# Patient Record
Sex: FEMALE | Race: BLACK OR AFRICAN AMERICAN | NOT HISPANIC OR LATINO | Employment: FULL TIME | URBAN - METROPOLITAN AREA
[De-identification: names, ages, dates, MRNs, and addresses within clinical notes are randomized per-mention and may not be internally consistent; named-entity substitution may affect disease eponyms.]

---

## 2017-01-20 ENCOUNTER — OFFICE VISIT (OUTPATIENT)
Dept: OBSTETRICS AND GYNECOLOGY | Facility: CLINIC | Age: 22
End: 2017-01-20
Payer: MEDICAID

## 2017-01-20 VITALS
BODY MASS INDEX: 37.77 KG/M2 | WEIGHT: 187.38 LBS | HEIGHT: 59 IN | SYSTOLIC BLOOD PRESSURE: 90 MMHG | DIASTOLIC BLOOD PRESSURE: 60 MMHG

## 2017-01-20 DIAGNOSIS — N91.2 AMENORRHEA: Primary | ICD-10-CM

## 2017-01-20 DIAGNOSIS — O09.891 HISTORY OF PRETERM DELIVERY, CURRENTLY PREGNANT, FIRST TRIMESTER: ICD-10-CM

## 2017-01-20 DIAGNOSIS — Z34.91 NORMAL PREGNANCY IN FIRST TRIMESTER: ICD-10-CM

## 2017-01-20 DIAGNOSIS — Z86.32 HISTORY OF GESTATIONAL DIABETES: ICD-10-CM

## 2017-01-20 DIAGNOSIS — R82.71 GBS BACTERIURIA: ICD-10-CM

## 2017-01-20 PROBLEM — O09.899 HISTORY OF PRETERM DELIVERY, CURRENTLY PREGNANT: Status: ACTIVE | Noted: 2017-01-20

## 2017-01-20 LAB
B-HCG UR QL: POSITIVE
CTP QC/QA: YES

## 2017-01-20 PROCEDURE — 99213 OFFICE O/P EST LOW 20 MIN: CPT | Mod: PBBFAC | Performed by: OBSTETRICS & GYNECOLOGY

## 2017-01-20 PROCEDURE — 99202 OFFICE O/P NEW SF 15 MIN: CPT | Mod: TH,S$PBB,, | Performed by: OBSTETRICS & GYNECOLOGY

## 2017-01-20 PROCEDURE — 99999 PR PBB SHADOW E&M-EST. PATIENT-LVL III: CPT | Mod: PBBFAC,,, | Performed by: OBSTETRICS & GYNECOLOGY

## 2017-01-20 PROCEDURE — 87086 URINE CULTURE/COLONY COUNT: CPT

## 2017-01-20 PROCEDURE — 81025 URINE PREGNANCY TEST: CPT | Mod: PBBFAC | Performed by: OBSTETRICS & GYNECOLOGY

## 2017-01-20 PROCEDURE — 87591 N.GONORRHOEAE DNA AMP PROB: CPT

## 2017-01-20 NOTE — LETTER
January 20, 2017      Anh Green MD  [address] [facility] [address]  [facility] [address]           [facility] [address]  [address] [facility] [address]  [facility] [address]  Phone: [phone]  Fax: [phone]          Patient: Jacobo Alvarado   MR Number: [id number]   YOB: 1995   Date of Visit: 1/20/2017       Dear Dr. Anh Green:    Thank you for referring Jacobo Alvarado to me for evaluation. Attached you will find relevant portions of my assessment and plan of care.    If you have questions, please do not hesitate to call me. I look forward to following Jacobo Alvarado along with you.    Sincerely,    Tiffanie Salinas MD    Enclosure  CC:  No Recipients    If you would like to receive this communication electronically, please contact [email or web address] or [phone] to request more information on [facility] access.    For providers and/or their staff who would like to refer a patient to [facility], please contact us through our one-stop-shop provider referral line, [facility], at [phone].    If you feel you have received this communication in error or would no longer like to receive these types of communications, please e-mail [email or web address]

## 2017-01-20 NOTE — MR AVS SNAPSHOT
"    [facility] [address]  [address]  [address]  [facility] [address]  Phone: [phone]  Fax: [phone]                  Jacobo Alvarado   2017 1:15 PM   Office Visit    Description:  Female : 1995   Provider:  Tiffanie Salinas MD   Department:  [facility] [address]           Reason for Visit     Amenorrhea           Diagnoses this Visit        Comments    Amenorrhea    -  Primary            To Do List           Goals (5 Years of Data)     None      [facility]     [facility] Nurse Care Line -  Assistance  Registered nurses in the [facility] On Call Center provide clinical advisement, health education, appointment booking, and other advisory services.  Call for this free service at [phone].             Medications           Message regarding Medications     Verify the changes and/or additions to your medication regime listed below are the same as discussed with your clinician today.  If any of these changes or additions are incorrect, please notify your healthcare provider.             Verify that the below list of medications is an accurate representation of the medications you are currently taking.  If none reported, the list may be blank. If incorrect, please contact your healthcare provider. Carry this list with you in case of emergency.           Current Medications     norgestimate-ethinyl estradiol (ORTHO-CYCLEN) 0.25-35 mg-mcg per tablet Take 1 tablet by mouth once daily.           Clinical Reference Information           Vital Signs - Last Recorded  Most recent update: 2017  1:51 PM by Saadia Treadwell    BP Ht Wt LMP BMI    90/60 4' 11" (1.499 m) 85 kg (187 lb 6.3 oz) 2016 37.85 kg/m2      Blood Pressure          Most Recent Value    BP  90/60      Allergies as of 2017     No Known Allergies      Immunizations Administered on Date of Encounter - 2017     None      Orders Placed During Today's Visit      Normal Orders This Visit    " POCT Urine Pregnancy          1/20/2017  1:59 PM - Saadia Treadwell      Component Results     Component Value Flag Ref Range Units Status    POC Preg Test, Ur Positive (A) Negative  Final     Acceptable Yes    Final

## 2017-01-20 NOTE — PROGRESS NOTES
New OB    SUBJECTIVE:     Chief Complaint: Amenorrhea (lmp 2016)       History of Present Illness:  Pt is a 22 y.o. female who presents complaining of amenorrhea.  She is 6 weeks by her LMP.  She denies vaginal bleeding but reports some abdominal cramping. She does have nausea and does not have vomiting.     Significant History:   History of PPROM at 36 weeks followed by induction  GDM diet controlled in previous pregnancy  Alcohol use prior to knowledge of pregnancy    P[facility] at [facility]    Review of patient's allergies indicates:  No Known Allergies    Past Medical History   Diagnosis Date    Chlamydia      History reviewed. No pertinent past surgical history.  OB History      Para Term  AB TAB SAB Ectopic Multiple Living    2 1  1      1        Family History   Problem Relation Age of Onset    Breast cancer Other     Diabetes Mother     Kidney disease Mother      Social History   Substance Use Topics    Smoking status: Never Smoker    Smokeless tobacco: None      Comment: quit one month ago    Alcohol use No       No current outpatient prescriptions on file.     No current facility-administered medications for this visit.        Review of Systems:  Review of Systems   Constitutional: Negative for fever and unexpected weight change.   Respiratory: Negative for shortness of breath.    Cardiovascular: Negative for chest pain.   Gastrointestinal: Positive for nausea. Negative for constipation, diarrhea and vomiting.   Genitourinary: Positive for pelvic pain (cramping). Negative for dysuria, frequency, menstrual problem, urgency, vaginal bleeding, vaginal discharge and vaginal pain.   Psychiatric/Behavioral: The patient is not nervous/anxious.         OBJECTIVE:     Physical Exam:  Physical Exam   Constitutional: She is oriented to person, place, and time. She appears well-developed and well-nourished.   Neck: Normal range of motion. Neck supple. No tracheal deviation present. No thyromegaly  present.   Cardiovascular: Normal rate, regular rhythm and normal heart sounds.    Pulmonary/Chest: Effort normal and breath sounds normal. Right breast exhibits no inverted nipple, no mass, no nipple discharge, no skin change and no tenderness. Left breast exhibits no inverted nipple, no mass, no nipple discharge, no skin change and no tenderness. Breasts are symmetrical.   Abdominal: Soft.   Genitourinary: Vagina normal and uterus normal. No labial fusion. There is no rash, tenderness, lesion or injury on the right labia. There is no rash, tenderness, lesion or injury on the left labia. Uterus is not deviated, not enlarged, not fixed and not tender. Cervix exhibits no motion tenderness, no discharge and no friability. Right adnexum displays no mass, no tenderness and no fullness. Left adnexum displays no mass, no tenderness and no fullness. No erythema, tenderness or bleeding in the vagina. No foreign body in the vagina. No signs of injury around the vagina. No vaginal discharge found.   Genitourinary Comments: Mild tenderness on exam   Neurological: She is alert and oriented to person, place, and time.   Psychiatric: She has a normal mood and affect. Her behavior is normal. Judgment and thought content normal.   Nursing note and vitals reviewed.        ASSESSMENT:       ICD-10-CM ICD-9-CM    1. Amenorrhea N91.2 626.0 POCT Urine Pregnancy      US OB/GYN Procedure ([facility])      Urine culture      C. trachomatis/N. gonorrhoeae by AMP DNA Urine   2. Normal pregnancy in first trimester Z34.91 V22.2    3. History of gestational diabetes Z86.32 V12.21    4. History of  delivery, currently pregnant, first trimester O09.211 V23.41           Plan:      Jacobo was seen today for amenorrhea.    Diagnoses and all orders for this visit:    Amenorrhea  -     POCT Urine Pregnancy  -     US OB/GYN Procedure ([facility]); Future  -     Urine culture  -     C. trachomatis/N. gonorrhoeae by AMP DNA Urine    Normal  pregnancy in first trimester    History of gestational diabetes    History of  delivery, currently pregnant, first trimester        Orders Placed This Encounter   Procedures    Urine culture    C. trachomatis/N. gonorrhoeae by AMP DNA Urine    POCT Urine Pregnancy    US OB/GYN Procedure ([facility])       Positive UPT:   - UPT: positive in the clinic  - Dating U/S: ordered today  - Will discuss down syndrome screening next visit  - Recommended Prenatal vitamins with with at least 400 mcg of folic acid  - First trimester labs: release signed from [facility]; if no records next visit will order    History of PPROM:   - needs jignesh at 16 weeks    History of GDM:   - will screen with labs for DM at next visit if not in records      Counseling: Patient was counseled today on routine 1st trimester precautions, including vaginal bleeding and abdominal pain. We also discussed proper weight gain based on the [place] [employer]'s recommendations based on her pre-pregnancy weight, foods to avoid in pregnancy (i.e. sushi, fish that are high in mercury, cold deli meat, and unpasteurized cheeses), environmental precautions such as cat litter, and prenatal vitamin options (i.e. stool softener, DHA).  Patient given A to Z handbook of pregnancy.    Return in about 1 month (around 2017) for ob check.    Tiffanie Salinas

## 2017-01-22 LAB
BACTERIA UR CULT: NORMAL
BACTERIA UR CULT: NORMAL

## 2017-01-23 LAB
C TRACH DNA SPEC QL NAA+PROBE: NEGATIVE
N GONORRHOEA DNA SPEC QL NAA+PROBE: NEGATIVE

## 2017-01-27 ENCOUNTER — PROCEDURE VISIT (OUTPATIENT)
Dept: OBSTETRICS AND GYNECOLOGY | Facility: CLINIC | Age: 22
End: 2017-01-27
Payer: MEDICAID

## 2017-01-27 DIAGNOSIS — N91.2 AMENORRHEA: ICD-10-CM

## 2017-01-27 PROBLEM — R82.71 GBS BACTERIURIA: Status: ACTIVE | Noted: 2017-01-27

## 2017-01-27 PROCEDURE — 76801 OB US < 14 WKS SINGLE FETUS: CPT | Mod: PBBFAC | Performed by: OBSTETRICS & GYNECOLOGY

## 2017-01-27 PROCEDURE — 76801 OB US < 14 WKS SINGLE FETUS: CPT | Mod: 26,S$PBB,, | Performed by: OBSTETRICS & GYNECOLOGY

## 2017-02-17 ENCOUNTER — ROUTINE PRENATAL (OUTPATIENT)
Dept: OBSTETRICS AND GYNECOLOGY | Facility: CLINIC | Age: 22
End: 2017-02-17
Payer: MEDICAID

## 2017-02-17 ENCOUNTER — TELEPHONE (OUTPATIENT)
Dept: OBSTETRICS AND GYNECOLOGY | Facility: CLINIC | Age: 22
End: 2017-02-17

## 2017-02-17 VITALS
BODY MASS INDEX: 37.76 KG/M2 | DIASTOLIC BLOOD PRESSURE: 70 MMHG | SYSTOLIC BLOOD PRESSURE: 100 MMHG | WEIGHT: 186.94 LBS

## 2017-02-17 DIAGNOSIS — O03.9 COMPLETE ABORTION: Primary | ICD-10-CM

## 2017-02-17 PROBLEM — Z34.91 NORMAL PREGNANCY IN FIRST TRIMESTER: Status: RESOLVED | Noted: 2017-01-20 | Resolved: 2017-02-17

## 2017-02-17 PROBLEM — O09.899 HISTORY OF PRETERM DELIVERY, CURRENTLY PREGNANT: Status: RESOLVED | Noted: 2017-01-20 | Resolved: 2017-02-17

## 2017-02-17 PROBLEM — Z86.32 HISTORY OF GESTATIONAL DIABETES: Status: RESOLVED | Noted: 2017-01-20 | Resolved: 2017-02-17

## 2017-02-17 PROCEDURE — 99212 OFFICE O/P EST SF 10 MIN: CPT | Mod: PBBFAC | Performed by: OBSTETRICS & GYNECOLOGY

## 2017-02-17 PROCEDURE — 88305 TISSUE EXAM BY PATHOLOGIST: CPT | Performed by: PATHOLOGY

## 2017-02-17 PROCEDURE — 99999 PR PBB SHADOW E&M-EST. PATIENT-LVL II: CPT | Mod: PBBFAC,,, | Performed by: OBSTETRICS & GYNECOLOGY

## 2017-02-17 PROCEDURE — 88305 TISSUE EXAM BY PATHOLOGIST: CPT | Mod: 26,,, | Performed by: PATHOLOGY

## 2017-02-17 PROCEDURE — 99214 OFFICE O/P EST MOD 30 MIN: CPT | Mod: TH,S$PBB,, | Performed by: OBSTETRICS & GYNECOLOGY

## 2017-02-17 RX ORDER — OXYCODONE AND ACETAMINOPHEN 5; 325 MG/1; MG/1
1 TABLET ORAL EVERY 4 HOURS PRN
Qty: 15 TABLET | Refills: 0 | Status: SHIPPED | OUTPATIENT
Start: 2017-02-17 | End: 2017-02-27

## 2017-02-17 RX ORDER — OXYCODONE AND ACETAMINOPHEN 5; 325 MG/1; MG/1
1 TABLET ORAL EVERY 4 HOURS PRN
Qty: 15 TABLET | Refills: 0 | Status: SHIPPED | OUTPATIENT
Start: 2017-02-17 | End: 2017-02-17 | Stop reason: SDUPTHER

## 2017-02-17 RX ORDER — MISOPROSTOL 200 UG/1
800 TABLET ORAL ONCE
Qty: 4 TABLET | Refills: 0 | Status: SHIPPED | OUTPATIENT
Start: 2017-02-17 | End: 2018-03-07

## 2017-02-17 NOTE — MR AVS SNAPSHOT
"    [facility] [address]  [address]  [address]  [facility] [address]  Phone: [phone]  Fax: [phone]                  Jacobo Alvarado   2017 10:00 AM   Routine Prenatal    Description:  Female : 1995   Provider:  Tiffanie Salinas MD   Department:  [facility] [address]           Reason for Visit     Routine Prenatal Visit                To Do List           Goals (5 Years of Data)     None      [facility]     [facility] On [facility] Nurse [facility] -  Assistance  Registered nurses in the [facility] On Call Center provide clinical advisement, health education, appointment booking, and other advisory services.  Call for this free service at [phone].             Medications           Message regarding Medications     Verify the changes and/or additions to your medication regime listed below are the same as discussed with your clinician today.  If any of these changes or additions are incorrect, please notify your healthcare provider.             Verify that the below list of medications is an accurate representation of the medications you are currently taking.  If none reported, the list may be blank. If incorrect, please contact your healthcare provider. Carry this list with you in case of emergency.                Clinical Reference Information           Prenatal Vitals     Enc. Date GA Prenatal Vitals Prenatal Pulse Pain Level Urine Albumin/Glucose Edema Presentation Dilation/Effacement/Station    17 10w0d 100/70 / 84.8 kg (186 lb 15.2 oz)   6        17 6w0d 90/60 / 85 kg (187 lb 6.3 oz)              TWG: 3.152 kg (6 lb 15.2 oz)   Pregravid weight: 81.6 kg (180 lb)   Height: 4' 11" (1.499 m)   BMI: 36.3       Your Vitals Were     BP Weight Last Period BMI       100/70 84.8 kg (186 lb 15.2 oz) 2016 37.76 kg/m2       Allergies as of 2017     No Known Allergies      Immunizations Administered on Date of Encounter - 2017     None      Language " Assistance Services     ATTENTION: Language assistance services are available, free of charge. Please call [phone].      ATENCIÓN: Si habla judson, tiene a dent disposición servicios gratuitos de asistencia lingüística. Llame al [phone].     CHÚ Ý: N?u b?n nói Ti?ng Vi?t, có các d?ch v? h? tr? ngôn ng? mi?n phí dành cho b?n. G?i s? [phone].         [facility] [address] complies with applicable Federal civil rights laws and does not discriminate on the basis of race, color, national origin, age, disability, or sex.

## 2017-02-17 NOTE — PROGRESS NOTES
Patient presents today with active bleeding and spontaneous ab in the clinic. She reports bleeding/cramping since 4 am.  She came late to her appointment due to discomfort.  She passed a 10 wk fetus in the clinic (specimen sent to pathology).  Speculum exam reveals minimal bleeding.  Patient did not tolerate exam and I was unable to visualize the cervix.  Grossly, no remaining products visible.    Cytotec 800 mcg sent to pharmacy.  Percocet rx given.  O positive blood type.  Hcg scheduled q 2 weeks starting in two weeks.   Patient desires paraguard IUD.  Will return for post partum eval and placement of IUD once approved.  Patient advised, no intercourse until IUD is placed to ensure no active pregnancy at the time of placement.

## 2017-02-19 ENCOUNTER — TELEPHONE (OUTPATIENT)
Dept: OBSTETRICS AND GYNECOLOGY | Facility: HOSPITAL | Age: 22
End: 2017-02-19

## 2017-02-19 NOTE — TELEPHONE ENCOUNTER
Patient called.  She was able to get cytotec from the pharmacy.  She does reports heavy bleeding.  Went through 5 pads over night.  Has only required 2 pads this morning.  Does not recall passing any more products. Overall, feels good.      Tiffanie Salinas

## 2017-02-22 ENCOUNTER — PATIENT MESSAGE (OUTPATIENT)
Dept: OBSTETRICS AND GYNECOLOGY | Facility: CLINIC | Age: 22
End: 2017-02-22

## 2017-02-22 DIAGNOSIS — R10.2 PELVIC PAIN: Primary | ICD-10-CM

## 2017-02-22 NOTE — TELEPHONE ENCOUNTER
"Patient states she has tenderness in vaginal and pelvic area with "shocking pain. Pt states she itches when she take the percocet. States she finished cytotec Saturday. States pain has improved slightly this morning. Please advise.  "

## 2017-02-23 ENCOUNTER — TELEPHONE (OUTPATIENT)
Dept: OBSTETRICS AND GYNECOLOGY | Facility: CLINIC | Age: 22
End: 2017-02-23

## 2017-02-23 NOTE — TELEPHONE ENCOUNTER
Patient called.  Timeline is as follows: miscarriage on Friday in the office, cytotec Saturday, bleeding/cramping resolved by Sunday, Monday started having occasional sharp shooting pains in the pelvis.  No fever or chills.  Reports pain is a 8-10/10.  Still spotting, minimal cramping.     Pelvic ultrasound and urine culture ordered.

## 2017-02-24 ENCOUNTER — HOSPITAL ENCOUNTER (OUTPATIENT)
Dept: RADIOLOGY | Facility: OTHER | Age: 22
Discharge: HOME | End: 2017-02-24
Attending: OBSTETRICS & GYNECOLOGY
Payer: MEDICAID

## 2017-02-24 DIAGNOSIS — R10.2 PELVIC PAIN: ICD-10-CM

## 2017-02-24 PROCEDURE — 76856 US EXAM PELVIC COMPLETE: CPT | Mod: 26,,, | Performed by: RADIOLOGY

## 2017-02-24 PROCEDURE — 76856 US EXAM PELVIC COMPLETE: CPT | Mod: TC

## 2017-02-24 PROCEDURE — 76830 TRANSVAGINAL US NON-OB: CPT | Mod: 26,,, | Performed by: RADIOLOGY

## 2017-02-27 ENCOUNTER — TELEPHONE (OUTPATIENT)
Dept: OBSTETRICS AND GYNECOLOGY | Facility: CLINIC | Age: 22
End: 2017-02-27

## 2017-02-27 NOTE — TELEPHONE ENCOUNTER
"Patient called.  Ultrasound reviewed with patient.  She reports that this weekend she has had "shocking" pains here and there throughout the day and some pelvic cramping.  Ultrasound is consistent with post ab appearance.  No masses in the adnexa.  Some free fluid.  Suspect pain is due to free fluid/rupture of cyst or possibly a UTI.  Pt did not drop off urine sample the other day, but will today. Pt denies fever, chills, pelvic tenderness, etc.  Bleeding is now minimal. Will continue to monitor pain.  If pain does not improve or worsens over the next few days, patient will notify me.    Tiffanie Salinas    "

## 2017-03-09 ENCOUNTER — PATIENT MESSAGE (OUTPATIENT)
Dept: OBSTETRICS AND GYNECOLOGY | Facility: CLINIC | Age: 22
End: 2017-03-09

## 2017-03-10 ENCOUNTER — TELEPHONE (OUTPATIENT)
Dept: OBSTETRICS AND GYNECOLOGY | Facility: CLINIC | Age: 22
End: 2017-03-10

## 2017-03-10 NOTE — TELEPHONE ENCOUNTER
----- Message from Ashlee White sent at 3/9/2017 10:19 AM CST -----  Contact: Self  Pt is calling in regards of some heavy bleeding that she is having. The pt states that the bleeding started today and filled up a pad within 10 mins. The pt can be reached at [phone]. Thanks KG

## 2017-03-10 NOTE — TELEPHONE ENCOUNTER
Spoke to patient and she states she passed a blood clot and she have not had any bleeding after that, patient denied pain, dizziness and weakness, patient was informed to monitor for fever, if she bleed to call the office. Patient verbalized understanding.

## 2017-03-15 ENCOUNTER — LAB VISIT (OUTPATIENT)
Dept: LAB | Facility: OTHER | Age: 22
End: 2017-03-15
Attending: OBSTETRICS & GYNECOLOGY
Payer: MEDICAID

## 2017-03-15 DIAGNOSIS — O03.9 COMPLETE ABORTION: ICD-10-CM

## 2017-03-15 LAB — HCG INTACT+B SERPL-ACNC: 23 MIU/ML

## 2017-03-15 PROCEDURE — 84702 CHORIONIC GONADOTROPIN TEST: CPT

## 2017-03-15 PROCEDURE — 36415 COLL VENOUS BLD VENIPUNCTURE: CPT

## 2017-03-24 ENCOUNTER — PATIENT MESSAGE (OUTPATIENT)
Dept: OBSTETRICS AND GYNECOLOGY | Facility: CLINIC | Age: 22
End: 2017-03-24

## 2017-03-27 ENCOUNTER — TELEPHONE (OUTPATIENT)
Dept: OBSTETRICS AND GYNECOLOGY | Facility: CLINIC | Age: 22
End: 2017-03-27

## 2017-03-27 NOTE — TELEPHONE ENCOUNTER
Spoke to patient. Pt states she has been having light bleeding for 4 days. Advised pt to monitor bleeding. Advised to go to ER if filling pad an hour, severe pelvic pain, chills and fever.

## 2017-04-04 ENCOUNTER — OFFICE VISIT (OUTPATIENT)
Dept: OBSTETRICS AND GYNECOLOGY | Facility: CLINIC | Age: 22
End: 2017-04-04
Payer: MEDICAID

## 2017-04-04 ENCOUNTER — LAB VISIT (OUTPATIENT)
Dept: LAB | Facility: OTHER | Age: 22
End: 2017-04-04
Attending: OBSTETRICS & GYNECOLOGY
Payer: MEDICAID

## 2017-04-04 VITALS
HEIGHT: 60 IN | BODY MASS INDEX: 36.53 KG/M2 | DIASTOLIC BLOOD PRESSURE: 76 MMHG | SYSTOLIC BLOOD PRESSURE: 110 MMHG | WEIGHT: 186.06 LBS

## 2017-04-04 DIAGNOSIS — R30.0 DYSURIA: ICD-10-CM

## 2017-04-04 DIAGNOSIS — O03.9 COMPLETE ABORTION: ICD-10-CM

## 2017-04-04 DIAGNOSIS — Z30.011 ENCOUNTER FOR INITIAL PRESCRIPTION OF CONTRACEPTIVE PILLS: ICD-10-CM

## 2017-04-04 DIAGNOSIS — N92.6 IRREGULAR BLEEDING: Primary | ICD-10-CM

## 2017-04-04 DIAGNOSIS — N89.8 VAGINAL DISCHARGE: ICD-10-CM

## 2017-04-04 LAB — HCG INTACT+B SERPL-ACNC: 2.3 MIU/ML

## 2017-04-04 PROCEDURE — 84702 CHORIONIC GONADOTROPIN TEST: CPT

## 2017-04-04 PROCEDURE — 36415 COLL VENOUS BLD VENIPUNCTURE: CPT

## 2017-04-04 PROCEDURE — 99214 OFFICE O/P EST MOD 30 MIN: CPT | Mod: S$PBB,,, | Performed by: OBSTETRICS & GYNECOLOGY

## 2017-04-04 PROCEDURE — 99999 PR PBB SHADOW E&M-EST. PATIENT-LVL III: CPT | Mod: PBBFAC,,, | Performed by: OBSTETRICS & GYNECOLOGY

## 2017-04-04 NOTE — PROGRESS NOTES
Gynecology    SUBJECTIVE:     Chief Complaint: Vaginal Bleeding (bleeding off and on)       History of Present Illness:  22 year old with bleeding irregularities.  Patient had a miscarriage that was 2/17/17.  She stopped bleeding one week later, started again one week after that with on/off bleeding for one week.  She had occasional spotting on and off for the month following this.  Then last week, patient had four days of heavy bleeding (pad every 2 hours) followed by on and off spotting (panti liner per day) and bleeding after intercourse.      Patient also complains of vaginal discharge- white, no odor, no itching, no burning.  Patient complains of a slight dysuria and urgency.      Review of Systems:  Review of Systems   Constitutional: Negative for appetite change, fever and unexpected weight change.   Respiratory: Negative for shortness of breath.    Cardiovascular: Negative for chest pain.   Gastrointestinal: Negative for nausea and vomiting.   Genitourinary: Positive for dysuria, frequency, menstrual problem, urgency and vaginal discharge. Negative for menorrhagia, pelvic pain, vaginal bleeding, vaginal pain and vaginal odor.        OBJECTIVE:     Physical Exam:  Physical Exam   Constitutional: She is oriented to person, place, and time. She appears well-developed and well-nourished.   Pulmonary/Chest: Effort normal.   Abdominal: Soft.   Genitourinary: Uterus normal. No labial fusion. There is no rash, tenderness, lesion or injury on the right labia. There is no rash, tenderness, lesion or injury on the left labia. Uterus is not deviated, not enlarged, not fixed and not tender. Cervix exhibits no motion tenderness, no discharge and no friability. Right adnexum displays no mass, no tenderness and no fullness. Left adnexum displays no mass, no tenderness and no fullness. There is bleeding in the vagina. No erythema or tenderness in the vagina. No foreign body in the vagina. No signs of injury around the  vagina. No vaginal discharge found.   Neurological: She is alert and oriented to person, place, and time.   Psychiatric: She has a normal mood and affect. Her behavior is normal. Judgment and thought content normal.   Nursing note and vitals reviewed.        ASSESSMENT:       ICD-10-CM ICD-9-CM    1. Irregular bleeding N92.6 626.4 norgestrel-ethinyl estradiol (LO/OVRAL) 0.3-30 mg-mcg per tablet   2. Encounter for initial prescription of contraceptive pills Z30.011 V25.01    3. Vaginal discharge N89.8 623.5 Vaginosis Screen by DNA Probe      C. trachomatis/N. gonorrhoeae by AMP DNA Cervix   4. Dysuria R30.0 788.1 Urine culture          Plan:      Jacobo was seen today for vaginal bleeding.    Diagnoses and all orders for this visit:    Irregular bleeding  -     norgestrel-ethinyl estradiol (LO/OVRAL) 0.3-30 mg-mcg per tablet; Take 1 tablet by mouth once daily.    Encounter for initial prescription of contraceptive pills    Vaginal discharge  -     Vaginosis Screen by DNA Probe  -     C. trachomatis/N. gonorrhoeae by AMP DNA Cervix    Dysuria  -     Urine culture      No obvious abnormalities on exam.   Patient needs final hCG.  Ordered today.    GC/CT and vaginosis screen ordered.   Urine culture ordered.   Consider pelvic ultrasound if all wnl and no change in bleeding.   Rx for OCP sent to pharmacy.  Plan to use for three months to regulate cycle.   Paraguard paperwork filled out.      Orders Placed This Encounter   Procedures    Vaginosis Screen by DNA Probe    C. trachomatis/N. gonorrhoeae by AMP DNA Cervix    Urine culture       Return for annual or prn.    Tiffanie Salinas

## 2017-04-04 NOTE — MR AVS SNAPSHOT
[facility] [address]  [address]  [address]  [facility] [address]  Phone: [phone]  Fax: [phone]                  Jacobo Alvarado   2017 3:45 PM   Office Visit    Description:  Female : 1995   Provider:  Tiffanie Salinas MD   Department:  [facility] [address]           Reason for Visit     Vaginal Bleeding                To Do List           Goals (5 Years of Data)     None      [facility] On Call     [facility] On Call Nurse Care Line -  Assistance  Unless otherwise directed by your provider, please contact [facility], our nurse care line that is available for  assistance.     Registered nurses in the [facility] On Call Center provide: appointment scheduling, clinical advisement, health education, and other advisory services.  Call: [phone] (toll free)               Medications           Message regarding Medications     Verify the changes and/or additions to your medication regime listed below are the same as discussed with your clinician today.  If any of these changes or additions are incorrect, please notify your healthcare provider.             Verify that the below list of medications is an accurate representation of the medications you are currently taking.  If none reported, the list may be blank. If incorrect, please contact your healthcare provider. Carry this list with you in case of emergency.           Current Medications     misoprostol (CYTOTEC) 200 MCG Tab Place 4 tablets (800 mcg total) vaginally once. Take 200 mg of cytotec every 6 hours until complete.           Clinical Reference Information           Your Vitals Were     BP Height Weight Last Period BMI    110/76 5' (1.524 m) 84.4 kg (186 lb 1.1 oz) (LMP Unknown) 36.34 kg/m2      Blood Pressure          Most Recent Value    BP  110/76      Allergies as of 2017     No Known Allergies      Immunizations Administered on Date of Encounter - 2017     None      Language  Assistance Services     ATTENTION: Language assistance services are available, free of charge. Please call [phone].      ATENCIÓN: Si habla judson, tiene a dent disposición servicios gratuitos de asistencia lingüística. Llame al [phone].     CHÚ Ý: N?u b?n nói Ti?ng Vi?t, có các d?ch v? h? tr? ngôn ng? mi?n phí dành cho b?n. G?i s? [phone].         [facility] [address] complies with applicable Federal civil rights laws and does not discriminate on the basis of race, color, national origin, age, disability, or sex.

## 2017-04-05 LAB
C TRACH DNA SPEC QL NAA+PROBE: NOT DETECTED
CANDIDA RRNA VAG QL PROBE: NEGATIVE
G VAGINALIS RRNA GENITAL QL PROBE: POSITIVE
N GONORRHOEA DNA SPEC QL NAA+PROBE: NOT DETECTED
T VAGINALIS RRNA GENITAL QL PROBE: NEGATIVE

## 2017-04-06 ENCOUNTER — PATIENT MESSAGE (OUTPATIENT)
Dept: OBSTETRICS AND GYNECOLOGY | Facility: HOSPITAL | Age: 22
End: 2017-04-06

## 2017-04-06 DIAGNOSIS — N76.0 BV (BACTERIAL VAGINOSIS): Primary | ICD-10-CM

## 2017-04-06 DIAGNOSIS — B96.89 BV (BACTERIAL VAGINOSIS): Primary | ICD-10-CM

## 2017-04-06 LAB — BACTERIA UR CULT: NO GROWTH

## 2017-04-06 RX ORDER — METRONIDAZOLE 500 MG/1
500 TABLET ORAL EVERY 12 HOURS
Qty: 14 TABLET | Refills: 0 | Status: SHIPPED | OUTPATIENT
Start: 2017-04-06 | End: 2017-04-13

## 2017-04-10 ENCOUNTER — PATIENT MESSAGE (OUTPATIENT)
Dept: OBSTETRICS AND GYNECOLOGY | Facility: CLINIC | Age: 22
End: 2017-04-10

## 2017-07-28 ENCOUNTER — OFFICE VISIT (OUTPATIENT)
Dept: OBSTETRICS AND GYNECOLOGY | Facility: CLINIC | Age: 22
End: 2017-07-28
Payer: MEDICAID

## 2017-07-28 VITALS — HEIGHT: 59 IN | WEIGHT: 187.81 LBS | BODY MASS INDEX: 37.86 KG/M2

## 2017-07-28 DIAGNOSIS — Z11.3 SCREEN FOR STD (SEXUALLY TRANSMITTED DISEASE): ICD-10-CM

## 2017-07-28 DIAGNOSIS — N64.4 BREAST PAIN: ICD-10-CM

## 2017-07-28 DIAGNOSIS — Z01.419 WELL WOMAN EXAM WITH ROUTINE GYNECOLOGICAL EXAM: Primary | ICD-10-CM

## 2017-07-28 PROCEDURE — 99212 OFFICE O/P EST SF 10 MIN: CPT | Mod: PBBFAC | Performed by: OBSTETRICS & GYNECOLOGY

## 2017-07-28 PROCEDURE — 99395 PREV VISIT EST AGE 18-39: CPT | Mod: S$PBB,,, | Performed by: OBSTETRICS & GYNECOLOGY

## 2017-07-28 PROCEDURE — 87591 N.GONORRHOEAE DNA AMP PROB: CPT

## 2017-07-28 PROCEDURE — 99999 PR PBB SHADOW E&M-EST. PATIENT-LVL II: CPT | Mod: PBBFAC,,, | Performed by: OBSTETRICS & GYNECOLOGY

## 2017-07-28 NOTE — PROGRESS NOTES
History & Physical  Gynecology      SUBJECTIVE:     Chief Complaint: Annual Exam; Breast Pain (right); and STD CHECK       History of Present Illness:  Annual Exam-Premenopausal  Patient presents for annual exam. The patient has  complaints today of breast discomfort on the underside of her breasts.  She also reports a sharp pain in her chest that is over her right breast that lasts about one minute.  Not necessary after eating. Menstrual cycles are once a month/regular.  The patient is sexually active. GYN screening history: last pap: approximate date  and was normal. The patient participates in regular exercise: yes.  The patient does not smoke.      Contraception: condoms    FH:  Breast cancer: great grandmother, aunt  Colon cancer: none  Ovarian cancer: none    Review of patient's allergies indicates:  No Known Allergies    Past Medical History:   Diagnosis Date    Chlamydia      History reviewed. No pertinent surgical history.  OB History      Para Term  AB Living    2 1   1   1    SAB TAB Ectopic Multiple Live Births            1        Family History   Problem Relation Age of Onset    Breast cancer Other     Diabetes Mother     Kidney disease Mother      Social History   Substance Use Topics    Smoking status: Never Smoker    Smokeless tobacco: Not on file      Comment: quit one month ago    Alcohol use No       Current Outpatient Prescriptions   Medication Sig    misoprostol (CYTOTEC) 200 MCG Tab Place 4 tablets (800 mcg total) vaginally once. Take 200 mg of cytotec every 6 hours until complete.    norgestrel-ethinyl estradiol (LO/OVRAL) 0.3-30 mg-mcg per tablet Take 1 tablet by mouth once daily.     No current facility-administered medications for this visit.          Review of Systems:  Review of Systems   Constitutional: Negative for activity change, appetite change and fever.   Respiratory: Negative for shortness of breath.    Cardiovascular: Negative for chest pain.    Gastrointestinal: Negative for abdominal pain, constipation, diarrhea, nausea and vomiting.   Genitourinary: Negative for menorrhagia, menstrual problem, pelvic pain, vaginal bleeding, vaginal discharge and vaginal pain.   Neurological: Negative for numbness and headaches.   Breast: Negative for breast pain and nipple discharge       OBJECTIVE:     Physical Exam:  Physical Exam   Constitutional: She is oriented to person, place, and time. She appears well-developed and well-nourished.   Neck: Normal range of motion. Neck supple. No tracheal deviation present. No thyromegaly present.   Cardiovascular: Normal rate, regular rhythm and normal heart sounds.    Pulmonary/Chest: Effort normal and breath sounds normal. Right breast exhibits no inverted nipple, no mass, no nipple discharge, no skin change and no tenderness. Left breast exhibits no inverted nipple, no mass, no nipple discharge, no skin change and no tenderness. Breasts are symmetrical.   Large, pendulous breasts. No rashes or abnormalities; no lumps   Abdominal: Soft.   Genitourinary: Vagina normal and uterus normal. No labial fusion. There is no rash, tenderness, lesion or injury on the right labia. There is no rash, tenderness, lesion or injury on the left labia. Uterus is not deviated, not enlarged, not fixed and not tender. Cervix exhibits no motion tenderness, no discharge and no friability. Right adnexum displays no mass, no tenderness and no fullness. Left adnexum displays no mass, no tenderness and no fullness. No erythema, tenderness or bleeding in the vagina. No foreign body in the vagina. No signs of injury around the vagina. No vaginal discharge found.   Neurological: She is alert and oriented to person, place, and time.   Psychiatric: She has a normal mood and affect. Her behavior is normal. Judgment and thought content normal.   Nursing note and vitals reviewed.        ASSESSMENT:       ICD-10-CM ICD-9-CM    1. Well woman exam with routine  gynecological exam Z01.419 V72.31    2. Screen for STD (sexually transmitted disease) Z11.3 V74.5 C. trachomatis/N. gonorrhoeae by AMP DNA Cervix   3. Breast pain N64.4 611.71           Plan:      Jacobo was seen today for annual exam, breast pain and std check.    Diagnoses and all orders for this visit:    Well woman exam with routine gynecological exam    Screen for STD (sexually transmitted disease)  -     C. trachomatis/N. gonorrhoeae by AMP DNA Cervix    Breast pain        Orders Placed This Encounter   Procedures    C. trachomatis/N. gonorrhoeae by AMP DNA Cervix       Well Woman:  - Pap smear due next year  - Birth control: condoms; discussed kyleena and other IUDs  - GC/CT:done today  - Mammogram: due age 39  - Smoking cessation: n/a  - Labs: none reuqired   - Vaccines: suggested that patient check with peds to see if she's completed HPV  - breast pain: suspect due to large, pendulous breasts; recommended supportive bra; nsaids;     Return in about 1 year (around 7/28/2018) for annual or prn.    Tiffanie Salinas

## 2017-07-29 LAB
C TRACH DNA SPEC QL NAA+PROBE: NOT DETECTED
N GONORRHOEA DNA SPEC QL NAA+PROBE: NOT DETECTED

## 2017-09-01 ENCOUNTER — PATIENT MESSAGE (OUTPATIENT)
Dept: OBSTETRICS AND GYNECOLOGY | Facility: CLINIC | Age: 22
End: 2017-09-01

## 2018-02-03 ENCOUNTER — PATIENT MESSAGE (OUTPATIENT)
Dept: OBSTETRICS AND GYNECOLOGY | Facility: CLINIC | Age: 23
End: 2018-02-03

## 2018-02-19 ENCOUNTER — PATIENT MESSAGE (OUTPATIENT)
Dept: OBSTETRICS AND GYNECOLOGY | Facility: CLINIC | Age: 23
End: 2018-02-19

## 2018-03-07 ENCOUNTER — LAB VISIT (OUTPATIENT)
Dept: LAB | Facility: OTHER | Age: 23
End: 2018-03-07
Attending: OBSTETRICS & GYNECOLOGY
Payer: MEDICAID

## 2018-03-07 ENCOUNTER — OFFICE VISIT (OUTPATIENT)
Dept: OBSTETRICS AND GYNECOLOGY | Facility: CLINIC | Age: 23
End: 2018-03-07
Payer: MEDICAID

## 2018-03-07 VITALS
SYSTOLIC BLOOD PRESSURE: 110 MMHG | DIASTOLIC BLOOD PRESSURE: 80 MMHG | WEIGHT: 185.19 LBS | HEIGHT: 59 IN | BODY MASS INDEX: 37.33 KG/M2

## 2018-03-07 DIAGNOSIS — N92.6 IRREGULAR PERIODS: Primary | ICD-10-CM

## 2018-03-07 DIAGNOSIS — Z11.3 SCREEN FOR STD (SEXUALLY TRANSMITTED DISEASE): ICD-10-CM

## 2018-03-07 DIAGNOSIS — R10.2 PELVIC PAIN: ICD-10-CM

## 2018-03-07 DIAGNOSIS — N92.6 IRREGULAR PERIODS: ICD-10-CM

## 2018-03-07 LAB — TSH SERPL DL<=0.005 MIU/L-ACNC: 1.39 UIU/ML

## 2018-03-07 PROCEDURE — 87491 CHLMYD TRACH DNA AMP PROBE: CPT

## 2018-03-07 PROCEDURE — 86592 SYPHILIS TEST NON-TREP QUAL: CPT

## 2018-03-07 PROCEDURE — 84146 ASSAY OF PROLACTIN: CPT

## 2018-03-07 PROCEDURE — 80074 ACUTE HEPATITIS PANEL: CPT

## 2018-03-07 PROCEDURE — 84443 ASSAY THYROID STIM HORMONE: CPT

## 2018-03-07 PROCEDURE — 99213 OFFICE O/P EST LOW 20 MIN: CPT | Mod: PBBFAC | Performed by: OBSTETRICS & GYNECOLOGY

## 2018-03-07 PROCEDURE — 99214 OFFICE O/P EST MOD 30 MIN: CPT | Mod: S$PBB,,, | Performed by: OBSTETRICS & GYNECOLOGY

## 2018-03-07 PROCEDURE — 99999 PR PBB SHADOW E&M-EST. PATIENT-LVL III: CPT | Mod: PBBFAC,,, | Performed by: OBSTETRICS & GYNECOLOGY

## 2018-03-07 PROCEDURE — 86703 HIV-1/HIV-2 1 RESULT ANTBDY: CPT

## 2018-03-07 PROCEDURE — 36415 COLL VENOUS BLD VENIPUNCTURE: CPT

## 2018-03-07 PROCEDURE — 87086 URINE CULTURE/COLONY COUNT: CPT

## 2018-03-07 NOTE — PROGRESS NOTES
Gynecology    SUBJECTIVE:     Chief Complaint: irregular periods       History of Present Illness:  23 year old who presents with irregular periods.  Previously, cycles were once a month/regular.  Patient's periods have lasted 3-5 days each month.  The first day of each period has been on 10/26, 12/1, 1/1, 2/6, and 2/17.  She is concerned that these are irregular.  She has no problems with weight gain, hair growth, acne.  She does feel fatigued due to starting a new job.   She never started the OCP that was prescribed to her because she is considering another pregnancy.      She also complains of an occasional cramp on the right side minimal in severity and only occasionally. She does report also some urinary frequency.  No dysuria, no urgency.      She also wants STD testing.    Review of Systems:  Review of Systems   Constitutional: Negative for appetite change, fever and unexpected weight change.   Respiratory: Negative for shortness of breath.    Cardiovascular: Negative for chest pain.   Gastrointestinal: Negative for nausea and vomiting.   Genitourinary: Positive for menstrual problem. Negative for menorrhagia, pelvic pain, vaginal bleeding, vaginal discharge and vaginal pain.        OBJECTIVE:     Physical Exam:  Physical Exam   Constitutional: She is oriented to person, place, and time. She appears well-developed and well-nourished.   Pulmonary/Chest: Effort normal.   Neurological: She is oriented to person, place, and time.   Skin: No pallor.   Psychiatric: She has a normal mood and affect. Her behavior is normal. Judgment and thought content normal.   Nursing note and vitals reviewed.        ASSESSMENT:       ICD-10-CM ICD-9-CM    1. Irregular periods N92.6 626.4 TSH      Prolactin   2. Pelvic pain R10.2 [id number] Urine culture   3. Screen for STD (sexually transmitted disease) Z11.3 V74.5 HIV-1 and HIV-2 antibodies      RPR      Hepatitis panel, acute      C. trachomatis/N. gonorrhoeae by AMP DNA Urine           Plan:      Jacobo was seen today for irregular periods.    Diagnoses and all orders for this visit:    Irregular periods  -     TSH; Future  -     Prolactin; Future    Pelvic pain  -     Urine culture    Screen for STD (sexually transmitted disease)  -     HIV-1 and HIV-2 antibodies; Future  -     RPR; Future  -     Hepatitis panel, acute; Future  -     C. trachomatis/N. gonorrhoeae by AMP DNA Urine    - recommended OCP until she and her partner are ready for another pregnancy  - reassured that other than this most recent cycle, that her periods are mostly monthly; will order TSH and prolactin today  - patient desires STD screen  - urine culture to rule out UTI as cause for occasional sharp pain and urinary frequency    Orders Placed This Encounter   Procedures    Urine culture    C. trachomatis/N. gonorrhoeae by AMP DNA Urine    TSH    Prolactin    HIV-1 and HIV-2 antibodies    RPR    Hepatitis panel, acute       Follow-up for annual or prn.    Tiffanie Salinas

## 2018-03-08 LAB
HAV IGM SERPL QL IA: NEGATIVE
HBV CORE IGM SERPL QL IA: NEGATIVE
HBV SURFACE AG SERPL QL IA: NEGATIVE
HCV AB SERPL QL IA: NEGATIVE
HIV 1+2 AB+HIV1 P24 AG SERPL QL IA: NEGATIVE
PROLACTIN SERPL IA-MCNC: 25.2 NG/ML
RPR SER QL: NORMAL

## 2018-03-09 LAB
BACTERIA UR CULT: NORMAL
BACTERIA UR CULT: NORMAL
C TRACH DNA SPEC QL NAA+PROBE: NOT DETECTED
N GONORRHOEA DNA SPEC QL NAA+PROBE: NOT DETECTED

## 2018-03-29 ENCOUNTER — OFFICE VISIT (OUTPATIENT)
Dept: OBSTETRICS AND GYNECOLOGY | Facility: CLINIC | Age: 23
End: 2018-03-29
Payer: MEDICAID

## 2018-03-29 VITALS
DIASTOLIC BLOOD PRESSURE: 52 MMHG | WEIGHT: 191.56 LBS | HEIGHT: 59 IN | SYSTOLIC BLOOD PRESSURE: 137 MMHG | BODY MASS INDEX: 38.62 KG/M2

## 2018-03-29 DIAGNOSIS — N91.2 AMENORRHEA: Primary | ICD-10-CM

## 2018-03-29 DIAGNOSIS — Z32.01 POSITIVE URINE PREGNANCY TEST: ICD-10-CM

## 2018-03-29 PROBLEM — R82.71 GBS BACTERIURIA: Status: RESOLVED | Noted: 2017-01-27 | Resolved: 2018-03-29

## 2018-03-29 PROCEDURE — 88175 CYTOPATH C/V AUTO FLUID REDO: CPT

## 2018-03-29 PROCEDURE — 99214 OFFICE O/P EST MOD 30 MIN: CPT | Mod: TH,S$GLB,, | Performed by: OBSTETRICS & GYNECOLOGY

## 2018-03-29 PROCEDURE — 87086 URINE CULTURE/COLONY COUNT: CPT

## 2018-03-29 PROCEDURE — 87480 CANDIDA DNA DIR PROBE: CPT

## 2018-03-29 RX ORDER — PYRIDOXINE HCL (VITAMIN B6) 25 MG
TABLET ORAL
Qty: 40 TABLET | Refills: 3 | Status: SHIPPED | OUTPATIENT
Start: 2018-03-29 | End: 2018-04-24

## 2018-03-29 NOTE — PROGRESS NOTES
New OB    SUBJECTIVE:     Chief Complaint: Initial Prenatal Visit       History of Present Illness:  Pt is a 23 y.o. female who presents complaining of amenorrhea.  She is 7w2d based on her LMP.    She does not vaginal bleeding/spotting, does not abdominal pain,  does have nausea, does have vomiting.     Significant History:   History of  delivery (36 weeks)    Last pap: , normal    Review of patient's allergies indicates:  No Known Allergies    Past Medical History:   Diagnosis Date    Chlamydia      History reviewed. No pertinent surgical history.  OB History      Para Term  AB Living    4 1   1 1 1    SAB TAB Ectopic Multiple Live Births    1       1        Family History   Problem Relation Age of Onset    Breast cancer Other     Diabetes Mother     Kidney disease Mother     Ovarian cancer Neg Hx     Colon cancer Neg Hx      Social History   Substance Use Topics    Smoking status: Never Smoker    Smokeless tobacco: Not on file      Comment: quit one month ago    Alcohol use No       Current Outpatient Prescriptions   Medication Sig    doxylamine succinate (ULTRA SLEEP, DOXYLAMINE SUCC,) 25 mg tablet Take 1 tablet nightly. If still nauseated, add 1/2 a tablet in the morning and 1/2 a tablet in the afternoon.    PNV,calcium 72-iron-folic acid 27 mg iron- 1 mg Tab Take 1 tablet (1 each total) by mouth once daily.    pyridoxine, vitamin B6, (B-6) 25 MG Tab Take 1 tablet nightly. If still nauseated, add 1/2 a tablet in the morning and 1/2 a tablet in the afternoon.     No current facility-administered medications for this visit.        Review of Systems:  Review of Systems   Constitutional: Negative for fever and unexpected weight change.   Respiratory: Negative for shortness of breath.    Cardiovascular: Negative for chest pain.   Gastrointestinal: Positive for nausea and vomiting. Negative for constipation and diarrhea.   Genitourinary: Negative for dysuria, frequency, menstrual  problem, pelvic pain, urgency, vaginal bleeding, vaginal discharge and vaginal pain.   Psychiatric/Behavioral: The patient is not nervous/anxious.         OBJECTIVE:     Physical Exam:  Physical Exam   Constitutional: She is oriented to person, place, and time. She appears well-developed and well-nourished.   Neck: Normal range of motion. Neck supple. No tracheal deviation present. No thyromegaly present.   Cardiovascular: Normal rate, regular rhythm and normal heart sounds.    Pulmonary/Chest: Effort normal and breath sounds normal. Right breast exhibits no inverted nipple, no mass, no nipple discharge, no skin change and no tenderness. Left breast exhibits no inverted nipple, no mass, no nipple discharge, no skin change and no tenderness. Breasts are symmetrical.   Abdominal: Soft.   Genitourinary: Vagina normal and uterus normal. No labial fusion. There is no rash, tenderness, lesion or injury on the right labia. There is no rash, tenderness, lesion or injury on the left labia. Uterus is not deviated, not enlarged, not fixed and not tender. Cervix exhibits no motion tenderness, no discharge and no friability. Right adnexum displays no mass, no tenderness and no fullness. Left adnexum displays no mass, no tenderness and no fullness. No erythema, tenderness or bleeding in the vagina. No foreign body in the vagina. No signs of injury around the vagina. No vaginal discharge found.   Neurological: She is alert and oriented to person, place, and time.   Psychiatric: She has a normal mood and affect. Her behavior is normal. Judgment and thought content normal.   Nursing note and vitals reviewed.      Chaperoned by: Laura    ASSESSMENT:       ICD-10-CM ICD-9-CM    1. Amenorrhea N91.2 626.0  OB/GYN Procedure ([facility])      CANCELED: POCT urine pregnancy   2. Positive urine pregnancy test Z32.01 V72.42 CBC auto differential      Hepatitis B surface antigen      Rubella antibody, IgG      Urine culture       Liquid-based pap smear, screening      Type & Screen      PNV,calcium 72-iron-folic acid 27 mg iron- 1 mg Tab      doxylamine succinate (ULTRA SLEEP, DOXYLAMINE SUCC,) 25 mg tablet      pyridoxine, vitamin B6, (B-6) 25 MG Tab      US MFM Procedure ([facility])      Cystic Fibrosis Mutation Panel      Hemoglobin A1c      Vaginosis Screen by DNA Probe          Plan:      Jacobo was seen today for initial prenatal visit.    Diagnoses and all orders for this visit:    Amenorrhea  -     Cancel: POCT urine pregnancy  -     US OB/GYN Procedure ([facility]); Future    Positive urine pregnancy test  -     CBC auto differential; Future  -     Hepatitis B surface antigen; Future  -     Rubella antibody, IgG; Future  -     Urine culture  -     Liquid-based pap smear, screening  -     Type & Screen; Future  -     PNV,calcium 72-iron-folic acid 27 mg iron- 1 mg Tab; Take 1 tablet (1 each total) by mouth once daily.  -     doxylamine succinate (ULTRA SLEEP, DOXYLAMINE SUCC,) 25 mg tablet; Take 1 tablet nightly. If still nauseated, add 1/2 a tablet in the morning and 1/2 a tablet in the afternoon.  -     pyridoxine, vitamin B6, (B-6) 25 MG Tab; Take 1 tablet nightly. If still nauseated, add 1/2 a tablet in the morning and 1/2 a tablet in the afternoon.  -     US MFM Procedure ([facility]); Future  -     Cystic Fibrosis Mutation Panel; Future  -     Hemoglobin A1c; Future  -     Vaginosis Screen by DNA Probe        Orders Placed This Encounter   Procedures    Urine culture    Vaginosis Screen by DNA Probe    CBC auto differential    Hepatitis B surface antigen    Rubella antibody, IgG    Cystic Fibrosis Mutation Panel    Hemoglobin A1c    Type & Screen    US OB/GYN Procedure ([facility])    US MFM Procedure ([facility])       Positive UPT:   - UPT: positive in the clinic  - Dating U/S: ordered today  - She does want 1st trimester screening with MFM.   - Recommended Prenatal vitamins with with at least 400 mcg of folic acid  -  First trimester labs: ordered +CF;     History of  delivery:  - recommended progesterone for 16-36 weeks; discussed increased risk of  delivery this pregnancy    Obesity:  - Obesity/Overweight:   - counseled on the risks with obesity: miscarriage, structural anomalies, gestational diabetes, gestational hypertension, pre-eclampsia, abnormal growth patterns  - discussed recommended weight gain based on BMI:    BMI 25-29.9: 15-25 lb; >30: 11-20 lb      Counseling: Patient was counseled today on routine 1st trimester precautions, including vaginal bleeding and abdominal pain. We also discussed proper weight gain based on the [place] [employer]'s recommendations based on her pre-pregnancy weight, foods to avoid in pregnancy (i.e. sushi, fish that are high in mercury, cold deli meat, and unpasteurized cheeses), environmental precautions such as cat litter, and prenatal vitamin options (i.e. stool softener, DHA).  Patient given A to Z handbook of pregnancy.    No Follow-up on file.    Tiffanie Salinas

## 2018-03-30 ENCOUNTER — PATIENT MESSAGE (OUTPATIENT)
Dept: OBSTETRICS AND GYNECOLOGY | Facility: CLINIC | Age: 23
End: 2018-03-30

## 2018-03-30 DIAGNOSIS — B37.9 YEAST INFECTION: Primary | ICD-10-CM

## 2018-03-30 LAB
BACTERIA UR CULT: NORMAL
BACTERIA UR CULT: NORMAL
CANDIDA RRNA VAG QL PROBE: POSITIVE
G VAGINALIS RRNA GENITAL QL PROBE: NEGATIVE
T VAGINALIS RRNA GENITAL QL PROBE: NEGATIVE

## 2018-03-30 RX ORDER — MICONAZOLE NITRATE 100 MG/1
100 SUPPOSITORY VAGINAL NIGHTLY
Qty: 7 SUPPOSITORY | Refills: 0 | Status: SHIPPED | OUTPATIENT
Start: 2018-03-30 | End: 2018-04-24

## 2018-04-09 ENCOUNTER — PATIENT MESSAGE (OUTPATIENT)
Dept: OBSTETRICS AND GYNECOLOGY | Facility: CLINIC | Age: 23
End: 2018-04-09

## 2018-04-09 ENCOUNTER — LAB VISIT (OUTPATIENT)
Dept: LAB | Facility: OTHER | Age: 23
End: 2018-04-09
Attending: OBSTETRICS & GYNECOLOGY
Payer: MEDICAID

## 2018-04-09 ENCOUNTER — PROCEDURE VISIT (OUTPATIENT)
Dept: OBSTETRICS AND GYNECOLOGY | Facility: CLINIC | Age: 23
End: 2018-04-09
Payer: MEDICAID

## 2018-04-09 DIAGNOSIS — N91.2 AMENORRHEA: ICD-10-CM

## 2018-04-09 DIAGNOSIS — Z32.01 POSITIVE URINE PREGNANCY TEST: ICD-10-CM

## 2018-04-09 LAB
ABO + RH BLD: NORMAL
BASOPHILS # BLD AUTO: 0.01 K/UL
BASOPHILS NFR BLD: 0.1 %
BLD GP AB SCN CELLS X3 SERPL QL: NORMAL
DIFFERENTIAL METHOD: ABNORMAL
EOSINOPHIL # BLD AUTO: 0.1 K/UL
EOSINOPHIL NFR BLD: 1.5 %
ERYTHROCYTE [DISTWIDTH] IN BLOOD BY AUTOMATED COUNT: 14.5 %
ESTIMATED AVG GLUCOSE: 103 MG/DL
HBA1C MFR BLD HPLC: 5.2 %
HCT VFR BLD AUTO: 37.6 %
HGB BLD-MCNC: 11.8 G/DL
LYMPHOCYTES # BLD AUTO: 2.2 K/UL
LYMPHOCYTES NFR BLD: 25.6 %
MCH RBC QN AUTO: 25.8 PG
MCHC RBC AUTO-ENTMCNC: 31.4 G/DL
MCV RBC AUTO: 82 FL
MONOCYTES # BLD AUTO: 0.8 K/UL
MONOCYTES NFR BLD: 9.1 %
NEUTROPHILS # BLD AUTO: 5.4 K/UL
NEUTROPHILS NFR BLD: 63.6 %
PLATELET # BLD AUTO: 346 K/UL
PMV BLD AUTO: 9.5 FL
RBC # BLD AUTO: 4.58 M/UL
WBC # BLD AUTO: 8.49 K/UL

## 2018-04-09 PROCEDURE — 86901 BLOOD TYPING SEROLOGIC RH(D): CPT

## 2018-04-09 PROCEDURE — 76801 OB US < 14 WKS SINGLE FETUS: CPT | Mod: 26,S$PBB,, | Performed by: PEDIATRICS

## 2018-04-09 PROCEDURE — 76817 TRANSVAGINAL US OBSTETRIC: CPT | Mod: PBBFAC | Performed by: PEDIATRICS

## 2018-04-09 PROCEDURE — 36415 COLL VENOUS BLD VENIPUNCTURE: CPT

## 2018-04-09 PROCEDURE — 86762 RUBELLA ANTIBODY: CPT

## 2018-04-09 PROCEDURE — 83036 HEMOGLOBIN GLYCOSYLATED A1C: CPT

## 2018-04-09 PROCEDURE — 87340 HEPATITIS B SURFACE AG IA: CPT

## 2018-04-09 PROCEDURE — 76801 OB US < 14 WKS SINGLE FETUS: CPT | Mod: PBBFAC | Performed by: PEDIATRICS

## 2018-04-09 PROCEDURE — 76817 TRANSVAGINAL US OBSTETRIC: CPT | Mod: 26,S$PBB,, | Performed by: PEDIATRICS

## 2018-04-09 PROCEDURE — 81220 CFTR GENE COM VARIANTS: CPT

## 2018-04-09 PROCEDURE — 85025 COMPLETE CBC W/AUTO DIFF WBC: CPT

## 2018-04-09 NOTE — PROCEDURES
Procedures     Indication  ========    Estimation of gestational age.    Method  ======    Transvaginal ultrasound examination, [place] S8. View: Good view.    Pregnancy  =========    Jackman pregnancy. Number of fetuses: 1.    Dating  ======    LMP on: 2/6/2018  GA by LMP 8 w + 6 d  ILDA by LMP: 11/13/2018    Assessment  ==========    Gestational sac: visualized  Location: intrauterine  GS mean 13.4 mm 6w 1d Rempen  Yolk sac: visualized  YS mean 37.0 mm  Embryo: uncertain  Cardiac activity: uncertain    Maternal Structures  ===============    Ovaries / Tubes / Adnexa  Rt ovary: Visualized  Rt ovary D1 3.1 cm  Rt ovary D2 3.0 cm  Rt ovary D3 1.6 cm  Rt ovary mean 2.6 cm  Rt ovary vol 8.0 cm³  Lt ovary: Visualized  Lt ovary D1 3.7 cm  Lt ovary D2 2.7 cm  Lt ovary D3 2.2 cm  Lt ovary mean 2.9 cm  Lt ovary vol 11.3 cm³  Lt ovarian corpus luteum D1 21.0 mm  Lt ovarian corpus luteum D2 18.0 mm  Lt ovarian corpus luteum D3 17.0 mm  Pouch of Andrew / Other Structures  Cul de Sac details: Anechoic  Free fluid: Free fluid visualized  Pouch of Andrew largest pool D1 35.0 mm  Pouch of Andrew largest pool D2 22.0 mm  Pouch of Andrew largest pool D3 10.0 mm  Pouch of Andrew largest poo Vol. 4.032 ml          Impression  =========    Gestational sac noted with yolk sac. No embryonic pole identified.    Size of sac is not consistent with LMP dating.    An intrauterine gestational sac is seen. The mean sac diameter (MSD) is 13.4 mm. A yolk sac is seen within the gestational sac but no embryo noted. At a MSD of 13 mm, the absence of an embryo is not pathologic. Recommend follow-up ultrasound exam in 10 days to 2 weeks. May also consider monitoring with serial quantitative HCG levels.          Recommendation  ==============    Repeat ultrasound study as clinically indicated.

## 2018-04-10 LAB
HBV SURFACE AG SERPL QL IA: NEGATIVE
RUBV IGG SER-ACNC: 62.1 IU/ML
RUBV IGG SER-IMP: REACTIVE

## 2018-04-11 ENCOUNTER — PATIENT MESSAGE (OUTPATIENT)
Dept: OBSTETRICS AND GYNECOLOGY | Facility: CLINIC | Age: 23
End: 2018-04-11

## 2018-04-11 LAB — CFTR MUT ANL BLD/T: NORMAL

## 2018-04-13 ENCOUNTER — TELEPHONE (OUTPATIENT)
Dept: OBSTETRICS AND GYNECOLOGY | Facility: CLINIC | Age: 23
End: 2018-04-13

## 2018-04-13 DIAGNOSIS — Z34.01 ENCOUNTER FOR SUPERVISION OF NORMAL FIRST PREGNANCY IN FIRST TRIMESTER: Primary | ICD-10-CM

## 2018-04-13 NOTE — TELEPHONE ENCOUNTER
Patient called.  Notified that U/S is inconsistent with dates indicating either incorrect LMP or early miscarriage..  Recommended repeat ultrasound 2 weeks after the first.  Order placed.

## 2018-04-23 ENCOUNTER — PROCEDURE VISIT (OUTPATIENT)
Dept: OBSTETRICS AND GYNECOLOGY | Facility: CLINIC | Age: 23
End: 2018-04-23
Payer: MEDICAID

## 2018-04-23 DIAGNOSIS — O02.1 MISSED ABORTION: ICD-10-CM

## 2018-04-23 DIAGNOSIS — Z34.01 ENCOUNTER FOR SUPERVISION OF NORMAL FIRST PREGNANCY IN FIRST TRIMESTER: ICD-10-CM

## 2018-04-23 PROCEDURE — 76817 TRANSVAGINAL US OBSTETRIC: CPT | Mod: 26,S$PBB,, | Performed by: OBSTETRICS & GYNECOLOGY

## 2018-04-23 PROCEDURE — 76817 TRANSVAGINAL US OBSTETRIC: CPT | Mod: PBBFAC | Performed by: OBSTETRICS & GYNECOLOGY

## 2018-04-24 ENCOUNTER — ROUTINE PRENATAL (OUTPATIENT)
Dept: OBSTETRICS AND GYNECOLOGY | Facility: CLINIC | Age: 23
End: 2018-04-24
Payer: MEDICAID

## 2018-04-24 VITALS
BODY MASS INDEX: 37.71 KG/M2 | DIASTOLIC BLOOD PRESSURE: 80 MMHG | WEIGHT: 186.75 LBS | SYSTOLIC BLOOD PRESSURE: 110 MMHG

## 2018-04-24 DIAGNOSIS — O02.1 MISSED AB: Primary | ICD-10-CM

## 2018-04-24 PROCEDURE — 99213 OFFICE O/P EST LOW 20 MIN: CPT | Mod: S$PBB,TH,, | Performed by: OBSTETRICS & GYNECOLOGY

## 2018-04-24 PROCEDURE — 99999 PR PBB SHADOW E&M-EST. PATIENT-LVL III: CPT | Mod: PBBFAC,,, | Performed by: OBSTETRICS & GYNECOLOGY

## 2018-04-24 PROCEDURE — 99213 OFFICE O/P EST LOW 20 MIN: CPT | Mod: PBBFAC,TH | Performed by: OBSTETRICS & GYNECOLOGY

## 2018-04-24 NOTE — PROGRESS NOTES
Patient presents today s/p missed ab diagnosed with ultrasound yesterday.  No cramping or bleeding, fever or chills.  Tearful as expected.   Exam: cervix closed, no vaginal bleeding  Discussed options: expectant management vs cytotec vs surgical management and risks/benefits to each.  Patient desires surgical management.  Reviewed consents with patient.  Will sign the morning or surgery.   Plan for D&C 3/30/2018 at 12 pm.  Advised NPO after midnight the night before.    Counseled about bleeding/pain precautions in the event that the patient passes the pregnancy prior to D&C.

## 2018-04-25 ENCOUNTER — PATIENT MESSAGE (OUTPATIENT)
Dept: SURGERY | Facility: OTHER | Age: 23
End: 2018-04-25

## 2018-04-27 ENCOUNTER — PATIENT MESSAGE (OUTPATIENT)
Dept: OBSTETRICS AND GYNECOLOGY | Facility: CLINIC | Age: 23
End: 2018-04-27

## 2018-04-30 ENCOUNTER — ANESTHESIA EVENT (OUTPATIENT)
Dept: SURGERY | Facility: OTHER | Age: 23
End: 2018-04-30
Payer: MEDICAID

## 2018-04-30 ENCOUNTER — ANESTHESIA (OUTPATIENT)
Dept: SURGERY | Facility: OTHER | Age: 23
End: 2018-04-30
Payer: MEDICAID

## 2018-04-30 ENCOUNTER — HOSPITAL ENCOUNTER (OUTPATIENT)
Facility: OTHER | Age: 23
Discharge: HOME | End: 2018-04-30
Attending: OBSTETRICS & GYNECOLOGY | Admitting: OBSTETRICS & GYNECOLOGY
Payer: MEDICAID

## 2018-04-30 ENCOUNTER — SURGERY (OUTPATIENT)
Age: 23
End: 2018-04-30

## 2018-04-30 VITALS
HEIGHT: 59 IN | SYSTOLIC BLOOD PRESSURE: 103 MMHG | DIASTOLIC BLOOD PRESSURE: 62 MMHG | HEART RATE: 72 BPM | WEIGHT: 185 LBS | OXYGEN SATURATION: 99 % | BODY MASS INDEX: 37.29 KG/M2 | RESPIRATION RATE: 16 BRPM | TEMPERATURE: 98 F

## 2018-04-30 DIAGNOSIS — O02.1 MISSED AB: ICD-10-CM

## 2018-04-30 PROCEDURE — 63600175 PHARM REV CODE 636 W HCPCS: Performed by: STUDENT IN AN ORGANIZED HEALTH CARE EDUCATION/TRAINING PROGRAM

## 2018-04-30 PROCEDURE — 71000039 HC RECOVERY, EACH ADD'L HOUR: Performed by: OBSTETRICS & GYNECOLOGY

## 2018-04-30 PROCEDURE — 36000704 HC OR TIME LEV I 1ST 15 MIN: Performed by: OBSTETRICS & GYNECOLOGY

## 2018-04-30 PROCEDURE — 01965 ANES INCOMPL/MISSED AB PX: CPT | Performed by: OBSTETRICS & GYNECOLOGY

## 2018-04-30 PROCEDURE — 59820 CARE OF MISCARRIAGE: CPT | Mod: ,,, | Performed by: OBSTETRICS & GYNECOLOGY

## 2018-04-30 PROCEDURE — 71000033 HC RECOVERY, INTIAL HOUR: Performed by: OBSTETRICS & GYNECOLOGY

## 2018-04-30 PROCEDURE — 25000003 PHARM REV CODE 250: Performed by: OBSTETRICS & GYNECOLOGY

## 2018-04-30 PROCEDURE — 37000008 HC ANESTHESIA 1ST 15 MINUTES: Performed by: OBSTETRICS & GYNECOLOGY

## 2018-04-30 PROCEDURE — 88305 TISSUE EXAM BY PATHOLOGIST: CPT | Mod: 26,,, | Performed by: PATHOLOGY

## 2018-04-30 PROCEDURE — 25000003 PHARM REV CODE 250: Performed by: ANESTHESIOLOGY

## 2018-04-30 PROCEDURE — 25000003 PHARM REV CODE 250: Performed by: STUDENT IN AN ORGANIZED HEALTH CARE EDUCATION/TRAINING PROGRAM

## 2018-04-30 PROCEDURE — 63600175 PHARM REV CODE 636 W HCPCS: Performed by: NURSE ANESTHETIST, CERTIFIED REGISTERED

## 2018-04-30 PROCEDURE — 63600175 PHARM REV CODE 636 W HCPCS: Performed by: ANESTHESIOLOGY

## 2018-04-30 PROCEDURE — 88305 TISSUE EXAM BY PATHOLOGIST: CPT | Performed by: PATHOLOGY

## 2018-04-30 PROCEDURE — 71000015 HC POSTOP RECOV 1ST HR: Performed by: OBSTETRICS & GYNECOLOGY

## 2018-04-30 PROCEDURE — 71000016 HC POSTOP RECOV ADDL HR: Performed by: OBSTETRICS & GYNECOLOGY

## 2018-04-30 PROCEDURE — 36000705 HC OR TIME LEV I EA ADD 15 MIN: Performed by: OBSTETRICS & GYNECOLOGY

## 2018-04-30 PROCEDURE — 37000009 HC ANESTHESIA EA ADD 15 MINS: Performed by: OBSTETRICS & GYNECOLOGY

## 2018-04-30 PROCEDURE — 25000003 PHARM REV CODE 250: Performed by: NURSE ANESTHETIST, CERTIFIED REGISTERED

## 2018-04-30 RX ORDER — FENTANYL CITRATE 50 UG/ML
INJECTION, SOLUTION INTRAMUSCULAR; INTRAVENOUS
Status: DISCONTINUED | OUTPATIENT
Start: 2018-04-30 | End: 2018-04-30

## 2018-04-30 RX ORDER — ACETAMINOPHEN 10 MG/ML
INJECTION, SOLUTION INTRAVENOUS
Status: DISCONTINUED | OUTPATIENT
Start: 2018-04-30 | End: 2018-04-30

## 2018-04-30 RX ORDER — SODIUM CHLORIDE 0.9 % (FLUSH) 0.9 %
3 SYRINGE (ML) INJECTION
Status: DISCONTINUED | OUTPATIENT
Start: 2018-04-30 | End: 2018-04-30 | Stop reason: HOSPADM

## 2018-04-30 RX ORDER — HYDROCODONE BITARTRATE AND ACETAMINOPHEN 5; 325 MG/1; MG/1
1 TABLET ORAL EVERY 4 HOURS PRN
Qty: 10 TABLET | Refills: 0 | Status: SHIPPED | OUTPATIENT
Start: 2018-04-30 | End: 2018-05-31

## 2018-04-30 RX ORDER — DIPHENHYDRAMINE HYDROCHLORIDE 50 MG/ML
25 INJECTION INTRAMUSCULAR; INTRAVENOUS EVERY 4 HOURS PRN
Status: DISCONTINUED | OUTPATIENT
Start: 2018-04-30 | End: 2018-04-30 | Stop reason: HOSPADM

## 2018-04-30 RX ORDER — PROPOFOL 10 MG/ML
VIAL (ML) INTRAVENOUS
Status: DISCONTINUED | OUTPATIENT
Start: 2018-04-30 | End: 2018-04-30

## 2018-04-30 RX ORDER — MEPERIDINE HYDROCHLORIDE 50 MG/ML
12.5 INJECTION INTRAMUSCULAR; INTRAVENOUS; SUBCUTANEOUS ONCE AS NEEDED
Status: COMPLETED | OUTPATIENT
Start: 2018-04-30 | End: 2018-04-30

## 2018-04-30 RX ORDER — MIDAZOLAM HYDROCHLORIDE 1 MG/ML
INJECTION INTRAMUSCULAR; INTRAVENOUS
Status: DISCONTINUED | OUTPATIENT
Start: 2018-04-30 | End: 2018-04-30

## 2018-04-30 RX ORDER — IBUPROFEN 600 MG/1
600 TABLET ORAL EVERY 6 HOURS PRN
Status: DISCONTINUED | OUTPATIENT
Start: 2018-04-30 | End: 2018-04-30 | Stop reason: HOSPADM

## 2018-04-30 RX ORDER — LIDOCAINE HCL/PF 100 MG/5ML
SYRINGE (ML) INTRAVENOUS
Status: DISCONTINUED | OUTPATIENT
Start: 2018-04-30 | End: 2018-04-30

## 2018-04-30 RX ORDER — DIPHENHYDRAMINE HCL 25 MG
25 CAPSULE ORAL EVERY 4 HOURS PRN
Status: DISCONTINUED | OUTPATIENT
Start: 2018-04-30 | End: 2018-04-30 | Stop reason: HOSPADM

## 2018-04-30 RX ORDER — GLYCOPYRROLATE 0.2 MG/ML
INJECTION INTRAMUSCULAR; INTRAVENOUS
Status: DISCONTINUED | OUTPATIENT
Start: 2018-04-30 | End: 2018-04-30

## 2018-04-30 RX ORDER — SODIUM CHLORIDE, SODIUM LACTATE, POTASSIUM CHLORIDE, CALCIUM CHLORIDE 600; 310; 30; 20 MG/100ML; MG/100ML; MG/100ML; MG/100ML
INJECTION, SOLUTION INTRAVENOUS CONTINUOUS PRN
Status: DISCONTINUED | OUTPATIENT
Start: 2018-04-30 | End: 2018-04-30

## 2018-04-30 RX ORDER — ONDANSETRON 8 MG/1
8 TABLET, ORALLY DISINTEGRATING ORAL EVERY 8 HOURS PRN
Status: DISCONTINUED | OUTPATIENT
Start: 2018-04-30 | End: 2018-04-30 | Stop reason: HOSPADM

## 2018-04-30 RX ORDER — ONDANSETRON 2 MG/ML
4 INJECTION INTRAMUSCULAR; INTRAVENOUS DAILY PRN
Status: DISCONTINUED | OUTPATIENT
Start: 2018-04-30 | End: 2018-04-30 | Stop reason: HOSPADM

## 2018-04-30 RX ORDER — ONDANSETRON 2 MG/ML
INJECTION INTRAMUSCULAR; INTRAVENOUS
Status: DISCONTINUED | OUTPATIENT
Start: 2018-04-30 | End: 2018-04-30

## 2018-04-30 RX ORDER — DOXYCYCLINE HYCLATE 100 MG
200 TABLET ORAL ONCE
Status: COMPLETED | OUTPATIENT
Start: 2018-04-30 | End: 2018-04-30

## 2018-04-30 RX ORDER — HYDROCODONE BITARTRATE AND ACETAMINOPHEN 5; 325 MG/1; MG/1
1 TABLET ORAL EVERY 4 HOURS PRN
Status: DISCONTINUED | OUTPATIENT
Start: 2018-04-30 | End: 2018-04-30 | Stop reason: HOSPADM

## 2018-04-30 RX ORDER — DEXAMETHASONE SODIUM PHOSPHATE 4 MG/ML
INJECTION, SOLUTION INTRA-ARTICULAR; INTRALESIONAL; INTRAMUSCULAR; INTRAVENOUS; SOFT TISSUE
Status: DISCONTINUED | OUTPATIENT
Start: 2018-04-30 | End: 2018-04-30

## 2018-04-30 RX ORDER — OXYCODONE HYDROCHLORIDE 5 MG/1
5 TABLET ORAL
Status: DISCONTINUED | OUTPATIENT
Start: 2018-04-30 | End: 2018-04-30 | Stop reason: HOSPADM

## 2018-04-30 RX ORDER — IBUPROFEN 600 MG/1
600 TABLET ORAL EVERY 8 HOURS PRN
Qty: 30 TABLET | Refills: 1 | Status: SHIPPED | OUTPATIENT
Start: 2018-04-30 | End: 2018-05-31

## 2018-04-30 RX ORDER — FENTANYL CITRATE 50 UG/ML
25 INJECTION, SOLUTION INTRAMUSCULAR; INTRAVENOUS EVERY 5 MIN PRN
Status: DISCONTINUED | OUTPATIENT
Start: 2018-04-30 | End: 2018-04-30 | Stop reason: HOSPADM

## 2018-04-30 RX ADMIN — FENTANYL CITRATE 100 MCG: 50 INJECTION, SOLUTION INTRAMUSCULAR; INTRAVENOUS at 12:04

## 2018-04-30 RX ADMIN — MIDAZOLAM HYDROCHLORIDE 2 MG: 1 INJECTION, SOLUTION INTRAMUSCULAR; INTRAVENOUS at 12:04

## 2018-04-30 RX ADMIN — ONDANSETRON 4 MG: 2 INJECTION INTRAMUSCULAR; INTRAVENOUS at 12:04

## 2018-04-30 RX ADMIN — PROMETHAZINE HYDROCHLORIDE 12.5 MG: 25 INJECTION INTRAMUSCULAR; INTRAVENOUS at 01:04

## 2018-04-30 RX ADMIN — GLYCOPYRROLATE 0.2 MG: 0.2 INJECTION, SOLUTION INTRAMUSCULAR; INTRAVENOUS at 12:04

## 2018-04-30 RX ADMIN — PROPOFOL 300 MG: 10 INJECTION, EMULSION INTRAVENOUS at 12:04

## 2018-04-30 RX ADMIN — LIDOCAINE HYDROCHLORIDE 100 MG: 20 INJECTION, SOLUTION INTRAVENOUS at 12:04

## 2018-04-30 RX ADMIN — DOXYCYCLINE HYCLATE 200 MG: 100 TABLET, COATED ORAL at 04:04

## 2018-04-30 RX ADMIN — DOXYCYCLINE 100 MG: 100 INJECTION, POWDER, LYOPHILIZED, FOR SOLUTION INTRAVENOUS at 12:04

## 2018-04-30 RX ADMIN — MEPERIDINE HYDROCHLORIDE 12.5 MG: 50 INJECTION INTRAMUSCULAR; INTRAVENOUS; SUBCUTANEOUS at 01:04

## 2018-04-30 RX ADMIN — ACETAMINOPHEN 1000 MG: 10 INJECTION, SOLUTION INTRAVENOUS at 12:04

## 2018-04-30 RX ADMIN — SODIUM CHLORIDE, SODIUM LACTATE, POTASSIUM CHLORIDE, AND CALCIUM CHLORIDE: 600; 310; 30; 20 INJECTION, SOLUTION INTRAVENOUS at 11:04

## 2018-04-30 RX ADMIN — ONDANSETRON HYDROCHLORIDE 4 MG: 2 INJECTION INTRAMUSCULAR; INTRAVENOUS at 03:04

## 2018-04-30 RX ADMIN — DEXAMETHASONE SODIUM PHOSPHATE 8 MG: 4 INJECTION, SOLUTION INTRAMUSCULAR; INTRAVENOUS at 12:04

## 2018-04-30 NOTE — LETTER
April 30, 2018         [address]  [place] [address]  Phone: [phone]  Fax: [phone]       Patient: Jacobo Alvarado   YOB: 1995  Date of Visit: 04/30/2018    To Whom It May Concern:    Stanley Alvarado  was at [facility] on 04/30/2018. She may return to work on 5/5/18 with no restrictions.         Sincerely,      Gemma Hernandez RN

## 2018-04-30 NOTE — INTERVAL H&P NOTE
The patient has been examined and the H&P has been reviewed:    I concur with the findings and no changes have occurred since H&P was written.    Surgery risks, benefits and alternative options discussed and understood by patient/family. Dc instructions discussed with patient. Consents signed.           Active Hospital Problems    Diagnosis  POA    Missed ab [O02.1]  Yes      Resolved Hospital Problems    Diagnosis Date Resolved POA   No resolved problems to display.

## 2018-04-30 NOTE — ANESTHESIA PREPROCEDURE EVALUATION
04/30/2018  Jacobo Alvarado is a 23 y.o., female.    Anesthesia Evaluation    I have reviewed the Patient Summary Reports.    I have reviewed the Nursing Notes.   I have reviewed the Medications.     Review of Systems  Anesthesia Hx:  No problems with previous Anesthesia  Denies Family Hx of Anesthesia complications.   Denies Personal Hx of Anesthesia complications.   Social:  Non-Smoker    Hematology/Oncology:  Hematology Normal   Oncology Normal     EENT/Dental:EENT/Dental Normal   Cardiovascular:  Cardiovascular Normal Exercise tolerance: good     Pulmonary:  Pulmonary Normal    Renal/:  Renal/ Normal     Musculoskeletal:  Musculoskeletal Normal    Neurological:  Neurology Normal    Endocrine:  Endocrine Normal    Dermatological:  Skin Normal    Psych:  Psychiatric Normal           Physical Exam  General:  Well nourished    Airway/Jaw/Neck:  Airway Findings: Mouth Opening: Normal Tongue: Normal  General Airway Assessment: Adult  Mallampati: I  TM Distance: Normal, at least 6 cm  Jaw/Neck Findings:  Neck ROM: Normal ROM      Dental:  Dental Findings: In tact             Anesthesia Plan  Type of Anesthesia, risks & benefits discussed:  Anesthesia Type:  general  Patient's Preference:   Intra-op Monitoring Plan:   Intra-op Monitoring Plan Comments:   Post Op Pain Control Plan:   Post Op Pain Control Plan Comments:   Induction:   IV  Beta Blocker:         Informed Consent: Patient understands risks and agrees with Anesthesia plan.  Questions answered. Anesthesia consent signed with patient.  ASA Score: 1     Day of Surgery Review of History & Physical:    H&P update referred to the surgeon.         Ready For Surgery From Anesthesia Perspective.

## 2018-04-30 NOTE — H&P
Subjective:       Patient ID: Jacobo Alvarado is a 23 y.o. female.    Chief Complaint:  No chief complaint on file.      History of Present Illness  Jacobo Alvarado is a 23 y.o. female  who presents for surgery for a missed ab.  See last ultrasound:    Impression  =========  There is no cardiac activity identified > 11 days from the patient's previous scan demonstrating an intrauterine gestation.  These findings are consistent with early pregnancy failure.    Recommendation  ==============  Dr. Salinas contacted.    Review of Systems  Review of Systems   Constitutional: Negative for chills, fatigue and fever.   Respiratory: Negative for shortness of breath.    Cardiovascular: Negative for chest pain.   Gastrointestinal: Negative for abdominal distention, abdominal pain, constipation, diarrhea, nausea and vomiting.   Genitourinary: Negative for pelvic pain, vaginal bleeding, vaginal discharge and vaginal pain.   Neurological: Negative for dizziness and light-headedness.           Objective:    Physical Exam   Constitutional: She is oriented to person, place, and time. She appears well-developed and well-nourished.   Pulmonary/Chest: Effort normal.   Genitourinary:   Genitourinary Comments: See last clinic notes for full exam   Neurological: She is oriented to person, place, and time.   Skin: No pallor.   Psychiatric: She has a normal mood and affect. Her behavior is normal. Judgment and thought content normal.   Nursing note and vitals reviewed.        Assessment:        1. Missed ab               Plan:        Orders Placed This Encounter   Procedures    Notify physician    Notify Physician - Potential Need of Opioid Reversal    Notify physician if BS > 180 for hysterectomy patients    Notify Physician/Vital Signs Parameters Urine output less than 0.5mL/kg/hr (with indwelling catheter) or 30 mL/hr (without indwelling catheter) or blood glucose greater than 200 mg/dL    Place sequential  compression device    Place CHRIS hose    Oxygen Continuous    Pulse Oximetry Q4H    Place in Outpatient       Medications Ordered This Encounter      doxycycline (VIBRAMYCIN) 100mg in dextrose 5% 250 mL IVPB (ready to mix)      sodium chloride 0.9% flush 3 mL    - consents reviewed with patient.   - plan for suction D&C as discussed in clinic.       Tiffanie Salinas MD  [facility]  [phone]

## 2018-04-30 NOTE — SUBJECTIVE & OBJECTIVE
Obstetric History       T0      L1     SAB1   TAB0   Ectopic0   Multiple0   Live Births1       # Outcome Date GA Lbr Cody/2nd Weight Sex Delivery Anes PTL Lv   4             3  12           2  12 36w0d  2.693 kg (5 lb 15 oz) F Vag-Spont EPI Y [place]      Name: Kenya Kincaid SAB                 Past Medical History:   Diagnosis Date    Chlamydia     Miscarriage      History reviewed. No pertinent surgical history.    No prescriptions prior to admission.       Review of patient's allergies indicates:  No Known Allergies     Family History     Problem Relation (Age of Onset)    Breast cancer Other    Diabetes Mother    Kidney disease Mother        Social History Main Topics    Smoking status: Never Smoker    Smokeless tobacco: Not on file      Comment: quit one month ago    Alcohol use No    Drug use: No    Sexual activity: Yes     Partners: Male     Birth control/ protection: None     Review of Systems   Constitutional: Negative for chills and fever.   Eyes: Negative for visual disturbance.   Respiratory: Negative for shortness of breath.    Cardiovascular: Negative for chest pain.   Gastrointestinal: Positive for abdominal pain. Negative for nausea and vomiting.   Genitourinary: Positive for pelvic pain and vaginal bleeding. Negative for vaginal discharge.   Skin:  Negative for rash.   Neurological: Negative for headaches.      Objective:     Vital Signs (Most Recent):  Temp: 98.3 °F (36.8 °C) (18 1059)  Pulse: 84 (18 1059)  Resp: 18 (18 1059)  BP: 122/69 (18 1059)  SpO2: 100 % (18 1059) Vital Signs (24h Range):  Temp:  [98.3 °F (36.8 °C)] 98.3 °F (36.8 °C)  Pulse:  [84] 84  Resp:  [18] 18  SpO2:  [100 %] 100 %  BP: (122)/(69) 122/69     Weight: 83.9 kg (185 lb)  Body mass index is 37.37 kg/m².    No LMP recorded.    Physical Exam:   Constitutional: She is oriented to person, place, and time. She appears well-developed and  well-nourished. No distress.    HENT:   Head: Normocephalic and atraumatic.       Pulmonary/Chest: Effort normal. No respiratory distress.        Abdominal: Soft. She exhibits no distension. There is no tenderness. There is no rebound.             Musculoskeletal: Moves all extremeties.       Neurological: She is alert and oriented to person, place, and time.    Skin: Skin is warm and dry. She is not diaphoretic.    Psychiatric: She has a normal mood and affect. Her behavior is normal. Judgment and thought content normal.       Laboratory:  none    Diagnostic Results:  TVUS: There is no cardiac activity identified > 11 days from the patient's previous scan demonstrating an intrauterine gestation.  These findings are consistent with early pregnancy failure.

## 2018-04-30 NOTE — DISCHARGE INSTRUCTIONS
Anesthesia: After Your Surgery  Youve just had surgery. During surgery, you received medication called anesthesia to keep you comfortable and pain-free. After surgery, you may experience some pain or nausea. This is common. Here are some tips for feeling better and recovering after surgery.    Going home  Your doctor or nurse will show you how to take care of yourself when you go home. He or she will also answer your questions. Have an adult family member or friend drive you home. For the first 24 hours after your surgery:  · Do not drive or use heavy equipment.  · Do not make important decisions or sign legal documents.  · Avoid alcohol.  · Have someone stay with you, if needed. He or she can watch for problems and help keep you safe.  Be sure to keep all follow-up appointments with your doctor. And rest after your procedure for as long as your doctor tells you to.    Coping with pain  If you have pain after surgery, pain medication will help you feel better. Take it as directed, before pain becomes severe. Also, ask your doctor or pharmacist about other ways to control pain, such as with heat, ice, and relaxation. And follow any other instructions your surgeon or nurse gives you.    Tips for taking pain medication  To get the best relief possible, remember these points:  · Pain medications can upset your stomach. Taking them with a little food may help.  · Most pain relievers taken by mouth need at least 20 to 30 minutes to take effect.  · Taking medication on a schedule can help you remember to take it. Try to time your medication so that you can take it before beginning an activity, such as dressing, walking, or sitting down for dinner.  · Constipation is a common side effect of pain medications. Contact your doctor before taking any medications like laxatives or stool softeners to help relieve constipation. Also ask about any dietary restrictions, because drinking lots of fluids and eating foods like fruits  and vegetables that are high in fiber can also help. Remember, dont take laxatives unless your surgeon has prescribed them.  · Mixing alcohol and pain medication can cause dizziness and slow your breathing. It can even be fatal. Dont drink alcohol while taking pain medication.  · Pain medication can slow your reflexes. Dont drive or operate machinery while taking pain medication.  If your health care provider tells you to take acetaminophen to help relieve your pain, ask him or her how much you are supposed to take each day. (Acetaminophen is the generic name for Tylenol and other brand-name pain relievers.) Acetaminophen or other pain relievers may interact with your prescription medicines or other over-the-counter (OTC) drugs. Some prescription medications contain acetaminophen along with other active ingredients. Using both prescription and OTC acetaminophen for pain can cause you to overdose. The FDA recommends that you read the labels on your OTC medications carefully. This will help you to clearly understand the list of active ingredients, dosing instructions, and any warnings. It may also help you avoid taking too much acetaminophen. If you have questions or don't understand the information, ask your pharmacist or health care provider to explain it to you before you take the OTC medication.    Managing nausea  Some people have an upset stomach after surgery. This is often due to anesthesia, pain, pain medications, or the stress of surgery. The following tips will help you manage nausea and get good nutrition as you recover. If you were on a special diet before surgery, ask your doctor if you should follow it during recovery. These tips may help:  · Dont push yourself to eat. Your body will tell you when to eat and how much.  · Start off with clear liquids and soup. They are easier to digest.  · Progress to semi-solid foods (mashed potatoes, applesauce, and gelatin) as you feel ready.  · Slowly move to  solid foods. Dont eat fatty, rich, or spicy foods at first.  · Dont force yourself to have three large meals a day. Instead, eat smaller amounts more often.  · Take pain medications with a small amount of solid food, such as crackers or toast to avoid nausea.      Call your surgeon if  · You still have pain an hour after taking medication (it may not be strong enough).  · You feel too sleepy, dizzy, or groggy (medication may be too strong).  · You have side effects like nausea, vomiting, or skin changes (rash, itching, or hives).   © 3836-7581 [employer] [facility]. [address] [place] [address], [place] [address]. All rights reserved. This information is not intended as a substitute for professional medical care. Always follow your healthcare professional's instructions.                  Discharge Instructions for Dilation and Curettage (D and C)  Your doctor performed dilation and curettage (D&C). The reasons for having this procedure vary from person to person. The D&C may be done to control heavy uterine bleeding, to find the cause of irregular bleeding,or to remove pregnancy tissue if you have had a miscarriage.  Home care  · Take it easy. Rest for 2 days as needed.  · Return to your normal activities after 24 to 48 hours. You may also return to work at that time.  · Eat a normal diet.  · Take an over-the-counter pain reliever for pain, if needed.  · Remember, its OK to have bleeding for about a week after the procedure. The amount of bleeding should be similar to what you have during a normal period.  · Dont drive for 24 hours after the procedure unless specifically told by your provider that it is OK to do so.  · Dont have sexual intercourse or use tampons or douches until your doctor says its safe to do so.  Follow-up  · Make a follow-up appointment as directed by our staff.     When to call your doctor  Call your doctor right away if you have any of the following:  · Bleeding that soaks more than one  sanitary pad in one hour  · Severe abdominal pain  · Severe cramps  · Fever above 100.4°F (38.0°C)  · A foul smelling vaginal discharge   Date Last Reviewed: 5/19/2015  © 9949-6299 [employer]. [address] [place] [address], [place] [address]. All rights reserved. This information is not intended as a substitute for professional medical care. Always follow your healthcare professional's instructions.

## 2018-04-30 NOTE — OP NOTE
Operative Report    Procedure: Suction Dilation and Curettage    Date of Surgery 2018    Surgeon: Dr. Tiffanie Slainas MD    Assistant: Timothy Cheung MD PGY1    Pre-Op Diagnosis: missed     Post-op Diagnosis:  same    EBL: minimal     IVF: 300cc    Urine Output: 100cc     Specimen: endometrial contents    Findings:   1. Normal external genitalia  2. Cervix dilated to #8 Hegar  3. Uterine contents removed from uterine cavity using #8 suction tip  4. Gentle curettage performed until gritty texture was noted in all four quadrants  5. Tenaculum removed and good hemostasis noted at puncture sites    Procedure in Detail:  After receiving Doxycycline 100mg PO once in the pre-operative area, the patient was taken to the OR where general anesthesia was found to be adequate.  She was placed in the dorsal lithotomy position and prepped and draped in the normal sterile fashion.  The weighted speculum was inserted into the posterior vagina, and the anterior lip of the cervix was grasped with the single tooth tenaculum.  The cervix was dilated with the Cody & Hegar dilators until the 8 mm suction catheter was able to be inserted into the cervix without difficulty.  The uterine cavity was suctioned with multiple passes of the 8 mm suction catheter, and products of conception were obtained.  A curette was then used for gentle curettage of each quadrant of the uterine cavity. A gritty texture was not yet observed, so the suction catheter was reinserted and additional uterine contents were removed. A curette was then reinserted and curettage was performed until the four quadrants of the uterine cavity were left with a gritty texture.  One final pass of the suction curette was made to ensure that all products had been removed.  The single tooth tenaculum was removed from the anterior lip of the cervix, and the cervix was noted to be hemostatic.  The weighted speculum was removed from the vagina.  Sponge, lap, and  instrument count were correct times two.  The products of conception were sent to pathology for evaluation. The patient tolerated the procedure well.  She was transferred to the PACU in stable condition.  She will receive Doxycycline 200mg PO once in the PACU.     Timothy Cheung MD  PGY1, Obstetrics & Gynecology  [facility]

## 2018-04-30 NOTE — PLAN OF CARE
Jacobo Alvarado has met all discharge criteria from Phase II. Vital Signs are stable, ambulating  without difficulty. Discharge instructions given, patient verbalized understanding. Discharged from facility via wheelchair in stable condition.

## 2018-04-30 NOTE — TRANSFER OF CARE
"Anesthesia Transfer of Care Note    Patient: Jacobo Alvarado    Procedure(s) Performed: Procedure(s) (LRB):  D & C (SUCTION) (N/A)    Patient location: PACU    Anesthesia Type: general    Transport from OR: Transported from OR on 2-3 L/min O2 by NC with adequate spontaneous ventilation    Post pain: adequate analgesia    Post assessment: no apparent anesthetic complications    Post vital signs: stable    Level of consciousness: awake, alert and oriented    Nausea/Vomiting: no nausea/vomiting    Complications: none    Transfer of care protocol was followed      Last vitals:   Visit Vitals  /69 (BP Location: Right arm, Patient Position: Sitting)   Pulse 84   Temp 36.8 °C (98.3 °F) (Oral)   Resp 18   Ht 4' 11" (1.499 m)   Wt 83.9 kg (185 lb)   SpO2 100%   Breastfeeding? No   BMI 37.37 kg/m²     "

## 2018-04-30 NOTE — H&P
[facility]  Obstetrics & Gynecology  History & Physical    Patient Name: Jacobo Alvarado  MRN: [id number]  Admission Date: 2018  Primary Care Provider: Primary Doctor No    Subjective:     Chief Complaint/Reason for Admission: Missed AB    History of Present Illness:  Pt is a 24 yo  who presents for scheduled suction d&c for missed .         Obstetric History       T0      L1     SAB1   TAB0   Ectopic0   Multiple0   Live Births1       # Outcome Date GA Lbr Cody/2nd Weight Sex Delivery Anes PTL Lv   4             3  12           2  12 36w0d  2.693 kg (5 lb 15 oz) F Vag-Spont EPI Y [place]      Name: Kenya Kincaid SAB                 Past Medical History:   Diagnosis Date    Chlamydia     Miscarriage      History reviewed. No pertinent surgical history.    No prescriptions prior to admission.       Review of patient's allergies indicates:  No Known Allergies     Family History     Problem Relation (Age of Onset)    Breast cancer Other    Diabetes Mother    Kidney disease Mother        Social History Main Topics    Smoking status: Never Smoker    Smokeless tobacco: Not on file      Comment: quit one month ago    Alcohol use No    Drug use: No    Sexual activity: Yes     Partners: Male     Birth control/ protection: None     Review of Systems   Constitutional: Negative for chills and fever.   Eyes: Negative for visual disturbance.   Respiratory: Negative for shortness of breath.    Cardiovascular: Negative for chest pain.   Gastrointestinal: Positive for abdominal pain. Negative for nausea and vomiting.   Genitourinary: Positive for pelvic pain and vaginal bleeding. Negative for vaginal discharge.   Skin:  Negative for rash.   Neurological: Negative for headaches.      Objective:     Vital Signs (Most Recent):  Temp: 98.3 °F (36.8 °C) (18 1059)  Pulse: 84 (18 1059)  Resp: 18 (18 1059)  BP: 122/69 (18  1059)  SpO2: 100 % (04/30/18 1059) Vital Signs (24h Range):  Temp:  [98.3 °F (36.8 °C)] 98.3 °F (36.8 °C)  Pulse:  [84] 84  Resp:  [18] 18  SpO2:  [100 %] 100 %  BP: (122)/(69) 122/69     Weight: 83.9 kg (185 lb)  Body mass index is 37.37 kg/m².    No LMP recorded.    Physical Exam:   Constitutional: She is oriented to person, place, and time. She appears well-developed and well-nourished. No distress.    HENT:   Head: Normocephalic and atraumatic.       Pulmonary/Chest: Effort normal. No respiratory distress.        Abdominal: Soft. She exhibits no distension. There is no tenderness. There is no rebound.             Musculoskeletal: Moves all extremeties.       Neurological: She is alert and oriented to person, place, and time.    Skin: Skin is warm and dry. She is not diaphoretic.    Psychiatric: She has a normal mood and affect. Her behavior is normal. Judgment and thought content normal.       Laboratory:  none    Diagnostic Results:  TVUS: There is no cardiac activity identified > 11 days from the patient's previous scan demonstrating an intrauterine gestation.  These findings are consistent with early pregnancy failure.    Assessment/Plan:     Missed ab    --to OR for suction d&c  --doxy 100mg PO prior to surgery  --200mg doxy postop  --consents signed  --dc instructions discussed  --to follow up with Dr. Salinas in 6 weeks            Timothy Cheung MD  Obstetrics & Gynecology  [facility]

## 2018-04-30 NOTE — ANESTHESIA POSTPROCEDURE EVALUATION
"Anesthesia Post Evaluation    Patient: Jacobo Alvarado    Procedure(s) Performed: Procedure(s) (LRB):  D & C (SUCTION) (N/A)    Final Anesthesia Type: general  Patient location during evaluation: PACU  Patient participation: Yes- Able to Participate  Level of consciousness: awake and alert  Post-procedure vital signs: reviewed and stable  Pain management: adequate  Airway patency: patent  PONV status at discharge: No PONV  Anesthetic complications: no      Cardiovascular status: blood pressure returned to baseline  Respiratory status: unassisted  Hydration status: euvolemic  Follow-up not needed.        Visit Vitals  /66   Pulse 68   Temp 36.9 °C (98.4 °F) (Oral)   Resp 18   Ht 4' 11" (1.499 m)   Wt 83.9 kg (185 lb)   SpO2 99%   Breastfeeding? No   BMI 37.37 kg/m²       Pain/Yoselin Score: Presence of Pain: non-verbal indicators absent (4/30/2018  1:03 PM)  Pain Rating Prior to Med Admin: 2 (4/30/2018  1:15 PM)  Pain Rating Post Med Admin: 0 (4/30/2018  2:20 PM)  Yoselin Score: 10 (4/30/2018  2:20 PM)      "

## 2018-04-30 NOTE — DISCHARGE SUMMARY
[facility]  Brief Op Note/Discharge Note  Short Stay    Admit Date: 4/30/2018    Discharge Date: 04/30/2018    Attending Physician: Tiffanie Salinas MD     Surgery Date: 4/30/2018     Surgeon(s) and Role:     * Tiffanie Salinas MD - Primary    Assisting Surgeon: Timothy Cheung MD PGY1    Pre-op Diagnosis:  Missed ab [O02.1]    Post-op Diagnosis:  Post-Op Diagnosis Codes:     * Missed ab [O02.1]    Procedure(s) (LRB):  D & C (SUCTION) (N/A)    Anesthesia: General    Findings/Key Components:   **please see op note for full details**  1. Normal external genitalia  2. Cervix dilated to #8 Hegar  3. Uterine contents removed from uterine cavity using #8 suction tip  4. Gentle curettage performed until gritty texture was noted in all four quadrants  5. Tenaculum removed and good hemostasis noted at puncture sites    Estimated Blood Loss: minimal         Specimens:   Specimen (12h ago through future)    Start     Ordered    04/30/18 1237  Specimen to Pathology - Surgery  Once     Comments:  Products of conception      04/30/18 1241          Discharge Provider: Timothy Cheung    Diagnoses:  Active Hospital Problems    Diagnosis  POA    *Missed ab [O02.1]  Yes      Resolved Hospital Problems    Diagnosis Date Resolved POA   No resolved problems to display.       Discharged Condition: good    Hospital Course:   Patient was admitted for outpatient procedure as above, and tolerated the procedure well with no complications. Please see operative report for further details. Following the procedure, the patient was awakened from anesthesia and transferred to the recovery area in stable condition. She was discharged to home once ambulating, voiding, tolerating PO intake, and pain was well-controlled. Patient was given routine post-op instructions and prescriptions for pain medication to take as needed. Patient instructed to follow up with Dr. Salinas in 4 weeks.    Final Diagnoses: Same as principal  problem.    Disposition: Home or Self Care    Follow up/Patient Instructions:    Medications:  Reconciled Home Medications:      Medication List      START taking these medications    hydrocodone-acetaminophen 5-325mg 5-325 mg per tablet  Commonly known as:  NORCO  Take 1 tablet by mouth every 4 (four) hours as needed for Pain.     ibuprofen 600 MG tablet  Commonly known as:  ADVIL,MOTRIN  Take 1 tablet (600 mg total) by mouth every 8 (eight) hours as needed for Pain.            Discharge Procedure Orders  Diet general     Activity as tolerated     Other restrictions (specify):   Order Comments: Nothing in the vagina until your post op appointment     Call MD for:  temperature >100.4     Call MD for:  persistent nausea and vomiting     Call MD for:  severe uncontrolled pain     Call MD for:  difficulty breathing, headache or visual disturbances     Call MD for:  hives     Call MD for:  persistent dizziness or light-headedness     Call MD for:  extreme fatigue     Call MD for:   Order Comments: Vaginal bleeding more than 1 pad/hr for more than 2 hours in a row       Follow-up Information     Tiffanie Salinas MD. Schedule an appointment as soon as possible for a visit in 4 weeks.    Specialty:  Obstetrics and Gynecology  Why:  Post Op Check  Contact information:  [address] [facility] [address]  [phone]                 Timothy Cheung MD  PGY1, OBN  [facility]

## 2018-04-30 NOTE — ASSESSMENT & PLAN NOTE
--to OR for suction d&c  --doxy 100mg PO prior to surgery  --200mg doxy postop  --consents signed  --dc instructions discussed  --to follow up with Dr. Salinas in 6 weeks

## 2018-04-30 NOTE — OR NURSING
"Spoke with Dr Chenug, states it is okay to give patient a "return to work" note for Saturday, 5-5-18.  "

## 2018-05-06 ENCOUNTER — PATIENT MESSAGE (OUTPATIENT)
Dept: OBSTETRICS AND GYNECOLOGY | Facility: CLINIC | Age: 23
End: 2018-05-06

## 2018-05-06 DIAGNOSIS — R30.0 DYSURIA: Primary | ICD-10-CM

## 2018-05-07 ENCOUNTER — PATIENT MESSAGE (OUTPATIENT)
Dept: OBSTETRICS AND GYNECOLOGY | Facility: CLINIC | Age: 23
End: 2018-05-07

## 2018-05-31 ENCOUNTER — OFFICE VISIT (OUTPATIENT)
Dept: OBSTETRICS AND GYNECOLOGY | Facility: CLINIC | Age: 23
End: 2018-05-31
Payer: MEDICAID

## 2018-05-31 VITALS
WEIGHT: 191.81 LBS | DIASTOLIC BLOOD PRESSURE: 60 MMHG | BODY MASS INDEX: 38.67 KG/M2 | SYSTOLIC BLOOD PRESSURE: 90 MMHG | HEIGHT: 59 IN

## 2018-05-31 DIAGNOSIS — N96 RECURRENT PREGNANCY LOSS: Primary | ICD-10-CM

## 2018-05-31 PROCEDURE — 99024 POSTOP FOLLOW-UP VISIT: CPT | Mod: S$GLB,,, | Performed by: OBSTETRICS & GYNECOLOGY

## 2018-05-31 NOTE — PROGRESS NOTES
Gynecology    SUBJECTIVE:     Chief Complaint: Post-op Evaluation       History of Present Illness:  Postoperative Follow-up  Patient presents to the clinic 4 weeks status post suction D&C for missed ab. Eating a regular diet without difficulty. Bowel movements are normal. The patient is not having any pain.    Review of Systems:  Review of Systems   Constitutional: Negative for appetite change, fever and unexpected weight change.   Respiratory: Negative for shortness of breath.    Cardiovascular: Negative for chest pain.   Gastrointestinal: Negative for nausea and vomiting.   Genitourinary: Negative for menorrhagia, menstrual problem, pelvic pain, vaginal bleeding, vaginal discharge and vaginal pain.        OBJECTIVE:     Physical Exam:  Physical Exam   Constitutional: She is oriented to person, place, and time. She appears well-developed and well-nourished.   Pulmonary/Chest: Effort normal.   Abdominal: Soft.   Genitourinary: Vagina normal and uterus normal. No labial fusion. There is no rash, tenderness, lesion or injury on the right labia. There is no rash, tenderness, lesion or injury on the left labia. Uterus is not deviated, not enlarged, not fixed and not tender. Cervix exhibits no motion tenderness, no discharge and no friability. Right adnexum displays no mass, no tenderness and no fullness. Left adnexum displays no mass, no tenderness and no fullness. No erythema, tenderness or bleeding in the vagina. No foreign body in the vagina. No signs of injury around the vagina. No vaginal discharge found.   Neurological: She is alert and oriented to person, place, and time.   Psychiatric: She has a normal mood and affect. Her behavior is normal. Judgment and thought content normal.   Nursing note and vitals reviewed.        ASSESSMENT:       ICD-10-CM ICD-9-CM    1. Recurrent pregnancy loss N96 [id number] ANTIPHOSPHOLIPID AB (ANTICARDIOLIPIN)      DRVVT      BETA-2 GLYCOPROTEIN ANTIBODIES          Plan:      Infinity  was seen today for post-op evaluation.    Diagnoses and all orders for this visit:    Recurrent pregnancy loss  -     ANTIPHOSPHOLIPID AB (ANTICARDIOLIPIN); Future  -     DRVVT; Future  -     BETA-2 GLYCOPROTEIN ANTIBODIES; Future    - discussed recurrent pregnancy loss after two vs three miscarriages; patient does want to have testing done  - doing well post operatively, no complaints    Orders Placed This Encounter   Procedures    ANTIPHOSPHOLIPID AB (ANTICARDIOLIPIN)    DRVVT    BETA-2 GLYCOPROTEIN ANTIBODIES       Follow-up for annual or prn.    Tiffanie Salinas

## 2018-06-20 ENCOUNTER — PATIENT MESSAGE (OUTPATIENT)
Dept: OBSTETRICS AND GYNECOLOGY | Facility: CLINIC | Age: 23
End: 2018-06-20

## 2018-06-22 ENCOUNTER — TELEPHONE (OUTPATIENT)
Dept: OBSTETRICS AND GYNECOLOGY | Facility: CLINIC | Age: 23
End: 2018-06-22

## 2018-06-22 DIAGNOSIS — Z34.90 PREGNANCY, UNSPECIFIED GESTATIONAL AGE: Primary | ICD-10-CM

## 2018-06-25 ENCOUNTER — LAB VISIT (OUTPATIENT)
Dept: LAB | Facility: HOSPITAL | Age: 23
End: 2018-06-25
Attending: OBSTETRICS & GYNECOLOGY
Payer: MEDICAID

## 2018-06-25 ENCOUNTER — TELEPHONE (OUTPATIENT)
Dept: OBSTETRICS AND GYNECOLOGY | Facility: CLINIC | Age: 23
End: 2018-06-25

## 2018-06-25 DIAGNOSIS — O20.0 THREATENED MISCARRIAGE: Primary | ICD-10-CM

## 2018-06-25 DIAGNOSIS — N96 RECURRENT PREGNANCY LOSS: ICD-10-CM

## 2018-06-25 DIAGNOSIS — O20.0 THREATENED MISCARRIAGE: ICD-10-CM

## 2018-06-25 LAB — HCG INTACT+B SERPL-ACNC: 24 MIU/ML

## 2018-06-25 PROCEDURE — 84702 CHORIONIC GONADOTROPIN TEST: CPT

## 2018-06-25 PROCEDURE — 85613 RUSSELL VIPER VENOM DILUTED: CPT

## 2018-06-25 PROCEDURE — 86146 BETA-2 GLYCOPROTEIN ANTIBODY: CPT | Mod: 59

## 2018-06-25 PROCEDURE — 86147 CARDIOLIPIN ANTIBODY EA IG: CPT

## 2018-06-25 NOTE — TELEPHONE ENCOUNTER
Patient called.  Reports having blood clots this weekend after a positive pregnancy test.  Does not think she saw any tissue.  Now not having any bleeding.  Coming for labwork today for recurrent pregnancy loss. Will add hCG today and Wednesday.  Patient questions answered.

## 2018-06-26 LAB
CARDIOLIPIN IGG SER IA-ACNC: <9.4 GPL
CARDIOLIPIN IGM SER IA-ACNC: <9.4 MPL
LA PPP-IMP: NEGATIVE

## 2018-06-27 ENCOUNTER — TELEPHONE (OUTPATIENT)
Dept: OBSTETRICS AND GYNECOLOGY | Facility: CLINIC | Age: 23
End: 2018-06-27

## 2018-06-27 ENCOUNTER — LAB VISIT (OUTPATIENT)
Dept: LAB | Facility: HOSPITAL | Age: 23
End: 2018-06-27
Attending: OBSTETRICS & GYNECOLOGY
Payer: MEDICAID

## 2018-06-27 DIAGNOSIS — O20.0 THREATENED MISCARRIAGE: ICD-10-CM

## 2018-06-27 DIAGNOSIS — N96 RECURRENT PREGNANCY LOSS: ICD-10-CM

## 2018-06-27 PROBLEM — Z34.91 NORMAL PREGNANCY IN FIRST TRIMESTER: Status: RESOLVED | Noted: 2017-01-20 | Resolved: 2018-06-27

## 2018-06-27 PROBLEM — O02.1 MISSED AB: Status: RESOLVED | Noted: 2018-04-30 | Resolved: 2018-06-27

## 2018-06-27 LAB — HCG INTACT+B SERPL-ACNC: 11 MIU/ML

## 2018-06-27 PROCEDURE — 36415 COLL VENOUS BLD VENIPUNCTURE: CPT

## 2018-06-27 PROCEDURE — 84702 CHORIONIC GONADOTROPIN TEST: CPT

## 2018-06-27 NOTE — TELEPHONE ENCOUNTER
Patient called.  Notified of falling hCG of 24 -> 11.  Discussed briefly recurrent pregnancy loss as this would be her third miscarriage.  Patient has had [facility] labs drawn, waiting on final results of beta 2 glycoprotein, but other tests are negative.  Discussed HSG and karyotype.  Patient will come in for a visit with partner to further review options.     Tiffanie Salinas

## 2018-06-28 LAB
B2 GLYCOPROT1 IGA SER QL: <9 SAU
B2 GLYCOPROT1 IGG SER QL: <9 SGU
B2 GLYCOPROT1 IGM SER QL: <9 SMU

## 2018-09-06 ENCOUNTER — OFFICE VISIT (OUTPATIENT)
Dept: OBSTETRICS AND GYNECOLOGY | Facility: CLINIC | Age: 23
End: 2018-09-06
Payer: MEDICAID

## 2018-09-06 VITALS
DIASTOLIC BLOOD PRESSURE: 77 MMHG | SYSTOLIC BLOOD PRESSURE: 125 MMHG | HEIGHT: 59 IN | BODY MASS INDEX: 39.96 KG/M2 | WEIGHT: 198.19 LBS

## 2018-09-06 DIAGNOSIS — Z30.011 ORAL CONTRACEPTION INITIATION: Primary | ICD-10-CM

## 2018-09-06 LAB
B-HCG UR QL: NEGATIVE
CTP QC/QA: YES

## 2018-09-06 PROCEDURE — 81025 URINE PREGNANCY TEST: CPT | Mod: S$GLB,,, | Performed by: OBSTETRICS & GYNECOLOGY

## 2018-09-06 PROCEDURE — 99213 OFFICE O/P EST LOW 20 MIN: CPT | Mod: S$GLB,,, | Performed by: OBSTETRICS & GYNECOLOGY

## 2018-09-06 RX ORDER — NORETHINDRONE ACETATE AND ETHINYL ESTRADIOL .02; 1 MG/1; MG/1
1 TABLET ORAL DAILY
Qty: 28 TABLET | Refills: 12 | Status: SHIPPED | OUTPATIENT
Start: 2018-09-06 | End: 2018-10-09

## 2018-09-06 NOTE — PROGRESS NOTES
Gynecology    SUBJECTIVE:     Chief Complaint: Contraception       History of Present Illness:  Patient presents today s/p miscarriage in 6/2018.  Had a normal period one month later, July 26-28.  Patient then had four days of spotting in August 21-24, came about 4-5 days early.  Patient is sexually active, not using any contraception.  UPT negative today. Patient is interested in contraception.  Desires OCP contraception.     Review of Systems:  Review of Systems   Genitourinary: Negative for menorrhagia, menstrual problem, pelvic pain, vaginal bleeding, vaginal discharge and vaginal pain.        OBJECTIVE:     Physical Exam:  Physical Exam   Constitutional: She is oriented to person, place, and time. She appears well-developed and well-nourished.   Pulmonary/Chest: Effort normal.   Neurological: She is oriented to person, place, and time.   Skin: No pallor.   Psychiatric: She has a normal mood and affect. Her behavior is normal. Judgment and thought content normal.   Nursing note and vitals reviewed.      ASSESSMENT:       ICD-10-CM ICD-9-CM    1. Oral contraception initiation Z30.011 V25.01 POCT urine pregnancy      norethindrone-ethinyl estradiol (LOESTRIN 1/20, 21,) 1-20 mg-mcg per tablet          Plan:      Jacobo was seen today for contraception.    Diagnoses and all orders for this visit:    Oral contraception initiation  -     POCT urine pregnancy  -     norethindrone-ethinyl estradiol (LOESTRIN 1/20, 21,) 1-20 mg-mcg per tablet; Take 1 tablet by mouth once daily.    - negative UPT today; discussed taking another UPT prior to starting OCP or waiting until after menstrual cycle  - OCP sent to pharmacy  - briefly discussed work up for recurrent pregnancy loss; has already had APA testing; would consider HSG and chromosome analysis; discussed that in more than 50% of cases, no cause for RPL is found    - The use of the estrogen containing contraception has been fully discussed with the patient. This includes  the proper method to initiate and continue the drug, the need for regular compliance to ensure adequate contraceptive effect, the physiology which make the pill/patch/ring effective, the instructions for what to do in event of a missed or delayed dosage, and warnings about anticipated minor side effects such as breakthrough spotting, nausea, breast tenderness, weight changes, acne, headaches, etc.    - We also discussed the more serious potential side effects such as MI, stroke, and deep vein thrombosis, all of which are very unlikely.  She has been asked to report any signs of such serious problems immediately.  She was advised to use back up during the first cycle. The need for additional protection, such as a condom, to prevent exposure to sexually transmitted diseases has also been discussed- the patient has been clearly reminded that the pill/patch/ring cannot protect her against diseases such as HIV and others. She understands and wishes to take the medication as prescribed.      Orders Placed This Encounter   Procedures    POCT urine pregnancy       Follow-up for annual or prn.    Tiffanie Salinas

## 2018-10-09 ENCOUNTER — HOSPITAL ENCOUNTER (EMERGENCY)
Facility: OTHER | Age: 23
Discharge: HOME | End: 2018-10-09
Attending: EMERGENCY MEDICINE
Payer: MEDICAID

## 2018-10-09 VITALS
HEART RATE: 86 BPM | DIASTOLIC BLOOD PRESSURE: 72 MMHG | TEMPERATURE: 98 F | BODY MASS INDEX: 40.32 KG/M2 | HEIGHT: 59 IN | OXYGEN SATURATION: 99 % | WEIGHT: 200 LBS | SYSTOLIC BLOOD PRESSURE: 122 MMHG | RESPIRATION RATE: 16 BRPM

## 2018-10-09 DIAGNOSIS — R10.11 RUQ PAIN: Primary | ICD-10-CM

## 2018-10-09 LAB
ALBUMIN SERPL BCP-MCNC: 4 G/DL
ALP SERPL-CCNC: 87 U/L
ALT SERPL W/O P-5'-P-CCNC: 14 U/L
ANION GAP SERPL CALC-SCNC: 8 MMOL/L
AST SERPL-CCNC: 18 U/L
B-HCG UR QL: NEGATIVE
BASOPHILS # BLD AUTO: 0.01 K/UL
BASOPHILS NFR BLD: 0.1 %
BILIRUB SERPL-MCNC: 0.2 MG/DL
BUN SERPL-MCNC: 13 MG/DL
CALCIUM SERPL-MCNC: 10 MG/DL
CHLORIDE SERPL-SCNC: 104 MMOL/L
CO2 SERPL-SCNC: 26 MMOL/L
CREAT SERPL-MCNC: 0.7 MG/DL
CTP QC/QA: YES
DIFFERENTIAL METHOD: ABNORMAL
EOSINOPHIL # BLD AUTO: 0.2 K/UL
EOSINOPHIL NFR BLD: 2.8 %
ERYTHROCYTE [DISTWIDTH] IN BLOOD BY AUTOMATED COUNT: 14.6 %
EST. GFR  (AFRICAN AMERICAN): >60 ML/MIN/1.73 M^2
EST. GFR  (NON AFRICAN AMERICAN): >60 ML/MIN/1.73 M^2
GLUCOSE SERPL-MCNC: 91 MG/DL
HCT VFR BLD AUTO: 38.6 %
HGB BLD-MCNC: 12 G/DL
LIPASE SERPL-CCNC: 41 U/L
LYMPHOCYTES # BLD AUTO: 2.7 K/UL
LYMPHOCYTES NFR BLD: 37.8 %
MCH RBC QN AUTO: 25.6 PG
MCHC RBC AUTO-ENTMCNC: 31.1 G/DL
MCV RBC AUTO: 82 FL
MONOCYTES # BLD AUTO: 0.7 K/UL
MONOCYTES NFR BLD: 9.2 %
NEUTROPHILS # BLD AUTO: 3.6 K/UL
NEUTROPHILS NFR BLD: 50 %
PLATELET # BLD AUTO: 328 K/UL
PMV BLD AUTO: 9.6 FL
POTASSIUM SERPL-SCNC: 3.8 MMOL/L
PROT SERPL-MCNC: 8.6 G/DL
RBC # BLD AUTO: 4.69 M/UL
SODIUM SERPL-SCNC: 138 MMOL/L
WBC # BLD AUTO: 7.2 K/UL

## 2018-10-09 PROCEDURE — 80053 COMPREHEN METABOLIC PANEL: CPT

## 2018-10-09 PROCEDURE — 81025 URINE PREGNANCY TEST: CPT | Performed by: EMERGENCY MEDICINE

## 2018-10-09 PROCEDURE — 63600175 PHARM REV CODE 636 W HCPCS: Performed by: EMERGENCY MEDICINE

## 2018-10-09 PROCEDURE — 85025 COMPLETE CBC W/AUTO DIFF WBC: CPT

## 2018-10-09 PROCEDURE — 83690 ASSAY OF LIPASE: CPT

## 2018-10-09 PROCEDURE — 96372 THER/PROPH/DIAG INJ SC/IM: CPT

## 2018-10-09 PROCEDURE — 99284 EMERGENCY DEPT VISIT MOD MDM: CPT | Mod: 25

## 2018-10-09 RX ORDER — KETOROLAC TROMETHAMINE 30 MG/ML
10 INJECTION, SOLUTION INTRAMUSCULAR; INTRAVENOUS
Status: COMPLETED | OUTPATIENT
Start: 2018-10-09 | End: 2018-10-09

## 2018-10-09 RX ADMIN — KETOROLAC TROMETHAMINE 10 MG: 30 INJECTION, SOLUTION INTRAMUSCULAR at 01:10

## 2018-10-09 NOTE — ED PROVIDER NOTES
"Encounter Date: 10/9/2018    SCRIBE #1 NOTE: I, Violet Ascencio, am scribing for, and in the presence of, Dr. Cisneros.       History     Chief Complaint   Patient presents with    Abdominal Pain     atraumatic left upper rib pain onset today. denies SOB     Time seen by provider: 12:39 AM    This is a 23 y.o. female who presents to the ED with complaint of abdominal pain that onset today. Pt reports abdominal tenderness and soreness that onset a month ago. Pt reports when she went to give blood about two weeks ago, they did a physical exam and found "tenderness over the liver." Pt reports a sharp, shooting pain when standing up at work today. Pt reports nausea. Pt denies vomiting or diarrhea. Pt denies EtOH use.         The history is provided by the patient.     Review of patient's allergies indicates:  No Known Allergies  Past Medical History:   Diagnosis Date    Chlamydia     Miscarriage      Past Surgical History:   Procedure Laterality Date    D & C (SUCTION) N/A 4/30/2018    [facility] at [facility]    DILATION AND CURETTAGE OF UTERUS  04/30/2018     Family History   Problem Relation Age of Onset    Breast cancer Other     Diabetes Mother     Kidney disease Mother     Ovarian cancer Neg Hx     Colon cancer Neg Hx      Social History     Tobacco Use    Smoking status: Never Smoker    Smokeless tobacco: Never Used    Tobacco comment: quit one month ago   Substance Use Topics    Alcohol use: No    Drug use: No     Review of Systems   Constitutional: Negative for fever.   HENT: Negative for congestion and sore throat.    Respiratory: Negative for cough.    Cardiovascular: Negative for chest pain.   Gastrointestinal: Positive for abdominal pain and nausea. Negative for diarrhea and vomiting.   Genitourinary: Negative for dysuria.   Musculoskeletal: Negative for back pain.   Skin: Negative for rash.   Neurological: Negative for weakness.   Hematological: Does not bruise/bleed " easily.       Physical Exam     Initial Vitals [10/09/18 0030]   BP Pulse Resp Temp SpO2   108/64 86 16 98.7 °F (37.1 °C) 100 %      MAP       --         Physical Exam    Nursing note and vitals reviewed.  Constitutional: She appears well-developed and well-nourished. She is not diaphoretic. No distress.   HENT:   Head: Normocephalic and atraumatic.   Cardiovascular: Normal rate, regular rhythm and normal heart sounds. Exam reveals no gallop and no friction rub.    No murmur heard.  Pulmonary/Chest: Breath sounds normal. No respiratory distress. She has no wheezes. She has no rhonchi. She has no rales.   Abdominal: Soft. Bowel sounds are normal. She exhibits no distension. There is no rebound and no guarding.   Abdominal tenderness in the RUQ. Negative Fofana's sign.   Musculoskeletal: Normal range of motion. She exhibits no edema or tenderness.   Neurological: She is alert and oriented to person, place, and time.   Skin: Skin is warm and dry. No erythema. No pallor.   Psychiatric: She has a normal mood and affect. Her behavior is normal. Judgment and thought content normal.         ED Course   Procedures  Labs Reviewed   CBC W/ AUTO DIFFERENTIAL   COMPREHENSIVE METABOLIC PANEL   LIPASE   POCT URINE PREGNANCY          Imaging Results    None          Medical Decision Making:   Clinical Tests:   Lab Tests: Ordered and Reviewed  ED Management:  Morbidly obese patient presents complaining of intermittent right upper quadrant pain for several weeks.  An episode of worsening colic today that has resolved with Tylenol.  A little bit of residual right upper quadrant tenderness on exam, negative Fofana sign. Afebrile.  No nausea or vomiting.  My primary suspicion is biliary colic, I also consider pancreatitis and hepatitis.  Blood work obtained without acute abnormalities.  Patient has upcoming outpatient ultrasound scheduled in 2 days.  I do not think emergent ultrasound indicated this time.  Counseled keep his  appointment.  Counseled likely pathology biliary colic.    I did have an extensive talk regarding signs to return for and need for follow up. Patient expressed understanding and will monitor symptoms closely and follow-up as needed.    TEMITOPE Cisneros M.D.  10/09/2018  2:26 AM              Scribe Attestation:   Scribe #1: I performed the above scribed service and the documentation accurately describes the services I performed. I attest to the accuracy of the note.    Attending Attestation:           Physician Attestation for Scribe:  Physician Attestation Statement for Scribe #1: I, Dr. Cisneros, reviewed documentation, as scribed by Violet Ascencio in my presence, and it is both accurate and complete.                    Clinical Impression:     1. RUQ pain                                   Sarwat Cisneros MD  10/09/18 0226

## 2018-10-09 NOTE — ED NOTES
Pt has has tenderness and soreness to upper abd on rt side for over a month. Supposed to have an ultrasound coming up Thursday. Had a sharp pain today and was concerned, it was relieved by tylenol. Admits to some nausea, denies emesis or diarrhea

## 2018-11-23 ENCOUNTER — OFFICE VISIT (OUTPATIENT)
Dept: OBSTETRICS AND GYNECOLOGY | Facility: CLINIC | Age: 23
End: 2018-11-23
Payer: MEDICAID

## 2018-11-23 ENCOUNTER — LAB VISIT (OUTPATIENT)
Dept: LAB | Facility: OTHER | Age: 23
End: 2018-11-23
Payer: MEDICAID

## 2018-11-23 VITALS
SYSTOLIC BLOOD PRESSURE: 114 MMHG | DIASTOLIC BLOOD PRESSURE: 68 MMHG | WEIGHT: 202.69 LBS | HEIGHT: 59 IN | BODY MASS INDEX: 40.86 KG/M2

## 2018-11-23 DIAGNOSIS — Z32.01 POSITIVE PREGNANCY TEST: ICD-10-CM

## 2018-11-23 DIAGNOSIS — N91.2 AMENORRHEA: ICD-10-CM

## 2018-11-23 DIAGNOSIS — O21.9 NAUSEA AND VOMITING IN PREGNANCY: ICD-10-CM

## 2018-11-23 DIAGNOSIS — N91.2 AMENORRHEA: Primary | ICD-10-CM

## 2018-11-23 LAB
ABO + RH BLD: NORMAL
ANION GAP SERPL CALC-SCNC: 8 MMOL/L
BASOPHILS # BLD AUTO: 0.02 K/UL
BASOPHILS NFR BLD: 0.3 %
BLD GP AB SCN CELLS X3 SERPL QL: NORMAL
BUN SERPL-MCNC: 10 MG/DL
C TRACH DNA SPEC QL NAA+PROBE: NOT DETECTED
CALCIUM SERPL-MCNC: 9.3 MG/DL
CHLORIDE SERPL-SCNC: 106 MMOL/L
CO2 SERPL-SCNC: 23 MMOL/L
CREAT SERPL-MCNC: 0.6 MG/DL
DIFFERENTIAL METHOD: ABNORMAL
EOSINOPHIL # BLD AUTO: 0.1 K/UL
EOSINOPHIL NFR BLD: 1.7 %
ERYTHROCYTE [DISTWIDTH] IN BLOOD BY AUTOMATED COUNT: 14.9 %
EST. GFR  (AFRICAN AMERICAN): >60 ML/MIN/1.73 M^2
EST. GFR  (NON AFRICAN AMERICAN): >60 ML/MIN/1.73 M^2
GLUCOSE SERPL-MCNC: 89 MG/DL
HBV SURFACE AG SERPL QL IA: NEGATIVE
HCT VFR BLD AUTO: 36.5 %
HGB BLD-MCNC: 11.5 G/DL
HIV 1+2 AB+HIV1 P24 AG SERPL QL IA: NEGATIVE
LYMPHOCYTES # BLD AUTO: 1.8 K/UL
LYMPHOCYTES NFR BLD: 28.2 %
MCH RBC QN AUTO: 25.7 PG
MCHC RBC AUTO-ENTMCNC: 31.5 G/DL
MCV RBC AUTO: 82 FL
MONOCYTES # BLD AUTO: 0.8 K/UL
MONOCYTES NFR BLD: 11.9 %
N GONORRHOEA DNA SPEC QL NAA+PROBE: NOT DETECTED
NEUTROPHILS # BLD AUTO: 3.7 K/UL
NEUTROPHILS NFR BLD: 57.6 %
PLATELET # BLD AUTO: 373 K/UL
PMV BLD AUTO: 9.9 FL
POTASSIUM SERPL-SCNC: 3.9 MMOL/L
RBC # BLD AUTO: 4.47 M/UL
RPR SER QL: NORMAL
SODIUM SERPL-SCNC: 137 MMOL/L
WBC # BLD AUTO: 6.49 K/UL

## 2018-11-23 PROCEDURE — 99213 OFFICE O/P EST LOW 20 MIN: CPT | Mod: PBBFAC,25,TH | Performed by: NURSE PRACTITIONER

## 2018-11-23 PROCEDURE — 85025 COMPLETE CBC W/AUTO DIFF WBC: CPT

## 2018-11-23 PROCEDURE — 80048 BASIC METABOLIC PNL TOTAL CA: CPT

## 2018-11-23 PROCEDURE — 86762 RUBELLA ANTIBODY: CPT

## 2018-11-23 PROCEDURE — 87491 CHLMYD TRACH DNA AMP PROBE: CPT

## 2018-11-23 PROCEDURE — 87086 URINE CULTURE/COLONY COUNT: CPT

## 2018-11-23 PROCEDURE — 99203 OFFICE O/P NEW LOW 30 MIN: CPT | Mod: S$PBB,TH,, | Performed by: NURSE PRACTITIONER

## 2018-11-23 PROCEDURE — 86850 RBC ANTIBODY SCREEN: CPT

## 2018-11-23 PROCEDURE — 86703 HIV-1/HIV-2 1 RESULT ANTBDY: CPT

## 2018-11-23 PROCEDURE — 87340 HEPATITIS B SURFACE AG IA: CPT

## 2018-11-23 PROCEDURE — 36415 COLL VENOUS BLD VENIPUNCTURE: CPT

## 2018-11-23 PROCEDURE — 86592 SYPHILIS TEST NON-TREP QUAL: CPT

## 2018-11-23 PROCEDURE — 99999 PR PBB SHADOW E&M-EST. PATIENT-LVL III: CPT | Mod: PBBFAC,,, | Performed by: NURSE PRACTITIONER

## 2018-11-23 RX ORDER — DOXYLAMINE SUCCINATE AND PYRIDOXINE HYDROCHLORIDE, DELAYED RELEASE TABLETS 10 MG/10 MG 10; 10 MG/1; MG/1
2 TABLET, DELAYED RELEASE ORAL NIGHTLY
Qty: 60 TABLET | Refills: 2 | Status: SHIPPED | OUTPATIENT
Start: 2018-11-23 | End: 2018-12-23

## 2018-11-25 LAB — BACTERIA UR CULT: NORMAL

## 2018-11-26 LAB
RUBV IGG SER-ACNC: 63.2 IU/ML
RUBV IGG SER-IMP: REACTIVE

## 2018-11-28 NOTE — PROGRESS NOTES
"CC: Positive Pregnancy Test    HISTORY OF PRESENT ILLNESS:    Jacobo Alvarado is a 23 y.o. female, ,  presents today for a routine exam complaining of amenorrhea and positive home urine pregnancy test.  Patient's last menstrual period was 10/21/2018 (exact date).  She is not currently on any contraception. Reports nausea and vomiting x 1 daily. Reports breast tenderness. Denies vaginal bleeding and pelvic pain. Denies urinary and bowel issues.   Reports history of SAB x 3 (10 wk, 6 wks, 5 wks). Reports 1   at 36 weeks  ILDA: 19, EGA: 4w5d based on LMP  Last pap smear 2018- wnl    ROS:  GENERAL: No weight changes. No swelling. No fatigue. No fever.  CARDIOVASCULAR: No chest pain. No shortness of breath. No leg cramps.   NEUROLOGICAL: No headaches. No vision changes.  BREASTS: No pain. No lumps. No discharge.  ABDOMEN: No pain. No diarrhea. No constipation.  REPRODUCTIVE: No abnormal bleeding.   VULVA: No pain. No lesions. No itching.  VAGINA: No relaxation. No itching. No odor. No discharge. No lesions.  URINARY: No incontinence. No nocturia. No frequency. No dysuria.    MEDICATIONS AND ALLERGIES:  Reviewed    /68 (BP Location: Left arm, Patient Position: Sitting)   Ht 4' 11" (1.499 m)   Wt 92 kg (202 lb 11.4 oz)   LMP 10/21/2018 (Exact Date)   BMI 40.94 kg/m²     PE:  AFFECT: Calm, alert and oriented X 3. Interactive during exam  GENERAL: Appears well-nourished, well-developed, in no acute distress.  HEAD: Normocephalic, atruamatic  SKIN: Normal for race, warm, & dry. No lesions or rashes.  LUNGS: Easy and unlabored  ABDOMEN: Soft and nontender without masses or organomegally.  VULVA: No lesions, masses or tenderness.  VAGINA: Moist and well rugated without lesions or discharge.  CERVIX: Moist and pink without lesions, discharge or tenderness.      UTERUS SIZE: 6 week size, nontender and without masses.  ADNEXA: No masses or tenderness.  ESTIMATE OF PELVIC CAPACITY: " Adequate  EXTREMITIES: No cyanosis, clubbing or edema. No calf tenderness.      Diagnosis:  1. Amenorrhea    2. Positive pregnancy test    3. Nausea and vomiting in pregnancy        Plan:     Orders Placed This Encounter    Urine culture    C. trachomatis/N. gonorrhoeae by AMP DNA    HIV-1 and HIV-2 antibodies    RPR    Hepatitis B surface antigen    Rubella antibody, IgG    CBC auto differential    Basic metabolic panel    POCT urine pregnancy    Type & Screen    doxylamine-pyridoxine, vit B6, (DICLEGIS) 10-10 mg TbEC    prenatal vit 10-iron fum-folic 65-1 mg Tab    US OB/GYN Procedure ([facility])     Nausea and vomiting in pregnancy    -  Education regarding lifestyle and dietary modifications    -  Advised use of B6/Unisom. Pt will notify us if no relief/worsening symptoms, will consider Zofran if needed.      1st TRIMESTER COUNSELING: booklet provided:  Discussed:   Common complaints of pregnancy  Risk factors identified by prenatal history  Toxoplasmosis precautions (Cats/Raw Meat)  Sexual activity and exercise  Travel  Tobacco (Ask, Advise, Assess, Assist, and Arrange), as well as alcohol and drug use  Use of any medications (Including supplements, Vitamins, Herbs, or OTC Drugs)  Domestic violence    Dating US 12/21/18  FOLLOW-UP in 4 weeks with Dr. Cade 12/28/18 at [facility]

## 2018-12-05 ENCOUNTER — TELEPHONE (OUTPATIENT)
Dept: OBSTETRICS AND GYNECOLOGY | Facility: CLINIC | Age: 23
End: 2018-12-05

## 2018-12-05 NOTE — TELEPHONE ENCOUNTER
----- Message from Karen Lobato sent at 12/5/2018  3:47 PM CST -----  Contact: heide   Can the clinic reply in [facility]: no    Who Called: heide     Date of Positive Preg Test: 11/21/2018    1st day of Last Menstrual Cycle: 10/21/2018    List Any Difficulties: no    What Number to Call Back: [phone]

## 2018-12-05 NOTE — TELEPHONE ENCOUNTER
I spoke to the pt and evens her an apt to be seen pt did not come to her apt today due to she had no one to get her kids. Pt states that she has a history if miscarriages and needs something soon. Pt was informed that she had an apt today and last week. Pt wanted to come in again to be seen. I scheduled the pt and apt

## 2018-12-21 ENCOUNTER — PROCEDURE VISIT (OUTPATIENT)
Dept: OBSTETRICS AND GYNECOLOGY | Facility: CLINIC | Age: 23
End: 2018-12-21
Payer: MEDICAID

## 2018-12-21 DIAGNOSIS — N91.2 AMENORRHEA: ICD-10-CM

## 2018-12-21 PROCEDURE — 76801 OB US < 14 WKS SINGLE FETUS: CPT | Mod: PBBFAC | Performed by: OBSTETRICS & GYNECOLOGY

## 2018-12-21 PROCEDURE — 76801 OB US < 14 WKS SINGLE FETUS: CPT | Mod: 26,S$PBB,, | Performed by: OBSTETRICS & GYNECOLOGY

## 2018-12-21 PROCEDURE — 76801 PR US, OB <14WKS, TRANSABD, SINGLE GESTATION: ICD-10-PCS | Mod: 26,S$PBB,, | Performed by: OBSTETRICS & GYNECOLOGY

## 2018-12-28 ENCOUNTER — INITIAL PRENATAL (OUTPATIENT)
Dept: OBSTETRICS AND GYNECOLOGY | Facility: CLINIC | Age: 23
End: 2018-12-28
Payer: MEDICAID

## 2018-12-28 VITALS
BODY MASS INDEX: 41.01 KG/M2 | SYSTOLIC BLOOD PRESSURE: 118 MMHG | DIASTOLIC BLOOD PRESSURE: 70 MMHG | WEIGHT: 203.06 LBS

## 2018-12-28 DIAGNOSIS — N89.8 VAGINAL DISCHARGE: ICD-10-CM

## 2018-12-28 DIAGNOSIS — O21.9 NAUSEA AND VOMITING DURING PREGNANCY: Primary | ICD-10-CM

## 2018-12-28 DIAGNOSIS — Z87.51 HISTORY OF PRETERM DELIVERY: ICD-10-CM

## 2018-12-28 DIAGNOSIS — Z34.81 ENCOUNTER FOR SUPERVISION OF OTHER NORMAL PREGNANCY IN FIRST TRIMESTER: ICD-10-CM

## 2018-12-28 LAB
CANDIDA RRNA VAG QL PROBE: NEGATIVE
G VAGINALIS RRNA GENITAL QL PROBE: POSITIVE
T VAGINALIS RRNA GENITAL QL PROBE: NEGATIVE

## 2018-12-28 PROCEDURE — 99213 OFFICE O/P EST LOW 20 MIN: CPT | Mod: PBBFAC,TH,PO | Performed by: OBSTETRICS & GYNECOLOGY

## 2018-12-28 PROCEDURE — 99213 OFFICE O/P EST LOW 20 MIN: CPT | Mod: TH,S$PBB,, | Performed by: OBSTETRICS & GYNECOLOGY

## 2018-12-28 PROCEDURE — 87660 TRICHOMONAS VAGIN DIR PROBE: CPT

## 2018-12-28 PROCEDURE — 99999 PR PBB SHADOW E&M-EST. PATIENT-LVL III: CPT | Mod: PBBFAC,,, | Performed by: OBSTETRICS & GYNECOLOGY

## 2018-12-28 RX ORDER — PROMETHAZINE HYDROCHLORIDE 25 MG/1
12.5 TABLET ORAL EVERY 6 HOURS PRN
Qty: 30 TABLET | Refills: 1 | Status: SHIPPED | OUTPATIENT
Start: 2018-12-28 | End: 2018-12-31 | Stop reason: SDUPTHER

## 2018-12-28 RX ORDER — HYDROXYPROGESTERONE CAPROATE 250 MG/ML
250 INJECTION INTRAMUSCULAR
Qty: 4 ML | Refills: 30 | Status: SHIPPED | OUTPATIENT
Start: 2018-12-28 | End: 2019-06-06

## 2018-12-28 RX ORDER — DOXYLAMINE SUCCINATE AND PYRIDOXINE HYDROCHLORIDE, DELAYED RELEASE TABLETS 10 MG/10 MG 10; 10 MG/1; MG/1
1 TABLET, DELAYED RELEASE ORAL DAILY
Qty: 30 TABLET | Refills: 3 | Status: SHIPPED | OUTPATIENT
Start: 2018-12-28 | End: 2018-12-31 | Stop reason: SDUPTHER

## 2018-12-28 NOTE — PROGRESS NOTES
Complaints today: 9w5d by LMP consistent w/ 8wk US. Had US last week but report is not up. Reports ongoing nausea in AMs. Has occasional vomiting. Only using diclegis intermittently. Denies vaginal bleeding or abdominal cramping. Reports white vaginal discharge.     /70   Wt 92.1 kg (203 lb 0.7 oz)   LMP 10/21/2018 (Exact Date)   BMI 41.01 kg/m²     23 y.o., at Unknown by Estimated Date of Delivery: None noted.  Patient Active Problem List   Diagnosis    Recurrent pregnancy loss     OB History    Para Term  AB Living   5 1   1 3 1   SAB TAB Ectopic Multiple Live Births   3       1      # Outcome Date GA Lbr [place]/2nd Weight Sex Delivery Anes PTL Lv   5 Current            4 SAB 2018     SAB   FD   3 SAB 18 12w0d    SAB   FD   2 SAB 2017     SAB   FD   1  12 36w0d  2.693 kg (5 lb 15 oz) F Vag-Spont EPI Y [place]          Dating reviewed    Allergies and problem list reviewed and updated    Medical and surgical history reviewed    Prenatal labs reviewed and updated    Physical Exam:  ABD: soft, nontender  : cervix closed, +white vaginal discharge     Assessment:  IUP at 9w5d    Plan:     IUP at 9w5d  -- Dating scan report pending  -- Desires genetic screening: NT ordered   -- avoid NSAIDs/alcohol/drugs/raw meat/cold deli meats/fish with high mercury content/unpasteruized cheeses and to take daily PNV.     H/o PTD @ 36  -- Rx Kendy to speciality pharmacy to start @ 16wga    Vaginal Discharge  -- Affirm       N/V  -- Diclegis daily  -- Phenergan PRN

## 2018-12-29 ENCOUNTER — PATIENT MESSAGE (OUTPATIENT)
Dept: OBSTETRICS AND GYNECOLOGY | Facility: CLINIC | Age: 23
End: 2018-12-29

## 2018-12-29 RX ORDER — CLINDAMYCIN PHOSPHATE 20 MG/G
CREAM VAGINAL NIGHTLY
Qty: 40 G | Refills: 0 | Status: SHIPPED | OUTPATIENT
Start: 2018-12-29 | End: 2019-01-05

## 2018-12-31 ENCOUNTER — TELEPHONE (OUTPATIENT)
Dept: PHARMACY | Facility: CLINIC | Age: 23
End: 2018-12-31

## 2018-12-31 DIAGNOSIS — O21.9 NAUSEA AND VOMITING DURING PREGNANCY: ICD-10-CM

## 2018-12-31 DIAGNOSIS — Z32.01 POSITIVE PREGNANCY TEST: ICD-10-CM

## 2018-12-31 RX ORDER — DOXYLAMINE SUCCINATE AND PYRIDOXINE HYDROCHLORIDE, DELAYED RELEASE TABLETS 10 MG/10 MG 10; 10 MG/1; MG/1
1 TABLET, DELAYED RELEASE ORAL DAILY
Qty: 30 TABLET | Refills: 3 | Status: SHIPPED | OUTPATIENT
Start: 2018-12-31 | End: 2019-02-21

## 2018-12-31 RX ORDER — PROMETHAZINE HYDROCHLORIDE 25 MG/1
12.5 TABLET ORAL EVERY 6 HOURS PRN
Qty: 30 TABLET | Refills: 1 | Status: SHIPPED | OUTPATIENT
Start: 2018-12-31 | End: 2019-02-21

## 2019-01-07 ENCOUNTER — PATIENT MESSAGE (OUTPATIENT)
Dept: OBSTETRICS AND GYNECOLOGY | Facility: CLINIC | Age: 24
End: 2019-01-07

## 2019-01-14 ENCOUNTER — PATIENT MESSAGE (OUTPATIENT)
Dept: OBSTETRICS AND GYNECOLOGY | Facility: CLINIC | Age: 24
End: 2019-01-14

## 2019-01-15 RX ORDER — METRONIDAZOLE 500 MG/1
500 TABLET ORAL EVERY 12 HOURS
Qty: 14 TABLET | Refills: 0 | Status: SHIPPED | OUTPATIENT
Start: 2019-01-15 | End: 2019-01-22

## 2019-01-17 ENCOUNTER — LAB VISIT (OUTPATIENT)
Dept: LAB | Facility: OTHER | Age: 24
End: 2019-01-17
Attending: OBSTETRICS & GYNECOLOGY
Payer: MEDICAID

## 2019-01-17 ENCOUNTER — PROCEDURE VISIT (OUTPATIENT)
Dept: MATERNAL FETAL MEDICINE | Facility: CLINIC | Age: 24
End: 2019-01-17
Payer: MEDICAID

## 2019-01-17 VITALS — WEIGHT: 201.06 LBS | BODY MASS INDEX: 40.61 KG/M2

## 2019-01-17 DIAGNOSIS — Z36.82 ENCOUNTER FOR ANTENATAL SCREENING FOR NUCHAL TRANSLUCENCY: ICD-10-CM

## 2019-01-17 DIAGNOSIS — Z36.89 ENCOUNTER FOR FETAL ANATOMIC SURVEY: Primary | ICD-10-CM

## 2019-01-17 DIAGNOSIS — Z34.81 ENCOUNTER FOR SUPERVISION OF OTHER NORMAL PREGNANCY IN FIRST TRIMESTER: ICD-10-CM

## 2019-01-17 PROCEDURE — 76813 OB US NUCHAL MEAS 1 GEST: CPT | Mod: PBBFAC | Performed by: OBSTETRICS & GYNECOLOGY

## 2019-01-17 PROCEDURE — 76813 PR US, OB NUCHAL, TRANSABDOM/TRANSVAG, FIRST GESTATION: ICD-10-PCS | Mod: 26,S$PBB,, | Performed by: OBSTETRICS & GYNECOLOGY

## 2019-01-17 PROCEDURE — 99499 UNLISTED E&M SERVICE: CPT | Mod: S$PBB,,, | Performed by: OBSTETRICS & GYNECOLOGY

## 2019-01-17 PROCEDURE — 36415 COLL VENOUS BLD VENIPUNCTURE: CPT

## 2019-01-17 PROCEDURE — 76813 OB US NUCHAL MEAS 1 GEST: CPT | Mod: 26,S$PBB,, | Performed by: OBSTETRICS & GYNECOLOGY

## 2019-01-17 PROCEDURE — 84163 PAPPA SERUM: CPT

## 2019-01-17 PROCEDURE — 99499 NO LOS: ICD-10-PCS | Mod: S$PBB,,, | Performed by: OBSTETRICS & GYNECOLOGY

## 2019-01-17 NOTE — PROGRESS NOTES
Obstetrical ultrasound completed today.  See report in imaging section of [facility].

## 2019-01-17 NOTE — TELEPHONE ENCOUNTER
Patient provided [place] 250mg/ml counseling on 19.  Confirmed 2 patient identifiers - name and . She is currently <13 weeks GA. She previously saw Dr. Salinas and would like to go back to seeing Dr. Salinas if possible. Consultation was completed and consent to deliver to [facility] was granted, but she would like a f/u call in 2 weeks to ensure medication is sent to the correct MD.  $0 copay (Medicaid waive) - patient informed.     Counseled patient on administration directions:  Inject [place] 250mg/0.5ml into the muscle once weekly (every 7 days). To be administered in MD office.  - Treatment may begin between 16 weeks 0 days and 20 weeks 6 days of gestation. Continue weekly administration until 37 weeks gestation or until delivery, whichever comes first.  ?  Patient was counseled on possible side effects:  ·   The most common side effects of JENNIFER include:   pain, swelling, itching or a hard bump at the injection site  hives  itching  nausea  diarrhea  Call your healthcare provider if you have the following at your injection site:  increased pain over time  oozing of blood or fluid  swelling     ·   JENNIFER may cause serious side effects, including:   Blood clots. Symptoms of a blood clot may include;  leg swelling  redness in your leg  a spot on your leg that is warm to the touch  leg pain that gets worse when you bend your foot  Call your healthcare provider right away if you get any of the symptoms above during treatment with JENNIFER.  Allergic reactions. Symptoms of an allergic reaction may include:  hives  itching  swelling of the face  Call your healthcare provider right away if you get any of the symptoms above during treatment with JENNIFER.  Decrease in glucose (blood sugar) tolerance. Your healthcare provider will need to monitor your blood sugar while taking JENNIFER if you have diabetes or pre-diabetes.  Your body may hold too much fluid (fluid retention).  Depression.  Yellowing of your skin and the whites  of your eyes (jaundice).  High blood pressure.       Patient verbalized understanding.  will f/u with her in 2 weeks as requested to see how to proceed with delivery of Kendy.  Patient advised to call [facility] should any questions arise.

## 2019-01-22 LAB — MATERNAL INTEGRATED SCREEN PART 1: NORMAL

## 2019-02-01 NOTE — TELEPHONE ENCOUNTER
Patient confirms that she would still like Kendy delivered to Dr. Salinas's office.  She is ~15 weeks GA.  Her appointment is scheduled for 2/7/19.  Will confirm delivery address with [facility].

## 2019-02-04 ENCOUNTER — PATIENT MESSAGE (OUTPATIENT)
Dept: OBSTETRICS AND GYNECOLOGY | Facility: CLINIC | Age: 24
End: 2019-02-04

## 2019-02-07 ENCOUNTER — ROUTINE PRENATAL (OUTPATIENT)
Dept: OBSTETRICS AND GYNECOLOGY | Facility: CLINIC | Age: 24
End: 2019-02-07
Payer: MEDICAID

## 2019-02-07 VITALS
DIASTOLIC BLOOD PRESSURE: 70 MMHG | SYSTOLIC BLOOD PRESSURE: 120 MMHG | BODY MASS INDEX: 40.97 KG/M2 | WEIGHT: 202.81 LBS

## 2019-02-07 DIAGNOSIS — R10.9 SIDE PAIN: ICD-10-CM

## 2019-02-07 DIAGNOSIS — O26.20 HIGH RISK PREGNANCY DUE TO RECURRENT MISCARRIAGE: ICD-10-CM

## 2019-02-07 DIAGNOSIS — N89.8 VAGINAL DISCHARGE: Primary | ICD-10-CM

## 2019-02-07 DIAGNOSIS — O09.213 HIGH RISK PREGNANCY DUE TO HISTORY OF PRETERM LABOR IN THIRD TRIMESTER: ICD-10-CM

## 2019-02-07 LAB
CANDIDA RRNA VAG QL PROBE: POSITIVE
G VAGINALIS RRNA GENITAL QL PROBE: NEGATIVE
T VAGINALIS RRNA GENITAL QL PROBE: NEGATIVE

## 2019-02-07 PROCEDURE — 87480 CANDIDA DNA DIR PROBE: CPT

## 2019-02-07 PROCEDURE — 87086 URINE CULTURE/COLONY COUNT: CPT

## 2019-02-07 PROCEDURE — 87147 CULTURE TYPE IMMUNOLOGIC: CPT

## 2019-02-07 PROCEDURE — 99213 PR OFFICE/OUTPT VISIT, EST, LEVL III, 20-29 MIN: ICD-10-PCS | Mod: TH,S$GLB,, | Performed by: OBSTETRICS & GYNECOLOGY

## 2019-02-07 PROCEDURE — 99213 OFFICE O/P EST LOW 20 MIN: CPT | Mod: TH,S$GLB,, | Performed by: OBSTETRICS & GYNECOLOGY

## 2019-02-07 PROCEDURE — 87088 URINE BACTERIA CULTURE: CPT

## 2019-02-07 NOTE — PROGRESS NOTES
No bleeding or spotting.  Occasional sharp pain, no intense cramping or discomfort.  Does have some vaginal discharge and itching.  Vaginosis screen done today.  Urine culture ordered for side pain.

## 2019-02-08 DIAGNOSIS — B37.9 YEAST INFECTION: Primary | ICD-10-CM

## 2019-02-08 RX ORDER — FLUCONAZOLE 150 MG/1
150 TABLET ORAL ONCE
Qty: 1 TABLET | Refills: 0 | Status: SHIPPED | OUTPATIENT
Start: 2019-02-08 | End: 2019-02-08

## 2019-02-09 LAB
BACTERIA UR CULT: NORMAL

## 2019-02-11 PROBLEM — B95.1 GROUP BETA STREP POSITIVE: Status: ACTIVE | Noted: 2019-02-11

## 2019-02-15 ENCOUNTER — PATIENT MESSAGE (OUTPATIENT)
Dept: OBSTETRICS AND GYNECOLOGY | Facility: CLINIC | Age: 24
End: 2019-02-15

## 2019-02-15 ENCOUNTER — HOSPITAL ENCOUNTER (EMERGENCY)
Facility: HOSPITAL | Age: 24
Discharge: HOME | End: 2019-02-15
Attending: EMERGENCY MEDICINE
Payer: MEDICAID

## 2019-02-15 VITALS
SYSTOLIC BLOOD PRESSURE: 129 MMHG | HEART RATE: 98 BPM | RESPIRATION RATE: 16 BRPM | HEIGHT: 59 IN | OXYGEN SATURATION: 100 % | TEMPERATURE: 98 F | DIASTOLIC BLOOD PRESSURE: 97 MMHG | BODY MASS INDEX: 40.92 KG/M2 | WEIGHT: 203 LBS

## 2019-02-15 DIAGNOSIS — O20.0 THREATENED MISCARRIAGE: Primary | ICD-10-CM

## 2019-02-15 LAB
BACTERIA #/AREA URNS AUTO: ABNORMAL /HPF
BACTERIA GENITAL QL WET PREP: ABNORMAL
BILIRUB UR QL STRIP: NEGATIVE
BILIRUB UR QL STRIP: NEGATIVE
CLARITY UR REFRACT.AUTO: ABNORMAL
CLARITY UR REFRACT.AUTO: CLEAR
CLUE CELLS VAG QL WET PREP: ABNORMAL
COLOR UR AUTO: YELLOW
COLOR UR AUTO: YELLOW
FILAMENT FUNGI VAG WET PREP-#/AREA: ABNORMAL
GLUCOSE UR QL STRIP: NEGATIVE
GLUCOSE UR QL STRIP: NEGATIVE
HGB UR QL STRIP: NEGATIVE
HGB UR QL STRIP: NEGATIVE
KETONES UR QL STRIP: ABNORMAL
KETONES UR QL STRIP: NEGATIVE
LEUKOCYTE ESTERASE UR QL STRIP: ABNORMAL
LEUKOCYTE ESTERASE UR QL STRIP: NEGATIVE
MICROSCOPIC COMMENT: ABNORMAL
NITRITE UR QL STRIP: NEGATIVE
NITRITE UR QL STRIP: NEGATIVE
PH UR STRIP: 6 [PH] (ref 5–8)
PH UR STRIP: 7 [PH] (ref 5–8)
PROT UR QL STRIP: NEGATIVE
PROT UR QL STRIP: NEGATIVE
RBC #/AREA URNS AUTO: 1 /HPF (ref 0–4)
SP GR UR STRIP: 1.01 (ref 1–1.03)
SP GR UR STRIP: 1.01 (ref 1–1.03)
SPECIMEN SOURCE: ABNORMAL
SQUAMOUS #/AREA URNS AUTO: 30 /HPF
T VAGINALIS GENITAL QL WET PREP: ABNORMAL
URN SPEC COLLECT METH UR: ABNORMAL
URN SPEC COLLECT METH UR: ABNORMAL
WBC #/AREA URNS AUTO: 2 /HPF (ref 0–5)
WBC #/AREA VAG WET PREP: ABNORMAL
YEAST GENITAL QL WET PREP: ABNORMAL

## 2019-02-15 PROCEDURE — 99284 PR EMERGENCY DEPT VISIT,LEVEL IV: ICD-10-PCS | Mod: ,,, | Performed by: EMERGENCY MEDICINE

## 2019-02-15 PROCEDURE — 99284 EMERGENCY DEPT VISIT MOD MDM: CPT | Mod: ,,, | Performed by: EMERGENCY MEDICINE

## 2019-02-15 PROCEDURE — 87210 SMEAR WET MOUNT SALINE/INK: CPT

## 2019-02-15 PROCEDURE — 81003 URINALYSIS AUTO W/O SCOPE: CPT

## 2019-02-15 PROCEDURE — 87491 CHLMYD TRACH DNA AMP PROBE: CPT

## 2019-02-15 PROCEDURE — 99283 EMERGENCY DEPT VISIT LOW MDM: CPT

## 2019-02-15 PROCEDURE — 25000003 PHARM REV CODE 250: Performed by: EMERGENCY MEDICINE

## 2019-02-15 PROCEDURE — 81001 URINALYSIS AUTO W/SCOPE: CPT

## 2019-02-15 RX ORDER — ACETAMINOPHEN 325 MG/1
650 TABLET ORAL
Status: COMPLETED | OUTPATIENT
Start: 2019-02-15 | End: 2019-02-15

## 2019-02-15 RX ORDER — ACETAMINOPHEN 325 MG/1
325 TABLET ORAL EVERY 6 HOURS PRN
Qty: 20 TABLET | Refills: 0 | Status: SHIPPED | OUTPATIENT
Start: 2019-02-15 | End: 2019-09-12

## 2019-02-15 RX ADMIN — ACETAMINOPHEN 650 MG: 325 TABLET ORAL at 12:02

## 2019-02-15 NOTE — ED PROVIDER NOTES
Encounter Date: 2/15/2019       History     Chief Complaint   Patient presents with    Abdominal Pain     abd pain and cramping.  Pt 17 weeks pregnant     24 y.o.  SAb3 at 16-5 wga presenting with chief complaint of cramping. Pt endorses acute onset of cramping pain in lower abdomen that began 1 hour ago. No radiation. Denies vaginal bleeding or previous cramping with pregnancy. No loss of fluid from vagina. Pt has not yet felt baby move this pregnancy. No dysuria, frequency, no unusual vaginal discharge. She has not taken any medication for the pain.        The history is provided by the patient.     Review of patient's allergies indicates:  No Known Allergies  Past Medical History:   Diagnosis Date    Chlamydia     Miscarriage      Past Surgical History:   Procedure Laterality Date    D & C (SUCTION) N/A 2018    [facility] at [facility]    DILATION AND CURETTAGE OF UTERUS  2018     Family History   Problem Relation Age of Onset    Breast cancer Other     Diabetes Mother     Kidney disease Mother     Ovarian cancer Neg Hx     Colon cancer Neg Hx      Social History     Tobacco Use    Smoking status: Never Smoker    Smokeless tobacco: Never Used    Tobacco comment: quit one month ago   Substance Use Topics    Alcohol use: No    Drug use: No     Review of Systems   Constitutional: Negative for fever.   HENT: Negative for sore throat.    Respiratory: Negative for shortness of breath.    Cardiovascular: Negative for chest pain.   Gastrointestinal: Positive for abdominal pain. Negative for constipation, diarrhea, nausea and vomiting.   Genitourinary: Negative for dysuria and vaginal bleeding.   Musculoskeletal: Negative for back pain.   Skin: Negative for rash.   Neurological: Negative for weakness.   Hematological: Does not bruise/bleed easily.       Physical Exam     Initial Vitals [02/15/19 1134]   BP Pulse Resp Temp SpO2   (!) 129/97 98 16 98.2 °F (36.8 °C) 100 %       MAP       --         Physical Exam    Nursing note and vitals reviewed.  Constitutional: She appears well-developed and well-nourished. She is not diaphoretic. No distress.   HENT:   Head: Normocephalic and atraumatic.   Eyes: EOM are normal. Pupils are equal, round, and reactive to light. Right eye exhibits no discharge. Left eye exhibits no discharge. No scleral icterus.   Neck: Normal range of motion. Neck supple. No JVD present.   Cardiovascular: Normal rate, regular rhythm, normal heart sounds and intact distal pulses. Exam reveals no gallop and no friction rub.    No murmur heard.  Pulmonary/Chest: Breath sounds normal. No respiratory distress. She has no wheezes. She has no rhonchi. She has no rales. She exhibits no tenderness.   Abdominal: Soft. Bowel sounds are normal. She exhibits no distension and no mass. There is no rebound and no guarding.   Gravid abdomen with uterus palpated to level of the umbilicus.   Mild suprapubic tenderness to palpation.     Genitourinary:   Genitourinary Comments: Pelvic exam: No adnexal tenderness or fullness, no CMT, cervix is closed. white vaginal discharge.    Musculoskeletal: Normal range of motion. She exhibits no edema or tenderness.   Lymphadenopathy:     She has no cervical adenopathy.   Neurological: She is alert and oriented to person, place, and time. She has normal strength. No sensory deficit.   Skin: Skin is warm and dry. Capillary refill takes less than 2 seconds.   Psychiatric: She has a normal mood and affect.         ED Course   Procedures  Labs Reviewed   URINALYSIS, REFLEX TO URINE CULTURE - Abnormal; Notable for the following components:       Result Value    Appearance, UA Cloudy (*)     Leukocytes, UA 2+ (*)     All other components within normal limits    Narrative:     Preferred Collection Type->Urine, Clean Catch   VAGINAL SCREEN - Abnormal; Notable for the following components:    WBC - Vaginal Screen Rare (*)     Bacteria - Vaginal Screen Rare  (*)     All other components within normal limits   URINALYSIS MICROSCOPIC - Abnormal; Notable for the following components:    Bacteria, UA Moderate (*)     All other components within normal limits    Narrative:     Preferred Collection Type->Urine, Clean Catch   URINALYSIS, REFLEX TO URINE CULTURE - Abnormal; Notable for the following components:    Ketones, UA Trace (*)     All other components within normal limits    Narrative:     Preferred Collection Type->Urine, Clean Catch   C. TRACHOMATIS/N. GONORRHOEAE BY AMP DNA          Imaging Results    None          Medical Decision Making:   History:   Old Medical Records: I decided to obtain old medical records.  Initial Assessment:   24 y.o.  SAb3 at 16-5 wga presenting with chief complaint of cramping.  Differential Diagnosis:   Threatened AB, cramping, BV, infectious issues.   Clinical Tests:   Lab Tests: Ordered and Reviewed  ED Management:  Bedside US reveals IUP with fetal heart rate in 140s. Will administer tylenol and obtain labs.    Reassessment:  Initial UA contaminated by squamous cells. Repeat UA without squamous cells and was clear of any evidence of infection. Vaginal screen is clear. Pt reported improvement in pain with tylenol but still has some contractions that are lighter in intensity. Symptoms overall concerning for a threatened miscarriage. She remains without any vaginal bleeding. Pt provided with instructions for symptomatic treatment. Scripts provided for tylenol. Follow up with her OBGYN, return precautions.               Scribe Attestation:   Scribe #1: I performed the above scribed service and the documentation accurately describes the services I performed. I attest to the accuracy of the note.    Attending Attestation:           Physician Attestation for Scribe:      Comments: I, Dr. Larry Rojas, personally performed the services described in this documentation. All medical record entries made by the scribe were at my direction and  in my presence.  I have reviewed the chart and agree that the record reflects my personal performance and is accurate and complete. Larry Rojas MD.  3:26 PM 02/15/2019                 Clinical Impression:   The encounter diagnosis was Threatened miscarriage.                             Larry Rojas MD  02/15/19 1526

## 2019-02-17 LAB
C TRACH DNA SPEC QL NAA+PROBE: NOT DETECTED
N GONORRHOEA DNA SPEC QL NAA+PROBE: NOT DETECTED

## 2019-02-20 ENCOUNTER — PATIENT MESSAGE (OUTPATIENT)
Dept: OBSTETRICS AND GYNECOLOGY | Facility: CLINIC | Age: 24
End: 2019-02-20

## 2019-02-21 ENCOUNTER — ROUTINE PRENATAL (OUTPATIENT)
Dept: OBSTETRICS AND GYNECOLOGY | Facility: CLINIC | Age: 24
End: 2019-02-21
Payer: MEDICAID

## 2019-02-21 VITALS — DIASTOLIC BLOOD PRESSURE: 61 MMHG | BODY MASS INDEX: 41.41 KG/M2 | SYSTOLIC BLOOD PRESSURE: 97 MMHG | WEIGHT: 205 LBS

## 2019-02-21 DIAGNOSIS — R10.2 PELVIC PAIN: Primary | ICD-10-CM

## 2019-02-21 DIAGNOSIS — O09.213 HIGH RISK PREGNANCY DUE TO HISTORY OF PRETERM LABOR IN THIRD TRIMESTER: ICD-10-CM

## 2019-02-21 DIAGNOSIS — O26.20 HIGH RISK PREGNANCY DUE TO RECURRENT MISCARRIAGE: ICD-10-CM

## 2019-02-21 DIAGNOSIS — B95.1 GROUP BETA STREP POSITIVE: ICD-10-CM

## 2019-02-21 PROCEDURE — 99213 PR OFFICE/OUTPT VISIT, EST, LEVL III, 20-29 MIN: ICD-10-PCS | Mod: TH,S$GLB,, | Performed by: OBSTETRICS & GYNECOLOGY

## 2019-02-21 PROCEDURE — 87147 CULTURE TYPE IMMUNOLOGIC: CPT

## 2019-02-21 PROCEDURE — 87086 URINE CULTURE/COLONY COUNT: CPT

## 2019-02-21 PROCEDURE — 99213 OFFICE O/P EST LOW 20 MIN: CPT | Mod: TH,S$GLB,, | Performed by: OBSTETRICS & GYNECOLOGY

## 2019-02-21 PROCEDURE — 87088 URINE BACTERIA CULTURE: CPT

## 2019-02-21 NOTE — PROGRESS NOTES
Patient had gone to the ED with cramping.  Pain has resolved but still reports that it is a low pelvic pain/pressure.  Has some discomfort with urination and bowel movements.  Is having problems with constipation.  Discussed fiber.  Does also have round ligament pain.  No bleeding.    Urine culture ordered.   Kendy injection scheduled today.  Seq 2 rescheduled.

## 2019-02-21 NOTE — TELEPHONE ENCOUNTER
Kendy F/U attempted.  Patient is in [facility] and would like a call back.  No notes if patient had started Kendy yet.

## 2019-02-23 LAB
BACTERIA UR CULT: NORMAL

## 2019-03-01 NOTE — TELEPHONE ENCOUNTER
3rd attempt for [place] F/U.  No answer.  LVM for call back.  $0 copay in 004 ($3 copay waived, Medicaid).

## 2019-03-07 NOTE — TELEPHONE ENCOUNTER
Patient reached in regards to Kendy. Confirmed two patient identifiers - name + . Patient states that she was sick and [place] was never initiated because she was never scheduled by Dr. Salinas's office for administration. Initial Kendy delivery was sent on 19 to Dr. Salinas's office. [facility] will reach out to [facility] to have them schedule patient accordingly. She will be at [facility] tomorrow at 1pm, so hopefully she can get her first injection then. She will be 20 weeks GA tomorrow. She should have 4 doses remaining at [facility]. TTN

## 2019-03-08 ENCOUNTER — PROCEDURE VISIT (OUTPATIENT)
Dept: MATERNAL FETAL MEDICINE | Facility: CLINIC | Age: 24
End: 2019-03-08
Payer: MEDICAID

## 2019-03-08 DIAGNOSIS — Z36.89 ENCOUNTER FOR FETAL ANATOMIC SURVEY: ICD-10-CM

## 2019-03-08 DIAGNOSIS — Z36.9 ENCOUNTER FOR ANTENATAL SCREENING: Primary | ICD-10-CM

## 2019-03-08 PROCEDURE — 76805 OB US >/= 14 WKS SNGL FETUS: CPT | Mod: PBBFAC | Performed by: OBSTETRICS & GYNECOLOGY

## 2019-03-08 PROCEDURE — 99499 UNLISTED E&M SERVICE: CPT | Mod: S$PBB,,, | Performed by: OBSTETRICS & GYNECOLOGY

## 2019-03-08 PROCEDURE — 76805 OB US >/= 14 WKS SNGL FETUS: CPT | Mod: 26,S$PBB,, | Performed by: OBSTETRICS & GYNECOLOGY

## 2019-03-08 PROCEDURE — 76805 PR US, OB 14+WKS, TRANSABD, SINGLE GESTATION: ICD-10-PCS | Mod: 26,S$PBB,, | Performed by: OBSTETRICS & GYNECOLOGY

## 2019-03-08 PROCEDURE — 99499 NO LOS: ICD-10-PCS | Mod: S$PBB,,, | Performed by: OBSTETRICS & GYNECOLOGY

## 2019-03-18 NOTE — TELEPHONE ENCOUNTER
[place] follow up. Confirmed two patient identifiers - name + . She states that her 1st injection for [place] is scheduled for 3/21/19 at [facility]. She questions why she cannot go to the [facility] instead. Advised her to call [facility] because we do not schedule those injections and [facility] confirmed the delivery address with [facility]. I'm also uncertain if the [facility] has the nursing staff to inject Kendy there. She almost mentions that her insurance has informed her that Home health nursing visits for the weekly injections would be covered. She is also advised to inform her MD if she would like to go that route because they likely would have to submit supporting documents to arrange that. Pt verbalized understanding and had no further questions at this time. In the meantime, she is scheduled her first dose on 3/21/19; [facility] should have 4 doses on hand for the patient. [facility] to f/u in 1 week and then monthly for refills. TTN

## 2019-03-21 ENCOUNTER — TELEPHONE (OUTPATIENT)
Dept: OBSTETRICS AND GYNECOLOGY | Facility: CLINIC | Age: 24
End: 2019-03-21

## 2019-03-21 NOTE — TELEPHONE ENCOUNTER
Called Gayathri back and she is faxing over an [employer] rx paper that I have to fill out with pts info. Also I will fax over a letter with pt current GA and ILDA.

## 2019-03-21 NOTE — LETTER
March 21, 2019    Jacobo Alvarado  [address] [facility] [address] [place] [id number]         [facility] [address] [place] [address]  Phone: [phone]  Fax: [phone] March 21, 2019     Patient: Jacobo Alvarado   YOB: 1995   Date of Visit: 3/21/2019       To Whom It May Concern:    It is my medical opinion that Jacobo Alvarado is currently 21 weeks 4 days. Her estimated due date is 7/28/19    If you have any questions or concerns, please don't hesitate to call.    Sincerely,        Dr. Tiffanie Salinas M.D./Laura Issa MA

## 2019-03-21 NOTE — TELEPHONE ENCOUNTER
----- Message from Yulisa Mina sent at 3/19/2019  1:00 PM CDT -----  Contact: Kiddify   Name of Who is Calling: Sophy       What is the request in detail: Sopyh laguerre nurse from [facility] called she would like to request that the patient be set up for her Kendy injections be done in Home Care. Please call her.       Can the clinic reply by [facility]: No       What Number to Call Back if not in [facility]: [phone]

## 2019-03-27 ENCOUNTER — PATIENT MESSAGE (OUTPATIENT)
Dept: OBSTETRICS AND GYNECOLOGY | Facility: CLINIC | Age: 24
End: 2019-03-27

## 2019-03-27 ENCOUNTER — HOSPITAL ENCOUNTER (EMERGENCY)
Facility: OTHER | Age: 24
Discharge: HOME | End: 2019-03-28
Attending: OBSTETRICS & GYNECOLOGY
Payer: MEDICAID

## 2019-03-27 VITALS
RESPIRATION RATE: 18 BRPM | HEIGHT: 59 IN | OXYGEN SATURATION: 100 % | TEMPERATURE: 96 F | HEART RATE: 104 BPM | WEIGHT: 203 LBS | SYSTOLIC BLOOD PRESSURE: 105 MMHG | BODY MASS INDEX: 40.92 KG/M2 | DIASTOLIC BLOOD PRESSURE: 55 MMHG

## 2019-03-27 DIAGNOSIS — O12.02 SWELLING OF LOWER EXTREMITY DURING PREGNANCY IN SECOND TRIMESTER: Primary | ICD-10-CM

## 2019-03-27 DIAGNOSIS — Z3A.22 22 WEEKS GESTATION OF PREGNANCY: ICD-10-CM

## 2019-03-27 DIAGNOSIS — O26.899 PREGNANCY RELATED NAUSEA, ANTEPARTUM: ICD-10-CM

## 2019-03-27 DIAGNOSIS — R11.0 PREGNANCY RELATED NAUSEA, ANTEPARTUM: ICD-10-CM

## 2019-03-27 PROCEDURE — 99283 EMERGENCY DEPT VISIT LOW MDM: CPT

## 2019-03-28 LAB
BILIRUB SERPL-MCNC: NORMAL MG/DL
BLOOD URINE, POC: NORMAL
COLOR, POC UA: YELLOW
GLUCOSE UR QL STRIP: NORMAL
KETONES UR QL STRIP: NORMAL
LEUKOCYTE ESTERASE URINE, POC: NORMAL
NITRITE, POC UA: NORMAL
PH, POC UA: 6
PROTEIN, POC: NORMAL
SPECIFIC GRAVITY, POC UA: 1.01
UROBILINOGEN, POC UA: NORMAL

## 2019-03-28 PROCEDURE — 99284 PR EMERGENCY DEPT VISIT,LEVEL IV: ICD-10-PCS | Mod: ,,, | Performed by: OBSTETRICS & GYNECOLOGY

## 2019-03-28 PROCEDURE — 99284 EMERGENCY DEPT VISIT MOD MDM: CPT | Mod: ,,, | Performed by: OBSTETRICS & GYNECOLOGY

## 2019-03-28 PROCEDURE — 25000003 PHARM REV CODE 250: Performed by: STUDENT IN AN ORGANIZED HEALTH CARE EDUCATION/TRAINING PROGRAM

## 2019-03-28 RX ORDER — SIMETHICONE 80 MG
1 TABLET,CHEWABLE ORAL 3 TIMES DAILY PRN
Status: DISCONTINUED | OUTPATIENT
Start: 2019-03-28 | End: 2019-03-28 | Stop reason: HOSPADM

## 2019-03-28 RX ORDER — ONDANSETRON 4 MG/1
4 TABLET, ORALLY DISINTEGRATING ORAL ONCE
Status: COMPLETED | OUTPATIENT
Start: 2019-03-28 | End: 2019-03-28

## 2019-03-28 RX ORDER — ONDANSETRON 4 MG/1
4 TABLET, ORALLY DISINTEGRATING ORAL EVERY 6 HOURS PRN
Qty: 15 TABLET | Refills: 0 | Status: SHIPPED | OUTPATIENT
Start: 2019-03-28 | End: 2019-09-12

## 2019-03-28 RX ADMIN — SIMETHICONE CHEW TAB 80 MG 80 MG: 80 TABLET ORAL at 12:03

## 2019-03-28 RX ADMIN — ONDANSETRON 4 MG: 4 TABLET, ORALLY DISINTEGRATING ORAL at 12:03

## 2019-03-28 NOTE — ED PROVIDER NOTES
Encounter Date: 3/27/2019       History     Chief Complaint   Patient presents with    Shortness of Breath    Back Pain    Leg Swelling     Infinity Nalini Alvarado is a 24 y.o. [id number] at 22w3d presents complaining of leg swelling and nausea. Patient states that she noticed an increase in lower leg swelling yesterday. Her feet are now tight and tender. She also reports nausea x 24 hrs. She tolerated PO and had a normal dinner today. She last had an episode of emesis during her first trimester. She reports occasional SOB when laying flat. Patient presented secondary to her mother's wishes for her to be checked out. 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 She has not been getting [place] injections due to new stressors.     This IUP is complicated by history of PTD at 36 weeks and GBS UTI.  Patient denies contractions, denies vaginal bleeding, denies LOF.   Fetal Movement: normal.          Review of patient's allergies indicates:  No Known Allergies  Past Medical History:   Diagnosis Date    Chlamydia     Miscarriage      Past Surgical History:   Procedure Laterality Date    D & C (SUCTION) N/A 2018    Performed [facility] at [facility] OR    DILATION AND CURETTAGE OF UTERUS  2018     Family History   Problem Relation Age of Onset    Breast cancer Other     Diabetes Mother     Kidney disease Mother     Ovarian cancer Neg Hx     Colon cancer Neg Hx      Social History     Tobacco Use    Smoking status: Never Smoker    Smokeless tobacco: Never Used    Tobacco comment: quit one month ago   Substance Use Topics    Alcohol use: No    Drug use: No     Review of Systems   Constitutional: Negative for activity change, appetite change, chills, fatigue and fever.   HENT: Negative for congestion and rhinorrhea.    Eyes: Positive for visual disturbance (Blurry vision, patient has not been wearing her  prescription glasses).   Respiratory: Positive for shortness of breath. Negative for cough, chest tightness and wheezing.    Cardiovascular: Negative for chest pain and palpitations.   Gastrointestinal: Positive for abdominal pain (occasional gas pains) and constipation. Negative for diarrhea, nausea and vomiting.   Genitourinary: Negative for dysuria, hematuria, pelvic pain, vaginal bleeding and vaginal discharge.   Musculoskeletal: Positive for back pain (bilateral upper back soreness).   Skin: Negative for rash.   Neurological: Negative for dizziness, light-headedness and headaches.   Psychiatric/Behavioral: Negative for agitation.       Physical Exam     Initial Vitals   BP Pulse Resp Temp SpO2   03/27/19 2340 03/27/19 2340 03/27/19 2340 03/27/19 2340 03/27/19 2341   (!) 105/55 97 18 96.2 °F (35.7 °C) 100 %      MAP       --                Physical Exam    Vitals reviewed.  Constitutional: She appears well-developed and well-nourished. She is not diaphoretic. No distress.   Frequent belching during exam.   Cardiovascular: Normal rate, regular rhythm and normal heart sounds.   Posterior tibialis and dorsalis pedis pulses intact.   Pulmonary/Chest: No respiratory distress.   Abdominal: Soft. There is no tenderness. There is no rebound and no guarding.   Musculoskeletal: She exhibits edema (nonpitting edema of lower extremities bilaterally). She exhibits no tenderness (to lower extremities, agnieszka around ankles).   Neurological: She is alert and oriented to person, place, and time.   Skin: Skin is warm.   Psychiatric: She has a normal mood and affect. Her behavior is normal. Judgment and thought content normal.         ED Course   Procedures  Labs Reviewed   POCT URINALYSIS, DIPSTICK OR TABLET REAGENT, AUTOMATED, WITH MICROSCOP          Imaging Results    None          Medical Decision Making:   ED Management:  - VSS  - FHTs verified  - Will administer zofran and simethicone in [facility]  - Prescription for zofran and  medical grade compression stockings provided  - Counseled patient about lower extremity swelling in pregnancy. Will try less sodium, propping feet up when resting, and compression stockings  - Back back most likely MSK in nature, continue tylenol and heating pads which have somewhat helped with pain.   - Encouraged to keep prenatal appointment with Dr. Salinas next week.   - Discharged in stable condition with labor precautions.              Attending Attestation:   Physician Attestation Statement for Resident:  As the supervising MD   Physician Attestation Statement: I have personally seen and examined this patient.   I agree with the above history. -:   As the supervising MD I agree with the above PE.    As the supervising MD I agree with the above treatment, course, plan, and disposition.   -: Patient evaluated and found to be stable, agree with resident's assessment and plan.  I was personally present during the critical portions of the procedure(s) performed by the resident and was immediately available in the ED to provide services and assistance as needed during the entire procedure.  I have reviewed the following: old records at this facility.                       Clinical Impression:       ICD-10-CM ICD-9-CM   1. Swelling of lower extremity during pregnancy in second trimester O12.02 646.13   2. 22 weeks gestation of pregnancy Z3A.22 V22.2   3. Pregnancy related nausea, antepartum O26.899 643.03    R11.0          Disposition:   Disposition: Discharged  Condition: Stable                        Christiana Franz MD  Resident  03/28/19 2358

## 2019-04-04 ENCOUNTER — TELEPHONE (OUTPATIENT)
Dept: OBSTETRICS AND GYNECOLOGY | Facility: CLINIC | Age: 24
End: 2019-04-04

## 2019-04-04 NOTE — TELEPHONE ENCOUNTER
Called pt and informed her that she will need to get an injection next week. Informed pt that I will try to get her an appointment next Wednesday before her m appt. Pt verbalized understanding.

## 2019-04-04 NOTE — TELEPHONE ENCOUNTER
----- Message from Cici Ambrose sent at 4/4/2019  2:59 PM CDT -----  Contact: pt  Name of Who is Calling: LILLY BARRETT [[id number]]      What is the request in detail: pt returning call..please advise      Can the clinic reply by [facility]:no      [facility] Back [facility]: [phone]

## 2019-04-05 ENCOUNTER — HOSPITAL ENCOUNTER (EMERGENCY)
Facility: OTHER | Age: 24
Discharge: HOME | End: 2019-04-05
Attending: OBSTETRICS & GYNECOLOGY
Payer: MEDICAID

## 2019-04-05 VITALS
TEMPERATURE: 97 F | DIASTOLIC BLOOD PRESSURE: 53 MMHG | SYSTOLIC BLOOD PRESSURE: 111 MMHG | OXYGEN SATURATION: 97 % | HEART RATE: 97 BPM | RESPIRATION RATE: 18 BRPM

## 2019-04-05 DIAGNOSIS — Z3A.23 23 WEEKS GESTATION OF PREGNANCY: ICD-10-CM

## 2019-04-05 DIAGNOSIS — R10.9 ABDOMINAL PAIN DURING PREGNANCY IN SECOND TRIMESTER: ICD-10-CM

## 2019-04-05 DIAGNOSIS — N94.9 ROUND LIGAMENT PAIN: Primary | ICD-10-CM

## 2019-04-05 DIAGNOSIS — O26.892 ABDOMINAL PAIN DURING PREGNANCY IN SECOND TRIMESTER: ICD-10-CM

## 2019-04-05 LAB
ALBUMIN SERPL BCP-MCNC: 2.8 G/DL (ref 3.5–5.2)
ALP SERPL-CCNC: 95 U/L (ref 55–135)
ALT SERPL W/O P-5'-P-CCNC: 17 U/L (ref 10–44)
ANION GAP SERPL CALC-SCNC: 6 MMOL/L (ref 8–16)
AST SERPL-CCNC: 17 U/L (ref 10–40)
BASOPHILS # BLD AUTO: 0.01 K/UL (ref 0–0.2)
BASOPHILS NFR BLD: 0.1 % (ref 0–1.9)
BILIRUB SERPL-MCNC: 0.3 MG/DL (ref 0.1–1)
BILIRUB SERPL-MCNC: NORMAL MG/DL
BILIRUB UR QL STRIP: NEGATIVE
BLOOD URINE, POC: NORMAL
BUN SERPL-MCNC: 5 MG/DL (ref 6–20)
CALCIUM SERPL-MCNC: 9.1 MG/DL (ref 8.7–10.5)
CHLORIDE SERPL-SCNC: 106 MMOL/L (ref 95–110)
CLARITY UR: CLEAR
CO2 SERPL-SCNC: 24 MMOL/L (ref 23–29)
COLOR UR: YELLOW
COLOR, POC UA: YELLOW
CREAT SERPL-MCNC: 0.6 MG/DL (ref 0.5–1.4)
DIFFERENTIAL METHOD: ABNORMAL
EOSINOPHIL # BLD AUTO: 0.1 K/UL (ref 0–0.5)
EOSINOPHIL NFR BLD: 1.3 % (ref 0–8)
ERYTHROCYTE [DISTWIDTH] IN BLOOD BY AUTOMATED COUNT: 14.7 % (ref 11.5–14.5)
EST. GFR  (AFRICAN AMERICAN): >60 ML/MIN/1.73 M^2
EST. GFR  (NON AFRICAN AMERICAN): >60 ML/MIN/1.73 M^2
GLUCOSE SERPL-MCNC: 81 MG/DL (ref 70–110)
GLUCOSE UR QL STRIP: NEGATIVE
GLUCOSE UR QL STRIP: NORMAL
HCT VFR BLD AUTO: 31.3 % (ref 37–48.5)
HGB BLD-MCNC: 10.1 G/DL (ref 12–16)
HGB UR QL STRIP: NEGATIVE
KETONES UR QL STRIP: NEGATIVE
KETONES UR QL STRIP: NORMAL
LEUKOCYTE ESTERASE UR QL STRIP: NEGATIVE
LEUKOCYTE ESTERASE URINE, POC: NORMAL
LYMPHOCYTES # BLD AUTO: 1.8 K/UL (ref 1–4.8)
LYMPHOCYTES NFR BLD: 24.5 % (ref 18–48)
MCH RBC QN AUTO: 26.2 PG (ref 27–31)
MCHC RBC AUTO-ENTMCNC: 32.3 G/DL (ref 32–36)
MCV RBC AUTO: 81 FL (ref 82–98)
MONOCYTES # BLD AUTO: 0.8 K/UL (ref 0.3–1)
MONOCYTES NFR BLD: 11.1 % (ref 4–15)
NEUTROPHILS # BLD AUTO: 4.6 K/UL (ref 1.8–7.7)
NEUTROPHILS NFR BLD: 62.6 % (ref 38–73)
NITRITE UR QL STRIP: NEGATIVE
NITRITE, POC UA: NORMAL
PH UR STRIP: 6 [PH] (ref 5–8)
PH, POC UA: 6
PLATELET # BLD AUTO: 308 K/UL (ref 150–350)
PMV BLD AUTO: 9.6 FL (ref 9.2–12.9)
POTASSIUM SERPL-SCNC: 3.7 MMOL/L (ref 3.5–5.1)
PROT SERPL-MCNC: 6.8 G/DL (ref 6–8.4)
PROT UR QL STRIP: NEGATIVE
PROTEIN, POC: NORMAL
RBC # BLD AUTO: 3.85 M/UL (ref 4–5.4)
SODIUM SERPL-SCNC: 136 MMOL/L (ref 136–145)
SP GR UR STRIP: 1.02 (ref 1–1.03)
SPECIFIC GRAVITY, POC UA: 1
URN SPEC COLLECT METH UR: NORMAL
UROBILINOGEN UR STRIP-ACNC: 1 EU/DL
UROBILINOGEN, POC UA: NORMAL
WBC # BLD AUTO: 7.42 K/UL (ref 3.9–12.7)

## 2019-04-05 PROCEDURE — 25000003 PHARM REV CODE 250: Performed by: STUDENT IN AN ORGANIZED HEALTH CARE EDUCATION/TRAINING PROGRAM

## 2019-04-05 PROCEDURE — 59025 PR FETAL 2N-STRESS TEST: ICD-10-PCS | Mod: 26,,, | Performed by: OBSTETRICS & GYNECOLOGY

## 2019-04-05 PROCEDURE — 81003 URINALYSIS AUTO W/O SCOPE: CPT

## 2019-04-05 PROCEDURE — 99284 EMERGENCY DEPT VISIT MOD MDM: CPT | Mod: 25,,, | Performed by: OBSTETRICS & GYNECOLOGY

## 2019-04-05 PROCEDURE — 80053 COMPREHEN METABOLIC PANEL: CPT

## 2019-04-05 PROCEDURE — 99284 PR EMERGENCY DEPT VISIT,LEVEL IV: ICD-10-PCS | Mod: 25,,, | Performed by: OBSTETRICS & GYNECOLOGY

## 2019-04-05 PROCEDURE — 59025 FETAL NON-STRESS TEST: CPT

## 2019-04-05 PROCEDURE — 99284 EMERGENCY DEPT VISIT MOD MDM: CPT | Mod: 25

## 2019-04-05 PROCEDURE — 85025 COMPLETE CBC W/AUTO DIFF WBC: CPT

## 2019-04-05 PROCEDURE — 59025 FETAL NON-STRESS TEST: CPT | Mod: 26,,, | Performed by: OBSTETRICS & GYNECOLOGY

## 2019-04-05 PROCEDURE — 81002 URINALYSIS NONAUTO W/O SCOPE: CPT

## 2019-04-05 RX ORDER — ACETAMINOPHEN 325 MG/1
650 TABLET ORAL ONCE
Status: COMPLETED | OUTPATIENT
Start: 2019-04-05 | End: 2019-04-05

## 2019-04-05 RX ADMIN — ACETAMINOPHEN 650 MG: 325 TABLET ORAL at 06:04

## 2019-04-05 NOTE — ED PROVIDER NOTES
Encounter Date: 2019       History     Chief Complaint   Patient presents with    Abdominal Pain     Infinity Nalini Alvarado is a 24 y.o. [id number] at 23w5d presents complaining of left-sided abdominal pain. Patient states that an hour and a half ago she developed sharp pain in her LUQ that comes about every 5 mins and lasts a few seconds. It is not tender. The pain is not relieved by tylenol. She last had a BM this am. She denies fever, chills, nausea/vomiting, diarrhea, dysuria, hematuria, vaginal discharge, vaginal bleeding, and contractions. This IUP is complicated by history of PTD (on Kendy) and GBS pos status.           Review of patient's allergies indicates:  No Known Allergies  Past Medical History:   Diagnosis Date    Chlamydia     Miscarriage      Past Surgical History:   Procedure Laterality Date    D & C (SUCTION) N/A 2018    [facility]formed [facility] at [facility] OR    DILATION AND CURETTAGE OF UTERUS  2018     Family History   Problem Relation Age of Onset    Breast cancer Other     Diabetes Mother     Kidney disease Mother     Ovarian cancer Neg Hx     Colon cancer Neg Hx      Social History     Tobacco Use    Smoking status: Never Smoker    Smokeless tobacco: Never Used    Tobacco comment: quit one month ago   Substance Use Topics    Alcohol use: No    Drug use: No     Review of Systems   Constitutional: Negative for activity change, appetite change, chills, fatigue and fever.   HENT: Negative for congestion and rhinorrhea.    Eyes: Negative for visual disturbance.   Respiratory: Negative for shortness of breath.    Cardiovascular: Negative for chest pain and palpitations.   Gastrointestinal: Positive for abdominal pain. Negative for constipation, diarrhea, nausea and vomiting.   Genitourinary: Negative for dysuria, hematuria, pelvic pain, vaginal bleeding and vaginal discharge.   Musculoskeletal: Negative for back pain.   Skin: Negative for rash.   Neurological:  Negative for dizziness, light-headedness and headaches.   Psychiatric/Behavioral: Negative for agitation.       Physical Exam     Initial Vitals [04/05/19 0547]   BP Pulse Resp Temp SpO2   (!) 111/53 95 18 97 °F (36.1 °C) 96 %      MAP       --         Physical Exam    Vitals reviewed.  Constitutional: She appears well-developed and well-nourished. No distress.   Cardiovascular: Normal rate, regular rhythm and normal heart sounds.   Pulmonary/Chest: No respiratory distress.   Abdominal: Soft. There is no tenderness. There is no rebound and no guarding.   Unable to reproduce pain on exam   Musculoskeletal: She exhibits no edema or tenderness.   Neurological: She is alert and oriented to person, place, and time.   Skin: Skin is warm and dry.   Psychiatric: She has a normal mood and affect. Her behavior is normal. Judgment and thought content normal.     OB LABOR EXAM:             Dilatation: 0.   Station: -3.   Effacement: 60%.             ED Course   Procedures  Labs Reviewed   POCT URINALYSIS, DIPSTICK OR TABLET REAGENT, AUTOMATED, WITH MICROSCOP          Imaging Results    None          Medical Decision Making:   ED Management:  - FHTs +++       APC / Resident Notes:   C/o left sided abdominal pain  Benign exam  FHT present  Cervix closed  Will administer tylenol and heating pack and observe on toco, no ctx seen on toco  Suspect round ligament pain, recommend maternity belt  CBC, CMP, UA WNL         Attending Attestation:   Physician Attestation Statement for Resident:  As the supervising MD   Physician Attestation Statement: I have personally seen and examined this patient.   I agree with the above history. -:   As the supervising MD I agree with the above PE.    As the supervising MD I agree with the above treatment, course, plan, and disposition.   -:   NST  I independently reviewed the fetal non-stress test with the following interpretation:  135 BPM  baseline  Variability: moderate  Accelerations: present  Decelerations: absent  Contractions: none  Category 1    Clinical Interpretation:age appropriate    Patient evaluated and found to be stable, agree with resident's assessment of bilateral lower pelvic pain which is motion sensitive and c/w round ligament pain and plan to discharge to home with precautions re s/s placental abruption or  labor.  I was personally present during the critical portions of the procedure(s) performed by the resident and was immediately available in the ED to provide services and assistance as needed during the entire procedure.  I have reviewed and agree with the residents interpretation of the following: lab data.  I have reviewed the following: old records at this facility.                       Clinical Impression:       ICD-10-CM ICD-9-CM   1. Round ligament pain N94.9 625.9   2. 23 weeks gestation of pregnancy Z3A.23 V22.2   3. Abdominal pain during pregnancy in second trimester O26.892 646.83    R10.9 789.00                                Ingrid Lujan MD  Resident  19 0839       Ingrid Lujan MD  Resident  19 0839       Kiara Ballesteros MD  19 1144

## 2019-04-05 NOTE — DISCHARGE INSTRUCTIONS
Call clinic [phone] or L & D after hours at [phone] for vaginal bleeding, leakage of fluids, contractions 4-5 in one hour, decreased fetal movements ( 10 kicks in 2 hours), headache not relieved by Tylenol, blurry vision, or temp of 100.4 or greater.  Begin doing fetal kick counts, at least 10 movements in 2 hours starting at 28 weeks gestation.  Keep next clinic appointment

## 2019-04-10 ENCOUNTER — TELEPHONE (OUTPATIENT)
Dept: OBSTETRICS AND GYNECOLOGY | Facility: CLINIC | Age: 24
End: 2019-04-10

## 2019-04-10 ENCOUNTER — PROCEDURE VISIT (OUTPATIENT)
Dept: MATERNAL FETAL MEDICINE | Facility: CLINIC | Age: 24
End: 2019-04-10
Payer: MEDICAID

## 2019-04-10 DIAGNOSIS — Z36.89 ENCOUNTER FOR ULTRASOUND TO CHECK FETAL GROWTH: Primary | ICD-10-CM

## 2019-04-10 DIAGNOSIS — Z3A.24 24 WEEKS GESTATION OF PREGNANCY: ICD-10-CM

## 2019-04-10 PROCEDURE — 99499 UNLISTED E&M SERVICE: CPT | Mod: S$PBB,,, | Performed by: OBSTETRICS & GYNECOLOGY

## 2019-04-10 PROCEDURE — 76816 PR  US,PREGNANT UTERUS,F/U,TRANSABD APP: ICD-10-PCS | Mod: 26,S$PBB,, | Performed by: OBSTETRICS & GYNECOLOGY

## 2019-04-10 PROCEDURE — 76816 OB US FOLLOW-UP PER FETUS: CPT | Mod: 26,S$PBB,, | Performed by: OBSTETRICS & GYNECOLOGY

## 2019-04-10 PROCEDURE — 99499 NO LOS: ICD-10-PCS | Mod: S$PBB,,, | Performed by: OBSTETRICS & GYNECOLOGY

## 2019-04-10 PROCEDURE — 76816 OB US FOLLOW-UP PER FETUS: CPT | Mod: PBBFAC | Performed by: OBSTETRICS & GYNECOLOGY

## 2019-04-11 ENCOUNTER — ROUTINE PRENATAL (OUTPATIENT)
Dept: OBSTETRICS AND GYNECOLOGY | Facility: CLINIC | Age: 24
End: 2019-04-11
Payer: MEDICAID

## 2019-04-11 VITALS — BODY MASS INDEX: 41.41 KG/M2 | DIASTOLIC BLOOD PRESSURE: 80 MMHG | SYSTOLIC BLOOD PRESSURE: 120 MMHG | WEIGHT: 205 LBS

## 2019-04-11 DIAGNOSIS — O26.20 HIGH RISK PREGNANCY DUE TO RECURRENT MISCARRIAGE: ICD-10-CM

## 2019-04-11 DIAGNOSIS — B95.1 GROUP BETA STREP POSITIVE: ICD-10-CM

## 2019-04-11 DIAGNOSIS — O09.213 HIGH RISK PREGNANCY DUE TO HISTORY OF PRETERM LABOR IN THIRD TRIMESTER: ICD-10-CM

## 2019-04-11 PROCEDURE — 99213 PR OFFICE/OUTPT VISIT, EST, LEVL III, 20-29 MIN: ICD-10-PCS | Mod: TH,S$GLB,, | Performed by: OBSTETRICS & GYNECOLOGY

## 2019-04-11 PROCEDURE — 99213 OFFICE O/P EST LOW 20 MIN: CPT | Mod: TH,S$GLB,, | Performed by: OBSTETRICS & GYNECOLOGY

## 2019-04-11 NOTE — PROGRESS NOTES
Good fetal movement.  No contractions, no vaginal bleeding, and no loss of fluid.  Ob glucose and cbc ordered/scheduled.

## 2019-04-15 ENCOUNTER — PATIENT MESSAGE (OUTPATIENT)
Dept: OBSTETRICS AND GYNECOLOGY | Facility: CLINIC | Age: 24
End: 2019-04-15

## 2019-04-17 ENCOUNTER — PATIENT MESSAGE (OUTPATIENT)
Dept: OBSTETRICS AND GYNECOLOGY | Facility: CLINIC | Age: 24
End: 2019-04-17

## 2019-04-23 ENCOUNTER — CLINICAL SUPPORT (OUTPATIENT)
Dept: OBSTETRICS AND GYNECOLOGY | Facility: CLINIC | Age: 24
End: 2019-04-23
Payer: MEDICAID

## 2019-04-23 DIAGNOSIS — O09.213 HIGH RISK PREGNANCY DUE TO HISTORY OF PRETERM LABOR IN THIRD TRIMESTER: Primary | ICD-10-CM

## 2019-04-23 PROCEDURE — 96372 THER/PROPH/DIAG INJ SC/IM: CPT | Mod: PBBFAC

## 2019-04-23 PROCEDURE — 99999 PR PBB SHADOW E&M-EST. PATIENT-LVL II: ICD-10-PCS | Mod: PBBFAC,,,

## 2019-04-23 PROCEDURE — 99212 OFFICE O/P EST SF 10 MIN: CPT | Mod: PBBFAC,TH,25

## 2019-04-23 PROCEDURE — 99999 PR PBB SHADOW E&M-EST. PATIENT-LVL II: CPT | Mod: PBBFAC,,,

## 2019-04-23 RX ORDER — HYDROXYPROGESTERONE CAPROATE 250 MG/ML
250 INJECTION INTRAMUSCULAR
Status: COMPLETED | OUTPATIENT
Start: 2019-04-23 | End: 2019-04-23

## 2019-04-23 RX ADMIN — HYDROXYPROGESTERONE CAPROATE 250 MG: 250 INJECTION INTRAMUSCULAR at 03:04

## 2019-04-23 NOTE — PROGRESS NOTES
Here for hydroxyprogesterone caproate injection (Kendy ) 250 mg/ml. 1 ml./IM . ( RB ) Patient without complaint of pain before or after injection. Advised to wait in lobby 15 minutes after injection. Return in 1 week.     Patient SUPPLIED MEDICATION    Patient had remainder of 2 vials, medication was given to patient for home health to complete per pts request

## 2019-05-06 ENCOUNTER — TELEPHONE (OUTPATIENT)
Dept: PHARMACY | Facility: CLINIC | Age: 24
End: 2019-05-06

## 2019-05-06 ENCOUNTER — TELEPHONE (OUTPATIENT)
Dept: OBSTETRICS AND GYNECOLOGY | Facility: CLINIC | Age: 24
End: 2019-05-06

## 2019-05-06 NOTE — TELEPHONE ENCOUNTER
"Laura Issa MA confirms that patient is set up with HH and MD has given ok for [facility] to ship Kendy directly to patient's home for administration by a HCP/HH nurse. Call attempted to patient but NA, MARIANAM. Msg sent to [employer] to look at PA - claim rejecting - "filled after coverage terminated." TTN  "

## 2019-05-06 NOTE — TELEPHONE ENCOUNTER
----- Message from Tiffanie Salinas MD sent at 5/3/2019  5:32 PM CDT -----  Regarding: FW: Cooper Injections  Please call the patient to find this out.  Thank you!    ----- Message -----  From: Vandana Ulrich PharmD  Sent: 5/3/2019   4:31 PM  To: Tiffanie Salinas MD, Santa Silva PharmD, #  Subject: Kendy Injections                                Hi Dr. Salinas + Staff,    I see the injection nurse notes on 4/23/19 that patient had 2 vials of Cooper remaining which was given for her to take home for home health to complete per patient request. Do you know if she has been set up with home health for injections in the home? Does she still need [facility] to provide the medication? If so, the   mandates that the medication be provided to the doctor's office for administration by a healthcare professional. Will she  the refills at your office?? Please advise.    Thanks!  Vandana Ulrich, Demetrius, [facility]  Clinical Pharmacist  [facility]  P: [phone]   Toll Free: [phone]  Monday thru Friday 7am-7pm  F: [phone]

## 2019-05-07 ENCOUNTER — LAB VISIT (OUTPATIENT)
Dept: LAB | Facility: OTHER | Age: 24
End: 2019-05-07
Attending: OBSTETRICS & GYNECOLOGY
Payer: MEDICAID

## 2019-05-07 DIAGNOSIS — O26.20 HIGH RISK PREGNANCY DUE TO RECURRENT MISCARRIAGE: ICD-10-CM

## 2019-05-07 LAB
BASOPHILS # BLD AUTO: 0.01 K/UL (ref 0–0.2)
BASOPHILS NFR BLD: 0.1 % (ref 0–1.9)
DIFFERENTIAL METHOD: ABNORMAL
EOSINOPHIL # BLD AUTO: 0.1 K/UL (ref 0–0.5)
EOSINOPHIL NFR BLD: 1 % (ref 0–8)
ERYTHROCYTE [DISTWIDTH] IN BLOOD BY AUTOMATED COUNT: 14.9 % (ref 11.5–14.5)
GLUCOSE SERPL-MCNC: 104 MG/DL (ref 70–140)
HCT VFR BLD AUTO: 31.2 % (ref 37–48.5)
HGB BLD-MCNC: 10 G/DL (ref 12–16)
LYMPHOCYTES # BLD AUTO: 1.8 K/UL (ref 1–4.8)
LYMPHOCYTES NFR BLD: 18.2 % (ref 18–48)
MCH RBC QN AUTO: 26 PG (ref 27–31)
MCHC RBC AUTO-ENTMCNC: 32.1 G/DL (ref 32–36)
MCV RBC AUTO: 81 FL (ref 82–98)
MONOCYTES # BLD AUTO: 0.9 K/UL (ref 0.3–1)
MONOCYTES NFR BLD: 9.3 % (ref 4–15)
NEUTROPHILS # BLD AUTO: 6.9 K/UL (ref 1.8–7.7)
NEUTROPHILS NFR BLD: 70.9 % (ref 38–73)
PLATELET # BLD AUTO: 350 K/UL (ref 150–350)
PMV BLD AUTO: 9.6 FL (ref 9.2–12.9)
RBC # BLD AUTO: 3.84 M/UL (ref 4–5.4)
WBC # BLD AUTO: 9.67 K/UL (ref 3.9–12.7)

## 2019-05-07 PROCEDURE — 36415 COLL VENOUS BLD VENIPUNCTURE: CPT

## 2019-05-07 PROCEDURE — 82950 GLUCOSE TEST: CPT

## 2019-05-07 PROCEDURE — 85025 COMPLETE CBC W/AUTO DIFF WBC: CPT

## 2019-05-09 ENCOUNTER — ROUTINE PRENATAL (OUTPATIENT)
Dept: OBSTETRICS AND GYNECOLOGY | Facility: CLINIC | Age: 24
End: 2019-05-09
Payer: MEDICAID

## 2019-05-09 VITALS — DIASTOLIC BLOOD PRESSURE: 70 MMHG | BODY MASS INDEX: 42.3 KG/M2 | WEIGHT: 209.44 LBS | SYSTOLIC BLOOD PRESSURE: 110 MMHG

## 2019-05-09 DIAGNOSIS — N90.89 VULVAR IRRITATION: ICD-10-CM

## 2019-05-09 DIAGNOSIS — N89.8 VAGINAL DISCHARGE: Primary | ICD-10-CM

## 2019-05-09 DIAGNOSIS — B95.1 GROUP BETA STREP POSITIVE: ICD-10-CM

## 2019-05-09 DIAGNOSIS — O09.213 HIGH RISK PREGNANCY DUE TO HISTORY OF PRETERM LABOR IN THIRD TRIMESTER: ICD-10-CM

## 2019-05-09 DIAGNOSIS — O26.20 HIGH RISK PREGNANCY DUE TO RECURRENT MISCARRIAGE: ICD-10-CM

## 2019-05-09 PROCEDURE — 87510 GARDNER VAG DNA DIR PROBE: CPT

## 2019-05-09 PROCEDURE — 87480 CANDIDA DNA DIR PROBE: CPT

## 2019-05-09 PROCEDURE — 99213 PR OFFICE/OUTPT VISIT, EST, LEVL III, 20-29 MIN: ICD-10-PCS | Mod: TH,S$GLB,, | Performed by: OBSTETRICS & GYNECOLOGY

## 2019-05-09 PROCEDURE — 99213 OFFICE O/P EST LOW 20 MIN: CPT | Mod: TH,S$GLB,, | Performed by: OBSTETRICS & GYNECOLOGY

## 2019-05-09 RX ORDER — CLOTRIMAZOLE AND BETAMETHASONE DIPROPIONATE 10; .64 MG/G; MG/G
CREAM TOPICAL
Qty: 15 G | Refills: 0 | Status: SHIPPED | OUTPATIENT
Start: 2019-05-09 | End: 2019-09-12

## 2019-05-09 NOTE — PROGRESS NOTES
Good fetal movement.  No contractions, no vaginal bleeding, and no loss of fluid.  Occasional tightening, nonpainful.  Patient is receiving jignesh with home health injections.  Patient's medicaid is pending currently, she is unsure why.  Working on getting it reinstated.    Patient also has some vulvar irritation/itching.  Has some white discharge as well.  Has not tried anything over the counter.  She has used some new soap recently.  Vaginosis screen done today.  Some skin irritation and thickening near the clitoral lockhart. Lotrisone prescribed.   Patient has had doppler with home health nurse yesterday- rate was 156.  Doppler and ultrasound currently missing from clinic.

## 2019-05-11 LAB
BACTERIAL VAGINOSIS DNA: NEGATIVE
CANDIDA GLABRATA DNA: NEGATIVE
CANDIDA KRUSEI DNA: NEGATIVE
CANDIDA RRNA VAG QL PROBE: NEGATIVE
T VAGINALIS RRNA GENITAL QL PROBE: NEGATIVE

## 2019-05-23 ENCOUNTER — ROUTINE PRENATAL (OUTPATIENT)
Dept: OBSTETRICS AND GYNECOLOGY | Facility: CLINIC | Age: 24
End: 2019-05-23
Payer: MEDICAID

## 2019-05-23 VITALS — SYSTOLIC BLOOD PRESSURE: 110 MMHG | BODY MASS INDEX: 42.3 KG/M2 | DIASTOLIC BLOOD PRESSURE: 70 MMHG | WEIGHT: 209.44 LBS

## 2019-05-23 DIAGNOSIS — O26.20 HIGH RISK PREGNANCY DUE TO RECURRENT MISCARRIAGE: ICD-10-CM

## 2019-05-23 DIAGNOSIS — B95.1 GROUP BETA STREP POSITIVE: ICD-10-CM

## 2019-05-23 DIAGNOSIS — O09.213 HIGH RISK PREGNANCY DUE TO HISTORY OF PRETERM LABOR IN THIRD TRIMESTER: ICD-10-CM

## 2019-05-23 PROCEDURE — 99213 OFFICE O/P EST LOW 20 MIN: CPT | Mod: TH,S$GLB,, | Performed by: OBSTETRICS & GYNECOLOGY

## 2019-05-23 PROCEDURE — 99213 PR OFFICE/OUTPT VISIT, EST, LEVL III, 20-29 MIN: ICD-10-PCS | Mod: TH,S$GLB,, | Performed by: OBSTETRICS & GYNECOLOGY

## 2019-05-23 NOTE — PROGRESS NOTES
Good fetal movement.  No contractions, no vaginal bleeding, and no loss of fluid.  Is having some low pelvic pain and back pain here and there.  Discussed using a heating pad and tylenol.    Not getting jignesh in the last two weeks, insurance is pending.  Tdap ordered- will schedule.

## 2019-06-02 ENCOUNTER — PATIENT MESSAGE (OUTPATIENT)
Dept: OBSTETRICS AND GYNECOLOGY | Facility: CLINIC | Age: 24
End: 2019-06-02

## 2019-06-06 ENCOUNTER — CLINICAL SUPPORT (OUTPATIENT)
Dept: OBSTETRICS AND GYNECOLOGY | Facility: CLINIC | Age: 24
End: 2019-06-06
Payer: MEDICAID

## 2019-06-06 ENCOUNTER — ROUTINE PRENATAL (OUTPATIENT)
Dept: OBSTETRICS AND GYNECOLOGY | Facility: CLINIC | Age: 24
End: 2019-06-06
Payer: MEDICAID

## 2019-06-06 VITALS — BODY MASS INDEX: 42.7 KG/M2 | WEIGHT: 211.44 LBS | DIASTOLIC BLOOD PRESSURE: 75 MMHG | SYSTOLIC BLOOD PRESSURE: 108 MMHG

## 2019-06-06 DIAGNOSIS — O26.20 HIGH RISK PREGNANCY DUE TO RECURRENT MISCARRIAGE: Primary | ICD-10-CM

## 2019-06-06 DIAGNOSIS — O34.33 PREMATURE CERVICAL DILATION IN THIRD TRIMESTER: ICD-10-CM

## 2019-06-06 DIAGNOSIS — O09.213 HIGH RISK PREGNANCY DUE TO HISTORY OF PRETERM LABOR IN THIRD TRIMESTER: ICD-10-CM

## 2019-06-06 PROCEDURE — 99999 PR PBB SHADOW E&M-EST. PATIENT-LVL I: CPT | Mod: PBBFAC,,,

## 2019-06-06 PROCEDURE — 99211 OFF/OP EST MAY X REQ PHY/QHP: CPT | Mod: PBBFAC,25

## 2019-06-06 PROCEDURE — 99999 PR PBB SHADOW E&M-EST. PATIENT-LVL III: CPT | Mod: PBBFAC,,, | Performed by: OBSTETRICS & GYNECOLOGY

## 2019-06-06 PROCEDURE — 99213 OFFICE O/P EST LOW 20 MIN: CPT | Mod: TH,S$PBB,, | Performed by: OBSTETRICS & GYNECOLOGY

## 2019-06-06 PROCEDURE — 99999 PR PBB SHADOW E&M-EST. PATIENT-LVL I: ICD-10-PCS | Mod: PBBFAC,,,

## 2019-06-06 PROCEDURE — 99999 PR PBB SHADOW E&M-EST. PATIENT-LVL III: ICD-10-PCS | Mod: PBBFAC,,, | Performed by: OBSTETRICS & GYNECOLOGY

## 2019-06-06 PROCEDURE — 99213 OFFICE O/P EST LOW 20 MIN: CPT | Mod: PBBFAC,27 | Performed by: OBSTETRICS & GYNECOLOGY

## 2019-06-06 PROCEDURE — 96372 THER/PROPH/DIAG INJ SC/IM: CPT | Mod: PBBFAC

## 2019-06-06 PROCEDURE — 99213 PR OFFICE/OUTPT VISIT, EST, LEVL III, 20-29 MIN: ICD-10-PCS | Mod: TH,S$PBB,, | Performed by: OBSTETRICS & GYNECOLOGY

## 2019-06-06 RX ORDER — BETAMETHASONE SODIUM PHOSPHATE AND BETAMETHASONE ACETATE 3; 3 MG/ML; MG/ML
12 INJECTION, SUSPENSION INTRA-ARTICULAR; INTRALESIONAL; INTRAMUSCULAR; SOFT TISSUE DAILY
Status: COMPLETED | OUTPATIENT
Start: 2019-06-06 | End: 2019-06-07

## 2019-06-06 RX ADMIN — BETAMETHASONE SODIUM PHOSPHATE AND BETAMETHASONE ACETATE 12 MG: 3; 3 INJECTION, SUSPENSION INTRA-ARTICULAR; INTRALESIONAL; INTRAMUSCULAR at 02:06

## 2019-06-06 NOTE — PROGRESS NOTES
Patient here for a BETAMETHASONE injection, no pain noted. Injection given, tolerated well. Advised to wait in lobby 5 minutes. Report any adverse reactions.    Site: LB

## 2019-06-07 ENCOUNTER — CLINICAL SUPPORT (OUTPATIENT)
Dept: OBSTETRICS AND GYNECOLOGY | Facility: CLINIC | Age: 24
End: 2019-06-07
Payer: MEDICAID

## 2019-06-07 ENCOUNTER — HOSPITAL ENCOUNTER (EMERGENCY)
Facility: OTHER | Age: 24
Discharge: HOME | End: 2019-06-07
Attending: OBSTETRICS & GYNECOLOGY
Payer: MEDICAID

## 2019-06-07 ENCOUNTER — PATIENT MESSAGE (OUTPATIENT)
Dept: OBSTETRICS AND GYNECOLOGY | Facility: CLINIC | Age: 24
End: 2019-06-07

## 2019-06-07 VITALS
OXYGEN SATURATION: 98 % | HEART RATE: 109 BPM | RESPIRATION RATE: 18 BRPM | SYSTOLIC BLOOD PRESSURE: 106 MMHG | TEMPERATURE: 97 F | DIASTOLIC BLOOD PRESSURE: 58 MMHG

## 2019-06-07 DIAGNOSIS — Z3A.32 32 WEEKS GESTATION OF PREGNANCY: ICD-10-CM

## 2019-06-07 DIAGNOSIS — O26.899 CRAMPING AFFECTING PREGNANCY, ANTEPARTUM: ICD-10-CM

## 2019-06-07 DIAGNOSIS — Z36.89 NST (NON-STRESS TEST) REACTIVE: Primary | ICD-10-CM

## 2019-06-07 DIAGNOSIS — R10.9 CRAMPING AFFECTING PREGNANCY, ANTEPARTUM: ICD-10-CM

## 2019-06-07 PROCEDURE — 59025 FETAL NON-STRESS TEST: CPT

## 2019-06-07 PROCEDURE — 99211 OFF/OP EST MAY X REQ PHY/QHP: CPT | Mod: PBBFAC

## 2019-06-07 PROCEDURE — 99284 EMERGENCY DEPT VISIT MOD MDM: CPT | Mod: 25,27

## 2019-06-07 PROCEDURE — 99283 EMERGENCY DEPT VISIT LOW MDM: CPT | Mod: 25,,, | Performed by: OBSTETRICS & GYNECOLOGY

## 2019-06-07 PROCEDURE — 59025 PR FETAL 2N-STRESS TEST: ICD-10-PCS | Mod: 26,,, | Performed by: OBSTETRICS & GYNECOLOGY

## 2019-06-07 PROCEDURE — 96372 THER/PROPH/DIAG INJ SC/IM: CPT | Mod: PBBFAC

## 2019-06-07 PROCEDURE — 99283 PR EMERGENCY DEPT VISIT,LEVEL III: ICD-10-PCS | Mod: 25,,, | Performed by: OBSTETRICS & GYNECOLOGY

## 2019-06-07 PROCEDURE — 59025 FETAL NON-STRESS TEST: CPT | Mod: 26,,, | Performed by: OBSTETRICS & GYNECOLOGY

## 2019-06-07 PROCEDURE — 99999 PR PBB SHADOW E&M-EST. PATIENT-LVL I: ICD-10-PCS | Mod: PBBFAC,,,

## 2019-06-07 PROCEDURE — 99999 PR PBB SHADOW E&M-EST. PATIENT-LVL I: CPT | Mod: PBBFAC,,,

## 2019-06-07 RX ADMIN — BETAMETHASONE SODIUM PHOSPHATE AND BETAMETHASONE ACETATE 12 MG: 3; 3 INJECTION, SUSPENSION INTRA-ARTICULAR; INTRALESIONAL; INTRAMUSCULAR at 02:06

## 2019-06-07 NOTE — ED PROVIDER NOTES
Encounter Date: 2019       History     Chief Complaint   Patient presents with    Decreased Fetal Movement     24 y.o.  @ 32w5d p/w c/o mild abdominal cramping and decreased fetal movement since this AM. She is now feeling fetal movement.  This pregnancy is c/b a history of PTB and she is on 17-OHP this pregnancy        Review of patient's allergies indicates:  No Known Allergies  Past Medical History:   Diagnosis Date    Chlamydia     Miscarriage      Past Surgical History:   Procedure Laterality Date    D & C (SUCTION) N/A 2018    [facility]    DILATION AND CURETTAGE OF UTERUS  2018     Family History   Problem Relation Age of Onset    Breast cancer Other     Diabetes Mother     Kidney disease Mother     Ovarian cancer Neg Hx     Colon cancer Neg Hx      Social History     Tobacco Use    Smoking status: Never Smoker    Smokeless tobacco: Never Used   Substance Use Topics    Alcohol use: No    Drug use: No     Review of Systems   Constitutional: Negative for fever.   Eyes: Negative for visual disturbance.   Respiratory: Negative for shortness of breath.    Cardiovascular: Negative for chest pain.   Gastrointestinal: Negative for abdominal pain.   Genitourinary: Negative for vaginal bleeding.   Musculoskeletal: Negative for back pain.   Skin: Negative for rash.   Neurological: Negative for light-headedness.   Hematological: Does not bruise/bleed easily.   Psychiatric/Behavioral: The patient is not nervous/anxious.        Physical Exam     Initial Vitals [19 1450]   BP Pulse Resp Temp SpO2   112/67 (!) 115 18 96.6 °F (35.9 °C) 99 %      MAP       --         Physical Exam    Vitals reviewed.  Constitutional: She appears well-developed and well-nourished. She is not diaphoretic. No distress.   Cardiovascular: Normal rate, regular rhythm, normal heart sounds and intact distal pulses.   Pulmonary/Chest: Breath sounds normal.   Abdominal:  Soft. There is no tenderness. There is no guarding.   Neurological: She is alert and oriented to person, place, and time. She has normal strength. No sensory deficit.   Psychiatric: She has a normal mood and affect. Her behavior is normal. Judgment and thought content normal.         ED Course   Fetal non-stress test  Date/Time: 2019 2:44 PM  Performed by: Yessenia Martinez MD  Authorized by: Yessenia Martinez MD     Nonstress Test:     Variability:  6-25 BPM    Decelerations:  None    Accelerations:  15 bpm    Baseline:  130    Contractions:  Not present  Biophysical Profile:     Nonstress Test Interpretation: reactive      Overall Impression:  Reassuring      Labs Reviewed - No data to display       Imaging Results    None          Medical Decision Making:   ED Management:  Vital signs normal  SVE closed  Reactive NST  DC home with  labor and kick count precautions  Follow up with regularly scheduled OB                        Clinical Impression:       ICD-10-CM ICD-9-CM   1. NST (non-stress test) reactive Z36.89 V28.89   2. Cramping affecting pregnancy, antepartum O26.899 646.83    R10.9 789.00   3. 32 weeks gestation of pregnancy Z3A.32 V22.2                                Yessenia Martinez MD  19 1513

## 2019-06-07 NOTE — DISCHARGE INSTRUCTIONS
Contact your primary OB or after hours at [phone] if you experience any of the following:    Contractions every 7-10 minutes for 1 or more hours.   A sudden gush or constant leaking of fluid.  Heavy vaginal bleeding.   If you experience a constant headache, blurry vision, pain underneath your right rib, or sudden swelling of your hands, feet, and face.   If you are 28 weeks pregnant or greater, you can measure kick counts with a goal of 10 or more movements within 2 hours.     Remember to stay hydrated; drink 8-10 bottles of water a day.     Maintain all follow-up appointments.

## 2019-06-07 NOTE — PROGRESS NOTES
Patient here for BETAMETHASONE injection, no pain noted. Injection given, tolerated well. Advised to wait in lobby 5 minutes. Report any adverse reactions.      Site: RB

## 2019-06-07 NOTE — ED TRIAGE NOTES
Pt presents to [facility] c/o decreased fetal movement and intermittent contractions.  Pt states she has not felt baby move since this morning.  Denies LOF and VB.

## 2019-06-14 ENCOUNTER — PATIENT MESSAGE (OUTPATIENT)
Dept: OBSTETRICS AND GYNECOLOGY | Facility: CLINIC | Age: 24
End: 2019-06-14

## 2019-06-19 ENCOUNTER — ROUTINE PRENATAL (OUTPATIENT)
Dept: OBSTETRICS AND GYNECOLOGY | Facility: CLINIC | Age: 24
End: 2019-06-19
Payer: MEDICAID

## 2019-06-19 VITALS
BODY MASS INDEX: 42.03 KG/M2 | SYSTOLIC BLOOD PRESSURE: 124 MMHG | DIASTOLIC BLOOD PRESSURE: 64 MMHG | WEIGHT: 208.13 LBS

## 2019-06-19 DIAGNOSIS — O26.20 HIGH RISK PREGNANCY DUE TO RECURRENT MISCARRIAGE: Primary | ICD-10-CM

## 2019-06-19 DIAGNOSIS — M54.9 CVA TENDERNESS: ICD-10-CM

## 2019-06-19 DIAGNOSIS — O09.213 HIGH RISK PREGNANCY DUE TO HISTORY OF PRETERM LABOR IN THIRD TRIMESTER: ICD-10-CM

## 2019-06-19 LAB
BACTERIA #/AREA URNS AUTO: NORMAL /HPF
BILIRUB UR QL STRIP: NEGATIVE
CLARITY UR REFRACT.AUTO: ABNORMAL
COLOR UR AUTO: YELLOW
GLUCOSE UR QL STRIP: NEGATIVE
HGB UR QL STRIP: NEGATIVE
KETONES UR QL STRIP: ABNORMAL
LEUKOCYTE ESTERASE UR QL STRIP: ABNORMAL
MICROSCOPIC COMMENT: NORMAL
NITRITE UR QL STRIP: NEGATIVE
PH UR STRIP: 6 [PH] (ref 5–8)
PROT UR QL STRIP: NEGATIVE
RBC #/AREA URNS AUTO: 2 /HPF (ref 0–4)
SP GR UR STRIP: 1.01 (ref 1–1.03)
SQUAMOUS #/AREA URNS AUTO: 19 /HPF
URN SPEC COLLECT METH UR: ABNORMAL
WBC #/AREA URNS AUTO: 2 /HPF (ref 0–5)

## 2019-06-19 PROCEDURE — 99213 OFFICE O/P EST LOW 20 MIN: CPT | Mod: TH,S$PBB,, | Performed by: OBSTETRICS & GYNECOLOGY

## 2019-06-19 PROCEDURE — 81001 URINALYSIS AUTO W/SCOPE: CPT

## 2019-06-19 PROCEDURE — 99999 PR PBB SHADOW E&M-EST. PATIENT-LVL III: ICD-10-PCS | Mod: PBBFAC,,, | Performed by: OBSTETRICS & GYNECOLOGY

## 2019-06-19 PROCEDURE — 99213 PR OFFICE/OUTPT VISIT, EST, LEVL III, 20-29 MIN: ICD-10-PCS | Mod: TH,S$PBB,, | Performed by: OBSTETRICS & GYNECOLOGY

## 2019-06-19 PROCEDURE — 99213 OFFICE O/P EST LOW 20 MIN: CPT | Mod: PBBFAC,TH | Performed by: OBSTETRICS & GYNECOLOGY

## 2019-06-19 PROCEDURE — 87086 URINE CULTURE/COLONY COUNT: CPT

## 2019-06-19 PROCEDURE — 99999 PR PBB SHADOW E&M-EST. PATIENT-LVL III: CPT | Mod: PBBFAC,,, | Performed by: OBSTETRICS & GYNECOLOGY

## 2019-06-19 NOTE — PROGRESS NOTES
Good fetal movement.  No vaginal bleeding, and no loss of fluid.  Patient is still having tightening pain that is sometimes painful.  Pain comes and goes every so often.  Has pain in the right side in her mid back.  No fever or chills.  Pain tends to last for 5 minutes and then resolves.  Denies burning with urination.  Denies frequency or urgency.  Does have pelvic pain and pressure when she has to urinate. Does have some problem with constipation.   Cervix:  1.5/50/-2  Plan:   Urine culture and urinalysis today  Fiber  Patient to call [employer] to complete jignesh shots (last had shots 5/8/2019 due to losing medicaid for a short period of time)

## 2019-06-20 LAB
BACTERIA UR CULT: NORMAL
BACTERIA UR CULT: NORMAL

## 2019-07-02 ENCOUNTER — ROUTINE PRENATAL (OUTPATIENT)
Dept: OBSTETRICS AND GYNECOLOGY | Facility: CLINIC | Age: 24
End: 2019-07-02
Payer: MEDICAID

## 2019-07-02 VITALS
DIASTOLIC BLOOD PRESSURE: 62 MMHG | SYSTOLIC BLOOD PRESSURE: 122 MMHG | WEIGHT: 210.56 LBS | BODY MASS INDEX: 42.52 KG/M2

## 2019-07-02 DIAGNOSIS — O26.20 HIGH RISK PREGNANCY DUE TO RECURRENT MISCARRIAGE: Primary | ICD-10-CM

## 2019-07-02 DIAGNOSIS — O09.213 HIGH RISK PREGNANCY DUE TO HISTORY OF PRETERM LABOR IN THIRD TRIMESTER: ICD-10-CM

## 2019-07-02 PROCEDURE — 99999 PR PBB SHADOW E&M-EST. PATIENT-LVL III: CPT | Mod: PBBFAC,,, | Performed by: OBSTETRICS & GYNECOLOGY

## 2019-07-02 PROCEDURE — 99213 OFFICE O/P EST LOW 20 MIN: CPT | Mod: TH,S$PBB,, | Performed by: OBSTETRICS & GYNECOLOGY

## 2019-07-02 PROCEDURE — 99213 PR OFFICE/OUTPT VISIT, EST, LEVL III, 20-29 MIN: ICD-10-PCS | Mod: TH,S$PBB,, | Performed by: OBSTETRICS & GYNECOLOGY

## 2019-07-02 PROCEDURE — 99213 OFFICE O/P EST LOW 20 MIN: CPT | Mod: PBBFAC,TH | Performed by: OBSTETRICS & GYNECOLOGY

## 2019-07-02 PROCEDURE — 99999 PR PBB SHADOW E&M-EST. PATIENT-LVL III: ICD-10-PCS | Mod: PBBFAC,,, | Performed by: OBSTETRICS & GYNECOLOGY

## 2019-07-02 NOTE — PROGRESS NOTES
Good fetal movement.  No contractions, no vaginal bleeding, and no loss of fluid.  Tdap due.  BTL paper signed today.  Patient is aware that this is a permanent procedure that is not reversible.  She also is aware that if she delivers before one month, that it will not be done during her hospital stay.

## 2019-07-14 ENCOUNTER — PATIENT MESSAGE (OUTPATIENT)
Dept: OBSTETRICS AND GYNECOLOGY | Facility: CLINIC | Age: 24
End: 2019-07-14

## 2019-07-16 ENCOUNTER — LAB VISIT (OUTPATIENT)
Dept: LAB | Facility: OTHER | Age: 24
End: 2019-07-16
Attending: OBSTETRICS & GYNECOLOGY
Payer: MEDICAID

## 2019-07-16 ENCOUNTER — ROUTINE PRENATAL (OUTPATIENT)
Dept: OBSTETRICS AND GYNECOLOGY | Facility: CLINIC | Age: 24
End: 2019-07-16
Payer: MEDICAID

## 2019-07-16 VITALS
SYSTOLIC BLOOD PRESSURE: 120 MMHG | BODY MASS INDEX: 43.06 KG/M2 | WEIGHT: 213.19 LBS | DIASTOLIC BLOOD PRESSURE: 72 MMHG

## 2019-07-16 DIAGNOSIS — Z3A.38 38 WEEKS GESTATION OF PREGNANCY: Primary | ICD-10-CM

## 2019-07-16 DIAGNOSIS — O26.20 HIGH RISK PREGNANCY DUE TO RECURRENT MISCARRIAGE: ICD-10-CM

## 2019-07-16 DIAGNOSIS — O09.213 HIGH RISK PREGNANCY DUE TO HISTORY OF PRETERM LABOR IN THIRD TRIMESTER: ICD-10-CM

## 2019-07-16 DIAGNOSIS — N89.8 VAGINAL DISCHARGE DURING PREGNANCY IN THIRD TRIMESTER: ICD-10-CM

## 2019-07-16 DIAGNOSIS — O26.893 VAGINAL DISCHARGE DURING PREGNANCY IN THIRD TRIMESTER: ICD-10-CM

## 2019-07-16 LAB
BASOPHILS # BLD AUTO: 0.02 K/UL (ref 0–0.2)
BASOPHILS NFR BLD: 0.2 % (ref 0–1.9)
DIFFERENTIAL METHOD: ABNORMAL
EOSINOPHIL # BLD AUTO: 0.1 K/UL (ref 0–0.5)
EOSINOPHIL NFR BLD: 1.3 % (ref 0–8)
ERYTHROCYTE [DISTWIDTH] IN BLOOD BY AUTOMATED COUNT: 16 % (ref 11.5–14.5)
HCT VFR BLD AUTO: 31.6 % (ref 37–48.5)
HGB BLD-MCNC: 10 G/DL (ref 12–16)
IMM GRANULOCYTES # BLD AUTO: 0.13 K/UL (ref 0–0.04)
IMM GRANULOCYTES NFR BLD AUTO: 1.3 % (ref 0–0.5)
LYMPHOCYTES # BLD AUTO: 2 K/UL (ref 1–4.8)
LYMPHOCYTES NFR BLD: 20.6 % (ref 18–48)
MCH RBC QN AUTO: 24.4 PG (ref 27–31)
MCHC RBC AUTO-ENTMCNC: 31.6 G/DL (ref 32–36)
MCV RBC AUTO: 77 FL (ref 82–98)
MONOCYTES # BLD AUTO: 1.1 K/UL (ref 0.3–1)
MONOCYTES NFR BLD: 10.9 % (ref 4–15)
NEUTROPHILS # BLD AUTO: 6.3 K/UL (ref 1.8–7.7)
NEUTROPHILS NFR BLD: 65.7 % (ref 38–73)
NRBC BLD-RTO: 0 /100 WBC
PLATELET # BLD AUTO: 337 K/UL (ref 150–350)
PMV BLD AUTO: 10.4 FL (ref 9.2–12.9)
RBC # BLD AUTO: 4.09 M/UL (ref 4–5.4)
WBC # BLD AUTO: 9.63 K/UL (ref 3.9–12.7)

## 2019-07-16 PROCEDURE — 36415 COLL VENOUS BLD VENIPUNCTURE: CPT

## 2019-07-16 PROCEDURE — 85025 COMPLETE CBC W/AUTO DIFF WBC: CPT

## 2019-07-16 PROCEDURE — 99212 OFFICE O/P EST SF 10 MIN: CPT | Mod: TH,S$PBB,, | Performed by: NURSE PRACTITIONER

## 2019-07-16 PROCEDURE — 86703 HIV-1/HIV-2 1 RESULT ANTBDY: CPT

## 2019-07-16 PROCEDURE — 86592 SYPHILIS TEST NON-TREP QUAL: CPT

## 2019-07-16 PROCEDURE — 99212 PR OFFICE/OUTPT VISIT, EST, LEVL II, 10-19 MIN: ICD-10-PCS | Mod: TH,S$PBB,, | Performed by: NURSE PRACTITIONER

## 2019-07-16 PROCEDURE — 99213 OFFICE O/P EST LOW 20 MIN: CPT | Mod: PBBFAC,TH | Performed by: NURSE PRACTITIONER

## 2019-07-16 PROCEDURE — 87480 CANDIDA DNA DIR PROBE: CPT

## 2019-07-16 PROCEDURE — 99999 PR PBB SHADOW E&M-EST. PATIENT-LVL III: CPT | Mod: PBBFAC,,, | Performed by: NURSE PRACTITIONER

## 2019-07-16 PROCEDURE — 99999 PR PBB SHADOW E&M-EST. PATIENT-LVL III: ICD-10-PCS | Mod: PBBFAC,,, | Performed by: NURSE PRACTITIONER

## 2019-07-16 PROCEDURE — 87510 GARDNER VAG DNA DIR PROBE: CPT

## 2019-07-16 NOTE — PROGRESS NOTES
Here for routine OB appt at 38w2d, with no complaints.  Reports good FM.  Denies LOF, denies VB, denies contractions. Ctx yesterday but they were irregular and then completely stopped. Generally uncomfortable, discussed indx after 39 weeks with Rita at last visit. C/o increased discharge, no odor or itching. Nitrazine negative, affirm pending. GIRL, naming her Nely. Discussed GBS positive and need for PCN in labor.   Reviewed warning signs of Labor and Preeclampsia.  Daily FM counts reinforced.  F/U scheduled 1 week  3T labs today  Tdap next visit  Affirm pending

## 2019-07-17 LAB
HIV 1+2 AB+HIV1 P24 AG SERPL QL IA: NEGATIVE
RPR SER QL: NORMAL

## 2019-07-19 ENCOUNTER — PATIENT MESSAGE (OUTPATIENT)
Dept: OBSTETRICS AND GYNECOLOGY | Facility: CLINIC | Age: 24
End: 2019-07-19

## 2019-07-20 ENCOUNTER — HOSPITAL ENCOUNTER (EMERGENCY)
Facility: OTHER | Age: 24
Discharge: HOME | End: 2019-07-20
Attending: OBSTETRICS & GYNECOLOGY
Payer: MEDICAID

## 2019-07-20 VITALS
DIASTOLIC BLOOD PRESSURE: 64 MMHG | OXYGEN SATURATION: 99 % | RESPIRATION RATE: 18 BRPM | SYSTOLIC BLOOD PRESSURE: 117 MMHG | HEART RATE: 99 BPM

## 2019-07-20 DIAGNOSIS — R10.2 PELVIC PRESSURE IN PREGNANCY, ANTEPARTUM, THIRD TRIMESTER: Primary | ICD-10-CM

## 2019-07-20 DIAGNOSIS — Z3A.38 38 WEEKS GESTATION OF PREGNANCY: ICD-10-CM

## 2019-07-20 DIAGNOSIS — O26.893 PELVIC PRESSURE IN PREGNANCY, ANTEPARTUM, THIRD TRIMESTER: Primary | ICD-10-CM

## 2019-07-20 PROCEDURE — 99283 PR EMERGENCY DEPT VISIT,LEVEL III: ICD-10-PCS | Mod: 25,,, | Performed by: OBSTETRICS & GYNECOLOGY

## 2019-07-20 PROCEDURE — 99283 EMERGENCY DEPT VISIT LOW MDM: CPT | Mod: 25,,, | Performed by: OBSTETRICS & GYNECOLOGY

## 2019-07-20 PROCEDURE — 99284 EMERGENCY DEPT VISIT MOD MDM: CPT | Mod: 25

## 2019-07-20 PROCEDURE — 59025 FETAL NON-STRESS TEST: CPT

## 2019-07-20 PROCEDURE — 59025 PR FETAL 2N-STRESS TEST: ICD-10-PCS | Mod: 26,,, | Performed by: OBSTETRICS & GYNECOLOGY

## 2019-07-20 PROCEDURE — 59025 FETAL NON-STRESS TEST: CPT | Mod: 26,,, | Performed by: OBSTETRICS & GYNECOLOGY

## 2019-07-21 NOTE — ED PROVIDER NOTES
Encounter Date: 2019       History     Chief Complaint   Patient presents with    Rupture of Membranes     Infinity Nalini Alvarado is a 24 y.o. [id number] at 38w6d presents complaining of possible loss of fluid. The episode occurred after intercourse when the pt felt like she urinated on herself. No complaints of regular contractions or pain, however she does characterize the sensation as pelvic pressure.     This IUP is complicated by hx of PTD (jignesh),  GBS UTI in pregnancy (needs PCN), SAB x 3 (2 D&Cs). Patient denies contractions, denies vaginal bleeding, reports LOF. Fetal Movement: normal.        Review of patient's allergies indicates:  No Known Allergies  Past Medical History:   Diagnosis Date    Chlamydia     Miscarriage      Past Surgical History:   Procedure Laterality Date    D & C (SUCTION) N/A 2018    Performed by Tiffanie Salinas MD at [facility]    DILATION AND CURETTAGE OF UTERUS  2018     Family History   Problem Relation Age of Onset    Breast cancer Other     Diabetes Mother     Kidney disease Mother     Ovarian cancer Neg Hx     Colon cancer Neg Hx      Social History     Tobacco Use    Smoking status: Never Smoker    Smokeless tobacco: Never Used   Substance Use Topics    Alcohol use: No    Drug use: No     Review of Systems   Constitutional: Negative for fever.   HENT: Negative for sore throat.    Respiratory: Negative for shortness of breath.    Cardiovascular: Negative for chest pain.   Gastrointestinal: Negative for diarrhea.   Genitourinary: Negative for difficulty urinating.   Skin: Negative for color change.   Neurological: Negative for light-headedness and headaches.   Psychiatric/Behavioral: Negative for confusion.   All other systems reviewed and are negative.      Physical Exam     Initial Vitals   BP Pulse Resp Temp SpO2   19 2151 19 2138 19 2151 -- 19   117/64 101 18  99 %      MAP       --              Pulse:  []  99  Resp:  [18] 18  SpO2:  [99 %] 99 %  BP: (117)/(64) 117/64  Physical Exam    Constitutional: She appears well-developed and well-nourished. No distress.   HENT:   Head: Normocephalic and atraumatic.   Eyes: EOM are normal.   Cardiovascular: Normal rate.   Pulmonary/Chest: She has no wheezes.   Abdominal: Soft. There is no tenderness.   Musculoskeletal: Normal range of motion.   Neurological: She is alert and oriented to person, place, and time.   Skin: Skin is warm, dry and intact. No rash noted.   Psychiatric: She has a normal mood and affect. Her speech is normal and behavior is normal.     OB LABOR EXAM:   Pre-Term Labor: No.   Membranes ruptured: No.   Method: Sterile vaginal exam per MD and Sterile speculum exam per MD.   Vaginal Bleeding: none present.     Dilatation: 2.   Station: -3.   Effacement: 40%.   Amniotic Fluid Color: no fluid.           ED Course   Obtain Fetal nonstress test (NST)  Date/Time: 7/20/2019 9:15 PM  Performed by: Dwight Cano MD  Authorized by: Sobia Stubbs MD     Nonstress Test:     Variability:  6-25 BPM    Decelerations:  None    Accelerations:  15 bpm    Acoustic Stimulator: No      Uterine Irritability: Yes      Contractions:  Not present  Biophysical Profile:     Nonstress Test Interpretation: reactive      Overall Impression:  Reassuring      Labs Reviewed - No data to display       Imaging Results    None          Medical Decision Making:   Initial Assessment:   Patient in stable condition   Differential Diagnosis:   Rule out rupture of membranes   ED Management:  -EFM placed  -VSS  -Spec: no pooling,Nitro blue-, ferning-  -CVX: 2/40/-3  -Exam relatively unchanged since clinic visit on 7/18  - MVP performed >> 5.82   - Discharge home in stable condition with guidelines to return to OB ED if she feels like she has any change/contracts more.     Other:   I have discussed this case with another health care provider.       <> Summary of the Discussion: Dr. Saldivar               Attending Attestation:   Physician Attestation Statement for Resident:  As the supervising MD   Physician Attestation Statement: I have personally seen and examined this patient.   I agree with the above history. -:   As the supervising MD I agree with the above PE.    As the supervising MD I agree with the above treatment, course, plan, and disposition.   -:   NST  I independently reviewed the fetal non-stress test with the following interpretation:  135 BPM baseline  Variability: moderate  Accelerations: present  Decelerations: absent  Contractions: none  Category 1    Clinical Interpretation:reactive    Patient evaluated and found to be stable, agree with resident's assessment of false labor and plan to discharge to home with labor precautions.  I was personally present during the critical portions of the procedure(s) performed by the resident and was immediately available in the ED to provide services and assistance as needed during the entire procedure.  I have reviewed and agree with the residents interpretation of the following: lab data.  I have reviewed the following: old records at this facility.                       Clinical Impression:       ICD-10-CM ICD-9-CM   1. Pelvic pressure in pregnancy, antepartum, third trimester O26.893 646.83    N94.9    2. 38 weeks gestation of pregnancy Z3A.38 V22.2         Disposition:   Disposition: Discharged  Condition: Stable               Sobia Stubbs M.D.  OBGYN PGY1           Sobia Stubbs MD  Resident  07/20/19 2158       Kiara Ballesteros MD  07/20/19 7961

## 2019-07-21 NOTE — DISCHARGE INSTRUCTIONS
Adapting to Pregnancy: Third Trimester    Although common during pregnancy, some discomforts may seem worse in the final weeks. Simple lifestyle changes can help. Take care of yourself. And ask your partner to help out with small tasks.  Limiting leg problems  Ways to combat leg issues:  · Wear support hose all day.  · Avoid snug shoes and clothes that bind, like tight pants and socks with elastic tops.  · Sit with your feet and legs raised often.  Caring for your breasts  Tips to follow include:  · Wash with plain water. Avoid using harsh soaps or rubbing alcohol. They may cause dryness.  · Wear a nursing bra for extra support. It can also hide any leaks from your nipples.  Controlling hemorrhoids  Ways to avoid hemorrhoids include:  · Eat foods that are high in fiber. Also, exercise and drink enough fluids. This will reduce constipation and hemorrhoids.  · Sleep and nap on your side. This limits pressure on the veins of your rectum.  · Try not to stand or sit for long periods.  Controlling back pain  As your body changes during pregnancy, your back must work in new ways. Back pain is due to many causes. Physical changes in your body can strain your back and its supporting muscles. Also, hormones (chemicals that carry messages throughout the body) increase during pregnancy. This can affect how your muscles and joints work together. All of these changes can lead to pain. Pain may be felt in the upper or lower back. Pain is also common in the pelvis. Some pregnant women have sciatica. This is pain caused by pressure on the sciatic nerve running down the back of the leg. Ask your healthcare provider for specific tips and exercises to help control your back pain.  Tips to help you rest  Good rest and sleep will help you feel better. Here are some ideas:  · Ask your partner to massage your shoulders, neck, or back.  · Limit the errands you do each day.  · Lie down in the afternoon or after work for a few  minutes.  · Take a warm bath before you go to sleep.  · Drink warm milk or teas without caffeine.  · Avoid coffee, black tea, and cola.  Stopping heartburn  · Avoid spicy or acidic foods.  · Eat small amounts more often. Eat slowly. · Wait 2 hours after eating before lying down.  · Sleep with your upper body raised 6 inches.   Managing mood swings  Ways to manage mood swings include:  · Know that mood changes are normal.  · Exercise often, but get plenty of rest.  · Address any concerns and limit stress. Talking to your partner, other women, or your healthcare provider may help.  Dealing with urinary frequency  Tips to deal with having to urinate often include:  · Drink plenty of water all day. If you drink a lot in the evening, though, you may have to get up more in the night.  · Limit coffee, black tea, and cola.  Date Last Reviewed: 8/16/2015  © 0053-4093 [employer]. [address] [place] [address], [place] [address]. All rights reserved. This information is not intended as a substitute for professional medical care. Always follow your healthcare professional's instructions.

## 2019-07-24 ENCOUNTER — CLINICAL SUPPORT (OUTPATIENT)
Dept: OBSTETRICS AND GYNECOLOGY | Facility: CLINIC | Age: 24
End: 2019-07-24
Payer: MEDICAID

## 2019-07-24 ENCOUNTER — ROUTINE PRENATAL (OUTPATIENT)
Dept: OBSTETRICS AND GYNECOLOGY | Facility: CLINIC | Age: 24
End: 2019-07-24
Payer: MEDICAID

## 2019-07-24 VITALS — DIASTOLIC BLOOD PRESSURE: 74 MMHG | SYSTOLIC BLOOD PRESSURE: 106 MMHG | WEIGHT: 220 LBS | BODY MASS INDEX: 44.44 KG/M2

## 2019-07-24 DIAGNOSIS — O09.213 HIGH RISK PREGNANCY DUE TO HISTORY OF PRETERM LABOR IN THIRD TRIMESTER: Primary | ICD-10-CM

## 2019-07-24 PROCEDURE — 90471 IMMUNIZATION ADMIN: CPT | Mod: PBBFAC

## 2019-07-24 PROCEDURE — 99999 PR PBB SHADOW E&M-EST. PATIENT-LVL III: ICD-10-PCS | Mod: PBBFAC,,, | Performed by: OBSTETRICS & GYNECOLOGY

## 2019-07-24 PROCEDURE — 99999 PR PBB SHADOW E&M-EST. PATIENT-LVL I: CPT | Mod: PBBFAC,,,

## 2019-07-24 PROCEDURE — 99999 PR PBB SHADOW E&M-EST. PATIENT-LVL I: ICD-10-PCS | Mod: PBBFAC,,,

## 2019-07-24 PROCEDURE — 99213 OFFICE O/P EST LOW 20 MIN: CPT | Mod: TH,S$PBB,, | Performed by: OBSTETRICS & GYNECOLOGY

## 2019-07-24 PROCEDURE — 99999 PR PBB SHADOW E&M-EST. PATIENT-LVL III: CPT | Mod: PBBFAC,,, | Performed by: OBSTETRICS & GYNECOLOGY

## 2019-07-24 PROCEDURE — 99213 PR OFFICE/OUTPT VISIT, EST, LEVL III, 20-29 MIN: ICD-10-PCS | Mod: TH,S$PBB,, | Performed by: OBSTETRICS & GYNECOLOGY

## 2019-07-24 PROCEDURE — 99211 OFF/OP EST MAY X REQ PHY/QHP: CPT | Mod: PBBFAC,27,25

## 2019-07-24 PROCEDURE — 99213 OFFICE O/P EST LOW 20 MIN: CPT | Mod: PBBFAC,TH,25 | Performed by: OBSTETRICS & GYNECOLOGY

## 2019-07-24 NOTE — PROGRESS NOTES
Good fetal movement.  No contractions, no vaginal bleeding, and no loss of fluid.    Patient scheduled for overnight induction, currently 4 cm.  8pm arrival works better for her than in the morning at 4 am.  Patient will keep 8 pm arrival and will start pitocin for overnight induction.

## 2019-07-25 ENCOUNTER — ANESTHESIA (OUTPATIENT)
Dept: OBSTETRICS AND GYNECOLOGY | Facility: OTHER | Age: 24
End: 2019-07-25
Payer: MEDICAID

## 2019-07-25 ENCOUNTER — HOSPITAL ENCOUNTER (INPATIENT)
Facility: OTHER | Age: 24
LOS: 2 days | Discharge: HOME | End: 2019-07-27
Attending: OBSTETRICS & GYNECOLOGY | Admitting: OBSTETRICS & GYNECOLOGY
Payer: MEDICAID

## 2019-07-25 ENCOUNTER — ANESTHESIA EVENT (OUTPATIENT)
Dept: OBSTETRICS AND GYNECOLOGY | Facility: OTHER | Age: 24
End: 2019-07-25
Payer: MEDICAID

## 2019-07-25 DIAGNOSIS — Z37.9 NORMAL LABOR: ICD-10-CM

## 2019-07-25 DIAGNOSIS — Z3A.39 39 WEEKS GESTATION OF PREGNANCY: ICD-10-CM

## 2019-07-25 LAB
ABO + RH BLD: NORMAL
BASOPHILS # BLD AUTO: 0.01 K/UL (ref 0–0.2)
BASOPHILS NFR BLD: 0.1 % (ref 0–1.9)
BLD GP AB SCN CELLS X3 SERPL QL: NORMAL
DIFFERENTIAL METHOD: ABNORMAL
EOSINOPHIL # BLD AUTO: 0 K/UL (ref 0–0.5)
EOSINOPHIL NFR BLD: 0.3 % (ref 0–8)
ERYTHROCYTE [DISTWIDTH] IN BLOOD BY AUTOMATED COUNT: 16.2 % (ref 11.5–14.5)
HCT VFR BLD AUTO: 28.8 % (ref 37–48.5)
HGB BLD-MCNC: 9.2 G/DL (ref 12–16)
IMM GRANULOCYTES # BLD AUTO: 0.1 K/UL (ref 0–0.04)
IMM GRANULOCYTES NFR BLD AUTO: 0.8 % (ref 0–0.5)
LYMPHOCYTES # BLD AUTO: 1 K/UL (ref 1–4.8)
LYMPHOCYTES NFR BLD: 8.5 % (ref 18–48)
MCH RBC QN AUTO: 24.5 PG (ref 27–31)
MCHC RBC AUTO-ENTMCNC: 31.9 G/DL (ref 32–36)
MCV RBC AUTO: 77 FL (ref 82–98)
MONOCYTES # BLD AUTO: 1.1 K/UL (ref 0.3–1)
MONOCYTES NFR BLD: 8.8 % (ref 4–15)
NEUTROPHILS # BLD AUTO: 9.7 K/UL (ref 1.8–7.7)
NEUTROPHILS NFR BLD: 81.5 % (ref 38–73)
NRBC BLD-RTO: 0 /100 WBC
PLATELET # BLD AUTO: 307 K/UL (ref 150–350)
PMV BLD AUTO: 10.1 FL (ref 9.2–12.9)
RBC # BLD AUTO: 3.76 M/UL (ref 4–5.4)
WBC # BLD AUTO: 11.93 K/UL (ref 3.9–12.7)

## 2019-07-25 PROCEDURE — 59409 OBSTETRICAL CARE: CPT | Mod: AA,,, | Performed by: ANESTHESIOLOGY

## 2019-07-25 PROCEDURE — 62326 NJX INTERLAMINAR LMBR/SAC: CPT | Performed by: STUDENT IN AN ORGANIZED HEALTH CARE EDUCATION/TRAINING PROGRAM

## 2019-07-25 PROCEDURE — 72100002 HC LABOR CARE, 1ST 8 HOURS

## 2019-07-25 PROCEDURE — 86901 BLOOD TYPING SEROLOGIC RH(D): CPT

## 2019-07-25 PROCEDURE — 99285 EMERGENCY DEPT VISIT HI MDM: CPT | Mod: 25

## 2019-07-25 PROCEDURE — 51702 INSERT TEMP BLADDER CATH: CPT

## 2019-07-25 PROCEDURE — 25000003 PHARM REV CODE 250: Performed by: STUDENT IN AN ORGANIZED HEALTH CARE EDUCATION/TRAINING PROGRAM

## 2019-07-25 PROCEDURE — 63600175 PHARM REV CODE 636 W HCPCS: Performed by: STUDENT IN AN ORGANIZED HEALTH CARE EDUCATION/TRAINING PROGRAM

## 2019-07-25 PROCEDURE — 99283 EMERGENCY DEPT VISIT LOW MDM: CPT | Mod: 25,,, | Performed by: OBSTETRICS & GYNECOLOGY

## 2019-07-25 PROCEDURE — 59025 PR FETAL 2N-STRESS TEST: ICD-10-PCS | Mod: 26,,, | Performed by: OBSTETRICS & GYNECOLOGY

## 2019-07-25 PROCEDURE — 0502F SUBSEQUENT PRENATAL CARE: CPT | Mod: ,,, | Performed by: OBSTETRICS & GYNECOLOGY

## 2019-07-25 PROCEDURE — 99283 PR EMERGENCY DEPT VISIT,LEVEL III: ICD-10-PCS | Mod: 25,,, | Performed by: OBSTETRICS & GYNECOLOGY

## 2019-07-25 PROCEDURE — 99900035 HC TECH TIME PER 15 MIN (STAT)

## 2019-07-25 PROCEDURE — 63600175 PHARM REV CODE 636 W HCPCS

## 2019-07-25 PROCEDURE — 27800517 HC TRAY,EPIDURAL-CONTINUOUS: Performed by: STUDENT IN AN ORGANIZED HEALTH CARE EDUCATION/TRAINING PROGRAM

## 2019-07-25 PROCEDURE — 59409 PR OBSTETRICAL CARE,VAG DELIV ONLY: ICD-10-PCS | Mod: GB,,, | Performed by: OBSTETRICS & GYNECOLOGY

## 2019-07-25 PROCEDURE — 0502F PR SUBSEQUENT PRENATAL CARE: ICD-10-PCS | Mod: ,,, | Performed by: OBSTETRICS & GYNECOLOGY

## 2019-07-25 PROCEDURE — 59025 FETAL NON-STRESS TEST: CPT

## 2019-07-25 PROCEDURE — 59025 FETAL NON-STRESS TEST: CPT | Mod: 26,,, | Performed by: OBSTETRICS & GYNECOLOGY

## 2019-07-25 PROCEDURE — 59409 OBSTETRICAL CARE: CPT | Mod: GB,,, | Performed by: OBSTETRICS & GYNECOLOGY

## 2019-07-25 PROCEDURE — 11000001 HC ACUTE MED/SURG PRIVATE ROOM

## 2019-07-25 PROCEDURE — 27200710 HC EPIDURAL INFUSION PUMP SET: Performed by: STUDENT IN AN ORGANIZED HEALTH CARE EDUCATION/TRAINING PROGRAM

## 2019-07-25 PROCEDURE — 72200005 HC VAGINAL DELIVERY LEVEL II

## 2019-07-25 PROCEDURE — 85025 COMPLETE CBC W/AUTO DIFF WBC: CPT

## 2019-07-25 PROCEDURE — 59409 PRA ETRICAL CARE,VAG DELIV ONLY: ICD-10-PCS | Mod: AA,,, | Performed by: ANESTHESIOLOGY

## 2019-07-25 RX ORDER — ACETAMINOPHEN 325 MG/1
650 TABLET ORAL EVERY 6 HOURS PRN
Status: DISCONTINUED | OUTPATIENT
Start: 2019-07-25 | End: 2019-07-27 | Stop reason: HOSPADM

## 2019-07-25 RX ORDER — IBUPROFEN 600 MG/1
600 TABLET ORAL EVERY 6 HOURS
Status: DISCONTINUED | OUTPATIENT
Start: 2019-07-25 | End: 2019-07-27 | Stop reason: HOSPADM

## 2019-07-25 RX ORDER — HYDROCODONE BITARTRATE AND ACETAMINOPHEN 10; 325 MG/1; MG/1
1 TABLET ORAL EVERY 4 HOURS PRN
Status: DISCONTINUED | OUTPATIENT
Start: 2019-07-25 | End: 2019-07-27 | Stop reason: HOSPADM

## 2019-07-25 RX ORDER — MISOPROSTOL 200 UG/1
600 TABLET ORAL
Status: DISCONTINUED | OUTPATIENT
Start: 2019-07-25 | End: 2019-07-27 | Stop reason: HOSPADM

## 2019-07-25 RX ORDER — OXYTOCIN/RINGER'S LACTATE 20/1000 ML
41.7 PLASTIC BAG, INJECTION (ML) INTRAVENOUS CONTINUOUS
Status: ACTIVE | OUTPATIENT
Start: 2019-07-25 | End: 2019-07-25

## 2019-07-25 RX ORDER — SODIUM CHLORIDE, SODIUM LACTATE, POTASSIUM CHLORIDE, CALCIUM CHLORIDE 600; 310; 30; 20 MG/100ML; MG/100ML; MG/100ML; MG/100ML
INJECTION, SOLUTION INTRAVENOUS CONTINUOUS
Status: DISCONTINUED | OUTPATIENT
Start: 2019-07-25 | End: 2019-07-27 | Stop reason: HOSPADM

## 2019-07-25 RX ORDER — SODIUM CHLORIDE 9 MG/ML
INJECTION, SOLUTION INTRAVENOUS
Status: DISCONTINUED | OUTPATIENT
Start: 2019-07-25 | End: 2019-07-27 | Stop reason: HOSPADM

## 2019-07-25 RX ORDER — OXYTOCIN/RINGER'S LACTATE 20/1000 ML
2 PLASTIC BAG, INJECTION (ML) INTRAVENOUS CONTINUOUS
Status: DISCONTINUED | OUTPATIENT
Start: 2019-07-25 | End: 2019-07-27 | Stop reason: HOSPADM

## 2019-07-25 RX ORDER — DIPHENHYDRAMINE HCL 25 MG
25 CAPSULE ORAL EVERY 4 HOURS PRN
Status: DISCONTINUED | OUTPATIENT
Start: 2019-07-25 | End: 2019-07-27 | Stop reason: HOSPADM

## 2019-07-25 RX ORDER — HYDROCODONE BITARTRATE AND ACETAMINOPHEN 5; 325 MG/1; MG/1
1 TABLET ORAL EVERY 4 HOURS PRN
Status: DISCONTINUED | OUTPATIENT
Start: 2019-07-25 | End: 2019-07-27 | Stop reason: HOSPADM

## 2019-07-25 RX ORDER — FENTANYL/BUPIVACAINE/NS/PF 2MCG/ML-.1
PLASTIC BAG, INJECTION (ML) INJECTION CONTINUOUS PRN
Status: DISCONTINUED | OUTPATIENT
Start: 2019-07-25 | End: 2019-07-25

## 2019-07-25 RX ORDER — ONDANSETRON 8 MG/1
8 TABLET, ORALLY DISINTEGRATING ORAL EVERY 8 HOURS PRN
Status: DISCONTINUED | OUTPATIENT
Start: 2019-07-25 | End: 2019-07-27 | Stop reason: HOSPADM

## 2019-07-25 RX ORDER — FENTANYL/BUPIVACAINE/NS/PF 2MCG/ML-.1
PLASTIC BAG, INJECTION (ML) INJECTION CONTINUOUS
Status: DISCONTINUED | OUTPATIENT
Start: 2019-07-25 | End: 2019-07-27 | Stop reason: HOSPADM

## 2019-07-25 RX ORDER — LIDOCAINE HYDROCHLORIDE AND EPINEPHRINE 15; 5 MG/ML; UG/ML
INJECTION, SOLUTION EPIDURAL
Status: DISCONTINUED | OUTPATIENT
Start: 2019-07-25 | End: 2019-07-25

## 2019-07-25 RX ORDER — HYDROCORTISONE 25 MG/G
CREAM TOPICAL 3 TIMES DAILY PRN
Status: DISCONTINUED | OUTPATIENT
Start: 2019-07-25 | End: 2019-07-27 | Stop reason: HOSPADM

## 2019-07-25 RX ORDER — SODIUM CITRATE AND CITRIC ACID MONOHYDRATE 334; 500 MG/5ML; MG/5ML
30 SOLUTION ORAL ONCE
Status: DISCONTINUED | OUTPATIENT
Start: 2019-07-25 | End: 2019-07-27 | Stop reason: HOSPADM

## 2019-07-25 RX ORDER — FAMOTIDINE 10 MG/ML
20 INJECTION INTRAVENOUS ONCE
Status: DISCONTINUED | OUTPATIENT
Start: 2019-07-25 | End: 2019-07-27 | Stop reason: HOSPADM

## 2019-07-25 RX ORDER — DOCUSATE SODIUM 100 MG/1
200 CAPSULE, LIQUID FILLED ORAL 2 TIMES DAILY PRN
Status: DISCONTINUED | OUTPATIENT
Start: 2019-07-25 | End: 2019-07-27 | Stop reason: HOSPADM

## 2019-07-25 RX ORDER — DIPHENHYDRAMINE HYDROCHLORIDE 50 MG/ML
25 INJECTION INTRAMUSCULAR; INTRAVENOUS EVERY 4 HOURS PRN
Status: DISCONTINUED | OUTPATIENT
Start: 2019-07-25 | End: 2019-07-27 | Stop reason: HOSPADM

## 2019-07-25 RX ORDER — ONDANSETRON 8 MG/1
8 TABLET, ORALLY DISINTEGRATING ORAL EVERY 8 HOURS PRN
Status: DISCONTINUED | OUTPATIENT
Start: 2019-07-25 | End: 2019-07-25 | Stop reason: SDUPTHER

## 2019-07-25 RX ORDER — OXYTOCIN/RINGER'S LACTATE 20/1000 ML
41.65 PLASTIC BAG, INJECTION (ML) INTRAVENOUS CONTINUOUS
Status: ACTIVE | OUTPATIENT
Start: 2019-07-25 | End: 2019-07-26

## 2019-07-25 RX ORDER — OXYTOCIN/RINGER'S LACTATE 20/1000 ML
333 PLASTIC BAG, INJECTION (ML) INTRAVENOUS CONTINUOUS
Status: ACTIVE | OUTPATIENT
Start: 2019-07-25 | End: 2019-07-25

## 2019-07-25 RX ADMIN — Medication 10 ML/HR: at 11:07

## 2019-07-25 RX ADMIN — IBUPROFEN 600 MG: 600 TABLET ORAL at 11:07

## 2019-07-25 RX ADMIN — LIDOCAINE HYDROCHLORIDE,EPINEPHRINE BITARTRATE 3 ML: 15; .005 INJECTION, SOLUTION EPIDURAL; INFILTRATION; INTRACAUDAL; PERINEURAL at 11:07

## 2019-07-25 RX ADMIN — IBUPROFEN 600 MG: 600 TABLET ORAL at 05:07

## 2019-07-25 RX ADMIN — Medication 2 MILLI-UNITS/MIN: at 01:07

## 2019-07-25 RX ADMIN — DEXTROSE 5 MILLION UNITS: 50 INJECTION, SOLUTION INTRAVENOUS at 11:07

## 2019-07-25 RX ADMIN — HYDROCODONE BITARTRATE AND ACETAMINOPHEN 1 TABLET: 5; 325 TABLET ORAL at 10:07

## 2019-07-25 NOTE — ANESTHESIA PROCEDURE NOTES
Epidural    Patient location during procedure: OB   Reason for block: primary anesthetic   Start time: 7/25/2019 11:20 AM  Timeout: 7/25/2019 11:15 AM  End time: 7/25/2019 11:35 AM  Surgery related to: Vaginal Delivery    Staffing  Performing Provider: Danial Conley MD  Authorizing Provider: Yas Morrow MD        Preanesthetic Checklist  Completed: patient identified, site marked, surgical consent, pre-op evaluation, timeout performed, IV checked, risks and benefits discussed, monitors and equipment checked, anesthesia consent given, hand hygiene performed and patient being monitored  Preparation  Patient position: sitting  Prep: ChloraPrep  Patient monitoring: Pulse Ox  Epidural  Skin Anesthetic: lidocaine 1%  Skin Wheal: 3 mL  Administration type: continuous  Approach: midline  Interspace: L3-4    Injection technique: USHA saline  Needle and Epidural Catheter  Needle type: Tuohy   Needle gauge: 17  Needle length: 3.5 inches  Needle insertion depth: 8.5 cm  Catheter type: springwound  Catheter size: 19 G  Catheter at skin depth: 13.5 cm  Test dose: 3 mL of lidocaine 1.5% with Epi 1-to-200,000  Additional Documentation: incremental injection, negative aspiration for heme and CSF, no paresthesia on injection, no signs/symptoms of IV or SA injection, no significant pain on injection and no significant complaints from patient  Needle localization: anatomical landmarks  Medications:  Volume per aspiration: 5 mL  Time between aspirations: 5 minutes  Assessment  Ease of block: easy  Patient's tolerance of the procedure: comfortable throughout block and no complaintsNo inadvertent dural puncture with Tuohy.  Dural puncture performed with spinal needle.

## 2019-07-25 NOTE — LACTATION NOTE
Pumping for the Baby in NICU    Preparation and Hygiene:  Shower daily.  Wear a clean bra each day and wash daily in warm soapy water.  1. Change wet or moist breast pads frequently.  Moist pads can promote growth of germs.  2. Actively wash your hands, paying close attention to the area around and under your fingernails, thoroughly with soap and water for 15 seconds before pumping or handling your milk.  Re-wash your hands if you touch anything (scratching your nose, answering the phone, etc) while pumping or handling your milk.   3. Before pumping your breasts, assemble the pump collection kit and have ready the sterile container and labels.  Place these items on a clean surface next to the breast pump.  Collection and Storage of Expressed Breast milk:       1. Pump your breasts at least 8-10 times every 24 hours.  Double pump (both breasts at the same time) for at least 15-20 minutes using the most suction that is comfortable.     2.    Do not touch the inside of the storage containers or lids.  3. Tightly screw the lid onto the container and place immediately into the refrigerator for daily transportation to the hospital.   Do not freeze your milk unless asked to do so by your babys nurse.  However, if you are not able to visit your baby each day, place the expressed breast milk in the freezer.      Medela pump used with instructions provided and all questions answered. No concerns at this time.

## 2019-07-25 NOTE — PROGRESS NOTES
"LABOR NOTE    S:  Complaints: No.  Epidural working:  yes  MD to bedside for cervical check     O: /66   Pulse 110   Resp 19   Ht 4' 11" (1.499 m)   Wt 99.8 kg (220 lb 0.3 oz)   LMP 10/21/2018 (Exact Date)   SpO2 96%   Breastfeeding? No   BMI 44.44 kg/m²       FHT: baseline 150,  Minimal to moderate btbv, no accels, no decels Cat 2 (reassuring)  CTX: 2-3 mins  SVE:     ASSESSMENT:   24 y.o.  at 39w4d, in stable condition.   Patient called out for pressure>> tried practice pushes but unable to reduce lip at this time; patient placed on peanut ball.       FHT reassuring    Active Hospital Problems    Diagnosis  POA    39 weeks gestation of pregnancy [Z3A.39]  Not Applicable      Resolved Hospital Problems   No resolved problems to display.        Timeline:  1100: /BBOW  1300: /-2; AROM  1430: 0   1500: 9 with anterior lip, 0 station       PLAN:  Continue Close Maternal/Fetal Monitoring  Pitocin Augmentation per protocol  Recheck 2 hours or PRN      Sobia Stubbs M.D.  OBSENAITN PGY1    "

## 2019-07-25 NOTE — H&P
HISTORY AND PHYSICAL                                                OBSTETRICS          Subjective:      Jacobo Alvarado is a 24 y.o. [id number] at 39w4d presents complaining of contractions and possible rupture of membranes.   This IUP is complicated by history of  labor, miscarriage, and GBS +.  Patient reports contractions, denies vaginal bleeding, reports possible LOF.   Fetal Movement: normal.    Admitted for labor (membranes intact).     Review of Systems   Constitutional: Negative for chills and fever.   HENT: Negative for facial swelling.   Eyes: Negative for visual disturbance.   Respiratory: Negative for chest tightness and shortness of breath.    Cardiovascular: Negative for chest pain.   Gastrointestinal: Positive for abdominal pain. Negative for nausea and vomiting.   Genitourinary: Negative for dysuria, vaginal bleeding and vaginal discharge.   Skin: Negative.    Neurological: Negative for headaches.   Psychiatric/Behavioral: Negative.         PMHx:   Past Medical History:   Diagnosis Date    Chlamydia     Miscarriage        PSHx:   Past Surgical History:   Procedure Laterality Date    D & C (SUCTION) N/A 2018    [facility] at [facility]    DILATION AND CURETTAGE OF UTERUS  2018       All: Review of patient's allergies indicates:  No Known Allergies    Meds:   Medications Prior to Admission   Medication Sig Dispense Refill Last Dose    acetaminophen (TYLENOL) 325 MG tablet Take 1 tablet (325 mg total) by mouth every 6 (six) hours as needed for Pain. 20 tablet 0 Taking    clotrimazole-betamethasone 1-0.05% (LOTRISONE) cream Apply to affected area 2 times daily for three days, then once a day for three days 15 g 0 Taking    ondansetron (ZOFRAN-ODT) 4 MG TbDL Take 1 tablet (4 mg total) by mouth every 6 (six) hours as needed. 15 tablet 0 Taking    prenatal vit 10-iron fum-folic 65-1 mg Tab Take 1 tablet by mouth once daily. 30 tablet 11 Taking        SH:   Social History     Socioeconomic History    Marital status: Single     Spouse name: Not on file    Number of children: Not on file    Years of education: Not on file    Highest education level: Not on file   Occupational History    Not on file   Social Needs    Financial resource strain: Not on file    Food insecurity:     Worry: Not on file     Inability: Not on file    Transportation needs:     Medical: Not on file     Non-medical: Not on file   Tobacco Use    Smoking status: Never Smoker    Smokeless tobacco: Never Used   Substance and Sexual Activity    Alcohol use: No    Drug use: No    Sexual activity: Yes     Partners: Male     Birth control/protection: None   Lifestyle    Physical activity:     Days per week: Not on file     Minutes per session: Not on file    Stress: Not on file   Relationships    Social connections:     Talks on phone: Not on file     Gets together: Not on file     Attends [employer] service: Not on file     Active member of club or organization: Not on file     Attends meetings of clubs or organizations: Not on file     Relationship status: Not on file   Other Topics Concern    Not on file   Social History Narrative    Not on file       FH:   Family History   Problem Relation Age of Onset    Breast cancer Other     Diabetes Mother     Kidney disease Mother     Ovarian cancer Neg Hx     Colon cancer Neg Hx        OBHx:   OB History    Para Term  AB Living   5 1 0 1 3 1   SAB TAB Ectopic Multiple Live Births   3 0 0 0 1      # Outcome Date GA Lbr Cody/2nd Weight Sex Delivery Anes PTL Lv   5 Current            4 2018     SAB   FD   3 SAB 18 12w0d    SAB   FD   2 2017     SAB   FD   1  12 36w0d  2.693 kg (5 lb 15 oz) F Vag-Spont EPI Y [place]      Name: [place]       Objective:       /63   Pulse (!) 114   Resp 19   LMP 10/21/2018 (Exact Date)   SpO2 99%   Breastfeeding? No     Vitals:    19 1025  07/25/19 1030 07/25/19 1049 07/25/19 1050   BP: 129/72  133/63    Pulse: 108 (!) 111 110 (!) 114   Resp:       SpO2: 99% 99%  99%     Physical Exam     Vitals reviewed.  Constitutional: She appears well-developed and well-nourished.   HENT:   Head: Normocephalic and atraumatic.   Eyes: EOM are normal.   Neck: Normal range of motion. Neck supple.   Cardiovascular: Normal rate, regular rhythm and normal heart sounds.   Pulmonary/Chest: Breath sounds normal.   Abdominal: Soft. There is no tenderness.   Genitourinary: Vagina normal and uterus normal.   Musculoskeletal: Normal range of motion.   Neurological: She is alert and oriented to person, place, and time.   Skin: Skin is warm and dry.   Psychiatric: She has a normal mood and affect.     FHT: 140, moderate BTBV, +accels, -decels;  Cat 1 (reassuring)                 TOCO: Q 2-3 minutes   Presentations: cephalic by ultrasound   Cervix:     Dilation: 4cm    Effacement: 100%    Station:  BBOW    Consistency: soft    Position: anterior     Lab Review  Blood Type O POS  GBBS: positive  Rubella: Immune  RPR: NR  HIV: negative  HepB: negative       Assessment:       39w4d weeks gestation with normal labor    Active Hospital Problems    Diagnosis  POA    39 weeks gestation of pregnancy [Z3A.39]  Not Applicable      Resolved Hospital Problems   No resolved problems to display.          Plan:     1. Admit for labor   Risks, benefits, alternatives and possible complications have been discussed in detail with the patient.   - Consents signed and to chart  - Admit to Labor and Delivery unit   - Epidural per Anesthesia  - Draw CBC, T&S  - AROM after epidural, pitocin augmentation PRN    Post-Partum Hemorrhage risk - low    2. GBS pos  - PCN ppx    Arianna Conti MD  OB/GYN, PGY-4

## 2019-07-25 NOTE — LACTATION NOTE
Baby transferred to NICU at 1800. Mother was taught hand expression of breastmilk/colostrum. She was instructed to:   Sit upright and lean forward, if possible.   When feasible, apply warm, wet compress over breasts for a few minutes.    Perform gentle breast massage.   Form a C with her hand and place it about 1 inch back from the areola with the nipple centered between her index finger and her thumb.   Press, compress, relax:  Using her finger and thumb, apply pressure in an inward direction toward the breast without stretching the tissue, compress the breast tissue between her finger and thumb, then relax her finger and thumb. Repeat process for a few minutes.   Rotate placement of finger and thumb on the breasts to facilitate emptying.   Collect expressed breastmilk/colostrum with a spoon or cup and feed immediately to the baby, if able.   If unable to feed immediately, place breastmilk/colostrum directly into a sterile storage container for later use. Place the babys breast milk label (with the date and time of collection and the names of mother's medications) on the container. Reviewed proper handling and storage of expressed breastmilk.   Patient effectively return demonstrated and verbalized understanding.

## 2019-07-25 NOTE — ED PROVIDER NOTES
Encounter Date: 2019       History     Chief Complaint   Patient presents with    Contractions     Infinity Nalini Alvarado is a 24 y.o. [id number] at 39w4d presents complaining of contractions and possible rupture of membranes.   This IUP is complicated by history of  labor, miscarriage, and GBS +.  Patient reports contractions, denies vaginal bleeding, reports possible LOF.   Fetal Movement: normal.          Review of patient's allergies indicates:  No Known Allergies  Past Medical History:   Diagnosis Date    Chlamydia     Miscarriage      Past Surgical History:   Procedure Laterality Date    D & C (SUCTION) N/A 2018    Performed by [facility] at [facility] OR    DILATION AND CURETTAGE OF UTERUS  2018     Family History   Problem Relation Age of Onset    Breast cancer Other     Diabetes Mother     Kidney disease Mother     Ovarian cancer Neg Hx     Colon cancer Neg Hx      Social History     Tobacco Use    Smoking status: Never Smoker    Smokeless tobacco: Never Used   Substance Use Topics    Alcohol use: No    Drug use: No     Review of Systems   Constitutional: Negative for chills and fever.   HENT: Negative for facial swelling. Sneezing: contractions.    Eyes: Negative for visual disturbance.   Respiratory: Negative for chest tightness and shortness of breath.    Cardiovascular: Negative for chest pain.   Gastrointestinal: Positive for abdominal pain. Negative for nausea and vomiting.   Genitourinary: Negative for dysuria, vaginal bleeding and vaginal discharge.   Skin: Negative.    Neurological: Negative for headaches.   Psychiatric/Behavioral: Negative.        Physical Exam     Initial Vitals   BP Pulse Resp Temp SpO2   19 0920 19 0920 19 0908 -- --   113/79 (!) 127 19        MAP       --                Physical Exam    Vitals reviewed.  Constitutional: She appears well-developed and well-nourished.   HENT:   Head: Normocephalic and atraumatic.    Eyes: EOM are normal.   Neck: Normal range of motion. Neck supple.   Cardiovascular: Normal rate, regular rhythm and normal heart sounds.   Pulmonary/Chest: Breath sounds normal.   Abdominal: Soft. There is no tenderness.   Genitourinary: Vagina normal and uterus normal.   Musculoskeletal: Normal range of motion.   Neurological: She is alert and oriented to person, place, and time.   Skin: Skin is warm and dry.   Psychiatric: She has a normal mood and affect.     OB LABOR EXAM:       Method: Sterile vaginal exam per MD and Sterile speculum exam per MD.   Vaginal Bleeding: none present.     Dilatation: 4.   Station: -2.   Effacement: 60%.       Comments: Mucus seen in the vaginal vault. Swab taken. Negative nitrazine, negative pooling. Negative ferning.        ED Course   Obtain Fetal nonstress test (NST)  Date/Time: 7/25/2019 9:59 AM  Performed by: Sobia Stubbs MD  Authorized by: Sobia Stubbs MD     Nonstress Test:     Variability:  6-25 BPM    Accelerations:  15 bpm    Baseline:  120    Contractions:  Regular    Contraction Frequency:  Q 6 minutes  Biophysical Profile:     Nonstress Test Interpretation: reactive      Overall Impression:  Reassuring  Post-procedure:     Patient tolerance:  Patient tolerated the procedure well with no immediate complications      Labs Reviewed - No data to display       Imaging Results    None          Medical Decision Making:   Initial Assessment:   - Patient in stable condition, carlo every 6 minutes.   Differential Diagnosis:   Labor  ED Management:  Spec exam: mucus seen in vaginal vault; negative pooling, negative nitrazine, negative ferning    SVE 4/60/-2  Watch on toco and NST for 1 hour   Recheck SVE: 4/80/-2 with a bulging bag   Admit to L&D for delivery   Other:   I have discussed this case with another health care provider.       <> Summary of the Discussion: Dr. Ballesteros               Attending Attestation:   Physician Attestation Statement for Resident:  As  the supervising MD   Physician Attestation Statement: I have personally seen and examined this patient.   I agree with the above history. -:   As the supervising MD I agree with the above PE.    As the supervising MD I agree with the above treatment, course, plan, and disposition.   -:   NST  I independently reviewed the fetal non-stress test with the following interpretation:  120 BPM baseline  Variability: moderate  Accelerations: present  Decelerations: absent  Contractions: Q 5 min  Category 1    Clinical Interpretation:reactive    Patient evaluated and found to be stable, agree with resident's assessment of active labor and plan to admit to L+D.  I was personally present during the critical portions of the procedure(s) performed by the resident and was immediately available in the ED to provide services and assistance as needed during the entire procedure.  I have reviewed the following: old records at this facility.                       Clinical Impression:       ICD-10-CM ICD-9-CM   1. 39 weeks gestation of pregnancy Z3A.39 V22.2         Disposition:   Disposition: Admitted  Condition: Stable       Sobia Stubbs M.D.  OBGYN PGY1                   Sobia Stubbs MD  Resident  07/25/19 1058       Kiara Ballesteros MD  07/25/19 2954

## 2019-07-25 NOTE — ED NOTES
Pt reports ctx since 4 am 8/10 pain   Reports +fm.  Denies vb  Reports LOF at 7am   Not constant   Dr salcido notified

## 2019-07-25 NOTE — PROGRESS NOTES
"LABOR NOTE    S:  Complaints: No.  Epidural working:  yes  MD to bedside for cervical check     O: /66   Pulse 110   Resp 19   Ht 4' 11" (1.499 m)   Wt 99.8 kg (220 lb 0.3 oz)   LMP 10/21/2018 (Exact Date)   SpO2 96%   Breastfeeding? No   BMI 44.44 kg/m²       FHT: baseline 150,  Minimal to moderate btbv, no accels, no decels Cat 2 (reassuring)  CTX: irregular  SVE:     ASSESSMENT:   24 y.o.  at 39w4d, in stable condition.     FHT reassuring    Active Hospital Problems    Diagnosis  POA    39 weeks gestation of pregnancy [Z3A.39]  Not Applicable      Resolved Hospital Problems   No resolved problems to display.        Timeline:  1100: /BBOW  1300: -2; AROM  1430:          PLAN:  Continue Close Maternal/Fetal Monitoring  Pitocin Augmentation per protocol  Recheck 2 hours or PRN      Sobia Stubbs M.D.  [facility] PGY1    "

## 2019-07-25 NOTE — ANESTHESIA PREPROCEDURE EVALUATION
Jacobo Alvarado is a 24 y.o. female  39w4d presents complaining of contractions and possible rupture of membranes. This IUP is complicated by history of  labor, miscarriage, and GBS +.      OB History    Para Term  AB Living   5 1   1 3 1   SAB TAB Ectopic Multiple Live Births   3       1      # Outcome Date GA Lbr Cody/2nd Weight Sex Delivery Anes PTL Lv   5 Current            4 SAB 2018     SAB   FD   3 SAB 18 12w0d    SAB   FD   2 SAB 2017     SAB   FD   1  12 36w0d  2.693 kg (5 lb 15 oz) F Vag-Spont EPI Y [place]       Wt Readings from Last 1 Encounters:   19 1336 99.8 kg (220 lb 0.3 oz)       BP Readings from Last 3 Encounters:   19 133/63   19 106/74   19 117/64       Patient Active Problem List   Diagnosis    Recurrent pregnancy loss    History of  delivery -- NEEDS MIKENA @ 16 weeks    High risk pregnancy due to recurrent miscarriage    High risk pregnancy due to history of  labor in third trimester    Group beta Strep positive    39 weeks gestation of pregnancy       Past Surgical History:   Procedure Laterality Date    D & C (SUCTION) N/A 2018    Performed [facility] OR    DILATION AND CURETTAGE OF UTERUS  2018       Social History     Socioeconomic History    Marital status: Single     Spouse name: Not on file    Number of children: Not on file    Years of education: Not on file    Highest education level: Not on file   Occupational History    Not on file   Social Needs    Financial resource strain: Not on file    Food insecurity:     Worry: Not on file     Inability: Not on file    Transportation needs:     Medical: Not on file     Non-medical: Not on file   Tobacco Use    Smoking status: Never Smoker    Smokeless tobacco: Never Used   Substance and Sexual Activity    Alcohol use: No    Drug use: No    Sexual activity: Yes     Partners: Male     Birth  control/protection: None   Lifestyle    Physical activity:     Days per week: Not on file     Minutes per session: Not on file    Stress: Not on file   Relationships    Social connections:     Talks on phone: Not on file     Gets together: Not on file     Attends Confucianist service: Not on file     Active member of club or organization: Not on file     Attends meetings of clubs or organizations: Not on file     Relationship status: Not on file   Other Topics Concern    Not on file   Social History Narrative    Not on file         Chemistry        Component Value Date/Time     04/05/2019 0725    K 3.7 04/05/2019 0725     04/05/2019 0725    CO2 24 04/05/2019 0725    BUN 5 (L) 04/05/2019 0725    CREATININE 0.6 04/05/2019 0725    GLU 81 04/05/2019 0725        Component Value Date/Time    CALCIUM 9.1 04/05/2019 0725    ALKPHOS 95 04/05/2019 0725    AST 17 04/05/2019 0725    ALT 17 04/05/2019 0725    BILITOT 0.3 04/05/2019 0725    ESTGFRAFRICA >60 04/05/2019 0725    EGFRNONAA >60 04/05/2019 0725            Lab Results   Component Value Date    WBC 9.63 07/16/2019    HGB 10.0 (L) 07/16/2019    HCT 31.6 (L) 07/16/2019    MCV 77 (L) 07/16/2019     07/16/2019       No results for input(s): PT, INR, PROTIME, APTT in the last 72 hours.                  Anesthesia Evaluation    I have reviewed the Patient Summary Reports.    I have reviewed the Nursing Notes.   I have reviewed the Medications.     Review of Systems  Anesthesia Hx:  No problems with previous Anesthesia  Denies Family Hx of Anesthesia complications.   Denies Personal Hx of Anesthesia complications.   Social:  Non-Smoker    Hematology/Oncology:  Hematology Normal   Oncology Normal     EENT/Dental:EENT/Dental Normal   Cardiovascular:  Cardiovascular Normal Exercise tolerance: good     Pulmonary:  Pulmonary Normal    Renal/:  Renal/ Normal     Musculoskeletal:  Musculoskeletal Normal    Neurological:  Neurology Normal     Endocrine:  Endocrine Normal    Dermatological:  Skin Normal    Psych:  Psychiatric Normal           Physical Exam  General:  Well nourished    Airway/Jaw/Neck:  Airway Findings: Mouth Opening: Normal Tongue: Normal  General Airway Assessment: Adult  Mallampati: II  TM Distance: Normal, at least 6 cm  Jaw/Neck Findings:  Neck ROM: Normal ROM      Dental:  Dental Findings: In tact             Anesthesia Plan  Type of Anesthesia, risks & benefits discussed:  Anesthesia Type:  epidural  Patient's Preference:   Intra-op Monitoring Plan: standard ASA monitors  Intra-op Monitoring Plan Comments:   Post Op Pain Control Plan: per primary service following discharge from PACU  Post Op Pain Control Plan Comments:   Induction:    Beta Blocker:  Patient is not currently on a Beta-Blocker (No further documentation required).       Informed Consent: Patient understands risks and agrees with Anesthesia plan.  Questions answered. Anesthesia consent signed with patient.  ASA Score: 2     Day of Surgery Review of History & Physical: I have interviewed and examined the patient. I have reviewed the patient's H&P dated:            Ready For Surgery From Anesthesia Perspective.

## 2019-07-25 NOTE — L&D DELIVERY NOTE
[facility]  Vaginal Delivery   Operative Note    SUMMARY     Normal spontaneous vaginal delivery of live infant, skin to skin was unable to be performed due to lack of infant tone, color, and breathing.  Infant delivered position OA over perineum.  Nuchal cord: Yes x 3  >> cord reduced following delivery.  Spontaneous delivery of placenta and IV pitocin given noting good uterine tone.  No lacerations noted.  Patient tolerated delivery well. Sponge needle and lap counted correctly x2.    Indications: <principal problem not specified>  Pregnancy complicated by:   Patient Active Problem List   Diagnosis    Recurrent pregnancy loss    History of  delivery -- NEEDS MIKENA @ 16 weeks    High risk pregnancy due to recurrent miscarriage    High risk pregnancy due to history of  labor in third trimester    Group beta Strep positive    39 weeks gestation of pregnancy    Normal labor     Admitting GA: 39w4d    Delivery Information for  Robyn Alvarado    Birth information:  YOB: 2020   Time of birth: 3:30 PM   Sex: female   Head Delivery Date/Time: 2019  3:30 PM   Delivery type: Vaginal, Spontaneous   Gestational Age: 39w4d    Delivery Providers    Delivering clinician:  Yessenia Martinez MD   Provider Role    Tamela Muro MD Resident    Sobia Stubbs MD Resident    Mehrdad Kennedy, RN Registered Nurse    Noreen Abel RN Registered Nurse    Neto Quiroz, RN Registered Nurse    Cheri Gusman Surgical Tech            Measurements    Weight:  3.21 kg  Length:  0.48 m         Apgars    Living status:  Living  Apgars:   1 min.:   5 min.:   10 min.:   15 min.:   20 min.:     Skin color:   0  1       Heart rate:   2  2       Reflex irritability:   1  1       Muscle tone:   1  1       Respiratory effort:   1  1       Total:   5  6       Apgars assigned by:  ZACH QUIROZ RNC         Operative Delivery    Forceps attempted?:  No  Vacuum extractor  attempted?:  No         Shoulder Dystocia    Shoulder dystocia present?:  No           Presentation    Presentation:  Vertex  Position:  Left           Interventions/Resuscitation    Method:  Bulb Suctioning, Blow By Oxygen, Tactile Stimulation       Cord    Vessels:  3 vessels  Complications:  Nuchal  Nuchal Intervention:  reduced  Nuchal Cord Description:  tight nuchal cord  Number of Loops:  3  Delayed Cord Clamping?:  No  Cord Clamped Date/Time:  2019  3:30 PM  Cord Blood Disposition:  Sent with Baby  Gases Sent?:  Yes  Stem Cell Collection (by MD):  No       Placenta    Placenta delivery date/time:  2019 1535  Placenta removal:  Spontaneous  Placenta appearance:  Intact  Placenta disposition:  discarded           Labor Events:       labor: No     Labor Onset Date/Time:         Dilation Complete Date/Time:         Start Pushing Date/Time:       Rupture Date/Time: 19  1330         Rupture type:           Fluid Amount:        Fluid Color:        Fluid Odor:        Membrane Status (PeriCalm): ARM (Artificial Rupture)      Rupture Date/Time (PeriCalm): 2019 13:30:00      Fluid Amount (PeriCalm): Moderate      Fluid Color (PeriCalm): Clear       steroids: None     Antibiotics given for GBS: Yes     Induction: oxytocin     Indications for induction:  Elective     Augmentation: oxytocin     Indications for augmentation:       Labor complications: None     Additional complications:          Cervical ripening:                     Delivery:      Episiotomy: None     Indication for Episiotomy:       Perineal Lacerations: None Repaired:      Periurethral Laceration:   Repaired:     Labial Laceration:   Repaired:     Sulcus Laceration:   Repaired:     Vaginal Laceration:   Repaired:     Cervical Laceration:   Repaired:     Repair suture: None     Repair # of packets:       Last Value - EBL - Nursing (mL):       Sum - EBL - Nursing (mL): 0     Last Value - EBL - Anesthesia (mL):       Calculated QBL (mL): 400      Vaginal Sweep Performed: Yes     Surgicount Correct: Yes       Other providers:       Anesthesia    Method:  Epidural          Details (if applicable):  Trial of Labor      Categorization:      Priority:     Indications for :     Incision Type:       Additional  information:  Forceps:    Vacuum:    Breech:    Observed anomalies    Other (Comments):         EDNA Juarez PGY1

## 2019-07-25 NOTE — PROGRESS NOTES
"LABOR NOTE    S:  Complaints: No.  Epidural working:  yes  MD to bedside for cervical check     O: BP (!) 109/55   Pulse (!) 114   Resp 19   Ht 4' 11" (1.499 m)   Wt 99.8 kg (220 lb 0.3 oz)   LMP 10/21/2018 (Exact Date)   SpO2 100%   Breastfeeding? No   BMI 44.44 kg/m²       FHT: baseline 140,  Minimal to moderate btbv, no accels, no decels Cat 2 (reassuring)  CTX: irregular  SVE: /-2, AROM      ASSESSMENT:   24 y.o.  at 39w4d, in stable condition.     FHT reassuring    Active Hospital Problems    Diagnosis  POA    39 weeks gestation of pregnancy [Z3A.39]  Not Applicable      Resolved Hospital Problems   No resolved problems to display.        Timeline:  1100: OW  1300: /-2; AROM        PLAN:  Continue Close Maternal/Fetal Monitoring  Pitocin Augmentation per protocol  Recheck 2 hours or PRN      Sobia Stubbs M.D.  [facility] PGY1    "

## 2019-07-26 LAB
BASOPHILS # BLD AUTO: 0.04 K/UL (ref 0–0.2)
BASOPHILS NFR BLD: 0.2 % (ref 0–1.9)
DIFFERENTIAL METHOD: ABNORMAL
EOSINOPHIL # BLD AUTO: 0.1 K/UL (ref 0–0.5)
EOSINOPHIL NFR BLD: 0.8 % (ref 0–8)
ERYTHROCYTE [DISTWIDTH] IN BLOOD BY AUTOMATED COUNT: 16.4 % (ref 11.5–14.5)
HCT VFR BLD AUTO: 29.6 % (ref 37–48.5)
HGB BLD-MCNC: 9.1 G/DL (ref 12–16)
IMM GRANULOCYTES # BLD AUTO: 0.18 K/UL (ref 0–0.04)
IMM GRANULOCYTES NFR BLD AUTO: 1.1 % (ref 0–0.5)
LYMPHOCYTES # BLD AUTO: 2.1 K/UL (ref 1–4.8)
LYMPHOCYTES NFR BLD: 13.2 % (ref 18–48)
MCH RBC QN AUTO: 24 PG (ref 27–31)
MCHC RBC AUTO-ENTMCNC: 30.7 G/DL (ref 32–36)
MCV RBC AUTO: 78 FL (ref 82–98)
MONOCYTES # BLD AUTO: 1.9 K/UL (ref 0.3–1)
MONOCYTES NFR BLD: 11.6 % (ref 4–15)
NEUTROPHILS # BLD AUTO: 11.8 K/UL (ref 1.8–7.7)
NEUTROPHILS NFR BLD: 73.1 % (ref 38–73)
NRBC BLD-RTO: 0 /100 WBC
PLATELET # BLD AUTO: 301 K/UL (ref 150–350)
PMV BLD AUTO: 10.1 FL (ref 9.2–12.9)
RBC # BLD AUTO: 3.79 M/UL (ref 4–5.4)
WBC # BLD AUTO: 16.11 K/UL (ref 3.9–12.7)

## 2019-07-26 PROCEDURE — 25000003 PHARM REV CODE 250: Performed by: STUDENT IN AN ORGANIZED HEALTH CARE EDUCATION/TRAINING PROGRAM

## 2019-07-26 PROCEDURE — 11000001 HC ACUTE MED/SURG PRIVATE ROOM

## 2019-07-26 PROCEDURE — 85025 COMPLETE CBC W/AUTO DIFF WBC: CPT

## 2019-07-26 PROCEDURE — 99231 PR SUBSEQUENT HOSPITAL CARE,LEVL I: ICD-10-PCS | Mod: ,,, | Performed by: OBSTETRICS & GYNECOLOGY

## 2019-07-26 PROCEDURE — 99231 SBSQ HOSP IP/OBS SF/LOW 25: CPT | Mod: ,,, | Performed by: OBSTETRICS & GYNECOLOGY

## 2019-07-26 PROCEDURE — 36415 COLL VENOUS BLD VENIPUNCTURE: CPT

## 2019-07-26 RX ADMIN — HYDROCODONE BITARTRATE AND ACETAMINOPHEN 1 TABLET: 10; 325 TABLET ORAL at 10:07

## 2019-07-26 RX ADMIN — IBUPROFEN 600 MG: 600 TABLET ORAL at 06:07

## 2019-07-26 RX ADMIN — IBUPROFEN 600 MG: 600 TABLET ORAL at 07:07

## 2019-07-26 NOTE — ANESTHESIA POSTPROCEDURE EVALUATION
Anesthesia Post Evaluation    Patient: Jacobo Alvarado    Procedure(s) Performed: * No procedures listed *    Final Anesthesia Type: general  Patient location during evaluation: PACU  Patient participation: Yes- Able to Participate  Level of consciousness: awake and alert and oriented  Post-procedure vital signs: reviewed and stable  Pain management: adequate  Airway patency: patent  PONV status at discharge: No PONV  Anesthetic complications: no      Cardiovascular status: blood pressure returned to baseline and hemodynamically stable  Respiratory status: unassisted, spontaneous ventilation and room air  Hydration status: euvolemic            Vitals Value Taken Time   /57 7/26/2019  8:13 AM   Temp 36.4 °C (97.6 °F) 7/26/2019  8:13 AM   Pulse 82 7/26/2019  8:13 AM   Resp 18 7/26/2019  8:13 AM   SpO2 99 % 7/26/2019  8:13 AM         No case tracking events are documented in the log.      Pain/Yoselin Score: Pain Rating Prior to Med Admin: 7 (7/26/2019 10:04 AM)  Pain Rating Post Med Admin: 0 (7/26/2019 12:30 AM)

## 2019-07-26 NOTE — PLAN OF CARE
Problem: Breastfeeding  Goal: Effective Breastfeeding  Outcome: Ongoing (interventions implemented as appropriate)  Nurse, pump, supplment

## 2019-07-26 NOTE — LACTATION NOTE
07/26/19 1300   Breast Pumping   Breast Pumping double electric breast pump utilized   Breast Pumping Interventions post-feed pumping encouraged   Maternal Feeding Assessment   Maternal Emotional State assist needed;relaxed   Pain with Feeding no   Signs of Milk Transfer infant jaw motion present   Infant Positioning clutch/football;cross-cradle   Latch Assistance yes   Comfort Measures Before/During Feeding infant position adjusted;latch adjusted;maternal position adjusted   basic education reviewed. Infant to Mother baby unit from NICU. Assisted pt with latching infant to the breast. Infant with nice latch, swallows with compression. Continue to nurse, pump, and supplement as directed by MD.

## 2019-07-26 NOTE — PROGRESS NOTES
POSTPARTUM PROGRESS NOTE     Jacobo Alvarado is a 24 y.o. female PPD #1 status post Spontaneous vaginal delivery at 39w4d in a pregnancy complicated by hx of PTD ([place]). Patient is doing well this morning. She denies nausea, vomiting, fever or chills.  Patient reports mild abdominal pain that is adequately relieved by oral pain medications. Patient has mild back pain. Lochia is mild to moderate  and stable. Patient is voiding without difficulty and ambulating with no difficulty. She has passed flatus, and has not had BM.  Patient does plan to breast feed and is currently pumping as baby girl is in the NICU. Patient has scheduled BTL next week for contraception.     Objective:       Temp:  [97 °F (36.1 °C)-99.9 °F (37.7 °C)] 98.4 °F (36.9 °C)  Pulse:  [] 104  Resp:  [16-20] 18  SpO2:  [96 %-100 %] 100 %  BP: (107-141)/(50-79) 107/66    General:   alert, appears stated age and cooperative   Lungs:   nonlabored respirations    Heart:   regular rate and rhythm   Abdomen:  soft, nontender, nondistended   Uterus:  firm located at the umblicus.            Extremities: peripheral pulses normal, no pedal edema, no clubbing or cyanosis     Lab Review  No results found for this or any previous visit (from the past 4 hour(s)).    I/O    Intake/Output Summary (Last 24 hours) at 2019 0643  Last data filed at 2019 1700  Gross per 24 hour   Intake --   Output 1250 ml   Net -1250 ml        Assessment:     Patient Active Problem List   Diagnosis    Recurrent pregnancy loss    History of  delivery -- NEEDS MIKENA @ 16 weeks    High risk pregnancy due to recurrent miscarriage    High risk pregnancy due to history of  labor in third trimester    Group beta Strep positive    39 weeks gestation of pregnancy    Normal labor        Plan:   1. Postpartum care:  - Patient doing well. Continue routine management and advances.  - Continue PO pain meds. Pain well controlled.  - Heme: H/h 9.2/28.8 >>>  morning cbc pending   - Encourage ambulation  - Patient having a BTL next week for contraception  - Lactation consult PRN        Dispo: As patient meets milestones, will plan to discharge PPD#2.    Sobia Stubbs M.D.  [facility] PGY1

## 2019-07-26 NOTE — DISCHARGE INSTRUCTIONS
Preparation and Hygiene:    1. Shower daily.  2. Wear a clean bra each day and wash daily in warm soapy water.  3. Change wet or moist breast pads frequently.  Moist pads can promote growth of germs.  4. Actively wash your hands, paying close attention to the area around and under your fingernails, thoroughly with soap and water for 15 seconds before pumping or handling your milk.  Re-wash your hands if you touch anything (scratching your nose, answering the phone, etc) while pumping or handling your milk.   5. Before pumping your breasts, assemble the pump collection kit and have ready the sterile container and labels.  Place these items on a clean surface next to the breastpump.  6. Each time after you have finished pumping, take apart all of the parts of the breastpump collection kit and place them in a separate cleaning container (do not place them in the sink).  Be sure to remove the yellow valve from the breastshield and separate the white membrane from the yellow valve.  Rinse all of these parts with cool water.  Then use a new sponge and/or bottle brush and dishwashing detergent to clean the parts.  Rinse off the soapy water with cool water and air dry on a clean towel covered with a clean cloth.  All parts may also be washed after each use in the top rack of a .  7. Once each day, sterilize all of the parts of the breastpump collection kit.  This can be done by boiling the kit parts for 10 minutes or by using a Quick Clean Micro-Steam Bag made by [employer]  8. If condensation appears in the tubing, continue to run the pump with the tubing attached for 1-2 minutes or until the tubing is dry.   9. Notify your babys nurse or doctor if you become ill or need to take any medication, even over-the-counter medicines.        Collection and Storage of Expressed Breastmilk:         1. Pump your breasts at least 8-10 times every 24 hours.  Double pump (both breasts at  the same time) for at least 15-20  minutes using the most suction that is comfortable.    2. Write the date and time of pumping and the name of any medications you are takingon the babys pre-printed hospital identification label.   3. Place your babys pre-printed hospital identification label on each container of breastmilk.  Additional pre-printed labels can be obtained from your babys nurse.  If your expressed breastmilk is not correctly labeled, the nurse cannot feed the milk to the baby.       4. Place a brightly colored sticker on the top of each container of milk pumped during the first 30 days.  This identifies the milk as special and having higher levels of nutrients and anti-infective properties that are so important for your baby.  Additional stickers can be obtained from the lactation consultants or your babys nurse.  5.   Do not touch the inside of the storage containers or lids.  6.      Pour the amount of expressed milk needed for 1 of your babys feedings into each   storage container. Use a new container(s) for each pumping.  Additional storage   containers can be obtained from your babys nurse.        7.       Tightly screw the lid onto the container and place immediately into the       refrigerator fordaily transportation to the hospital.   Do not freeze your milk      unless asked to do so by your babys nurse.  However, if you are not able to      visit your baby each day, place the expressed breastmilk in the freezer.  8.   Expressed breastmilk should be refrigerated or frozen within 1 hour of      pumping.  9.      Do not store expressed breastmilk on the door of your refrigerator or freezer where the temperature is warmer.         Transportation of Expressed Breastmilk:    1. Refrigerated breastmilk or frozen milk should be packed tightly together in a cooler with frozen, blue gel-packs to keep the milk frozen.  DO NOT USE ICE CUBES (WET ICE) TO TRANSPORT FROZEN MILK.   A clean towel can be used to fill any extra space  between containers of frozen milk.  2.    Bring your expressed milk from home each time you visit the baby.        NICU lactation warmline:  [phone]  Lactation warmline:  [phone]

## 2019-07-27 VITALS
OXYGEN SATURATION: 100 % | WEIGHT: 220 LBS | DIASTOLIC BLOOD PRESSURE: 63 MMHG | TEMPERATURE: 98 F | BODY MASS INDEX: 44.35 KG/M2 | HEIGHT: 59 IN | SYSTOLIC BLOOD PRESSURE: 108 MMHG | RESPIRATION RATE: 18 BRPM | HEART RATE: 72 BPM

## 2019-07-27 PROCEDURE — 99238 PR HOSPITAL DISCHARGE DAY,<30 MIN: ICD-10-PCS | Mod: ,,, | Performed by: OBSTETRICS & GYNECOLOGY

## 2019-07-27 PROCEDURE — 25000003 PHARM REV CODE 250: Performed by: STUDENT IN AN ORGANIZED HEALTH CARE EDUCATION/TRAINING PROGRAM

## 2019-07-27 PROCEDURE — 99238 HOSP IP/OBS DSCHRG MGMT 30/<: CPT | Mod: ,,, | Performed by: OBSTETRICS & GYNECOLOGY

## 2019-07-27 RX ORDER — SERTRALINE HYDROCHLORIDE 50 MG/1
50 TABLET, FILM COATED ORAL DAILY
Qty: 30 TABLET | Refills: 6 | Status: SHIPPED | OUTPATIENT
Start: 2019-07-27 | End: 2019-09-12

## 2019-07-27 RX ORDER — IBUPROFEN 600 MG/1
600 TABLET ORAL EVERY 6 HOURS
Qty: 30 TABLET | Refills: 0 | Status: SHIPPED | OUTPATIENT
Start: 2019-07-27 | End: 2019-09-12

## 2019-07-27 RX ORDER — SERTRALINE HYDROCHLORIDE 25 MG/1
25 TABLET, FILM COATED ORAL DAILY
Qty: 7 TABLET | Refills: 0 | Status: SHIPPED | OUTPATIENT
Start: 2019-07-27 | End: 2019-08-03

## 2019-07-27 RX ADMIN — IBUPROFEN 600 MG: 600 TABLET ORAL at 01:07

## 2019-07-27 RX ADMIN — IBUPROFEN 600 MG: 600 TABLET ORAL at 09:07

## 2019-07-27 NOTE — PLAN OF CARE
Problem: Adult Inpatient Plan of Care  Goal: Plan of Care Review  Outcome: Outcome(s) achieved Date Met: 07/27/19  Pt AA0x3 and VSS.  at bedside. Two side rails up, bed locked, call light within reach. No falls noted as these precautions remain. Pt free of skin breakdown as the pt moves well independently. Voiding without difficulty. Passing gas. Fundus firm and midline. Vaginal bleeding mild to moderate. Pain controlled with po motrin. Discharge instructions, prescriptions, and follow up appointments reviewed with pt, pt verbalized understanding. ID bands verified with infant.

## 2019-07-27 NOTE — DISCHARGE SUMMARY
Delivery Discharge Summary  Obstetrics      Primary OB Clinician: Tiffanie Salinas MD      Admission date: 2019  Discharge date: 2019    Disposition: To home, self care    Discharge Diagnosis List:      Patient Active Problem List   Diagnosis    Recurrent pregnancy loss    History of  delivery -- NEEDS MIKENA @ 16 weeks    High risk pregnancy due to recurrent miscarriage    High risk pregnancy due to history of  labor in third trimester    Group beta Strep positive    39 weeks gestation of pregnancy    Normal labor     (spontaneous vaginal delivery)       Procedure:     Hospital Course:  Jacobo Alvarado is a 24 y.o. now , PPD #2 who was admitted on 2019 at 39w2d for labor. Patient was subsequently admitted to labor and delivery unit with signed consents.     Labor course was uncomplicated and resulted in  without complications.     Please see delivery note for further details. Her postpartum course was uncomplicated. On discharge day, patient's pain is controlled with oral pain medications. Pt is tolerating ambulation without SOB or CP, and regular diet without N/V. Reports lochia is mild. Denies any HA, vision changes, F/C, LE swelling. Denies any breast pain/soreness.  Feels she is at risk for postpartum depression so sent home with zoloft  rx and close follow-up.     Pt in stable condition and ready for discharge. She has been instructed to start and/or continue medications and follow up with her obstetrics provider as listed below.    Pertinent studies:  CBC  Recent Labs   Lab 19  1115 19  0639   WBC 11.93 16.11*   HGB 9.2* 9.1*   HCT 28.8* 29.6*   MCV 77* 78*    301        Immunization History   Administered Date(s) Administered    Tdap 2019        Delivery:    Episiotomy: None   Lacerations: None   Repair suture: None   Repair # of packets:     Blood loss (ml):       Birth information:  YOB: 2020   Time of  birth: 3:30 PM   Sex: female   Delivery type: Vaginal, Spontaneous   Gestational Age: 39w4d    Delivery Clinician:      Other providers:       Additional  information:  Forceps:    Vacuum:    Breech:    Observed anomalies      Living?:           APGARS  One minute Five minutes Ten minutes   Skin color:         Heart rate:         Grimace:         Muscle tone:         Breathing:         Totals: 5  6  8      Placenta: Delivered:       appearance      Patient Instructions:   Current Discharge Medication List      START taking these medications    Details   ibuprofen (ADVIL,MOTRIN) 600 MG tablet Take 1 tablet (600 mg total) by mouth every 6 (six) hours.  Qty: 30 tablet, Refills: 0      !! sertraline (ZOLOFT) 25 MG tablet Take 1 tablet (25 mg total) by mouth once daily. for 7 days  Qty: 7 tablet, Refills: 0      !! sertraline (ZOLOFT) 50 MG tablet Take 1 tablet (50 mg total) by mouth once daily. BEGIN TAKING ONCE FINISHED 1 WEEK OF 25MG DAILY  Qty: 30 tablet, Refills: 6       !! - Potential duplicate medications found. Please discuss with provider.      CONTINUE these medications which have NOT CHANGED    Details   acetaminophen (TYLENOL) 325 MG tablet Take 1 tablet (325 mg total) by mouth every 6 (six) hours as needed for Pain.  Qty: 20 tablet, Refills: 0      clotrimazole-betamethasone 1-0.05% (LOTRISONE) cream Apply to affected area 2 times daily for three days, then once a day for three days  Qty: 15 g, Refills: 0    Associated Diagnoses: Vulvar irritation      ondansetron (ZOFRAN-ODT) 4 MG TbDL Take 1 tablet (4 mg total) by mouth every 6 (six) hours as needed.  Qty: 15 tablet, Refills: 0      prenatal vit 10-iron fum-folic 65-1 mg Tab Take 1 tablet by mouth once daily.  Qty: 30 tablet, Refills: 11    Associated Diagnoses: Positive pregnancy test             Discharge Procedure Orders   Diet Adult Regular     Other restrictions (specify):   Order Comments: Pelvic rest for at least 6 weeks. Nothing in vagina - no sex,  tampons, or douching for 6 weeks     Notify your health care provider if you experience any of the following:   Order Comments: Heavy vaginal bleeding saturating more than 1 pad per hour for at least 2 hours.     Notify your health care provider if you experience any of the following:  increased confusion or weakness     Notify your health care provider if you experience any of the following:  persistent dizziness, light-headedness, or visual disturbances     Notify your health care provider if you experience any of the following:  severe persistent headache     Notify your health care provider if you experience any of the following:  difficulty breathing or increased cough     Notify your health care provider if you experience any of the following:  severe uncontrolled pain     Notify your health care provider if you experience any of the following:  persistent nausea and vomiting or diarrhea     Notify your health care provider if you experience any of the following:  temperature >100.4     Activity as tolerated       Follow-up Information     Tiffanie Salinas MD. Schedule an appointment as soon as possible for a visit in 6 weeks.    Specialty:  Obstetrics and Gynecology  Why:  Postpartum visit  Contact information:  [address] [id number] [phone]             Tiffanie Salinas MD In 1 week.    Specialty:  Obstetrics and Gynecology  Why:  Risk for postpartum depression  Contact information:  [address] [facility] [id number] [phone]

## 2019-07-27 NOTE — PROGRESS NOTES
POSTPARTUM PROGRESS NOTE     Jacobo Alvarado is a 24 y.o. female PPD #2 status post Spontaneous vaginal delivery at 39w4d in a pregnancy complicated by hx of PTD ([place]). Patient is doing well this morning. She denies nausea, vomiting, fever or chills.  Patient reports mild abdominal pain that is adequately relieved by oral pain medications. Still with some lower back pain that improves somewhat with medication. Encouraged to get up out of bed and sit in chair.   Lochia is mild to moderate  and stable. Patient is voiding without difficulty and ambulating with no difficulty. She has passed flatus, and has not had BM.  Patient does plan to breast feed and supplement with formula.  Desires BTL for contraception.   She is inquiring about medications for postpartum depression, feels she may require treatment. Feeling down, unsure why. Denies SI/HI/negative feelings toward infant. FOB at bedside.    Objective:       Temp:  [97.5 °F (36.4 °C)-97.6 °F (36.4 °C)] 97.6 °F (36.4 °C)  Pulse:  [77-86] 77  Resp:  [18] 18  SpO2:  [98 %-100 %] 100 %  BP: (104-111)/(57-72) 111/70    General:   alert, appears stated age and cooperative   Lungs:   nonlabored respirations    Heart:   regular rate and rhythm   Abdomen:  soft, nontender, nondistended   Uterus:  firm, below umbilicus           Extremities: peripheral pulses normal, no pedal edema     Lab Review  No results found for this or any previous visit (from the past 4 hour(s)).    I/O  No intake or output data in the 24 hours ending 19 0710     Assessment:     Patient Active Problem List   Diagnosis    Recurrent pregnancy loss    History of  delivery -- NEEDS MIKENA @ 16 weeks    High risk pregnancy due to recurrent miscarriage    High risk pregnancy due to history of  labor in third trimester    Group beta Strep positive    39 weeks gestation of pregnancy    Normal labor     (spontaneous vaginal delivery)        Plan:   1. Postpartum care:  -  Patient doing well. Continue routine management and advances.  - Continue PO pain meds. Pain well controlled.  - Heme: H/h 9.2/28.8 >>> morning cbc pending   - Encourage ambulation  - Lactation consult PRN  - Contraception: consents signed for BTL, but <30 days so unable to perform this hospitalization. To f/u outpatient.    2. At risk for postpartum depression  - will send home on zoloft  - 1 week follow-up    Dispo: As patient meets milestones, will plan to discharge PPD#2.    Arianna Conti MD  OB/GYN, PGY-4

## 2019-07-27 NOTE — LACTATION NOTE
07/27/19 0930   Maternal Assessment   Breast Density Bilateral:;soft   Areola Bilateral:;elastic   Nipples Bilateral:;everted   Maternal Infant Feeding   Maternal Emotional State assist needed;relaxed   Infant Positioning clutch/football   Signs of Milk Transfer audible swallow   Pain with Feeding no   Nipple Shape After Feeding, Left round   Latch Assistance yes   Equipment Type   Breast Pump Type double electric, hospital grade   Breast Pump Flange Type hard   Breast Pump Flange Size 24 mm   Breast Pumping   Breast Pumping Interventions post-feed pumping encouraged   Breast Pumping double electric breast pump utilized   Assisted with positioning and latch to L breast in football. Baby achieved deep latch but needs stimulation and compression to stay active. Occasional audible swallow present. Discussed POC for feedings after discharge later today: nurse baby on cue 8 or more times/24 hours, nurse and finish first breast then nurse on second breast, if baby is not content then mother to pump and supplement baby with EBM first then formula if needed. Patient states she has electric pump at home Unsure of brand. Discharge instructions completed.

## 2019-08-13 ENCOUNTER — TELEPHONE (OUTPATIENT)
Dept: OBSTETRICS AND GYNECOLOGY | Facility: OTHER | Age: 24
End: 2019-08-13

## 2019-08-13 NOTE — TELEPHONE ENCOUNTER
"Pt doing well. Denies any pain and states that she is "ready to go back to work and start exercising to get my body back." Pt educated that walking is a great light exercise, but it is encouraged to wait to do strenuous exercise and return to work until MD has cleared you at your 6 week PP follow-up. It was noted that pt was d/c'd on zoloft and supposed to see Dr. Salinas 1 week following discharge but did not. Pt reports she continues to feel sad but is trying to handle it on her own. Reviewed s/s of baby blues vs PP depression with pt. Encouraged pt to follow-up with Dr. Salinas as soon as possible. Educated pt to call 911 if she has any suicidal or homicidal ideations. Infant doing well at home. Being followed closely by pediatrician for constipation issues. Pt denies any further questions or concerns at this time.  "

## 2019-09-12 ENCOUNTER — POSTPARTUM VISIT (OUTPATIENT)
Dept: OBSTETRICS AND GYNECOLOGY | Facility: CLINIC | Age: 24
End: 2019-09-12
Payer: MEDICAID

## 2019-09-12 VITALS
WEIGHT: 204.25 LBS | BODY MASS INDEX: 41.18 KG/M2 | SYSTOLIC BLOOD PRESSURE: 110 MMHG | HEIGHT: 59 IN | DIASTOLIC BLOOD PRESSURE: 58 MMHG

## 2019-09-12 DIAGNOSIS — Z30.9 ENCOUNTER FOR CONTRACEPTIVE MANAGEMENT, UNSPECIFIED TYPE: Primary | ICD-10-CM

## 2019-09-12 PROBLEM — B95.1 GROUP BETA STREP POSITIVE: Status: RESOLVED | Noted: 2019-02-11 | Resolved: 2019-09-12

## 2019-09-12 PROBLEM — O09.213 HIGH RISK PREGNANCY DUE TO HISTORY OF PRETERM LABOR IN THIRD TRIMESTER: Status: RESOLVED | Noted: 2019-02-07 | Resolved: 2019-09-12

## 2019-09-12 PROBLEM — O26.20 HIGH RISK PREGNANCY DUE TO RECURRENT MISCARRIAGE: Status: RESOLVED | Noted: 2019-02-07 | Resolved: 2019-09-12

## 2019-09-12 PROCEDURE — 59430 PR CARE AFTER DELIVERY ONLY: ICD-10-PCS | Mod: S$GLB,,, | Performed by: OBSTETRICS & GYNECOLOGY

## 2019-09-12 NOTE — PROGRESS NOTES
Subjective:       Patient ID: Jacobo Alvarado is a 24 y.o. female.    Chief Complaint:  Postpartum Care      History of Present Illness       Jacobo Alvarado is a 24 y.o. female  presents for a postpartum visit.  She is status post  6 weeks ago.  Her hospitalization was not complicated.  She is not breastfeeding.  She desires copper IUD for contraception.  She reports postpartum depression.  She is struggling to care for her mom and her .  She reports that she was holding a lot of her struggle in to herself.  She reports that a nurse from [facility] reached out to her, and she has had counseling with her.  She has no thoughts of harming herself or anyone else.  She reports that her coping has improved over the past few weeks and reports that she has improved.  She reports that is doing well now.     Her last pap was 2018    Review of Systems  Review of Systems   Constitutional: Negative for appetite change.   Eyes: Negative for visual disturbance.   Respiratory: Negative for cough and shortness of breath.    Cardiovascular: Negative for chest pain.   Gastrointestinal: Negative for abdominal pain, constipation and diarrhea.   Genitourinary: Negative for difficulty urinating, dysuria, frequency, genital sores, pelvic pain, vaginal bleeding, vaginal discharge and vaginal pain.   Neurological: Negative for dizziness, light-headedness and headaches.   Psychiatric/Behavioral: Negative for dysphoric mood.           Objective:   Physical Exam   Constitutional: She is oriented to person, place, and time. She appears well-developed and well-nourished.   Pulmonary/Chest: Effort normal.   Abdominal: Soft.   Genitourinary: Vagina normal and uterus normal. No labial fusion. There is no rash, tenderness, lesion or injury on the right labia. There is no rash, tenderness, lesion or injury on the left labia. Uterus is not deviated, not enlarged, not fixed and not tender. Cervix exhibits no motion  tenderness, no discharge and no friability. Right adnexum displays no mass, no tenderness and no fullness. Left adnexum displays no mass, no tenderness and no fullness. No erythema, tenderness or bleeding in the vagina. No foreign body in the vagina. No signs of injury around the vagina. No vaginal discharge found.   Genitourinary Comments: Urethra: normal appearing urethra with no masses, tenderness or lesions  Urethral meatus: normal size, anterior vaginal wall with no prolapse, no lesions   Neurological: She is alert and oriented to person, place, and time.   Psychiatric: She has a normal mood and affect. Her behavior is normal. Judgment and thought content normal.   Nursing note and vitals reviewed.      Chaperoned by: Matty     Assessment:        1. Postpartum state               Plan:        No orders of the defined types were placed in this encounter.         Breastfeeding: no  Postpartum Depression: some, but managing well now; no thoughts of harming herself or anyone else; has worked with some new coping strategies and does not feel that she needs counseling or medication at this time; advised to notify me if the situation changes; will check on patient again when she returns for copper IUD placement  Contraception: copper IUD ordered  Pap smear: up to date    Follow up for contraception exam    Tiffanie Salinas MD  [facility]  [phone]

## 2019-09-13 DIAGNOSIS — Z30.9 ENCOUNTER FOR CONTRACEPTIVE MANAGEMENT, UNSPECIFIED TYPE: Primary | ICD-10-CM

## 2019-09-16 ENCOUNTER — PATIENT MESSAGE (OUTPATIENT)
Dept: OBSTETRICS AND GYNECOLOGY | Facility: CLINIC | Age: 24
End: 2019-09-16

## 2019-09-16 ENCOUNTER — TELEPHONE (OUTPATIENT)
Dept: PHARMACY | Facility: CLINIC | Age: 24
End: 2019-09-16

## 2019-09-16 NOTE — TELEPHONE ENCOUNTER
Hello,    We received a prescription for Paragard for this patient, and unfortunately, [facility] is unable to provide this medication due to limited access and availability. This medication is available from your normal clinic buy and bill procedure for the facility administered medication. [facility] is unable to bill medical claims for medications. The pharmacies that can be billed for this buy and bill procedure are [facility] and [facility].    Thank you,  Kyle

## 2019-10-04 ENCOUNTER — TELEPHONE (OUTPATIENT)
Dept: OBSTETRICS AND GYNECOLOGY | Facility: CLINIC | Age: 24
End: 2019-10-04

## 2019-10-04 ENCOUNTER — PATIENT MESSAGE (OUTPATIENT)
Dept: OBSTETRICS AND GYNECOLOGY | Facility: CLINIC | Age: 24
End: 2019-10-04

## 2019-10-04 DIAGNOSIS — Z97.5 IUD CONTRACEPTION: Primary | ICD-10-CM

## 2019-10-08 ENCOUNTER — TELEPHONE (OUTPATIENT)
Dept: PHARMACY | Facility: CLINIC | Age: 24
End: 2019-10-08

## 2019-10-28 PROBLEM — Z37.9 NORMAL LABOR: Status: RESOLVED | Noted: 2019-07-25 | Resolved: 2019-10-28

## 2019-10-31 ENCOUNTER — PATIENT MESSAGE (OUTPATIENT)
Dept: OBSTETRICS AND GYNECOLOGY | Facility: CLINIC | Age: 24
End: 2019-10-31

## 2019-10-31 DIAGNOSIS — Z97.5 IUD CONTRACEPTION: Primary | ICD-10-CM

## 2019-11-04 DIAGNOSIS — Z97.5 IUD CONTRACEPTION: Primary | ICD-10-CM

## 2019-11-05 ENCOUNTER — PATIENT MESSAGE (OUTPATIENT)
Dept: OBSTETRICS AND GYNECOLOGY | Facility: CLINIC | Age: 24
End: 2019-11-05

## 2019-11-06 ENCOUNTER — PATIENT MESSAGE (OUTPATIENT)
Dept: OBSTETRICS AND GYNECOLOGY | Facility: CLINIC | Age: 24
End: 2019-11-06

## 2019-11-07 ENCOUNTER — OFFICE VISIT (OUTPATIENT)
Dept: OBSTETRICS AND GYNECOLOGY | Facility: CLINIC | Age: 24
End: 2019-11-07
Payer: MEDICAID

## 2019-11-07 VITALS
HEIGHT: 59 IN | DIASTOLIC BLOOD PRESSURE: 68 MMHG | WEIGHT: 209.19 LBS | SYSTOLIC BLOOD PRESSURE: 110 MMHG | BODY MASS INDEX: 42.17 KG/M2

## 2019-11-07 DIAGNOSIS — N89.8 VAGINAL DISCHARGE: ICD-10-CM

## 2019-11-07 DIAGNOSIS — Z32.01 POSITIVE PREGNANCY TEST: ICD-10-CM

## 2019-11-07 DIAGNOSIS — Z34.90 UNPLANNED PREGNANCY, UNKNOWN IF WANTED: Primary | ICD-10-CM

## 2019-11-07 PROBLEM — Z3A.39 39 WEEKS GESTATION OF PREGNANCY: Status: RESOLVED | Noted: 2019-07-25 | Resolved: 2019-11-07

## 2019-11-07 LAB
B-HCG UR QL: POSITIVE
C TRACH DNA SPEC QL NAA+PROBE: NOT DETECTED
CTP QC/QA: YES
N GONORRHOEA DNA SPEC QL NAA+PROBE: NOT DETECTED

## 2019-11-07 PROCEDURE — 87481 CANDIDA DNA AMP PROBE: CPT | Mod: 59

## 2019-11-07 PROCEDURE — 87491 CHLMYD TRACH DNA AMP PROBE: CPT

## 2019-11-07 PROCEDURE — 99999 PR PBB SHADOW E&M-EST. PATIENT-LVL III: ICD-10-PCS | Mod: PBBFAC,,, | Performed by: NURSE PRACTITIONER

## 2019-11-07 PROCEDURE — 87801 DETECT AGNT MULT DNA AMPLI: CPT

## 2019-11-07 PROCEDURE — 87661 TRICHOMONAS VAGINALIS AMPLIF: CPT

## 2019-11-07 PROCEDURE — 99203 PR OFFICE/OUTPT VISIT, NEW, LEVL III, 30-44 MIN: ICD-10-PCS | Mod: S$PBB,TH,, | Performed by: NURSE PRACTITIONER

## 2019-11-07 PROCEDURE — 99203 OFFICE O/P NEW LOW 30 MIN: CPT | Mod: S$PBB,TH,, | Performed by: NURSE PRACTITIONER

## 2019-11-07 PROCEDURE — 99213 OFFICE O/P EST LOW 20 MIN: CPT | Mod: PBBFAC,TH | Performed by: NURSE PRACTITIONER

## 2019-11-07 PROCEDURE — 81025 URINE PREGNANCY TEST: CPT | Mod: PBBFAC | Performed by: NURSE PRACTITIONER

## 2019-11-07 PROCEDURE — 99999 PR PBB SHADOW E&M-EST. PATIENT-LVL III: CPT | Mod: PBBFAC,,, | Performed by: NURSE PRACTITIONER

## 2019-11-07 NOTE — PROGRESS NOTES
"  CC: Positive Pregnancy Test    HISTORY OF PRESENT ILLNESS:    Jacobo Alvarado is a 24 y.o. female, ,  Presents today for a routine exam complaining of amenorrhea and positive home urine pregnancy test.  Patient's last menstrual period was 10/02/2019 (exact date).  Here today with her partner. Unplanned pregnancy and unknown if wanted. Came in today with vaginal discharge but also had a feeling she was pregnant. UPT in clinic is positive. Daughter is 4 months old, not ready for another one. Interested in getting an ultrasound to see exactly how far along she is. She was in the process of getting her IUD approved.     LMP: 10/2/19  EGA: 5w1d  EDC: 20    ROS:  GENERAL: No weight changes. No swelling. No fatigue. No fever.  CARDIOVASCULAR: No chest pain. No shortness of breath. No leg cramps.   NEUROLOGICAL: No headaches. No vision changes.  BREASTS: No pain. No lumps. No discharge.  ABDOMEN: No pain. No diarrhea. No constipation.  REPRODUCTIVE: No abnormal bleeding.   VULVA: No pain. No lesions. No itching.  VAGINA: No relaxation. No itching. No odor. + discharge. No lesions.  URINARY: No incontinence. No nocturia. No frequency. No dysuria.    MEDICATIONS AND ALLERGIES:  Reviewed    COMPREHENSIVE GYN HISTORY:  PAP History: Denies abnormal Paps.  Infection History: Denies STDs. Denies PID.  Benign History: Denies uterine fibroids. Denies ovarian cysts. Denies endometriosis. Denies other conditions.  Cancer History: Denies cervical cancer. Denies uterine cancer or hyperplasia. Denies ovarian cancer. Denies vulvar cancer or pre-cancer. Denies vaginal cancer or pre-cancer. Denies breast cancer. Denies colon cancer.  Sexual Activity History: Reports currently being sexually active  Menstrual History: None.  Contraception: None    /68   Ht 4' 11" (1.499 m)   Wt 94.9 kg (209 lb 3.5 oz)   LMP 10/02/2019 (Exact Date)   Breastfeeding? No   BMI 42.26 kg/m²     PE:  AFFECT: Calm, alert and oriented X " 3. Interactive during exam  GENERAL: Appears well-nourished, well-developed, in no acute distress.  HEAD: Normocephalic, atruamatic  TEETH: Good dentition.  THYROID: No thyromegally   BREASTS: No masses, skin changes, nipple discharge or adenopathy bilaterally.  SKIN: Normal for race, warm, & dry. No lesions or rashes.  LUNGS: Easy and unlabored, clear to auscultation bilaterally.  HEART: Regular rate and rhythm   ABDOMEN: Soft and nontender without masses or organomegally.  VULVA: No lesions, masses or tenderness.  VAGINA: Moist and well rugated without lesions or discharge.  CERVIX: Moist and pink without lesions, discharge or tenderness.      UTERUS SIZE: 6 week size, nontender and without masses.  ADNEXA: No masses or tenderness.  ESTIMATE OF PELVIC CAPACITY: Adequate  EXTREMITIES: No cyanosis, clubbing or edema. No calf tenderness.  LYMPH NODES: No axillary or inguinal adenopathy.    PROCEDURES:  UPT Positive  Genprobe    ASSESSMENT/PLAN:  Amenorrhea  Positive urine pregnancy test (ILDA: 20, EGA: 5w1d based on LMP)    -  Routine prenatal care    Nausea and vomiting in pregnancy    -  Education regarding lifestyle and dietary modifications    -  Advised use of B6/Unisom. Pt will notify us if no relief/worsening symptoms, will consider Zofran if needed.    1st TRIMESTER COUNSELING: Discussed all, booklet provided:  Common complaints of pregnancy  HIV and other routine prenatal tests including  genetic screening  Risk factors identified by prenatal history  Oriented to practice - discussed anticipated course of prenatal care & indications for Ultrasound  Childbirth classes/Hospital facilities   Nutrition and weight gain counseling  Toxoplasmosis precautions (Cats/Raw Meat)  Sexual activity and exercise  Environmental/Work hazards  Travel  Tobacco (Ask, Advise, Assess, Assist, and Arrange), as well as alcohol and drug use  Use of any medications (Including supplements, Vitamins, Herbs, or OTC  Drugs)  Domestic violence  Seat belt use    Pt chooses to schedule ultrasound, discussed she is around 5 weeks today based off LMP. If she decides to keep the baby she will RTC in 4 weeks, otherwise will come back for IUD insertion post-AB  GCCT and affirm pending    Angela Barragan NP  OB/GYN

## 2019-11-09 LAB
BACTERIAL VAGINOSIS DNA: NEGATIVE
CANDIDA GLABRATA DNA: NEGATIVE
CANDIDA KRUSEI DNA: NEGATIVE
CANDIDA RRNA VAG QL PROBE: POSITIVE
T VAGINALIS RRNA GENITAL QL PROBE: NEGATIVE

## 2019-11-11 RX ORDER — TERCONAZOLE 4 MG/G
1 CREAM VAGINAL NIGHTLY
Qty: 1 G | Refills: 0 | Status: SHIPPED | OUTPATIENT
Start: 2019-11-11 | End: 2019-11-18

## 2019-11-12 ENCOUNTER — PROCEDURE VISIT (OUTPATIENT)
Dept: OBSTETRICS AND GYNECOLOGY | Facility: CLINIC | Age: 24
End: 2019-11-12
Payer: MEDICAID

## 2019-11-12 DIAGNOSIS — Z36.89 ESTABLISH GESTATIONAL AGE, ULTRASOUND: ICD-10-CM

## 2019-11-12 DIAGNOSIS — Z32.01 POSITIVE PREGNANCY TEST: ICD-10-CM

## 2019-11-12 DIAGNOSIS — Z34.90 UNPLANNED PREGNANCY, UNKNOWN IF WANTED: ICD-10-CM

## 2019-11-12 PROCEDURE — 76801 OB US < 14 WKS SINGLE FETUS: CPT | Mod: 26,S$PBB,, | Performed by: OBSTETRICS & GYNECOLOGY

## 2019-11-12 PROCEDURE — 76801 PR US, OB <14WKS, TRANSABD, SINGLE GESTATION: ICD-10-PCS | Mod: 26,S$PBB,, | Performed by: OBSTETRICS & GYNECOLOGY

## 2019-11-12 PROCEDURE — 76801 OB US < 14 WKS SINGLE FETUS: CPT | Mod: PBBFAC | Performed by: OBSTETRICS & GYNECOLOGY

## 2019-11-25 ENCOUNTER — PATIENT MESSAGE (OUTPATIENT)
Dept: OBSTETRICS AND GYNECOLOGY | Facility: CLINIC | Age: 24
End: 2019-11-25

## 2019-11-26 RX ORDER — TERCONAZOLE 4 MG/G
1 CREAM VAGINAL NIGHTLY
Qty: 1 G | Refills: 0 | Status: SHIPPED | OUTPATIENT
Start: 2019-11-26 | End: 2019-12-03

## 2020-01-07 ENCOUNTER — ANESTHESIA EVENT (OUTPATIENT)
Dept: OBSTETRICS AND GYNECOLOGY | Facility: OTHER | Age: 25
End: 2020-01-07

## 2020-01-07 ENCOUNTER — HOSPITAL ENCOUNTER (OUTPATIENT)
Facility: OTHER | Age: 25
Discharge: HOME | End: 2020-01-08
Attending: EMERGENCY MEDICINE | Admitting: OBSTETRICS & GYNECOLOGY
Payer: MEDICAID

## 2020-01-07 ENCOUNTER — ANESTHESIA (OUTPATIENT)
Dept: OBSTETRICS AND GYNECOLOGY | Facility: OTHER | Age: 25
End: 2020-01-07

## 2020-01-07 DIAGNOSIS — R11.2 NAUSEA AND VOMITING, INTRACTABILITY OF VOMITING NOT SPECIFIED, UNSPECIFIED VOMITING TYPE: ICD-10-CM

## 2020-01-07 DIAGNOSIS — N10 ACUTE PYELONEPHRITIS: Primary | ICD-10-CM

## 2020-01-07 DIAGNOSIS — R80.9 PROTEINURIA, UNSPECIFIED TYPE: ICD-10-CM

## 2020-01-07 DIAGNOSIS — D64.9 ANEMIA, UNSPECIFIED TYPE: ICD-10-CM

## 2020-01-07 DIAGNOSIS — R31.9 HEMATURIA, UNSPECIFIED TYPE: ICD-10-CM

## 2020-01-07 LAB
ALBUMIN SERPL BCP-MCNC: 3.1 G/DL (ref 3.5–5.2)
ALP SERPL-CCNC: 86 U/L (ref 55–135)
ALT SERPL W/O P-5'-P-CCNC: 13 U/L (ref 10–44)
ANION GAP SERPL CALC-SCNC: 8 MMOL/L (ref 8–16)
AST SERPL-CCNC: 12 U/L (ref 10–40)
BACTERIA #/AREA URNS HPF: ABNORMAL /HPF
BASOPHILS # BLD AUTO: 0.02 K/UL (ref 0–0.2)
BASOPHILS NFR BLD: 0.2 % (ref 0–1.9)
BILIRUB SERPL-MCNC: 0.2 MG/DL (ref 0.1–1)
BILIRUB UR QL STRIP: NEGATIVE
BUN SERPL-MCNC: 4 MG/DL (ref 6–20)
CALCIUM SERPL-MCNC: 9.3 MG/DL (ref 8.7–10.5)
CHLORIDE SERPL-SCNC: 105 MMOL/L (ref 95–110)
CLARITY UR: ABNORMAL
CO2 SERPL-SCNC: 23 MMOL/L (ref 23–29)
COLOR UR: YELLOW
CREAT SERPL-MCNC: 0.6 MG/DL (ref 0.5–1.4)
DIFFERENTIAL METHOD: ABNORMAL
EOSINOPHIL # BLD AUTO: 0.2 K/UL (ref 0–0.5)
EOSINOPHIL NFR BLD: 1.6 % (ref 0–8)
ERYTHROCYTE [DISTWIDTH] IN BLOOD BY AUTOMATED COUNT: 16.7 % (ref 11.5–14.5)
EST. GFR  (AFRICAN AMERICAN): >60 ML/MIN/1.73 M^2
EST. GFR  (NON AFRICAN AMERICAN): >60 ML/MIN/1.73 M^2
GLUCOSE SERPL-MCNC: 96 MG/DL (ref 70–110)
GLUCOSE UR QL STRIP: NEGATIVE
HCG INTACT+B SERPL-ACNC: NORMAL MIU/ML
HCT VFR BLD AUTO: 34.2 % (ref 37–48.5)
HGB BLD-MCNC: 10.7 G/DL (ref 12–16)
HGB UR QL STRIP: ABNORMAL
HYALINE CASTS #/AREA URNS LPF: 0 /LPF
IMM GRANULOCYTES # BLD AUTO: 0.04 K/UL (ref 0–0.04)
IMM GRANULOCYTES NFR BLD AUTO: 0.4 % (ref 0–0.5)
KETONES UR QL STRIP: NEGATIVE
LEUKOCYTE ESTERASE UR QL STRIP: ABNORMAL
LYMPHOCYTES # BLD AUTO: 1.7 K/UL (ref 1–4.8)
LYMPHOCYTES NFR BLD: 17.7 % (ref 18–48)
MCH RBC QN AUTO: 24 PG (ref 27–31)
MCHC RBC AUTO-ENTMCNC: 31.3 G/DL (ref 32–36)
MCV RBC AUTO: 77 FL (ref 82–98)
MICROSCOPIC COMMENT: ABNORMAL
MONOCYTES # BLD AUTO: 0.7 K/UL (ref 0.3–1)
MONOCYTES NFR BLD: 7.5 % (ref 4–15)
NEUTROPHILS # BLD AUTO: 7 K/UL (ref 1.8–7.7)
NEUTROPHILS NFR BLD: 72.6 % (ref 38–73)
NITRITE UR QL STRIP: POSITIVE
NRBC BLD-RTO: 0 /100 WBC
PH UR STRIP: 7 [PH] (ref 5–8)
PLATELET # BLD AUTO: 325 K/UL (ref 150–350)
PMV BLD AUTO: 9.4 FL (ref 9.2–12.9)
POTASSIUM SERPL-SCNC: 3.8 MMOL/L (ref 3.5–5.1)
PROT SERPL-MCNC: 7.4 G/DL (ref 6–8.4)
PROT UR QL STRIP: ABNORMAL
RBC # BLD AUTO: 4.46 M/UL (ref 4–5.4)
RBC #/AREA URNS HPF: 100 /HPF (ref 0–4)
SODIUM SERPL-SCNC: 136 MMOL/L (ref 136–145)
SP GR UR STRIP: 1.02 (ref 1–1.03)
SQUAMOUS #/AREA URNS HPF: 8 /HPF
URN SPEC COLLECT METH UR: ABNORMAL
UROBILINOGEN UR STRIP-ACNC: 1 EU/DL
WBC # BLD AUTO: 9.63 K/UL (ref 3.9–12.7)
WBC #/AREA URNS HPF: 50 /HPF (ref 0–5)
WBC CLUMPS URNS QL MICRO: ABNORMAL

## 2020-01-07 PROCEDURE — 87086 URINE CULTURE/COLONY COUNT: CPT

## 2020-01-07 PROCEDURE — 99219 PR INITIAL OBSERVATION CARE,LEVL II: CPT | Mod: ,,, | Performed by: OBSTETRICS & GYNECOLOGY

## 2020-01-07 PROCEDURE — 84702 CHORIONIC GONADOTROPIN TEST: CPT

## 2020-01-07 PROCEDURE — 25000003 PHARM REV CODE 250: Performed by: EMERGENCY MEDICINE

## 2020-01-07 PROCEDURE — 63600175 PHARM REV CODE 636 W HCPCS: Performed by: STUDENT IN AN ORGANIZED HEALTH CARE EDUCATION/TRAINING PROGRAM

## 2020-01-07 PROCEDURE — 96361 HYDRATE IV INFUSION ADD-ON: CPT

## 2020-01-07 PROCEDURE — 87186 SC STD MICRODIL/AGAR DIL: CPT

## 2020-01-07 PROCEDURE — 81000 URINALYSIS NONAUTO W/SCOPE: CPT

## 2020-01-07 PROCEDURE — G0378 HOSPITAL OBSERVATION PER HR: HCPCS

## 2020-01-07 PROCEDURE — 25000003 PHARM REV CODE 250: Performed by: STUDENT IN AN ORGANIZED HEALTH CARE EDUCATION/TRAINING PROGRAM

## 2020-01-07 PROCEDURE — 80053 COMPREHEN METABOLIC PANEL: CPT

## 2020-01-07 PROCEDURE — 87077 CULTURE AEROBIC IDENTIFY: CPT

## 2020-01-07 PROCEDURE — 99219 PR INITIAL OBSERVATION CARE,LEVL II: ICD-10-PCS | Mod: ,,, | Performed by: OBSTETRICS & GYNECOLOGY

## 2020-01-07 PROCEDURE — 99285 EMERGENCY DEPT VISIT HI MDM: CPT | Mod: 25

## 2020-01-07 PROCEDURE — 87088 URINE BACTERIA CULTURE: CPT

## 2020-01-07 PROCEDURE — 63600175 PHARM REV CODE 636 W HCPCS: Performed by: EMERGENCY MEDICINE

## 2020-01-07 PROCEDURE — 96365 THER/PROPH/DIAG IV INF INIT: CPT

## 2020-01-07 PROCEDURE — 85025 COMPLETE CBC W/AUTO DIFF WBC: CPT

## 2020-01-07 PROCEDURE — 96375 TX/PRO/DX INJ NEW DRUG ADDON: CPT

## 2020-01-07 RX ORDER — OXYCODONE HYDROCHLORIDE 5 MG/1
10 TABLET ORAL EVERY 6 HOURS PRN
Status: DISCONTINUED | OUTPATIENT
Start: 2020-01-07 | End: 2020-01-08

## 2020-01-07 RX ORDER — ACETAMINOPHEN 325 MG/1
650 TABLET ORAL EVERY 6 HOURS PRN
Status: DISCONTINUED | OUTPATIENT
Start: 2020-01-07 | End: 2020-01-08

## 2020-01-07 RX ORDER — ACETAMINOPHEN 325 MG/1
650 TABLET ORAL EVERY 6 HOURS PRN
Status: DISCONTINUED | OUTPATIENT
Start: 2020-01-07 | End: 2020-01-07

## 2020-01-07 RX ORDER — OXYCODONE HYDROCHLORIDE 5 MG/1
5 TABLET ORAL EVERY 6 HOURS PRN
Status: DISCONTINUED | OUTPATIENT
Start: 2020-01-07 | End: 2020-01-07

## 2020-01-07 RX ORDER — ACETAMINOPHEN 500 MG
1000 TABLET ORAL
Status: COMPLETED | OUTPATIENT
Start: 2020-01-07 | End: 2020-01-07

## 2020-01-07 RX ORDER — PRENATAL WITH FERROUS FUM AND FOLIC ACID 3080; 920; 120; 400; 22; 1.84; 3; 20; 10; 1; 12; 200; 27; 25; 2 [IU]/1; [IU]/1; MG/1; [IU]/1; MG/1; MG/1; MG/1; MG/1; MG/1; MG/1; UG/1; MG/1; MG/1; MG/1; MG/1
1 TABLET ORAL DAILY
Status: DISCONTINUED | OUTPATIENT
Start: 2020-01-07 | End: 2020-01-08 | Stop reason: HOSPADM

## 2020-01-07 RX ORDER — DOCUSATE SODIUM 100 MG/1
100 CAPSULE, LIQUID FILLED ORAL 2 TIMES DAILY PRN
Status: DISCONTINUED | OUTPATIENT
Start: 2020-01-07 | End: 2020-01-08 | Stop reason: HOSPADM

## 2020-01-07 RX ORDER — CALCIUM CARBONATE 200(500)MG
500 TABLET,CHEWABLE ORAL 2 TIMES DAILY PRN
Status: DISCONTINUED | OUTPATIENT
Start: 2020-01-07 | End: 2020-01-08 | Stop reason: HOSPADM

## 2020-01-07 RX ORDER — AMOXICILLIN 250 MG
1 CAPSULE ORAL NIGHTLY PRN
Status: DISCONTINUED | OUTPATIENT
Start: 2020-01-07 | End: 2020-01-08 | Stop reason: HOSPADM

## 2020-01-07 RX ORDER — OXYCODONE HYDROCHLORIDE 5 MG/1
5 TABLET ORAL EVERY 6 HOURS PRN
Status: DISCONTINUED | OUTPATIENT
Start: 2020-01-07 | End: 2020-01-08

## 2020-01-07 RX ORDER — HYDROMORPHONE HYDROCHLORIDE 1 MG/ML
0.2 INJECTION, SOLUTION INTRAMUSCULAR; INTRAVENOUS; SUBCUTANEOUS
Status: COMPLETED | OUTPATIENT
Start: 2020-01-07 | End: 2020-01-07

## 2020-01-07 RX ORDER — ONDANSETRON 8 MG/1
8 TABLET, ORALLY DISINTEGRATING ORAL EVERY 8 HOURS PRN
Status: DISCONTINUED | OUTPATIENT
Start: 2020-01-07 | End: 2020-01-08 | Stop reason: HOSPADM

## 2020-01-07 RX ORDER — SODIUM CHLORIDE 9 MG/ML
INJECTION, SOLUTION INTRAVENOUS CONTINUOUS
Status: DISCONTINUED | OUTPATIENT
Start: 2020-01-07 | End: 2020-01-08 | Stop reason: HOSPADM

## 2020-01-07 RX ORDER — SIMETHICONE 80 MG
1 TABLET,CHEWABLE ORAL EVERY 6 HOURS PRN
Status: DISCONTINUED | OUTPATIENT
Start: 2020-01-07 | End: 2020-01-08 | Stop reason: HOSPADM

## 2020-01-07 RX ORDER — ONDANSETRON 2 MG/ML
4 INJECTION INTRAMUSCULAR; INTRAVENOUS
Status: COMPLETED | OUTPATIENT
Start: 2020-01-07 | End: 2020-01-07

## 2020-01-07 RX ORDER — DIPHENHYDRAMINE HYDROCHLORIDE 50 MG/ML
25 INJECTION INTRAMUSCULAR; INTRAVENOUS EVERY 4 HOURS PRN
Status: DISCONTINUED | OUTPATIENT
Start: 2020-01-07 | End: 2020-01-08 | Stop reason: HOSPADM

## 2020-01-07 RX ORDER — DIPHENHYDRAMINE HCL 25 MG
25 CAPSULE ORAL EVERY 4 HOURS PRN
Status: DISCONTINUED | OUTPATIENT
Start: 2020-01-07 | End: 2020-01-08 | Stop reason: HOSPADM

## 2020-01-07 RX ADMIN — ACETAMINOPHEN 650 MG: 325 TABLET ORAL at 08:01

## 2020-01-07 RX ADMIN — OXYCODONE HYDROCHLORIDE 5 MG: 5 TABLET ORAL at 05:01

## 2020-01-07 RX ADMIN — HYDROMORPHONE HYDROCHLORIDE 0.2 MG: 1 INJECTION, SOLUTION INTRAMUSCULAR; INTRAVENOUS; SUBCUTANEOUS at 07:01

## 2020-01-07 RX ADMIN — SODIUM CHLORIDE 125 ML/HR: 0.9 INJECTION, SOLUTION INTRAVENOUS at 09:01

## 2020-01-07 RX ADMIN — SODIUM CHLORIDE 1000 ML: 0.9 INJECTION, SOLUTION INTRAVENOUS at 06:01

## 2020-01-07 RX ADMIN — PRENATAL VIT W/ FE FUMARATE-FA TAB 27-0.8 MG 1 TABLET: 27-0.8 TAB at 09:01

## 2020-01-07 RX ADMIN — OXYCODONE HYDROCHLORIDE 10 MG: 5 TABLET ORAL at 12:01

## 2020-01-07 RX ADMIN — SODIUM CHLORIDE: 0.9 INJECTION, SOLUTION INTRAVENOUS at 12:01

## 2020-01-07 RX ADMIN — ONDANSETRON 4 MG: 2 INJECTION INTRAMUSCULAR; INTRAVENOUS at 06:01

## 2020-01-07 RX ADMIN — CEFTRIAXONE 1 G: 1 INJECTION, SOLUTION INTRAVENOUS at 07:01

## 2020-01-07 RX ADMIN — ACETAMINOPHEN 1000 MG: 500 TABLET ORAL at 07:01

## 2020-01-07 NOTE — H&P
[facility]  Obstetrics & Gynecology  History & Physical    Patient Name: Jacobo Alvarado  MRN: [id number]  Admission Date: 2020  Primary Care Provider: Barrera Koch MD    Subjective:     Chief Complaint/Reason for Admission: Right flank pain, suprapubic pain, nausea/vomiting    History of Present Illness:  Jacobo Alvarado is a 25 y.o. [id number] at 13w5d who presented to the ED this morning complaining of flank pain, suprapubic pain, chills, urinary frequency, and nausea/vomiting. Patient states that yesterday evening she developed severe, right sided flank pain and suprapubic pain. She states that the pain is sharp and constant in her back, but the suprapubic pain comes and goes. She endorses urinary frequency and occasional incontinence. She denies fever but endorses chills. Around 3am this morning, she had an episode of emesis and an increase in back pain. She takes no medicines other than prenatal vitamins. This IUP is complicated by recurrent pregnancy loss and short interval pregnancy.         OB History    Para Term  AB Living   5 2 1 1 3 2   SAB TAB Ectopic Multiple Live Births   3 0 0 0 2      # Outcome Date GA Lbr Cody/2nd Weight Sex Delivery Anes PTL Lv   5 Term 19 39w4d   F Vag-Spont EPI N [place]      Name: GLADYS ALVARADO      Apgar1: 5  Apgar5: 6   4 SAB 2018     SAB   FD   3 SAB 18 12w0d    SAB   FD   2 SAB 2017     SAB   FD   1  12 36w0d  2.693 kg (5 lb 15 oz) F Vag-Spont EPI Y [place]      Name: Kenya     Past Medical History:   Diagnosis Date    Chlamydia     Miscarriage      Past Surgical History:   Procedure Laterality Date    DILATION AND CURETTAGE OF UTERUS  2018         (Not in a hospital admission)    Review of patient's allergies indicates:  No Known Allergies     Family History     Problem Relation (Age of Onset)    Breast cancer Other    Diabetes Mother    Kidney disease Mother        Tobacco Use     Smoking status: Never Smoker    Smokeless tobacco: Never Used   Substance and Sexual Activity    Alcohol use: No     Frequency: Monthly or less     Drinks per session: 1 or 2     Binge frequency: Never    Drug use: No    Sexual activity: Yes     Partners: Male     Birth control/protection: None     Review of Systems   Constitutional: Positive for chills. Negative for fever.   HENT: Negative for nasal congestion.    Eyes: Negative for visual disturbance.   Respiratory: Negative for shortness of breath.    Cardiovascular: Negative for chest pain.   Gastrointestinal: Positive for abdominal pain (suprapubic pain), nausea and vomiting.   Endocrine: Negative for diabetes.   Genitourinary: Positive for flank pain, frequency, pelvic pain and urinary incontinence. Negative for dysuria, vaginal discharge and vaginal dryness.   Musculoskeletal: Positive for back pain (R>L).   Integumentary:  Negative for rash.   Neurological: Negative for headaches.   Psychiatric/Behavioral: Negative for depression.      Objective:     Vital Signs (Most Recent):  Temp: 97.8 °F (36.6 °C) (01/07/20 0703)  Pulse: 78 (01/07/20 0755)  Resp: 18 (01/07/20 0744)  BP: (!) 106/53 (01/07/20 0755)  SpO2: 98 % (01/07/20 0755) Vital Signs (24h Range):  Temp:  [97.8 °F (36.6 °C)-98.6 °F (37 °C)] 97.8 °F (36.6 °C)  Pulse:  [78-97] 78  Resp:  [16-20] 18  SpO2:  [98 %-100 %] 98 %  BP: (106-135)/(53-77) 106/53     Weight: 95.3 kg (210 lb)  Body mass index is 42.41 kg/m².    No LMP recorded.    Physical Exam:   Constitutional: She is oriented to person, place, and time. She appears well-developed and well-nourished. She appears distressed.    HENT:   Head: Normocephalic and atraumatic.      Cardiovascular: Normal rate and regular rhythm.     Pulmonary/Chest: Effort normal. No respiratory distress.        Abdominal: Soft. She exhibits no distension. There is tenderness (tender to light and deep palpation over suprapubic area). There is guarding (voluntary over  suprapubic region). There is no rebound.     Genitourinary: Vagina normal. Pelvic exam was performed with patient supine. There is no rash, tenderness, lesion or injury on the right labia. There is no rash, tenderness, lesion or injury on the left labia. Cervix is normal. No erythema, tenderness or bleeding in the vagina. No signs of injury around the vagina. No vaginal discharge found. Cervix exhibits no discharge.   Genitourinary Comments: Cervix closed. Bimanual exam limited due to suprapubic pain.           Musculoskeletal: Normal range of motion. She exhibits no edema or tenderness.   + R CVA tenderness; some left sided flank tenderness       Neurological: She is alert and oriented to person, place, and time.    Skin: Skin is warm and dry.    Psychiatric: She has a normal mood and affect. Her behavior is normal. Judgment and thought content normal.       Laboratory:  CBC:   Recent Labs   Lab 01/07/20  0606   WBC 9.63   RBC 4.46   HGB 10.7*   HCT 34.2*      MCV 77*   MCH 24.0*   MCHC 31.3*     CMP:   Recent Labs   Lab 01/07/20  0606   GLU 96   CALCIUM 9.3   ALBUMIN 3.1*   PROT 7.4      K 3.8   CO2 23      BUN 4*   CREATININE 0.6   ALKPHOS 86   ALT 13   AST 12   BILITOT 0.2     Recent Labs   Lab 01/07/20  0554   COLORU Yellow   SPECGRAV 1.025   PHUR 7.0   PROTEINUA 2+*   BACTERIA Moderate*   NITRITE Positive*   LEUKOCYTESUR 2+*   UROBILINOGEN 1.0   HYALINECASTS 0       Diagnostic Results:  Renal ultrasound pending    Assessment/Plan:     * Acute pyelonephritis  - VSS, afebrile  - UA 3+ blood, nitrite pos, 2+ leuks  - Urine culture pending  - CBC 9.6/10.7/34.2/325  - CMP WNL, Cr 0.6  - Received 1L bolus, 1g rocephin, IV zofran, and 1g tylenol in main ED    - Admit to [facility] service for management of pyelonephritis  - Given 3+ blood on UA and absence of fever and leukocytosis, will obtain renal ultrasound to rule out nephrolithiasis  - Continue IV rocephin 1g q24h  - Continuous IVF  - [place] diet,  advance as tolerated  - Antiemetics prn  - Oxy IR 5/10 prn for pain, will reserve tylenol prn for fever  - Follow up urine culture      Christiana Franz MD  Obstetrics & Gynecology  [facility]      Patient seen and examined. Agree with resident assessment and plan.  13 weeks with pyelonephritis.     Afebrile, VSS  +++R CVA tenderness and suprapubic pain  No RUQ pain    WBC 9  UA with +leuks, +nitrates, +blood  Renal U/S negative for abscess or nephrolithiasis    Will administer IV Abx and pain medications for 24-48 hours  Will d/c home once clinically improved  Discussed with patient about need for abx to treat current infection and also abx for ppx for remainder of pregnancy    Latonia Cuellar MD  Maternal Fetal Medicine fellow  PGY-5

## 2020-01-07 NOTE — LETTER
January 8, 2020         [address]  [address]  [facility] [address]  Phone: [phone]  Fax: [phone]       Patient: Jacobo Alvarado   YOB: 1995  Date of Visit: 01/08/2020    To Whom It May Concern:    Jacobo Alvarado  was at [facility] on 01/07/2020 - 01/08/2020. She may return to work on 01/09/20. If you have any questions or concerns, or if I can be of further assistance, please do not hesitate to contact me.    Sincerely,        Christiana Franz MD

## 2020-01-07 NOTE — CONSULTS
25 y.o. [id number] at 13w5d admitted to [facility] service for pyelonephritis. Please see H&P to follow.     Christiana Franz MD  OBGYN, PGY-2

## 2020-01-07 NOTE — ED PROVIDER NOTES
Encounter Date: 2020    SCRIBE #1 NOTE: Alison CERVANTES am scribing for, and in the presence of, Dr. Jenkins.       History     Chief Complaint   Patient presents with    Pelvic Pain     lower back pain, pelvic pain, and dysuria. denies discharge, or bleeding.      Time seen by provider: 6:40 AM    This is a 25 y.o. female, A3, at 13w5d weeks gestation who presents with complaint of low back pain, onset yesterday approximately 13 hours ago. She reports associated urinary urgency. She states that about nine hours ago she had onset of headache and pelvic pain. She also reports one episode of emesis three hours ago; she denies any N/V throughout pregnancy. She does not smoke, drink, or use illicit drugs. She has NKDA. Pt denies any vaginal bleeding. She has been evaluated by OB/GYN, Dr. Barragan.    The history is provided by the patient.     Review of patient's allergies indicates:  No Known Allergies  Past Medical History:   Diagnosis Date    Chlamydia     Miscarriage      Past Surgical History:   Procedure Laterality Date    DILATION AND CURETTAGE OF UTERUS  2018     Family History   Problem Relation Age of Onset    Breast cancer Other     Diabetes Mother     Kidney disease Mother     Ovarian cancer Neg Hx     Colon cancer Neg Hx      Social History     Tobacco Use    Smoking status: Never Smoker    Smokeless tobacco: Never Used   Substance Use Topics    Alcohol use: No     Frequency: Monthly or less     Drinks per session: 1 or 2     Binge frequency: Never    Drug use: No     Review of Systems   Constitutional: Negative for chills and fever.   HENT: Negative for congestion and sore throat.    Eyes: Negative for photophobia and redness.   Respiratory: Negative for cough and shortness of breath.    Cardiovascular: Negative for chest pain.   Gastrointestinal: Positive for abdominal pain and vomiting.   Genitourinary: Positive for pelvic pain and urgency. Negative for vaginal bleeding.    Musculoskeletal: Positive for back pain.   Skin: Negative for rash.   Neurological: Positive for headaches. Negative for weakness and light-headedness.   Psychiatric/Behavioral: Negative for confusion.       Physical Exam     Initial Vitals [01/07/20 0544]   BP Pulse Resp Temp SpO2   135/77 97 16 98.6 °F (37 °C) 100 %      MAP       --         Physical Exam    Nursing note and vitals reviewed.  Constitutional: She appears well-developed and well-nourished. She is not diaphoretic. No distress.   HENT:   Head: Normocephalic and atraumatic.   Mouth/Throat: Oropharynx is clear and moist and mucous membranes are normal.   Mucous membranes are moist. TMs clear and intact bilaterally.    Eyes: Conjunctivae and EOM are normal. Pupils are equal, round, and reactive to light. No scleral icterus.   Conjunctivae are pink, clear, and intact.    Neck: Normal range of motion. Neck supple.   Cardiovascular: Normal rate, regular rhythm, S1 normal, S2 normal and normal heart sounds. Exam reveals no gallop and no friction rub.    No murmur heard.  Pulmonary/Chest: Breath sounds normal. No respiratory distress. She has no wheezes. She has no rhonchi. She has no rales.   Lungs clear to auscultation bilaterally.    Abdominal: Soft. Bowel sounds are normal. There is tenderness in the suprapubic area. There is no rebound and no guarding.   No audible bruits. Right flank tenderness.  FHT: 152.   Musculoskeletal: Normal range of motion. She exhibits no edema or tenderness.   No lower extremity edema.    Lymphadenopathy:     She has no cervical adenopathy.   Neurological: She is alert and oriented to person, place, and time.   Skin: Skin is warm and dry. Capillary refill takes less than 2 seconds. No rash noted. No pallor.   No skin tenting. No lesions.   Psychiatric: She has a normal mood and affect. Her behavior is normal. Judgment and thought content normal.         ED Course   Procedures  Labs Reviewed   URINALYSIS, REFLEX TO URINE  CULTURE - Abnormal; Notable for the following components:       Result Value    Appearance, UA Hazy (*)     Protein, UA 2+ (*)     Occult Blood UA 3+ (*)     Nitrite, UA Positive (*)     Leukocytes, UA 2+ (*)     All other components within normal limits    Narrative:     Preferred Collection Type->Urine, Clean Catch   COMPREHENSIVE METABOLIC PANEL - Abnormal; Notable for the following components:    BUN, Bld 4 (*)     Albumin 3.1 (*)     All other components within normal limits   CBC W/ AUTO DIFFERENTIAL - Abnormal; Notable for the following components:    Hemoglobin 10.7 (*)     Hematocrit 34.2 (*)     Mean Corpuscular Volume 77 (*)     Mean Corpuscular Hemoglobin 24.0 (*)     Mean Corpuscular Hemoglobin Conc 31.3 (*)     RDW 16.7 (*)     Lymph% 17.7 (*)     All other components within normal limits   URINALYSIS MICROSCOPIC - Abnormal; Notable for the following components:    RBC,  (*)     WBC, UA 50 (*)     WBC Clumps, UA Moderate (*)     Bacteria Moderate (*)     All other components within normal limits    Narrative:     Preferred Collection Type->Urine, Clean Catch   HCG, QUANTITATIVE, PREGNANCY          Imaging Results          US Retroperitoneal Complete (Kidney and (Final result)  Result time 01/07/20 10:46:21    Final result by Declan Villareal MD (01/07/20 10:46:21)                 Impression:      No sonographic abnormality.  No renal stones or hydronephrosis.      Electronically signed by: Declan Villareal MD  Date:    01/07/2020  Time:    10:46             Narrative:    EXAMINATION:  US RETROPERITONEAL COMPLETE    CLINICAL HISTORY:  rule out nephrolithiasis; flank pain + blood on UA;    TECHNIQUE:  Ultrasound of the kidneys and urinary bladder was performed including color flow and Doppler evaluation of the kidneys.    COMPARISON:  None.    FINDINGS:  Right kidney: The right kidney measures 8.6 cm. No cortical thinning. No loss of corticomedullary distinction. Resistive index measures 0.73.   No mass. No renal stone. No hydronephrosis.    Left kidney: The left kidney measures 10.4 cm. No cortical thinning. No loss of corticomedullary distinction. Resistive index measures 0.69.  No mass. No renal stone. No hydronephrosis.    The bladder is partially distended at the time of scanning and has an unremarkable appearance.                                 Medical Decision Making:   History:   Old Medical Records: I decided to obtain old medical records.  Clinical Tests:   Lab Tests: Ordered and Reviewed            Scribe Attestation:   Scribe #1: I performed the above scribed service and the documentation accurately describes the services I performed. I attest to the accuracy of the note.    Attending Attestation:           Physician Attestation for Scribe:  Physician Attestation Statement for Scribe #1: I, Dr. Jenkins, reviewed documentation, as scribed by Alison Littlejohn in my presence, and it is both accurate and complete.         Attending ED Notes:     Emergent evaluation of 25-year-old female approximately 13 weeks gestation who presents to the emergency room  With complaint of urinary frequency /urgency, flank pain and an episode of nausea and vomiting.  Patient is afebrile, nontoxic-appearing stable vital signs.  No acute findings on CMP except for albumin of 3.1.  Patient has no elevation white blood cell count.  H&H 10.7 and 34.2.   Urinary analysis reveals blood, protein, leukocytes and nitrites with moderate bacteria.  Patient has acute pyelonephritis.   IV antibiotics were administered after consultation with OB.  The patient is extensively counseled on her diagnosis and treatment including all diagnostic, laboratory and physical exam findings.  The patient is admitted in stable condition.          ED Course as of Jan 08 0940   Tue Jan 07, 2020   5698 Paged OB/GYN.    [CA]   7540 Consulted and discussed case with OB/GYN, who state they will come see the patient.    [CA]   5183 OB/GYN will admit  pt.    [CA]      ED Course User Index  [CA] Alison Littlejohn                Clinical Impression:     1. Acute pyelonephritis    2. Nausea and vomiting, intractability of vomiting not specified, unspecified vomiting type    3. Anemia, unspecified type    4. Proteinuria, unspecified type    5. Hematuria, unspecified type                              Jean Hernandez MD  01/08/20 0945

## 2020-01-07 NOTE — ED NOTES
Rounding on the patient has been done. she has been updated on the plan of care and her current status. Pain was assessed and is currently a 5/10. Comfort positioning and restroom needs were addressed. Necessary items were placed with in her reach and she was advised when a reassessment would take place. The call bell remains at the bedside for any additional patient needs. The patient is resting comfortably on the stretcher, respirations are even and unlabored, skin warm and dry. Will continue to monitor. Pt ambulated to bathroom without difficulty.

## 2020-01-07 NOTE — PLAN OF CARE
Admitted from ED c pyelonephritis; patient reports lessened pain p po pain meds; will continue on iv antibiotics; vital signs stable and wdl

## 2020-01-07 NOTE — LETTER
January 8, 2020         [address]  [address]  [facility] [address]  Phone: [phone]  Fax: [phone]       Patient: Jacobo Alvarado   YOB: 1995  Date of Visit: 01/08/2020    To Whom It May Concern:    Jacobo Alvarado  was at [facility] on 01/07/2020 - 01/08/2020. Please excuse her significant other from work.  If you have any questions or concerns, or if I can be of further assistance, please do not hesitate to contact me.    Sincerely,        Christiana Franz MD

## 2020-01-07 NOTE — SUBJECTIVE & OBJECTIVE
OB History    Para Term  AB Living   5 2 1 1 3 2   SAB TAB Ectopic Multiple Live Births   3 0 0 0 2      # Outcome Date GA Lbr Cody/2nd Weight Sex Delivery Anes PTL Lv   5 Term 19 39w4d   F Vag-Spont EPI N [place]      Name: GLADYS BARRETT INFINITY      Apgar1: 5  Apgar5: 6   4 SAB 2018     SAB   FD   3 SAB 18 12w0d    SAB   FD   2 SAB 2017     SAB   FD   1  12 36w0d  2.693 kg (5 lb 15 oz) F Vag-Spont EPI Y [place]      Name: [place]     Past Medical History:   Diagnosis Date    Chlamydia     Miscarriage      Past Surgical History:   Procedure Laterality Date    DILATION AND CURETTAGE OF UTERUS  2018         (Not in a hospital admission)    Review of patient's allergies indicates:  No Known Allergies     Family History     Problem Relation (Age of Onset)    Breast cancer Other    Diabetes Mother    Kidney disease Mother        Tobacco Use    Smoking status: Never Smoker    Smokeless tobacco: Never Used   Substance and Sexual Activity    Alcohol use: No     Frequency: Monthly or less     Drinks per session: 1 or 2     Binge frequency: Never    Drug use: No    Sexual activity: Yes     Partners: Male     Birth control/protection: None     Review of Systems   Constitutional: Positive for chills. Negative for fever.   HENT: Negative for nasal congestion.    Eyes: Negative for visual disturbance.   Respiratory: Negative for shortness of breath.    Cardiovascular: Negative for chest pain.   Gastrointestinal: Positive for abdominal pain (suprapubic pain), nausea and vomiting.   Endocrine: Negative for diabetes.   Genitourinary: Positive for flank pain, frequency, pelvic pain and urinary incontinence. Negative for dysuria, vaginal discharge and vaginal dryness.   Musculoskeletal: Positive for back pain (R>L).   Integumentary:  Negative for rash.   Neurological: Negative for headaches.   Psychiatric/Behavioral: Negative for depression.      Objective:     Vital Signs (Most  Recent):  Temp: 97.8 °F (36.6 °C) (01/07/20 0703)  Pulse: 78 (01/07/20 0755)  Resp: 18 (01/07/20 0744)  BP: (!) 106/53 (01/07/20 0755)  SpO2: 98 % (01/07/20 0755) Vital Signs (24h Range):  Temp:  [97.8 °F (36.6 °C)-98.6 °F (37 °C)] 97.8 °F (36.6 °C)  Pulse:  [78-97] 78  Resp:  [16-20] 18  SpO2:  [98 %-100 %] 98 %  BP: (106-135)/(53-77) 106/53     Weight: 95.3 kg (210 lb)  Body mass index is 42.41 kg/m².    No LMP recorded.    Physical Exam:   Constitutional: She is oriented to person, place, and time. She appears well-developed and well-nourished. She appears distressed.    HENT:   Head: Normocephalic and atraumatic.      Cardiovascular: Normal rate and regular rhythm.     Pulmonary/Chest: Effort normal. No respiratory distress.        Abdominal: Soft. She exhibits no distension. There is tenderness (tender to light and deep palpation over suprapubic area). There is guarding (voluntary over suprapubic region). There is no rebound.     Genitourinary: Vagina normal. Pelvic exam was performed with patient supine. There is no rash, tenderness, lesion or injury on the right labia. There is no rash, tenderness, lesion or injury on the left labia. Cervix is normal. No erythema, tenderness or bleeding in the vagina. No signs of injury around the vagina. No vaginal discharge found. Cervix exhibits no discharge.   Genitourinary Comments: Cervix closed. Bimanual exam limited due to suprapubic pain.           Musculoskeletal: Normal range of motion. She exhibits no edema or tenderness.   + R CVA tenderness; some left sided flank tenderness       Neurological: She is alert and oriented to person, place, and time.    Skin: Skin is warm and dry.    Psychiatric: She has a normal mood and affect. Her behavior is normal. Judgment and thought content normal.       Laboratory:  CBC:   Recent Labs   Lab 01/07/20  0606   WBC 9.63   RBC 4.46   HGB 10.7*   HCT 34.2*      MCV 77*   MCH 24.0*   MCHC 31.3*     CMP:   Recent Labs   Lab  01/07/20  0606   GLU 96   CALCIUM 9.3   ALBUMIN 3.1*   PROT 7.4      K 3.8   CO2 23      BUN 4*   CREATININE 0.6   ALKPHOS 86   ALT 13   AST 12   BILITOT 0.2     Recent Labs   Lab 01/07/20  0554   COLORU Yellow   SPECGRAV 1.025   PHUR 7.0   PROTEINUA 2+*   BACTERIA Moderate*   NITRITE Positive*   LEUKOCYTESUR 2+*   UROBILINOGEN 1.0   HYALINECASTS 0       Diagnostic Results:  Renal ultrasound pending

## 2020-01-07 NOTE — ED NOTES
Pt uncomfortable, c/o suprapubic pain with spasms. Pt rates pain 10/10. Pt informed of plan of care. VS stable with non labored resp. Significant other at bedside. Side rails up X 2. Call bed provided. Warm blanket provided. No restroom needs at present.

## 2020-01-07 NOTE — ASSESSMENT & PLAN NOTE
- VSS, afebrile  - UA 3+ blood, nitrite pos, 2+ leuks  - Urine culture pending  - CBC 9.6/10.7/34.2/325  - CMP WNL, Cr 0.6  - Received 1L bolus, 1g rocephin, IV zofran, and 1g tylenol in main ED    - Admit to [facility] service for management of pyelonephritis  - Given 3+ blood on UA and absence of fever and leukocytosis, will obtain renal ultrasound to rule out nephrolithiasis  - Continue IV rocephin 1g q24h  - Continuous IVF  - [place] diet, advance as tolerated  - Antiemetics prn  - Oxy IR 5/10 prn for pain, will reserve tylenol prn for fever  - Follow up urine culture

## 2020-01-07 NOTE — PROGRESS NOTES
Afternoon Assessment:    Patient is resting comfortably in bed. States that her pain is well-controlled with oxy IR 10mg which she received a few hours ago. She continues to have dysuria but states that it is manageable. She has tolerated a regular diet today without nausea/vomiting. She reports light vaginal spotting after being checked this AM. She mentioned that she felt onto her abdomen 2 days ago from a rocking chair when reaching for a baby bottle.     Temp:  [97.1 °F (36.2 °C)-98.6 °F (37 °C)] 97.1 °F (36.2 °C)  Pulse:  [78-97] 80  Resp:  [16-20] 18  SpO2:  [98 %-100 %] 100 %  BP: (106-135)/(53-77) 108/53    PE:  Abdomen: Soft, non-distended, non-tender to palpation   MSK: Continues to have R CVA tenderness, but much improved from AM exam    Labs:  Recent Labs   Lab 01/07/20  0606   WBC 9.63   RBC 4.46   HGB 10.7*   HCT 34.2*      MCV 77*   MCH 24.0*   MCHC 31.3*       EXAMINATION:  US RETROPERITONEAL COMPLETE    CLINICAL HISTORY:  rule out nephrolithiasis; flank pain + blood on UA;    TECHNIQUE:  Ultrasound of the kidneys and urinary bladder was performed including color flow and Doppler evaluation of the kidneys.    COMPARISON:  None.    FINDINGS:  Right kidney: The right kidney measures 8.6 cm. No cortical thinning. No loss of corticomedullary distinction. Resistive index measures 0.73.  No mass. No renal stone. No hydronephrosis.    Left kidney: The left kidney measures 10.4 cm. No cortical thinning. No loss of corticomedullary distinction. Resistive index measures 0.69.  No mass. No renal stone. No hydronephrosis.    The bladder is partially distended at the time of scanning and has an unremarkable appearance.      Impression       No sonographic abnormality.  No renal stones or hydronephrosis.         A/P:  - Bedside ultrasound: FHTs 160, + fetal movement  - Renal ultrasound negative for nephrolithiasis  - Continue IV rocephin q24h  - Oxy IR prn for pain; refrain from tylenol so as not to mask  fever    Christiana Franz MD  OBGYN, PGY-2

## 2020-01-07 NOTE — LETTER
January 8, 2020         [address]  3RD [address]LO[address]  [facility] [address]  Phone: [phone]  Fax: [phone]       Patient: Jacobo Alvarado   YOB: 1995  Date of Visit: 01/08/2020    To Whom It May Concern:    Jacobo Alvarado  was at [facility] on 01/07/2020 - 01/08/2020. Please excuse her daughter from school. If you have any questions or concerns, or if I can be of further assistance, please do not hesitate to contact me.    Sincerely,        Christiana Franz MD

## 2020-01-07 NOTE — ED TRIAGE NOTES
Pt arrives ED with c/o back and pelvic pain with onset yesterday. Pt denies vaginal bleeding, use of medications for relief of pain. Pt reports being 14 weeks pregnant. Pt denies N/V/D. Pt lying in bed, respirations even, unlabored, eyes open spontaneously, NAD noted, AAOx4.

## 2020-01-08 VITALS
HEIGHT: 59 IN | HEART RATE: 80 BPM | WEIGHT: 210 LBS | DIASTOLIC BLOOD PRESSURE: 53 MMHG | BODY MASS INDEX: 42.33 KG/M2 | SYSTOLIC BLOOD PRESSURE: 93 MMHG | TEMPERATURE: 98 F | OXYGEN SATURATION: 99 % | RESPIRATION RATE: 18 BRPM

## 2020-01-08 PROCEDURE — 63600175 PHARM REV CODE 636 W HCPCS: Performed by: STUDENT IN AN ORGANIZED HEALTH CARE EDUCATION/TRAINING PROGRAM

## 2020-01-08 PROCEDURE — 96376 TX/PRO/DX INJ SAME DRUG ADON: CPT

## 2020-01-08 PROCEDURE — 96361 HYDRATE IV INFUSION ADD-ON: CPT

## 2020-01-08 PROCEDURE — 99225 PR SUBSEQUENT OBSERVATION CARE,LEVEL II: CPT | Mod: ,,, | Performed by: OBSTETRICS & GYNECOLOGY

## 2020-01-08 PROCEDURE — 25000003 PHARM REV CODE 250: Performed by: STUDENT IN AN ORGANIZED HEALTH CARE EDUCATION/TRAINING PROGRAM

## 2020-01-08 PROCEDURE — G0378 HOSPITAL OBSERVATION PER HR: HCPCS

## 2020-01-08 PROCEDURE — 99225 PR SUBSEQUENT OBSERVATION CARE,LEVEL II: ICD-10-PCS | Mod: ,,, | Performed by: OBSTETRICS & GYNECOLOGY

## 2020-01-08 RX ORDER — NITROFURANTOIN 25; 75 MG/1; MG/1
100 CAPSULE ORAL DAILY
Qty: 30 CAPSULE | Refills: 6 | Status: SHIPPED | OUTPATIENT
Start: 2020-01-15 | End: 2020-08-17

## 2020-01-08 RX ORDER — ACETAMINOPHEN 325 MG/1
650 TABLET ORAL EVERY 6 HOURS
Status: DISCONTINUED | OUTPATIENT
Start: 2020-01-08 | End: 2020-01-08 | Stop reason: HOSPADM

## 2020-01-08 RX ORDER — DEXTROMETHORPHAN HYDROBROMIDE, GUAIFENESIN 5; 100 MG/5ML; MG/5ML
650 LIQUID ORAL EVERY 8 HOURS PRN
Qty: 30 TABLET | Refills: 1 | Status: SHIPPED | OUTPATIENT
Start: 2020-01-08 | End: 2020-08-17

## 2020-01-08 RX ORDER — CEPHALEXIN 250 MG/1
500 CAPSULE ORAL EVERY 12 HOURS
Qty: 28 CAPSULE | Refills: 0 | Status: SHIPPED | OUTPATIENT
Start: 2020-01-08 | End: 2020-01-15

## 2020-01-08 RX ORDER — ACETAMINOPHEN 325 MG/1
650 TABLET ORAL EVERY 6 HOURS PRN
Qty: 30 TABLET | Refills: 1 | Status: SHIPPED | OUTPATIENT
Start: 2020-01-08 | End: 2020-01-08 | Stop reason: HOSPADM

## 2020-01-08 RX ADMIN — SODIUM CHLORIDE: 0.9 INJECTION, SOLUTION INTRAVENOUS at 01:01

## 2020-01-08 RX ADMIN — OXYCODONE HYDROCHLORIDE 5 MG: 5 TABLET ORAL at 07:01

## 2020-01-08 RX ADMIN — CEFTRIAXONE 1 G: 1 INJECTION, SOLUTION INTRAVENOUS at 07:01

## 2020-01-08 RX ADMIN — OXYCODONE HYDROCHLORIDE 5 MG: 5 TABLET ORAL at 12:01

## 2020-01-08 NOTE — DISCHARGE SUMMARY
MEDICINE HISTORY AND PHYSICAL    Patient ID:  Jennifer Desir  [id number]  59 year old  1958       Chief Complaint: SOB    History of Present Illness:   Patient is a 59 year old  female, with h/o COPD, chronic Low back pain on Subaxone who presents to the hospital with severe SOB.  Pt developed sudden onset of severe SOB tonight with dizziness, wheezing and minimal cough. Was in resp distress and hypoxic in 60s, requiring BIPAP by EMS on arrival to ER.  Pt denies fever, CP, orthopnea, PND, EL edema, abd pain, N/V/D, urinary symptoms.  Son is sick with influenza. Did not get Flu vaccine this season.  No recent travel.  Quit smoking abt a week ago.    In ER, in resp distress with fragmented speech, tachypneic, hypoxia requiring BIPAP for few hours with significant improvement in symptoms. Now on NC and without any distress.  CXR showed ?basilar infiltrate.  EKG/Troponin/rapid influenza negative.   Received an hour long neb, IV solumedrol     Past Medical/ Surgical History: reviewed    COPD (chronic obstructive pulmonary disease) (*               HYSTERECTOMY                                                  APPENDECTOMY                                                  FRACTURE SURGERY                                              ABDOMINAL ADHESION SURGERY                                    Allergies: reviewed  Allergies as of 01/15/2018   • (Not on File)       Home Medications: reviewed  Prescriptions Prior to Admission   Medication Sig Dispense Refill   • buprenorphine-naLOXone (SUBOXONE FILM) 8-2 MG sublingual film Place 1 each under the tongue daily.     • albuterol 108 (90 Base) MCG/ACT inhaler Inhale 2 puffs into the lungs every 4 hours as needed for Shortness of Breath or Wheezing.     • fluticasone-salmeterol (ADVAIR DISKUS) 500-50 MCG/DOSE inhaler Inhale 1 puff into the lungs two times daily.     • tiotropium (SPIRIVA HANDIHALER) 18 MCG capsule for inhaler Place 18 mcg into inhaler and inhale daily.    [facility]  Obstetrics & Gynecology  Discharge Summary    Patient Name: Jacobo Alvarado  MRN: [id number]  Admission Date: 2020  Hospital Length of Stay: 0 days  Discharge Date and Time:  2020 9:57 AM  Attending Physician: Hugh Iverson MD   Discharging Provider: Christiana Franz MD  Primary Care Provider: Barrera Koch MD    HPI:  Jacobo Alvarado is a 25 y.o. [id number] at 13w5d who presented to the ED this morning complaining of flank pain, suprapubic pain, chills, urinary frequency, and nausea/vomiting. Patient states that yesterday evening she developed severe, right sided flank pain and suprapubic pain. She states that the pain is sharp and constant in her back, but the suprapubic pain comes and goes. She endorses urinary frequency and occasional incontinence. She denies fever but endorses chills. Around 3am this morning, she had an episode of emesis and an increase in back pain. She takes no medicines other than prenatal vitamins. This IUP is complicated by recurrent pregnancy loss and short interval pregnancy.     Hospital Course:  2020 - Remains afebrile. Reports improvement in flank and suprapubic pain. Denies nausea/vomiting since admission. Will discharge today with 7 day course of keflex followed by daily macrobid suppression. Patient has follow up at prenatal visit tomorrow.    * No surgery found *     Consults (From admission, onward)        Status Ordering Provider     Inpatient consult to Obstetrics / Gynecology  Once     Provider:  Hugh Iverson MD    Completed BOB GREEN          Significant Diagnostic Studies: Labs:   CBC   Recent Labs   Lab 20  0606   WBC 9.63   HGB 10.7*   HCT 34.2*          Pending Diagnostic Studies:     None        Final Active Diagnoses:    Diagnosis Date Noted POA    PRINCIPAL PROBLEM:  Acute pyelonephritis [N10] 2020 Yes      Problems Resolved During this Admission:        Discharged Condition:        Family History: reviewed .No SCD  History reviewed. No pertinent family history.    Social History: reviewed  Social History     Social History   • Marital status:      Spouse name: N/A   • Number of children: N/A   • Years of education: N/A     Occupational History   • Not on file.     Social History Main Topics   • Smoking status: Former Smoker     Types: Cigarettes   • Smokeless tobacco: Never Used   • Alcohol use No   • Drug use: No   • Sexual activity: Not on file     Other Topics Concern   • Not on file     Social History Narrative   • No narrative on file         Review of Systems:  Total 10 point systems has been reviewed, and are negative other than what mentioned in HPI and above.      Physical Exam:     Vital Signs:    Visit Vitals  /74 (BP Location: [facility], Patient Position: Semi-Dong's)   Pulse 83   Temp 97.6 °F (36.4 °C) (Oral)   Resp 18   Ht 5' 10\" (1.778 m)   Wt 82.2 kg   SpO2 99%   BMI 26.00 kg/m²    No intake/output data recorded.      GENERAL: Alert, awake and oriented to place, person and time. Not in acute distress. Able to talk in full sentences  HEAD: Appears to be atraumatic and normocephalic   EYES: No conjunctival pallor. No scleral icterus. Pupils   equal, round and reactive to light.  Extraocular movements appear to be intact  THROAT: Oropharyngeal exam reveals moist oral mucosa. No oral lesion or lip lesion.  NECK: There is no JVD. No Lymphadenopathy in anterior cervical, posterior cervical, supraclavicular and submandibular region. Trachea midline. No thyromegaly.  SKIN: Warm and moist. No rash.  LUNGS: Reveals good effort. No use of accessory muscles of respiration, no fragmented speech. No wheezing. Occasional rhonchi  HEART: S1 normal, S2 normal. Regular rate and rhythm. No murmur  ABDOMEN:  Normoactive bowel sounds. Soft, non tender and nondistended. There is no rebound tenderness.  There is no hepatosplenomegaly   EXTREMITIES: No clubbing, cyanosis or  good    Disposition: Home or Self Care    Follow Up:  Follow-up Information     Angela Barragan NP. Go on 1/9/2020.    Specialty:  Obstetrics and Gynecology  Why:  Prenatal appointment  Contact information:  [address] [place] [id number]  [phone]                 Patient Instructions:      Diet Adult Regular     Notify your health care provider if you experience any of the following:   Order Comments: Heavy vaginal bleeding saturating more than 1 pad per hr for at least consecutive 2 hrs.     Notify your health care provider if you experience any of the following:  severe uncontrolled pain     Notify your health care provider if you experience any of the following:  persistent nausea and vomiting or diarrhea     Notify your health care provider if you experience any of the following:  temperature >100.4     Activity as tolerated     Medications:  Reconciled Home Medications:      Medication List      START taking these medications    acetaminophen 325 MG tablet  Commonly known as:  TYLENOL  Take 2 tablets (650 mg total) by mouth every 6 (six) hours as needed for Pain.     cephALEXin 250 MG capsule  Commonly known as:  KEFLEX  Take 2 capsules (500 mg total) by mouth every 12 (twelve) hours. for 7 days     nitrofurantoin (macrocrystal-monohydrate) 100 MG capsule  Commonly known as:  Macrobid  Take 1 capsule (100 mg total) by mouth once daily. Start taking this medicine daily when you finish the cephalexin.  Start taking on:  January 15, 2020            Christiana Franz MD  Obstetrics & Gynecology  [facility]   edema  NEUROLOGIC: Cranial nerves II through XII appear grossly intact. Sensation is intact. No focal neurological deficits.  PSYCH:  Mood and affect are appropriate.      Labs: reviewed  No results found for: SODIUM, POTASSIUM, CHLORIDE, CO2, GLUCOSE, BUN, CREATININE, TRINA, ALBUMIN, BILIRUBIN, LACTA, AST, PHOS, INR  Lab Results   Component Value Date    WBC 5.3 01/15/2018    HGB 13.2 01/15/2018    HCT 38.8 01/15/2018     01/15/2018       Diagnostics: EKG reviewed and showed SR without any ischemic changes  XR CHEST AP OR PA - PORTABLE   Final Result   IMPRESSION:        1.  Triangular opacity along left hemidiaphragm may represent atelectasis,   consolidation, or subpulmonic effusion.   2.  Probable mild atelectasis versus consolidation in the right medial lung   base.         I have reviewed the images and agree with the Resident interpretation.             ASSESSMENT AND PLAN:    1. Acute Hypoxic resp failure from Acute COPD exacerbation: : likely triggered by URI/PNA, son sick with flu, although minimal cough. CXR with ?infiltrate vs atelectasis. Required Brief BIPAP support in ER  - with sudden onset, will need to r/o angina equivalent and PE, will obtain serial troponin and d dimer. Initial EKG/troponin negative.  - start oral prednisone, DuoNeb, O2.  - Resp pathogen panel, blood culture, urine strep pneumo/legionella antigen, sputum Cx  - empiric tamiflu/Rocephin/Azitho  - resume Advair/Spiriva    2. Chronic LBP:: resume suboxone. Avoid opioid     3. Nicotine dependence:: Nicotine patch         DVT Prophylaxis: SCDs. Lovenox    Code status: Full Resuscitation    Is the patient expected to require at least a two midnight stay in the hospital? Yes -  I certify that I expect inpatient services for greater than two midnights are medically necessary for this patient.  Please see H&P and MD Progress Notes for additional information about the patient's course of treatment.        Juliana Ackerman MD  [facility]      1/15/2018 5:37 AM

## 2020-01-08 NOTE — ASSESSMENT & PLAN NOTE
- VSS, afebrile  - UA 3+ blood, nitrite pos, 2+ leuks  - Urine culture pending  - CBC 9.6/10.7/34.2/325  - CMP WNL, Cr 0.6  - Received 1L bolus, 1g rocephin, IV zofran, and 1g tylenol in main ED    - Admitted to [facility] service for management of pyelonephritis  - Renal u/s negative for nephrolithiasis or abscess  - Continue IV rocephin 1g q24h  - Continuous IVF  - Regular diet  - Antiemetics prn  - Oxy IR 5/10 prn for pain, will reserve tylenol prn for fever  - Urine culture pending

## 2020-01-08 NOTE — DISCHARGE INSTRUCTIONS
Drink 8-10 glasses of water day.    Take Tylenol as needed for pain.  Heat packs to back only. Not to front or sides of belly.  Take antibiotics as ordered.  Keep previously scheduled clinic appointment  Return to L&D if you experience contractions every 2-5 minutes for two consecutive hours  Vaginal Bleeding  Decreased fetal movement  Leaking of fluid  Headache, dizziness or blurred vision  Temperature 100.4 or greater  Call the clinic ([phone]) during the day time or L&D [phone]) after hours for any questions/concerns.

## 2020-01-08 NOTE — PROGRESS NOTES
Discharge instructions given to patient.  Pt verbalized understanding.  Pt awaiting to significant other to leave.  Pt states she is ok to ambulate out.

## 2020-01-08 NOTE — PROGRESS NOTES
[facility]  Obstetrics & Gynecology  Progress Note    Patient Name: Jacobo Alvarado  MRN: [id number]  Admission Date: 2020  Primary Care Provider: Barrera Koch MD  Principal Problem: Acute pyelonephritis    Subjective:     HPI:  Jacobo Alvarado is a 25 y.o. [id number] at 13w5d who presented to the ED this morning complaining of flank pain, suprapubic pain, chills, urinary frequency, and nausea/vomiting. Patient states that yesterday evening she developed severe, right sided flank pain and suprapubic pain. She states that the pain is sharp and constant in her back, but the suprapubic pain comes and goes. She endorses urinary frequency and occasional incontinence. She denies fever but endorses chills. Around 3am this morning, she had an episode of emesis and an increase in back pain. She takes no medicines other than prenatal vitamins. This IUP is complicated by recurrent pregnancy loss and short interval pregnancy.     Interval History:   Patient states that she is feeling much better this morning. Her flank and suprapubic pain is well-controlled with oxy IR 5mg. She still reports some dysuria but this is also improved. She denies fever, chills, nausea, and vomiting. She is tolerating a regular diet.    Scheduled Meds:   cefTRIAXone (ROCEPHIN) IVPB  1 g Intravenous Q24H    prenatal vitamin  1 tablet Oral Daily     Continuous Infusions:   sodium chloride 0.9% 125 mL/hr at 20 0126     PRN Meds:acetaminophen, calcium carbonate, diphenhydrAMINE, diphenhydrAMINE, docusate sodium, ondansetron, oxyCODONE, oxyCODONE, promethazine (PHENERGAN) IVPB, senna-docusate 8.6-50 mg, simethicone    Review of patient's allergies indicates:  No Known Allergies    Objective:     Vital Signs (Most Recent):  Temp: 98.1 °F (36.7 °C) (20)  Pulse: 92 (20)  Resp: 16 (20)  BP: (!) 123/57 (20)  SpO2: 99 % (20) Vital Signs (24h Range):  Temp:  [97  °F (36.1 °C)-98.1 °F (36.7 °C)] 98.1 °F (36.7 °C)  Pulse:  [70-92] 92  Resp:  [16-20] 16  SpO2:  [98 %-100 %] 99 %  BP: (100-128)/(53-61) 123/57     Weight: 95.3 kg (210 lb)  Body mass index is 42.41 kg/m².  No LMP recorded.    I&O (Last 24H):    Intake/Output Summary (Last 24 hours) at 1/8/2020 0627  Last data filed at 1/8/2020 0600  Gross per 24 hour   Intake 3550 ml   Output 1650 ml   Net 1900 ml       Physical Exam:   Constitutional: She is oriented to person, place, and time. She appears well-developed and well-nourished. No distress.    HENT:   Head: Normocephalic and atraumatic.      Cardiovascular: Normal rate and regular rhythm.     Pulmonary/Chest: Effort normal. No respiratory distress.        Abdominal: Soft. There is tenderness (suprapubic tenderness with deep palpation). There is no rebound and no guarding.             Musculoskeletal: Normal range of motion. She exhibits no edema or tenderness.       Neurological: She is alert and oriented to person, place, and time.    Skin: Skin is warm and dry.    Psychiatric: She has a normal mood and affect. Her behavior is normal. Judgment and thought content normal.       Laboratory:  CBC:   Recent Labs   Lab 01/07/20  0606   WBC 9.63   RBC 4.46   HGB 10.7*   HCT 34.2*      MCV 77*   MCH 24.0*   MCHC 31.3*       Diagnostic Results:  US: Reviewed    Assessment/Plan:     * Acute pyelonephritis  - VSS, afebrile  - UA 3+ blood, nitrite pos, 2+ leuks  - Urine culture pending  - CBC 9.6/10.7/34.2/325  - CMP WNL, Cr 0.6  - Received 1L bolus, 1g rocephin, IV zofran, and 1g tylenol in main ED    - Admitted to [facility] service for management of pyelonephritis  - Renal u/s negative for nephrolithiasis or abscess  - Continue IV rocephin 1g q24h  - Continuous IVF  - Regular diet  - Antiemetics prn  - Oxy IR 5/10 prn for pain, will reserve tylenol prn for fever  - Urine culture pending      Dispo: Will plan to discharge home later today or tomorrow after 24-48 hours of IV  antibiotics.    Christiana Franz MD  Obstetrics & Gynecology  [facility]

## 2020-01-08 NOTE — ASSESSMENT & PLAN NOTE
- VSS, afebrile  - UA 3+ blood, nitrite pos, 2+ leuks  - Urine culture pending  - CBC 9.6/10.7/34.2/325  - CMP WNL, Cr 0.6  - Received 1L bolus, 1g rocephin, IV zofran, and 1g tylenol in main ED    - Admitted to [facility] service for management of pyelonephritis  - Renal u/s negative for nephrolithiasis or abscess  - Continue IV rocephin 1g q24h  - Continuous IVF  - Regular diet  - Antiemetics prn  - Urine culture with prelim result of gram negative rods  - Discharge with 7 day Rx for keflex followed by daily macrobid suppression

## 2020-01-08 NOTE — HOSPITAL COURSE
01/08/2020 - Remains afebrile. Reports improvement in flank and suprapubic pain. Denies nausea/vomiting since admission. Will discharge today with 7 day course of keflex followed by daily macrobid suppression. Patient has follow up at prenatal visit tomorrow.

## 2020-01-08 NOTE — SUBJECTIVE & OBJECTIVE
Interval History:   Patient states that she is feeling much better this morning. Her flank and suprapubic pain is well-controlled with oxy IR 5mg. She still reports some dysuria but this is also improved. She denies fever, chills, nausea, and vomiting. She is tolerating a regular diet.    Scheduled Meds:   cefTRIAXone (ROCEPHIN) IVPB  1 g Intravenous Q24H    prenatal vitamin  1 tablet Oral Daily     Continuous Infusions:   sodium chloride 0.9% 125 mL/hr at 01/08/20 0126     PRN Meds:acetaminophen, calcium carbonate, diphenhydrAMINE, diphenhydrAMINE, docusate sodium, ondansetron, oxyCODONE, oxyCODONE, promethazine (PHENERGAN) IVPB, senna-docusate 8.6-50 mg, simethicone    Review of patient's allergies indicates:  No Known Allergies    Objective:     Vital Signs (Most Recent):  Temp: 98.1 °F (36.7 °C) (01/08/20 0405)  Pulse: 92 (01/08/20 0406)  Resp: 16 (01/08/20 0406)  BP: (!) 123/57 (01/08/20 0405)  SpO2: 99 % (01/08/20 0406) Vital Signs (24h Range):  Temp:  [97 °F (36.1 °C)-98.1 °F (36.7 °C)] 98.1 °F (36.7 °C)  Pulse:  [70-92] 92  Resp:  [16-20] 16  SpO2:  [98 %-100 %] 99 %  BP: (100-128)/(53-61) 123/57     Weight: 95.3 kg (210 lb)  Body mass index is 42.41 kg/m².  No LMP recorded.    I&O (Last 24H):    Intake/Output Summary (Last 24 hours) at 1/8/2020 0627  Last data filed at 1/8/2020 0600  Gross per 24 hour   Intake 3550 ml   Output 1650 ml   Net 1900 ml       Physical Exam:   Constitutional: She is oriented to person, place, and time. She appears well-developed and well-nourished. No distress.    HENT:   Head: Normocephalic and atraumatic.      Cardiovascular: Normal rate and regular rhythm.     Pulmonary/Chest: Effort normal. No respiratory distress.        Abdominal: Soft. There is tenderness (suprapubic tenderness with deep palpation). There is no rebound and no guarding.             Musculoskeletal: Normal range of motion. She exhibits no edema or tenderness.       Neurological: She is alert and oriented  to person, place, and time.    Skin: Skin is warm and dry.    Psychiatric: She has a normal mood and affect. Her behavior is normal. Judgment and thought content normal.       Laboratory:  CBC:   Recent Labs   Lab 01/07/20  0606   WBC 9.63   RBC 4.46   HGB 10.7*   HCT 34.2*      MCV 77*   MCH 24.0*   MCHC 31.3*       Diagnostic Results:  US: Reviewed

## 2020-01-09 ENCOUNTER — PATIENT MESSAGE (OUTPATIENT)
Dept: OBSTETRICS AND GYNECOLOGY | Facility: CLINIC | Age: 25
End: 2020-01-09

## 2020-01-09 LAB — BACTERIA UR CULT: ABNORMAL

## 2020-01-23 ENCOUNTER — TELEPHONE (OUTPATIENT)
Dept: OBSTETRICS AND GYNECOLOGY | Facility: CLINIC | Age: 25
End: 2020-01-23

## 2020-01-30 ENCOUNTER — PROCEDURE VISIT (OUTPATIENT)
Dept: OBSTETRICS AND GYNECOLOGY | Facility: CLINIC | Age: 25
End: 2020-01-30
Payer: MEDICAID

## 2020-01-30 VITALS
DIASTOLIC BLOOD PRESSURE: 66 MMHG | HEIGHT: 59 IN | SYSTOLIC BLOOD PRESSURE: 100 MMHG | BODY MASS INDEX: 43.13 KG/M2 | WEIGHT: 213.94 LBS

## 2020-01-30 DIAGNOSIS — Z30.430 ENCOUNTER FOR IUD INSERTION: Primary | ICD-10-CM

## 2020-01-30 PROCEDURE — 58300 INSERTION OF IUD: ICD-10-PCS | Mod: S$GLB,,, | Performed by: OBSTETRICS & GYNECOLOGY

## 2020-01-30 PROCEDURE — 58300 INSERT INTRAUTERINE DEVICE: CPT | Mod: S$GLB,,, | Performed by: OBSTETRICS & GYNECOLOGY

## 2020-01-30 NOTE — PROCEDURES
Insertion of IUD  Date/Time: 2020 3:30 PM  Performed by: Tiffanie Salinas MD  Authorized by: Tiffanie Salinas MD     Consent:     Consent obtained:  Written    Consent given by:  Patient    Procedure risks and benefits discussed: yes      Patient questions answered: yes      Patient agrees, verbalizes understanding, and wants to proceed: yes      Educational handouts given: yes      Instructions and paperwork completed: yes    Procedure:     Pelvic exam performed: yes      Negative urine pregnancy test: no (see below)      Cervix cleaned and prepped: yes      Speculum placed in vagina: yes      Tenaculum applied to cervix: yes      Uterus sounded: yes      Uterus sound depth (cm):  8    IUD inserted with no complications: yes      Strings trimmed: yes    Post-procedure:     Patient tolerated procedure well: yes      Patient will follow up after next period: yes      Patients urine pregnancy test is positive, but she had a surgical  8 days ago at 11 wga.  She has not had intercourse since that time.     Tiffanie Salinas

## 2020-01-31 ENCOUNTER — PATIENT MESSAGE (OUTPATIENT)
Dept: OBSTETRICS AND GYNECOLOGY | Facility: CLINIC | Age: 25
End: 2020-01-31

## 2020-04-17 ENCOUNTER — PATIENT MESSAGE (OUTPATIENT)
Dept: OBSTETRICS AND GYNECOLOGY | Facility: CLINIC | Age: 25
End: 2020-04-17

## 2020-07-28 ENCOUNTER — HOSPITAL ENCOUNTER (OUTPATIENT)
Dept: RADIOLOGY | Facility: OTHER | Age: 25
Discharge: HOME | End: 2020-07-28
Attending: OBSTETRICS & GYNECOLOGY
Payer: MEDICAID

## 2020-07-28 ENCOUNTER — OFFICE VISIT (OUTPATIENT)
Dept: OBSTETRICS AND GYNECOLOGY | Facility: CLINIC | Age: 25
End: 2020-07-28
Payer: MEDICAID

## 2020-07-28 VITALS
WEIGHT: 207.44 LBS | DIASTOLIC BLOOD PRESSURE: 72 MMHG | HEIGHT: 59 IN | BODY MASS INDEX: 41.82 KG/M2 | SYSTOLIC BLOOD PRESSURE: 104 MMHG

## 2020-07-28 DIAGNOSIS — Z11.3 SCREEN FOR STD (SEXUALLY TRANSMITTED DISEASE): ICD-10-CM

## 2020-07-28 DIAGNOSIS — R30.0 DYSURIA: Primary | ICD-10-CM

## 2020-07-28 DIAGNOSIS — R10.11 RUQ PAIN: ICD-10-CM

## 2020-07-28 DIAGNOSIS — N89.8 VAGINAL ODOR: ICD-10-CM

## 2020-07-28 DIAGNOSIS — N89.8 VAGINAL DISCHARGE: ICD-10-CM

## 2020-07-28 LAB
CANDIDA RRNA VAG QL PROBE: NEGATIVE
G VAGINALIS RRNA GENITAL QL PROBE: NEGATIVE
T VAGINALIS RRNA GENITAL QL PROBE: NEGATIVE

## 2020-07-28 PROCEDURE — 99213 OFFICE O/P EST LOW 20 MIN: CPT | Mod: PBBFAC,25 | Performed by: OBSTETRICS & GYNECOLOGY

## 2020-07-28 PROCEDURE — 99999 PR PBB SHADOW E&M-EST. PATIENT-LVL III: ICD-10-PCS | Mod: PBBFAC,,, | Performed by: OBSTETRICS & GYNECOLOGY

## 2020-07-28 PROCEDURE — 87480 CANDIDA DNA DIR PROBE: CPT

## 2020-07-28 PROCEDURE — 99999 PR PBB SHADOW E&M-EST. PATIENT-LVL III: CPT | Mod: PBBFAC,,, | Performed by: OBSTETRICS & GYNECOLOGY

## 2020-07-28 PROCEDURE — 87086 URINE CULTURE/COLONY COUNT: CPT

## 2020-07-28 PROCEDURE — 87186 SC STD MICRODIL/AGAR DIL: CPT

## 2020-07-28 PROCEDURE — 76705 ECHO EXAM OF ABDOMEN: CPT | Mod: TC

## 2020-07-28 PROCEDURE — 99213 PR OFFICE/OUTPT VISIT, EST, LEVL III, 20-29 MIN: ICD-10-PCS | Mod: S$PBB,,, | Performed by: OBSTETRICS & GYNECOLOGY

## 2020-07-28 PROCEDURE — 87491 CHLMYD TRACH DNA AMP PROBE: CPT

## 2020-07-28 PROCEDURE — 87088 URINE BACTERIA CULTURE: CPT

## 2020-07-28 PROCEDURE — 87077 CULTURE AEROBIC IDENTIFY: CPT

## 2020-07-28 PROCEDURE — 87510 GARDNER VAG DNA DIR PROBE: CPT

## 2020-07-28 PROCEDURE — 99213 OFFICE O/P EST LOW 20 MIN: CPT | Mod: S$PBB,,, | Performed by: OBSTETRICS & GYNECOLOGY

## 2020-07-28 PROCEDURE — 76705 ECHO EXAM OF ABDOMEN: CPT | Mod: 26,,, | Performed by: RADIOLOGY

## 2020-07-28 PROCEDURE — 76705 US ABDOMEN LIMITED: ICD-10-PCS | Mod: 26,,, | Performed by: RADIOLOGY

## 2020-07-28 NOTE — PROGRESS NOTES
Gynecology    SUBJECTIVE:     Chief Complaint: IUD Check       History of Present Illness:  25 year old who presents for IUD check.  She also has some burning with urination, urgency and frequency.  She also complains of some discharge/odor after intercourse.  She also reports some RUQ pain for about one month.  Reports it's worse with eating and also worse with certain positions.  Patient does have some nausea and fatigue as well.  No fever/chills.      Still having regular periods despite IUD.     Review of Systems:  Review of Systems   Constitutional: Negative for appetite change, fever and unexpected weight change.   Respiratory: Negative for shortness of breath.    Cardiovascular: Negative for chest pain.   Gastrointestinal: Negative for nausea and vomiting.   Genitourinary: Positive for dysuria, frequency, urgency, vaginal discharge and vaginal odor. Negative for menorrhagia, menstrual problem, pelvic pain, vaginal bleeding and vaginal pain.        OBJECTIVE:     Physical Exam:  Physical Exam  Vitals signs and nursing note reviewed.   Constitutional:       Appearance: She is well-developed.   Pulmonary:      Effort: Pulmonary effort is normal.   Abdominal:      Palpations: Abdomen is soft.      Tenderness: There is abdominal tenderness in the right upper quadrant. Negative signs include Fofana's sign.   Genitourinary:     Labia:         Right: No rash, tenderness, lesion or injury.         Left: No rash, tenderness, lesion or injury.       Vagina: Normal. No signs of injury and foreign body. No vaginal discharge, erythema, tenderness or bleeding.      Cervix: No cervical motion tenderness, discharge or friability.      Uterus: Not deviated, not enlarged, not fixed and not tender.       Adnexa:         Right: No mass, tenderness or fullness.          Left: No mass, tenderness or fullness.        Comments: Urethra: normal appearing urethra with no masses, tenderness or lesions  Urethral meatus: normal size,  anterior vaginal wall with no prolapse, no lesions  IUD strings visualized  Neurological:      Mental Status: She is alert and oriented to person, place, and time.   Psychiatric:         Behavior: Behavior normal.         Thought Content: Thought content normal.         Judgment: Judgment normal.         Chaperoned by: Matty    ASSESSMENT:       ICD-10-CM ICD-9-CM    1. Dysuria  R30.0 788.1 Urine culture   2. Vaginal odor  N89.8 625.8 Vaginosis Screen by DNA Probe      C. trachomatis/N. gonorrhoeae by AMP DNA   3. Vaginal discharge  N89.8 623.5 Vaginosis Screen by DNA Probe      C. trachomatis/N. gonorrhoeae by AMP DNA   4. Screen for STD (sexually transmitted disease)  Z11.3 V74.5 HIV 1/2 Ag/Ab (4th Gen)      RPR      Hepatitis Panel, Acute   5. RUQ pain  R10.11 789.01 US Abdomen Limited          Plan:      Jacobo was seen today for iud check.    Diagnoses and all orders for this visit:    Dysuria  -     Urine culture    Vaginal odor  -     Vaginosis Screen by DNA Probe  -     C. trachomatis/N. gonorrhoeae by AMP DNA    Vaginal discharge  -     Vaginosis Screen by DNA Probe  -     C. trachomatis/N. gonorrhoeae by AMP DNA    Screen for STD (sexually transmitted disease)  -     HIV 1/2 Ag/Ab (4th Gen); Future  -     RPR; Future  -     Hepatitis Panel, Acute; Future    RUQ pain  -     US Abdomen Limited; Future    - STD testing today, vaginosis screening  - urine culture  - RUQ ultrasound ordered  - IUD appropriately placed    Orders Placed This Encounter   Procedures    Vaginosis Screen by DNA Probe    C. trachomatis/N. gonorrhoeae by AMP DNA    Urine culture    US Abdomen Limited    HIV 1/2 Ag/Ab (4th Gen)    RPR    Hepatitis Panel, Acute       Follow up for annual or prn.    Tiffanie Salinas

## 2020-07-30 LAB — BACTERIA UR CULT: ABNORMAL

## 2020-07-31 ENCOUNTER — PATIENT MESSAGE (OUTPATIENT)
Dept: OBSTETRICS AND GYNECOLOGY | Facility: CLINIC | Age: 25
End: 2020-07-31

## 2020-07-31 DIAGNOSIS — A74.9 CHLAMYDIA: Primary | ICD-10-CM

## 2020-07-31 DIAGNOSIS — N30.00 ACUTE CYSTITIS WITHOUT HEMATURIA: ICD-10-CM

## 2020-07-31 LAB
C TRACH DNA SPEC QL NAA+PROBE: DETECTED
N GONORRHOEA DNA SPEC QL NAA+PROBE: NOT DETECTED

## 2020-07-31 RX ORDER — SULFAMETHOXAZOLE AND TRIMETHOPRIM 800; 160 MG/1; MG/1
1 TABLET ORAL 2 TIMES DAILY
Qty: 6 TABLET | Refills: 0 | Status: SHIPPED | OUTPATIENT
Start: 2020-07-31 | End: 2020-08-03

## 2020-07-31 RX ORDER — AZITHROMYCIN 500 MG/1
1000 TABLET, FILM COATED ORAL ONCE
Qty: 2 TABLET | Refills: 1 | Status: SHIPPED | OUTPATIENT
Start: 2020-07-31 | End: 2020-07-31

## 2020-08-17 ENCOUNTER — OFFICE VISIT (OUTPATIENT)
Dept: FAMILY MEDICINE | Facility: HOSPITAL | Age: 25
End: 2020-08-17
Attending: FAMILY MEDICINE
Payer: MEDICAID

## 2020-08-17 VITALS
BODY MASS INDEX: 42.98 KG/M2 | HEART RATE: 82 BPM | SYSTOLIC BLOOD PRESSURE: 105 MMHG | DIASTOLIC BLOOD PRESSURE: 69 MMHG | HEIGHT: 59 IN | WEIGHT: 213.19 LBS

## 2020-08-17 DIAGNOSIS — N30.00 ACUTE CYSTITIS WITHOUT HEMATURIA: ICD-10-CM

## 2020-08-17 DIAGNOSIS — E66.01 CLASS 3 SEVERE OBESITY DUE TO EXCESS CALORIES WITH BODY MASS INDEX (BMI) OF 40.0 TO 44.9 IN ADULT, UNSPECIFIED WHETHER SERIOUS COMORBIDITY PRESENT: ICD-10-CM

## 2020-08-17 DIAGNOSIS — Z20.2 STD EXPOSURE: ICD-10-CM

## 2020-08-17 DIAGNOSIS — M94.0 COSTOCHONDRITIS: Primary | ICD-10-CM

## 2020-08-17 PROCEDURE — 99213 OFFICE O/P EST LOW 20 MIN: CPT | Performed by: STUDENT IN AN ORGANIZED HEALTH CARE EDUCATION/TRAINING PROGRAM

## 2020-08-17 RX ORDER — NAPROXEN 500 MG/1
500 TABLET ORAL 2 TIMES DAILY
Qty: 60 TABLET | Refills: 2 | Status: SHIPPED | OUTPATIENT
Start: 2020-08-17 | End: 2020-08-17

## 2020-08-18 PROBLEM — E66.813 CLASS 3 SEVERE OBESITY DUE TO EXCESS CALORIES WITH BODY MASS INDEX (BMI) OF 40.0 TO 44.9 IN ADULT: Status: ACTIVE | Noted: 2020-08-18

## 2020-08-18 PROBLEM — Z20.2 STD EXPOSURE: Status: ACTIVE | Noted: 2020-08-18

## 2020-08-18 PROBLEM — E66.01 CLASS 3 SEVERE OBESITY DUE TO EXCESS CALORIES WITH BODY MASS INDEX (BMI) OF 40.0 TO 44.9 IN ADULT: Status: ACTIVE | Noted: 2020-08-18

## 2020-08-18 NOTE — PROGRESS NOTES
Progress Note  [facility]    Subjective:     Jacobo Alvarado is a 25 y.o. year old female with PMHx of obesity, STI exposure who presented to clinic today for complaints of abdominal pain.  Patient with reports of right upper quadrant abdominal pain for the past 3 months.  Patient reports that the pain is worsened with eating but also worsened with not eating.  Patient that reports that the pain is burning in nature.  Pain is along the right side and radiates up and down her side.  When she is not eating she describes it as a shooting pain.  Pain is also worsened by deep inspiration.  Of note, patient reports that she has minimal appetite, this has been present for many months, predating her most recent pregnancy which ended in an (elective?) termination.  Patient reports she has 1 to 2 small hard bowel movements per week.  Of note, patient had recent exposure to STI, testing positive for chlamydia, as well as testing positive for UTI.  Patient saw her OBGYN who started her on to unknown antibiotics and patient reports that she completed her antibiotic course for the chlamydia but not for the UTI as of yet.  Patient's abdominal pain predates likely exposure to chlamydia, although patient has extensive past exposure to STIs with unknown treatment status.      Review of Systems   Constitutional: Negative for chills and fever.        JUNE   Respiratory: Negative for cough and shortness of breath.    Cardiovascular: Negative for chest pain and palpitations.   Gastrointestinal: Positive for abdominal pain (RUQ). Negative for constipation, diarrhea, heartburn and nausea.   Genitourinary: Positive for flank pain.   Musculoskeletal: Positive for myalgias.   Neurological: Negative for dizziness and headaches.   Psychiatric/Behavioral: The patient is not nervous/anxious.        Objective:     Vitals:    08/17/20 1347   BP: 105/69   Pulse: 82       Physical Exam  Constitutional:       General: She is not in acute  distress.     Appearance: She is obese. She is not toxic-appearing.   HENT:      Head: Normocephalic and atraumatic.   Eyes:      Extraocular Movements: Extraocular movements intact.      Conjunctiva/sclera: Conjunctivae normal.      Pupils: Pupils are equal, round, and reactive to light.   Cardiovascular:      Rate and Rhythm: Normal rate and regular rhythm.      Heart sounds: Normal heart sounds. No murmur.   Pulmonary:      Effort: Pulmonary effort is normal. No respiratory distress.      Breath sounds: Normal breath sounds. No wheezing.   Abdominal:      General: Abdomen is flat. Bowel sounds are normal. There is no distension.      Palpations: Abdomen is soft.      Tenderness: There is no abdominal tenderness. There is no right CVA tenderness, left CVA tenderness, guarding or rebound. Negative signs include Fofana's sign.      Comments: Patient with pain of musculoskeletal structures on R side, especially with manipulation of ribcage   Musculoskeletal: Normal range of motion.   Skin:     General: Skin is warm and dry.      Capillary Refill: Capillary refill takes less than 2 seconds.   Neurological:      Mental Status: She is alert and oriented to person, place, and time.         Assessment/Plan:     Jacobo was seen today for headache and abdominal pain.    Diagnoses and all orders for this visit:    Costochondritis  -     naproxen (NAPROSYN) 500 MG tablet; Take 1 tablet (500 mg total) by mouth 2 (two) times daily.  - patient with nonspecific complaints of right upper quadrant pain, with pain worsening with deep inspiration and manipulation of the ribcage other musculoskeletal structures.  Unclear etiology of pain at this point although based on time course and physical exam, more likely that pain is MSK in origin, likely costochondritis, rather than a pain GI in origin as patient has abdominal ultrasound that was negative for any GI findings.  Although, can not rule out chance of Wtus-Ldyr-Hvshiw syndrome  given extensive past exposure to STIs.  Will treat conservatively with naproxen at this time and continue to monitor symptoms.    Class 3 severe obesity due to excess calories with body mass index (BMI) of 40.0 to 44.9 in adult, unspecified whether serious comorbidity present         -     Discussed with patient about ideal body weight, effects of obesity, and appropriate lifestyle modifications to aid in decreasing weight. Patient receptive and agreed to attempt modifications.          -      patient with significant obesity.  Unclear complaints of loss of appetite and other odd behaviors regarding food.  Can not rule out bulimia or other eating disorder.  Will need further investigation at future visit but as patient decidedly cagey when trying to discuss at this visit.    Acute cystitis without hematuria         -       patient recently diagnosed with UTI by her OBGYN.  Put on on antibiotic.  Reinforced importance completing antibiotic regimen.    STD exposure         -       Patient with exposure to STI multiple times in the past and recently found to be positive for chlamydia. Reinforced the importance of condom use. Patient currently sexually active with one male partner.         Last Pap smear: 4/5/2018  Last Mammogram: N/A  Last Colorectal cancer screening: N/A  BP today: 105/69  Statin: N/A  Last A1C: 5   Eye Exam in the past year: Not Diabetic  Foot Exam in the past year: Not Diabetic        Case discussed with staff: Dr. Dwight Koch MD PGY-2  [facility]   08/18/2020 8:30 AM    This note was partially created using [employer] Voice Recognition software. Typographical and content errors may occur with this process. While efforts are made to detect and correct such errors, in some cases errors will persist. For this reason, wording in this document should be considered in the proper context and not strictly verbatim.

## 2020-11-12 ENCOUNTER — PATIENT MESSAGE (OUTPATIENT)
Dept: FAMILY MEDICINE | Facility: HOSPITAL | Age: 25
End: 2020-11-12

## 2020-11-18 ENCOUNTER — PATIENT MESSAGE (OUTPATIENT)
Dept: OBSTETRICS AND GYNECOLOGY | Facility: CLINIC | Age: 25
End: 2020-11-18

## 2021-02-07 ENCOUNTER — PATIENT MESSAGE (OUTPATIENT)
Dept: OBSTETRICS AND GYNECOLOGY | Facility: CLINIC | Age: 26
End: 2021-02-07

## 2021-02-10 ENCOUNTER — OFFICE VISIT (OUTPATIENT)
Dept: OBSTETRICS AND GYNECOLOGY | Facility: CLINIC | Age: 26
End: 2021-02-10
Payer: MEDICAID

## 2021-02-10 VITALS
WEIGHT: 224.88 LBS | BODY MASS INDEX: 45.33 KG/M2 | SYSTOLIC BLOOD PRESSURE: 128 MMHG | HEIGHT: 59 IN | DIASTOLIC BLOOD PRESSURE: 80 MMHG

## 2021-02-10 DIAGNOSIS — Z30.431 IUD CHECK UP: Primary | ICD-10-CM

## 2021-02-10 DIAGNOSIS — N64.4 PAIN OF BOTH BREASTS: ICD-10-CM

## 2021-02-10 DIAGNOSIS — Z97.5 IUD (INTRAUTERINE DEVICE) IN PLACE: ICD-10-CM

## 2021-02-10 DIAGNOSIS — N89.8 VAGINAL DISCHARGE: ICD-10-CM

## 2021-02-10 LAB
B-HCG UR QL: NEGATIVE
CTP QC/QA: YES

## 2021-02-10 PROCEDURE — 87510 GARDNER VAG DNA DIR PROBE: CPT

## 2021-02-10 PROCEDURE — 87660 TRICHOMONAS VAGIN DIR PROBE: CPT

## 2021-02-10 PROCEDURE — 99999 PR PBB SHADOW E&M-EST. PATIENT-LVL III: CPT | Mod: PBBFAC,,, | Performed by: PHYSICIAN ASSISTANT

## 2021-02-10 PROCEDURE — 99214 PR OFFICE/OUTPT VISIT, EST, LEVL IV, 30-39 MIN: ICD-10-PCS | Mod: S$PBB,,, | Performed by: PHYSICIAN ASSISTANT

## 2021-02-10 PROCEDURE — 99214 OFFICE O/P EST MOD 30 MIN: CPT | Mod: S$PBB,,, | Performed by: PHYSICIAN ASSISTANT

## 2021-02-10 PROCEDURE — 99213 OFFICE O/P EST LOW 20 MIN: CPT | Mod: PBBFAC | Performed by: PHYSICIAN ASSISTANT

## 2021-02-10 PROCEDURE — 99999 PR PBB SHADOW E&M-EST. PATIENT-LVL III: ICD-10-PCS | Mod: PBBFAC,,, | Performed by: PHYSICIAN ASSISTANT

## 2021-02-10 RX ORDER — METRONIDAZOLE 500 MG/1
500 TABLET ORAL 2 TIMES DAILY
Qty: 14 TABLET | Refills: 0 | Status: SHIPPED | OUTPATIENT
Start: 2021-02-10 | End: 2021-02-17

## 2021-07-22 ENCOUNTER — OFFICE VISIT (OUTPATIENT)
Dept: OBSTETRICS AND GYNECOLOGY | Facility: CLINIC | Age: 26
End: 2021-07-22
Payer: COMMERCIAL

## 2021-07-22 VITALS
SYSTOLIC BLOOD PRESSURE: 100 MMHG | BODY MASS INDEX: 44.46 KG/M2 | WEIGHT: 226.44 LBS | DIASTOLIC BLOOD PRESSURE: 82 MMHG | HEIGHT: 60 IN

## 2021-07-22 DIAGNOSIS — Z01.419 WELL WOMAN EXAM: Primary | ICD-10-CM

## 2021-07-22 DIAGNOSIS — Z11.3 SCREENING EXAMINATION FOR STD (SEXUALLY TRANSMITTED DISEASE): ICD-10-CM

## 2021-07-22 DIAGNOSIS — Z12.4 CERVICAL CANCER SCREENING: ICD-10-CM

## 2021-07-22 DIAGNOSIS — E66.01 MORBID OBESITY WITH BMI OF 40.0-44.9, ADULT: ICD-10-CM

## 2021-07-22 PROCEDURE — 88175 CYTOPATH C/V AUTO FLUID REDO: CPT | Performed by: STUDENT IN AN ORGANIZED HEALTH CARE EDUCATION/TRAINING PROGRAM

## 2021-07-22 PROCEDURE — 99395 PR PREVENTIVE VISIT,EST,18-39: ICD-10-PCS | Mod: S$GLB,,, | Performed by: OBSTETRICS & GYNECOLOGY

## 2021-07-22 PROCEDURE — 99999 PR PBB SHADOW E&M-EST. PATIENT-LVL III: CPT | Mod: PBBFAC,,, | Performed by: OBSTETRICS & GYNECOLOGY

## 2021-07-22 PROCEDURE — 99213 OFFICE O/P EST LOW 20 MIN: CPT | Mod: PBBFAC,PN | Performed by: OBSTETRICS & GYNECOLOGY

## 2021-07-22 PROCEDURE — 99999 PR PBB SHADOW E&M-EST. PATIENT-LVL III: ICD-10-PCS | Mod: PBBFAC,,, | Performed by: OBSTETRICS & GYNECOLOGY

## 2021-07-22 PROCEDURE — 87591 N.GONORRHOEAE DNA AMP PROB: CPT | Performed by: STUDENT IN AN ORGANIZED HEALTH CARE EDUCATION/TRAINING PROGRAM

## 2021-07-22 PROCEDURE — 99395 PREV VISIT EST AGE 18-39: CPT | Mod: S$GLB,,, | Performed by: OBSTETRICS & GYNECOLOGY

## 2021-07-22 PROCEDURE — 87491 CHLMYD TRACH DNA AMP PROBE: CPT | Performed by: STUDENT IN AN ORGANIZED HEALTH CARE EDUCATION/TRAINING PROGRAM

## 2021-07-29 LAB
C TRACH DNA SPEC QL NAA+PROBE: NOT DETECTED
FINAL PATHOLOGIC DIAGNOSIS: NORMAL
Lab: NORMAL
N GONORRHOEA DNA SPEC QL NAA+PROBE: NOT DETECTED

## 2021-08-10 ENCOUNTER — PATIENT MESSAGE (OUTPATIENT)
Dept: OBSTETRICS AND GYNECOLOGY | Facility: CLINIC | Age: 26
End: 2021-08-10

## 2021-08-10 DIAGNOSIS — N92.6 IRREGULAR BLEEDING: Primary | ICD-10-CM

## 2021-08-16 ENCOUNTER — HOSPITAL ENCOUNTER (EMERGENCY)
Facility: OTHER | Age: 26
Discharge: HOME | End: 2021-08-16
Attending: EMERGENCY MEDICINE
Payer: COMMERCIAL

## 2021-08-16 VITALS
SYSTOLIC BLOOD PRESSURE: 115 MMHG | RESPIRATION RATE: 17 BRPM | TEMPERATURE: 98 F | OXYGEN SATURATION: 100 % | HEART RATE: 88 BPM | DIASTOLIC BLOOD PRESSURE: 76 MMHG

## 2021-08-16 DIAGNOSIS — J06.9 VIRAL URI WITH COUGH: Primary | ICD-10-CM

## 2021-08-16 DIAGNOSIS — R06.02 SHORTNESS OF BREATH: ICD-10-CM

## 2021-08-16 LAB
ABO + RH BLD: NORMAL
B-HCG UR QL: NEGATIVE
BASOPHILS # BLD AUTO: 0.02 K/UL (ref 0–0.2)
BASOPHILS NFR BLD: 0.4 % (ref 0–1.9)
BLD GP AB SCN CELLS X3 SERPL QL: NORMAL
CTP QC/QA: YES
CTP QC/QA: YES
DIFFERENTIAL METHOD: ABNORMAL
EOSINOPHIL # BLD AUTO: 0.4 K/UL (ref 0–0.5)
EOSINOPHIL NFR BLD: 6.9 % (ref 0–8)
ERYTHROCYTE [DISTWIDTH] IN BLOOD BY AUTOMATED COUNT: 14.9 % (ref 11.5–14.5)
HCT VFR BLD AUTO: 39.9 % (ref 37–48.5)
HGB BLD-MCNC: 12.5 G/DL (ref 12–16)
IMM GRANULOCYTES # BLD AUTO: 0.01 K/UL (ref 0–0.04)
IMM GRANULOCYTES NFR BLD AUTO: 0.2 % (ref 0–0.5)
LYMPHOCYTES # BLD AUTO: 1.8 K/UL (ref 1–4.8)
LYMPHOCYTES NFR BLD: 31.4 % (ref 18–48)
MCH RBC QN AUTO: 25.8 PG (ref 27–31)
MCHC RBC AUTO-ENTMCNC: 31.3 G/DL (ref 32–36)
MCV RBC AUTO: 82 FL (ref 82–98)
MONOCYTES # BLD AUTO: 0.8 K/UL (ref 0.3–1)
MONOCYTES NFR BLD: 13.5 % (ref 4–15)
NEUTROPHILS # BLD AUTO: 2.7 K/UL (ref 1.8–7.7)
NEUTROPHILS NFR BLD: 47.6 % (ref 38–73)
NRBC BLD-RTO: 0 /100 WBC
PLATELET # BLD AUTO: 354 K/UL (ref 150–450)
PMV BLD AUTO: 9.4 FL (ref 9.2–12.9)
RBC # BLD AUTO: 4.85 M/UL (ref 4–5.4)
SARS-COV-2 RDRP RESP QL NAA+PROBE: NEGATIVE
WBC # BLD AUTO: 5.64 K/UL (ref 3.9–12.7)

## 2021-08-16 PROCEDURE — 81025 URINE PREGNANCY TEST: CPT | Performed by: PHYSICIAN ASSISTANT

## 2021-08-16 PROCEDURE — 86900 BLOOD TYPING SEROLOGIC ABO: CPT | Performed by: PHYSICIAN ASSISTANT

## 2021-08-16 PROCEDURE — U0002 COVID-19 LAB TEST NON-CDC: HCPCS | Performed by: PHYSICIAN ASSISTANT

## 2021-08-16 PROCEDURE — 85025 COMPLETE CBC W/AUTO DIFF WBC: CPT | Performed by: PHYSICIAN ASSISTANT

## 2021-08-16 PROCEDURE — 25000003 PHARM REV CODE 250: Performed by: PHYSICIAN ASSISTANT

## 2021-08-16 PROCEDURE — 99284 EMERGENCY DEPT VISIT MOD MDM: CPT | Mod: 25

## 2021-08-16 RX ORDER — IBUPROFEN 600 MG/1
600 TABLET ORAL EVERY 6 HOURS PRN
Qty: 20 TABLET | Refills: 0 | OUTPATIENT
Start: 2021-08-16 | End: 2021-10-07

## 2021-08-16 RX ORDER — ACETAMINOPHEN 500 MG
1000 TABLET ORAL
Status: COMPLETED | OUTPATIENT
Start: 2021-08-16 | End: 2021-08-16

## 2021-08-16 RX ORDER — CETIRIZINE HYDROCHLORIDE 10 MG/1
10 TABLET ORAL DAILY
Qty: 30 TABLET | Refills: 0 | Status: SHIPPED | OUTPATIENT
Start: 2021-08-16 | End: 2021-10-20

## 2021-08-16 RX ORDER — BENZONATATE 100 MG/1
100 CAPSULE ORAL 3 TIMES DAILY PRN
Qty: 20 CAPSULE | Refills: 0 | Status: SHIPPED | OUTPATIENT
Start: 2021-08-16 | End: 2021-08-26

## 2021-08-16 RX ORDER — CETIRIZINE HYDROCHLORIDE 5 MG/1
10 TABLET ORAL
Status: COMPLETED | OUTPATIENT
Start: 2021-08-16 | End: 2021-08-16

## 2021-08-16 RX ORDER — BENZONATATE 100 MG/1
100 CAPSULE ORAL 3 TIMES DAILY PRN
Status: DISCONTINUED | OUTPATIENT
Start: 2021-08-16 | End: 2021-08-16 | Stop reason: HOSPADM

## 2021-08-16 RX ADMIN — ACETAMINOPHEN 1000 MG: 500 TABLET, FILM COATED ORAL at 05:08

## 2021-08-16 RX ADMIN — BENZONATATE 100 MG: 100 CAPSULE ORAL at 05:08

## 2021-08-16 RX ADMIN — CETIRIZINE HYDROCHLORIDE 10 MG: 5 TABLET, FILM COATED ORAL at 05:08

## 2021-08-18 ENCOUNTER — HOSPITAL ENCOUNTER (OUTPATIENT)
Dept: RADIOLOGY | Facility: OTHER | Age: 26
Discharge: HOME | End: 2021-08-18
Attending: OBSTETRICS & GYNECOLOGY
Payer: COMMERCIAL

## 2021-08-18 DIAGNOSIS — N92.6 IRREGULAR BLEEDING: ICD-10-CM

## 2021-08-18 PROCEDURE — 76830 US PELVIS COMP WITH TRANSVAG NON-OB (XPD): ICD-10-PCS | Mod: 26,,, | Performed by: RADIOLOGY

## 2021-08-18 PROCEDURE — 76856 US EXAM PELVIC COMPLETE: CPT | Mod: 26,,, | Performed by: RADIOLOGY

## 2021-08-18 PROCEDURE — 76856 US EXAM PELVIC COMPLETE: CPT | Mod: TC

## 2021-08-18 PROCEDURE — 76830 TRANSVAGINAL US NON-OB: CPT | Mod: 26,,, | Performed by: RADIOLOGY

## 2021-08-18 PROCEDURE — 76856 US PELVIS COMP WITH TRANSVAG NON-OB (XPD): ICD-10-PCS | Mod: 26,,, | Performed by: RADIOLOGY

## 2021-08-21 ENCOUNTER — PATIENT MESSAGE (OUTPATIENT)
Dept: OBSTETRICS AND GYNECOLOGY | Facility: CLINIC | Age: 26
End: 2021-08-21

## 2021-09-25 ENCOUNTER — OFFICE VISIT (OUTPATIENT)
Dept: OBSTETRICS AND GYNECOLOGY | Facility: CLINIC | Age: 26
End: 2021-09-25
Payer: MEDICAID

## 2021-09-25 VITALS — BODY MASS INDEX: 44.58 KG/M2 | WEIGHT: 227.06 LBS | HEIGHT: 60 IN

## 2021-09-25 DIAGNOSIS — N76.0 ACUTE VAGINITIS: Primary | ICD-10-CM

## 2021-09-25 PROCEDURE — 99213 OFFICE O/P EST LOW 20 MIN: CPT | Mod: PBBFAC | Performed by: ADVANCED PRACTICE MIDWIFE

## 2021-09-25 PROCEDURE — 99999 PR PBB SHADOW E&M-EST. PATIENT-LVL III: ICD-10-PCS | Mod: PBBFAC,,, | Performed by: ADVANCED PRACTICE MIDWIFE

## 2021-09-25 PROCEDURE — 1159F MED LIST DOCD IN RCRD: CPT | Mod: CPTII,S$GLB,, | Performed by: ADVANCED PRACTICE MIDWIFE

## 2021-09-25 PROCEDURE — 99999 PR PBB SHADOW E&M-EST. PATIENT-LVL III: CPT | Mod: PBBFAC,,, | Performed by: ADVANCED PRACTICE MIDWIFE

## 2021-09-25 PROCEDURE — 99213 PR OFFICE/OUTPT VISIT, EST, LEVL III, 20-29 MIN: ICD-10-PCS | Mod: S$GLB,,, | Performed by: ADVANCED PRACTICE MIDWIFE

## 2021-09-25 PROCEDURE — 3008F PR BODY MASS INDEX (BMI) DOCUMENTED: ICD-10-PCS | Mod: CPTII,S$GLB,, | Performed by: ADVANCED PRACTICE MIDWIFE

## 2021-09-25 PROCEDURE — 1159F PR MEDICATION LIST DOCUMENTED IN MEDICAL RECORD: ICD-10-PCS | Mod: CPTII,S$GLB,, | Performed by: ADVANCED PRACTICE MIDWIFE

## 2021-09-25 PROCEDURE — 87510 GARDNER VAG DNA DIR PROBE: CPT | Performed by: ADVANCED PRACTICE MIDWIFE

## 2021-09-25 PROCEDURE — 99213 OFFICE O/P EST LOW 20 MIN: CPT | Mod: S$GLB,,, | Performed by: ADVANCED PRACTICE MIDWIFE

## 2021-09-25 PROCEDURE — 3008F BODY MASS INDEX DOCD: CPT | Mod: CPTII,S$GLB,, | Performed by: ADVANCED PRACTICE MIDWIFE

## 2021-09-25 NOTE — PROGRESS NOTES
Jacobo Alvarado is a 26 y.o. female  presents to GYN Clinic with complaint of vaginal discharge and pelvic discomfort.  She reports itching, and debnies  She states the discharge is clear.          ROS:  GENERAL: No fever, chills, fatigability or weight loss.  VULVAR: No pain, no lesions and no itching.  VAGINAL: No relaxation, no abnormal bleeding and no lesions. Reports discharge and odor  ABDOMEN: No abdominal pain. Denies nausea. Denies vomiting. No diarrhea. No constipation  BREAST: Denies pain. No lumps. No discharge.  URINARY: No incontinence, no nocturia, no frequency and no dysuria.        Review of patient's allergies indicates:  No Known Allergies    Current Outpatient Medications:     cetirizine (ZYRTEC) 10 MG tablet, Take 1 tablet (10 mg total) by mouth once daily., Disp: 30 tablet, Rfl: 0    ibuprofen (ADVIL,MOTRIN) 600 MG tablet, Take 1 tablet (600 mg total) by mouth every 6 (six) hours as needed for Pain., Disp: 20 tablet, Rfl: 0    naproxen (NAPROSYN) 250 MG tablet, Take 2 tablets (500 mg total) by mouth 2 (two) times daily with meals. (Patient not taking: Reported on 2021), Disp: 120 tablet, Rfl: 2    Past Medical History:   Diagnosis Date    Chlamydia     Miscarriage      Past Surgical History:   Procedure Laterality Date    DILATION AND CURETTAGE OF UTERUS  2018     Social History     Tobacco Use    Smoking status: Never Smoker    Smokeless tobacco: Never Used   Substance Use Topics    Alcohol use: No    Drug use: No     OB History    Para Term  AB Living   5 2 1 1 3 2   SAB TAB Ectopic Multiple Live Births   3     0 2      # Outcome Date GA Lbr [place]/2nd Weight Sex Delivery Anes PTL Lv   5 Term 19 39w4d   F Vag-Spont EPI N [place]   4 SAB 2018     SAB   FD   3 SAB 18 12w0d    SAB   FD   2 SAB 2017     SAB   FD   1  12 36w0d  2.693 kg (5 lb 15 oz) F Vag-Spont EPI Y [place]       Ht 5' (1.524 m)   Wt 103 kg (227 lb 1.2 oz)    LMP 09/10/2021 Comment: pt has IUD   BMI 44.35 kg/m²     PHYSICAL EXAM:  GENERAL: Calm and appropriate affect, alert, oriented x4  SKIN: Color appropriate for race, warm and dry, clean and intact with no rashes.  RESP: Even, unlabored breathing  ABDOMEN: Soft, nontender, no masses.  :   Normal external female genitalia without lesions. Normal hair distribution. Adequate perineal body, normal urethral meatus.  Vagina pink and well rugated, no lesions, vaginal discharge - white, creamy, thick with no foul odor.   No significant cystocele or rectocele.  Cervix -no cervical motion tenderness.        ASSESSMENT / PLAN:    ICD-10-CM ICD-9-CM    1. Acute vaginitis  N76.0 616.10 Vaginosis Screen by DNA Probe     Acute vaginitis  -     Vaginosis Screen by DNA Probe          Patient was counseled today on vaginitis prevention including :  a. avoiding feminine products such as deoderant soaps, body wash, bubble bath, douches, scented toilet paper, deoderant tampons or pads, feminine wipes, chronic pad use, etc.  b. avoiding other vulvovaginal irritants such as long hot baths, humidity, tight, synthetic clothing, chlorine and sitting around in wet bathing suits  c. wearing cotton underwear, avoiding thong underwear and no underwear to bed  d. taking showers instead of baths and use a hair dryer on cool setting afterwards to dry  e. wearing cotton to exercise and shower immediately after exercise and change clothes  f. using polyurethane condoms without spermicide if sexually active and symptoms are triggered by intercourse  g. Discussed use of vagisil, along with repHresh and probiotics    FOLLOW UP:   Pending lab results, PRN lack of improvement.

## 2021-10-01 ENCOUNTER — PATIENT MESSAGE (OUTPATIENT)
Dept: OBSTETRICS AND GYNECOLOGY | Facility: CLINIC | Age: 26
End: 2021-10-01

## 2021-10-07 ENCOUNTER — HOSPITAL ENCOUNTER (EMERGENCY)
Facility: HOSPITAL | Age: 26
Discharge: HOME | End: 2021-10-07
Attending: EMERGENCY MEDICINE
Payer: COMMERCIAL

## 2021-10-07 VITALS
HEART RATE: 90 BPM | SYSTOLIC BLOOD PRESSURE: 110 MMHG | DIASTOLIC BLOOD PRESSURE: 65 MMHG | RESPIRATION RATE: 18 BRPM | OXYGEN SATURATION: 98 % | TEMPERATURE: 99 F

## 2021-10-07 DIAGNOSIS — S09.90XA CLOSED HEAD INJURY, INITIAL ENCOUNTER: Primary | ICD-10-CM

## 2021-10-07 DIAGNOSIS — M25.569 KNEE PAIN: ICD-10-CM

## 2021-10-07 LAB
B-HCG UR QL: NEGATIVE
CTP QC/QA: YES

## 2021-10-07 PROCEDURE — 96372 THER/PROPH/DIAG INJ SC/IM: CPT

## 2021-10-07 PROCEDURE — 81025 URINE PREGNANCY TEST: CPT | Performed by: EMERGENCY MEDICINE

## 2021-10-07 PROCEDURE — 25000003 PHARM REV CODE 250: Performed by: EMERGENCY MEDICINE

## 2021-10-07 PROCEDURE — 99284 PR EMERGENCY DEPT VISIT,LEVEL IV: ICD-10-PCS | Mod: ,,, | Performed by: EMERGENCY MEDICINE

## 2021-10-07 PROCEDURE — 63600175 PHARM REV CODE 636 W HCPCS: Performed by: EMERGENCY MEDICINE

## 2021-10-07 PROCEDURE — 99284 EMERGENCY DEPT VISIT MOD MDM: CPT | Mod: ,,, | Performed by: EMERGENCY MEDICINE

## 2021-10-07 PROCEDURE — 99284 EMERGENCY DEPT VISIT MOD MDM: CPT | Mod: 25

## 2021-10-07 RX ORDER — IBUPROFEN 400 MG/1
600 TABLET ORAL EVERY 6 HOURS PRN
Qty: 20 TABLET | Refills: 0 | Status: ON HOLD | OUTPATIENT
Start: 2021-10-07 | End: 2023-03-07 | Stop reason: HOSPADM

## 2021-10-07 RX ORDER — KETOROLAC TROMETHAMINE 30 MG/ML
10 INJECTION, SOLUTION INTRAMUSCULAR; INTRAVENOUS
Status: COMPLETED | OUTPATIENT
Start: 2021-10-07 | End: 2021-10-07

## 2021-10-07 RX ORDER — ACETAMINOPHEN 500 MG
500 TABLET ORAL EVERY 6 HOURS PRN
Qty: 20 TABLET | Refills: 0 | Status: ON HOLD | OUTPATIENT
Start: 2021-10-07 | End: 2023-03-07 | Stop reason: HOSPADM

## 2021-10-07 RX ORDER — ACETAMINOPHEN 325 MG/1
650 TABLET ORAL
Status: COMPLETED | OUTPATIENT
Start: 2021-10-07 | End: 2021-10-07

## 2021-10-07 RX ADMIN — KETOROLAC TROMETHAMINE 10 MG: 30 INJECTION, SOLUTION INTRAMUSCULAR at 03:10

## 2021-10-07 RX ADMIN — ACETAMINOPHEN 650 MG: 325 TABLET ORAL at 05:10

## 2021-10-19 ENCOUNTER — OFFICE VISIT (OUTPATIENT)
Dept: FAMILY MEDICINE | Facility: HOSPITAL | Age: 26
End: 2021-10-19
Attending: SPECIALIST
Payer: COMMERCIAL

## 2021-10-19 VITALS
DIASTOLIC BLOOD PRESSURE: 67 MMHG | SYSTOLIC BLOOD PRESSURE: 122 MMHG | HEIGHT: 60 IN | WEIGHT: 229.25 LBS | HEART RATE: 93 BPM | BODY MASS INDEX: 45.01 KG/M2

## 2021-10-19 DIAGNOSIS — M25.561 ACUTE PAIN OF RIGHT KNEE: Primary | ICD-10-CM

## 2021-10-19 DIAGNOSIS — R10.11 RIGHT UPPER QUADRANT PAIN: ICD-10-CM

## 2021-10-19 PROCEDURE — 99214 OFFICE O/P EST MOD 30 MIN: CPT | Performed by: STUDENT IN AN ORGANIZED HEALTH CARE EDUCATION/TRAINING PROGRAM

## 2021-10-21 ENCOUNTER — CLINICAL SUPPORT (OUTPATIENT)
Dept: REHABILITATION | Facility: HOSPITAL | Age: 26
End: 2021-10-21
Payer: COMMERCIAL

## 2021-10-21 ENCOUNTER — TELEPHONE (OUTPATIENT)
Dept: FAMILY MEDICINE | Facility: HOSPITAL | Age: 26
End: 2021-10-21

## 2021-10-21 DIAGNOSIS — M25.561 ACUTE PAIN OF RIGHT KNEE: ICD-10-CM

## 2021-10-21 PROCEDURE — 97161 PT EVAL LOW COMPLEX 20 MIN: CPT

## 2021-10-25 ENCOUNTER — CLINICAL SUPPORT (OUTPATIENT)
Dept: REHABILITATION | Facility: HOSPITAL | Age: 26
End: 2021-10-25
Payer: COMMERCIAL

## 2021-10-25 DIAGNOSIS — M25.561 ACUTE PAIN OF RIGHT KNEE: Primary | ICD-10-CM

## 2021-10-25 PROCEDURE — 97110 THERAPEUTIC EXERCISES: CPT

## 2021-10-25 PROCEDURE — 97116 GAIT TRAINING THERAPY: CPT

## 2021-10-25 PROCEDURE — 97010 HOT OR COLD PACKS THERAPY: CPT

## 2021-10-28 ENCOUNTER — PATIENT MESSAGE (OUTPATIENT)
Dept: FAMILY MEDICINE | Facility: HOSPITAL | Age: 26
End: 2021-10-28
Payer: MEDICAID

## 2021-11-04 ENCOUNTER — CLINICAL SUPPORT (OUTPATIENT)
Dept: REHABILITATION | Facility: HOSPITAL | Age: 26
End: 2021-11-04
Payer: COMMERCIAL

## 2021-11-04 DIAGNOSIS — M25.561 ACUTE PAIN OF RIGHT KNEE: ICD-10-CM

## 2021-11-04 PROCEDURE — 97110 THERAPEUTIC EXERCISES: CPT

## 2021-11-08 DIAGNOSIS — M25.561 ACUTE PAIN OF RIGHT KNEE: Primary | ICD-10-CM

## 2021-11-09 ENCOUNTER — OFFICE VISIT (OUTPATIENT)
Dept: ORTHOPEDICS | Facility: CLINIC | Age: 26
End: 2021-11-09
Payer: COMMERCIAL

## 2021-11-09 ENCOUNTER — HOSPITAL ENCOUNTER (OUTPATIENT)
Dept: RADIOLOGY | Facility: HOSPITAL | Age: 26
Discharge: HOME | End: 2021-11-09
Attending: ORTHOPAEDIC SURGERY
Payer: COMMERCIAL

## 2021-11-09 VITALS
WEIGHT: 227.88 LBS | HEART RATE: 98 BPM | SYSTOLIC BLOOD PRESSURE: 144 MMHG | BODY MASS INDEX: 44.74 KG/M2 | DIASTOLIC BLOOD PRESSURE: 80 MMHG | HEIGHT: 60 IN

## 2021-11-09 DIAGNOSIS — M25.569 KNEE PAIN: ICD-10-CM

## 2021-11-09 DIAGNOSIS — M25.561 ACUTE PAIN OF RIGHT KNEE: ICD-10-CM

## 2021-11-09 PROCEDURE — 20610 DRAIN/INJ JOINT/BURSA W/O US: CPT | Mod: RT,S$GLB,, | Performed by: ORTHOPAEDIC SURGERY

## 2021-11-09 PROCEDURE — 73564 X-RAY EXAM KNEE 4 OR MORE: CPT | Mod: 26,RT,, | Performed by: INTERNAL MEDICINE

## 2021-11-09 PROCEDURE — 3008F PR BODY MASS INDEX (BMI) DOCUMENTED: ICD-10-PCS | Mod: CPTII,S$GLB,, | Performed by: ORTHOPAEDIC SURGERY

## 2021-11-09 PROCEDURE — 3079F PR MOST RECENT DIASTOLIC BLOOD PRESSURE 80-89 MM HG: ICD-10-PCS | Mod: CPTII,S$GLB,, | Performed by: ORTHOPAEDIC SURGERY

## 2021-11-09 PROCEDURE — 99999 PR PBB SHADOW E&M-EST. PATIENT-LVL IV: CPT | Mod: PBBFAC,,, | Performed by: ORTHOPAEDIC SURGERY

## 2021-11-09 PROCEDURE — 3077F PR MOST RECENT SYSTOLIC BLOOD PRESSURE >= 140 MM HG: ICD-10-PCS | Mod: CPTII,S$GLB,, | Performed by: ORTHOPAEDIC SURGERY

## 2021-11-09 PROCEDURE — 99203 PR OFFICE/OUTPT VISIT, NEW, LEVL III, 30-44 MIN: ICD-10-PCS | Mod: 25,S$GLB,, | Performed by: ORTHOPAEDIC SURGERY

## 2021-11-09 PROCEDURE — 99203 OFFICE O/P NEW LOW 30 MIN: CPT | Mod: 25,S$GLB,, | Performed by: ORTHOPAEDIC SURGERY

## 2021-11-09 PROCEDURE — 1159F PR MEDICATION LIST DOCUMENTED IN MEDICAL RECORD: ICD-10-PCS | Mod: CPTII,S$GLB,, | Performed by: ORTHOPAEDIC SURGERY

## 2021-11-09 PROCEDURE — 99999 PR PBB SHADOW E&M-EST. PATIENT-LVL IV: ICD-10-PCS | Mod: PBBFAC,,, | Performed by: ORTHOPAEDIC SURGERY

## 2021-11-09 PROCEDURE — 20610 LARGE JOINT ASPIRATION/INJECTION: R KNEE: ICD-10-PCS | Mod: RT,S$GLB,, | Performed by: ORTHOPAEDIC SURGERY

## 2021-11-09 PROCEDURE — 73564 X-RAY EXAM KNEE 4 OR MORE: CPT | Mod: TC,FY,RT

## 2021-11-09 PROCEDURE — 3079F DIAST BP 80-89 MM HG: CPT | Mod: CPTII,S$GLB,, | Performed by: ORTHOPAEDIC SURGERY

## 2021-11-09 PROCEDURE — 73564 XR KNEE COMP 4 OR MORE VIEWS RIGHT: ICD-10-PCS | Mod: 26,RT,, | Performed by: INTERNAL MEDICINE

## 2021-11-09 PROCEDURE — 3077F SYST BP >= 140 MM HG: CPT | Mod: CPTII,S$GLB,, | Performed by: ORTHOPAEDIC SURGERY

## 2021-11-09 PROCEDURE — 1159F MED LIST DOCD IN RCRD: CPT | Mod: CPTII,S$GLB,, | Performed by: ORTHOPAEDIC SURGERY

## 2021-11-09 PROCEDURE — 3008F BODY MASS INDEX DOCD: CPT | Mod: CPTII,S$GLB,, | Performed by: ORTHOPAEDIC SURGERY

## 2021-11-09 RX ORDER — KETOROLAC TROMETHAMINE 30 MG/ML
30 INJECTION, SOLUTION INTRAMUSCULAR; INTRAVENOUS
Status: DISCONTINUED | OUTPATIENT
Start: 2021-11-09 | End: 2021-11-09 | Stop reason: HOSPADM

## 2021-11-09 RX ORDER — LIDOCAINE HYDROCHLORIDE 10 MG/ML
4 INJECTION INFILTRATION; PERINEURAL
Status: DISCONTINUED | OUTPATIENT
Start: 2021-11-09 | End: 2021-11-09 | Stop reason: HOSPADM

## 2021-11-09 RX ORDER — BENZONATATE 100 MG/1
CAPSULE ORAL
COMMUNITY
Start: 2021-10-07

## 2021-11-09 RX ORDER — BUPIVACAINE HYDROCHLORIDE 5 MG/ML
5 INJECTION, SOLUTION PERINEURAL
Status: DISCONTINUED | OUTPATIENT
Start: 2021-11-09 | End: 2021-11-09 | Stop reason: HOSPADM

## 2021-11-09 RX ADMIN — KETOROLAC TROMETHAMINE 30 MG: 30 INJECTION, SOLUTION INTRAMUSCULAR; INTRAVENOUS at 02:11

## 2021-11-09 RX ADMIN — BUPIVACAINE HYDROCHLORIDE 5 ML: 5 INJECTION, SOLUTION PERINEURAL at 02:11

## 2021-11-09 RX ADMIN — LIDOCAINE HYDROCHLORIDE 4 ML: 10 INJECTION INFILTRATION; PERINEURAL at 02:11

## 2021-11-11 ENCOUNTER — CLINICAL SUPPORT (OUTPATIENT)
Dept: REHABILITATION | Facility: HOSPITAL | Age: 26
End: 2021-11-11
Payer: COMMERCIAL

## 2021-11-11 DIAGNOSIS — M25.561 ACUTE PAIN OF RIGHT KNEE: ICD-10-CM

## 2021-11-11 PROCEDURE — 97110 THERAPEUTIC EXERCISES: CPT

## 2021-11-16 ENCOUNTER — CLINICAL SUPPORT (OUTPATIENT)
Dept: REHABILITATION | Facility: HOSPITAL | Age: 26
End: 2021-11-16
Payer: COMMERCIAL

## 2021-11-16 DIAGNOSIS — M25.561 ACUTE PAIN OF RIGHT KNEE: ICD-10-CM

## 2021-11-16 PROCEDURE — 97110 THERAPEUTIC EXERCISES: CPT

## 2021-11-26 ENCOUNTER — CLINICAL SUPPORT (OUTPATIENT)
Dept: REHABILITATION | Facility: HOSPITAL | Age: 26
End: 2021-11-26
Payer: COMMERCIAL

## 2021-11-26 DIAGNOSIS — M25.561 ACUTE PAIN OF RIGHT KNEE: ICD-10-CM

## 2021-11-26 PROCEDURE — 97110 THERAPEUTIC EXERCISES: CPT

## 2021-12-02 ENCOUNTER — CLINICAL SUPPORT (OUTPATIENT)
Dept: REHABILITATION | Facility: HOSPITAL | Age: 26
End: 2021-12-02
Payer: COMMERCIAL

## 2021-12-02 DIAGNOSIS — M25.561 ACUTE PAIN OF RIGHT KNEE: ICD-10-CM

## 2021-12-02 PROCEDURE — 97116 GAIT TRAINING THERAPY: CPT

## 2021-12-02 PROCEDURE — 97110 THERAPEUTIC EXERCISES: CPT

## 2021-12-16 ENCOUNTER — CLINICAL SUPPORT (OUTPATIENT)
Dept: REHABILITATION | Facility: HOSPITAL | Age: 26
End: 2021-12-16
Payer: COMMERCIAL

## 2021-12-16 DIAGNOSIS — M25.561 ACUTE PAIN OF RIGHT KNEE: ICD-10-CM

## 2021-12-16 PROCEDURE — 97110 THERAPEUTIC EXERCISES: CPT

## 2022-02-05 NOTE — PROGRESS NOTES
Patient is feeling occasional contractions- lower back and stomach pains.  Occurs every 40 minutes or so.  At one point, she felt them every 15 minutes.  Bearable, not extremely painful.  Tylenol works for pain and then she is able to sleep.  Also reports some spotting as well.  Did not fill a pad with blood.  Noticed mucous and and a small amount of pinkish discharge.  No fluid loss.  Good movement.  Some mucous discharge.    Cervix: 1.5 cm dilated today  Not currently carlo.  Plan for betamethasone today and tomorrow. Strict labor and delivery precautions if contractions/fluid loss.   Spoke with João at the rehab center regarding Pt. Per João, [facility] has been set up for transport.

## 2022-02-18 ENCOUNTER — OFFICE VISIT (OUTPATIENT)
Dept: OBSTETRICS AND GYNECOLOGY | Facility: CLINIC | Age: 27
End: 2022-02-18
Payer: COMMERCIAL

## 2022-02-18 VITALS
DIASTOLIC BLOOD PRESSURE: 70 MMHG | SYSTOLIC BLOOD PRESSURE: 94 MMHG | HEIGHT: 59 IN | WEIGHT: 222.25 LBS | BODY MASS INDEX: 44.8 KG/M2

## 2022-02-18 DIAGNOSIS — N89.8 VAGINAL ODOR: ICD-10-CM

## 2022-02-18 DIAGNOSIS — N89.8 VAGINAL DISCHARGE: ICD-10-CM

## 2022-02-18 DIAGNOSIS — B96.89 BV (BACTERIAL VAGINOSIS): ICD-10-CM

## 2022-02-18 DIAGNOSIS — Z32.00 PREGNANCY EXAMINATION OR TEST, PREGNANCY UNCONFIRMED: Primary | ICD-10-CM

## 2022-02-18 DIAGNOSIS — N76.0 BV (BACTERIAL VAGINOSIS): ICD-10-CM

## 2022-02-18 LAB
B-HCG UR QL: NEGATIVE
CTP QC/QA: YES

## 2022-02-18 PROCEDURE — 87210 PR  SMEAR,STAIN,WET MNT,INTERP: ICD-10-PCS | Mod: QW,S$GLB,, | Performed by: OBSTETRICS & GYNECOLOGY

## 2022-02-18 PROCEDURE — 87481 CANDIDA DNA AMP PROBE: CPT | Mod: 59 | Performed by: OBSTETRICS & GYNECOLOGY

## 2022-02-18 PROCEDURE — 87801 DETECT AGNT MULT DNA AMPLI: CPT | Performed by: OBSTETRICS & GYNECOLOGY

## 2022-02-18 PROCEDURE — 99999 PR PBB SHADOW E&M-EST. PATIENT-LVL III: ICD-10-PCS | Mod: PBBFAC,,, | Performed by: OBSTETRICS & GYNECOLOGY

## 2022-02-18 PROCEDURE — 99213 OFFICE O/P EST LOW 20 MIN: CPT | Mod: S$GLB,,, | Performed by: OBSTETRICS & GYNECOLOGY

## 2022-02-18 PROCEDURE — 81025 POCT URINE PREGNANCY: ICD-10-PCS | Mod: S$GLB,,, | Performed by: OBSTETRICS & GYNECOLOGY

## 2022-02-18 PROCEDURE — 87210 SMEAR WET MOUNT SALINE/INK: CPT | Mod: QW,S$GLB,, | Performed by: OBSTETRICS & GYNECOLOGY

## 2022-02-18 PROCEDURE — 3074F PR MOST RECENT SYSTOLIC BLOOD PRESSURE < 130 MM HG: ICD-10-PCS | Mod: CPTII,S$GLB,, | Performed by: OBSTETRICS & GYNECOLOGY

## 2022-02-18 PROCEDURE — 1159F MED LIST DOCD IN RCRD: CPT | Mod: CPTII,S$GLB,, | Performed by: OBSTETRICS & GYNECOLOGY

## 2022-02-18 PROCEDURE — 81025 URINE PREGNANCY TEST: CPT | Mod: S$GLB,,, | Performed by: OBSTETRICS & GYNECOLOGY

## 2022-02-18 PROCEDURE — 3078F PR MOST RECENT DIASTOLIC BLOOD PRESSURE < 80 MM HG: ICD-10-PCS | Mod: CPTII,S$GLB,, | Performed by: OBSTETRICS & GYNECOLOGY

## 2022-02-18 PROCEDURE — 99999 PR PBB SHADOW E&M-EST. PATIENT-LVL III: CPT | Mod: PBBFAC,,, | Performed by: OBSTETRICS & GYNECOLOGY

## 2022-02-18 PROCEDURE — 1159F PR MEDICATION LIST DOCUMENTED IN MEDICAL RECORD: ICD-10-PCS | Mod: CPTII,S$GLB,, | Performed by: OBSTETRICS & GYNECOLOGY

## 2022-02-18 PROCEDURE — 3008F BODY MASS INDEX DOCD: CPT | Mod: CPTII,S$GLB,, | Performed by: OBSTETRICS & GYNECOLOGY

## 2022-02-18 PROCEDURE — 99213 PR OFFICE/OUTPT VISIT, EST, LEVL III, 20-29 MIN: ICD-10-PCS | Mod: S$GLB,,, | Performed by: OBSTETRICS & GYNECOLOGY

## 2022-02-18 PROCEDURE — 3074F SYST BP LT 130 MM HG: CPT | Mod: CPTII,S$GLB,, | Performed by: OBSTETRICS & GYNECOLOGY

## 2022-02-18 PROCEDURE — 87591 N.GONORRHOEAE DNA AMP PROB: CPT | Performed by: OBSTETRICS & GYNECOLOGY

## 2022-02-18 PROCEDURE — 3078F DIAST BP <80 MM HG: CPT | Mod: CPTII,S$GLB,, | Performed by: OBSTETRICS & GYNECOLOGY

## 2022-02-18 PROCEDURE — 87491 CHLMYD TRACH DNA AMP PROBE: CPT | Mod: 59 | Performed by: OBSTETRICS & GYNECOLOGY

## 2022-02-18 PROCEDURE — 3008F PR BODY MASS INDEX (BMI) DOCUMENTED: ICD-10-PCS | Mod: CPTII,S$GLB,, | Performed by: OBSTETRICS & GYNECOLOGY

## 2022-02-18 RX ORDER — METRONIDAZOLE 500 MG/1
500 TABLET ORAL EVERY 12 HOURS
Qty: 14 TABLET | Refills: 0 | Status: SHIPPED | OUTPATIENT
Start: 2022-02-18 | End: 2022-02-25

## 2022-02-18 NOTE — PROGRESS NOTES
"  Reason for visit: Vaginitis      HPI:    27 y.o. female  here for vaginal odor and discharge after using new soap. Denies any fevers, chills, abdominal pain.    No LMP recorded. Patient has had an implant.     Knows that her IUD is "low lying" on US but desires to keep    Contraception: Kyleena IUD      Reviewed:    Pap: 2021, NILM  Mammogram: N/A    Past medical, surgical, social, family, and obstetric history: Reviewed and updated in EMR.  Medications: Reviewed and updated in EMR.  Allergies: Patient has no known allergies.    Prior records and results: reviewed  Outside records: n/a  Independent interpretation of tests: n/a  Discussion with another healthcare professional: n/a      Vitals: BP 94/70 (BP Location: Right arm, Patient Position: Sitting)   Ht 4' 11" (1.499 m)   Wt 100.8 kg (222 lb 3.6 oz)   BMI 44.88 kg/m²     Physical Exam  Genitourinary:      Vulva normal.      Right Labia: No rash, lesions or Bartholin's cyst.     Left Labia: No lesions, Bartholin's cyst or rash.     Vaginal discharge present.      No vaginal bleeding.        Right Adnexa: not tender and not full.     Left Adnexa: not tender and not full.     No cervical motion tenderness, discharge or lesion.      IUD strings visualized.      Uterus is not enlarged or tender.      Pelvic exam was performed with patient in the lithotomy position.   Exam conducted with a chaperone present.     Wet prep +clue cells      Assessment & Plan:    Pregnancy examination or test, pregnancy unconfirmed  -     POCT urine pregnancy    Vaginal discharge  -     C. trachomatis/N. gonorrhoeae by AMP DNA [facility]; Cervicovaginal    Vaginal odor  -     Vaginosis Screen by DNA Probe    BV (bacterial vaginosis)  -     metroNIDAZOLE (FLAGYL) 500 MG tablet; Take 1 tablet (500 mg total) by mouth every 12 (twelve) hours. for 7 days  Dispense: 14 tablet; Refill: 0         Wet prep with +clue cells. Rx Flagyl provided   Affirm and STD testing " sent   Counseled re: IUD placement, that while it is within uterine cavity it is likely to be effective for contraception but we don't know if it is less effective than fundal placement. Patient desires to keep at this time.   Counseled to not place soap within vagina, vulvar care guidelines discussed

## 2022-02-22 LAB
C TRACH DNA SPEC QL NAA+PROBE: NOT DETECTED
N GONORRHOEA DNA SPEC QL NAA+PROBE: NOT DETECTED

## 2022-03-08 ENCOUNTER — DOCUMENTATION ONLY (OUTPATIENT)
Dept: REHABILITATION | Facility: HOSPITAL | Age: 27
End: 2022-03-08
Payer: COMMERCIAL

## 2022-03-08 NOTE — PROGRESS NOTES
Pt to be discharged from outpatient physical therapy services at this time. Pt has not attended therapy in >30 days without contacting clinic.

## 2022-05-12 ENCOUNTER — OFFICE VISIT (OUTPATIENT)
Dept: FAMILY MEDICINE | Facility: HOSPITAL | Age: 27
End: 2022-05-12
Attending: FAMILY MEDICINE
Payer: COMMERCIAL

## 2022-05-12 VITALS — WEIGHT: 224.88 LBS | BODY MASS INDEX: 44.15 KG/M2 | HEIGHT: 60 IN

## 2022-05-12 DIAGNOSIS — M94.0 COSTOCHONDRITIS: ICD-10-CM

## 2022-05-12 DIAGNOSIS — E66.01 CLASS 3 SEVERE OBESITY DUE TO EXCESS CALORIES WITH BODY MASS INDEX (BMI) OF 40.0 TO 44.9 IN ADULT, UNSPECIFIED WHETHER SERIOUS COMORBIDITY PRESENT: Primary | ICD-10-CM

## 2022-05-12 PROCEDURE — 99213 OFFICE O/P EST LOW 20 MIN: CPT | Performed by: STUDENT IN AN ORGANIZED HEALTH CARE EDUCATION/TRAINING PROGRAM

## 2022-05-12 RX ORDER — DICLOFENAC SODIUM 10 MG/G
2 GEL TOPICAL 4 TIMES DAILY
Qty: 450 G | Refills: 0 | Status: SHIPPED | OUTPATIENT
Start: 2022-05-12 | End: 2022-07-11

## 2022-05-12 NOTE — PROGRESS NOTES
dHistory & Physical  [facility]    Chief Complaint:   Chief Complaint   Patient presents with    Weight Loss        History of Present Illness:    Jacobo Alvarado is a 27 y.o.female who  has a past medical history of Chlamydia and Miscarriage.. Patient presents today to discuss weight loss. Patient has an IUD, Kyleena. Was taking diet pills off [employer], works 10 hours/day. Diet tries to eat 1200 kcal/day. Uses bathroom 2x week. Feels backed up.     Discussed healthy weight loss vs fad diets, water weight vs fat vs muscle. Patient is not eating enough food, and required education on nutrition, given. Patient advised on healthful exercise habits, diet, and body image.     Past Medical History:   Diagnosis Date    Chlamydia     Miscarriage        Past Surgical History:   Procedure Laterality Date    DILATION AND CURETTAGE OF UTERUS  04/30/2018       Family History   Problem Relation Age of Onset    Breast cancer Other     Diabetes Mother     Kidney disease Mother     Ovarian cancer Neg Hx     Colon cancer Neg Hx        Social History     Socioeconomic History    Marital status: Single   Tobacco Use    Smoking status: Never Smoker    Smokeless tobacco: Never Used   Substance and Sexual Activity    Alcohol use: Yes    Drug use: No    Sexual activity: Yes     Partners: Male     Birth control/protection: I.U.D.     Social Determinants of Health     Financial Resource Strain: Unknown    Difficulty of Paying Living Expenses: Patient refused   Food Insecurity: Unknown    Worried About Running Out of Food in the Last Year: Patient refused    Ran Out of Food in the Last Year: Patient refused   Transportation Needs: Unknown    Lack of Transportation (Medical): Patient refused    Lack of Transportation (Non-Medical): Patient refused   Physical Activity: Insufficiently Active    Days of Exercise per Week: 3 days    Minutes of Exercise per Session: 20 min   Stress: No Stress Concern Present     Feeling of Stress : Not at all   Social Connections: Unknown    Frequency of Communication with Friends and Family: More than three times a week    Frequency of Social Gatherings with Friends and Family: Twice a week    Active Member of Clubs or Organizations: No    Attends Club or Organization Meetings: Never    Marital Status: Patient refused   Housing Stability: Unknown    Unable to Pay for Housing in the Last Year: No    Number of Places Lived in the Last Year: 1    Unstable Housing in the Last Year: Patient refused       Current Outpatient Medications   Medication Sig Dispense Refill    acetaminophen (TYLENOL) 500 MG tablet Take 1 tablet (500 mg total) by mouth every 6 (six) hours as needed for Pain. (Patient not taking: Reported on 2/18/2022) 20 tablet 0    benzonatate (TESSALON) 100 MG capsule       ibuprofen (ADVIL,MOTRIN) 400 MG tablet Take 1.5 tablets (600 mg total) by mouth every 6 (six) hours as needed for Other (pain). (Patient not taking: Reported on 2/18/2022) 20 tablet 0     No current facility-administered medications for this visit.       Review of patient's allergies indicates:  No Known Allergies      Review of Systems    ROS     Objective:    Vital Signs (Most Recent):  There were no vitals filed for this visit.  Body mass index is 43.92 kg/m².    Physical Exam:    Physical Exam  Vitals and nursing note reviewed.   Constitutional:       Appearance: Normal appearance. She is obese.   Cardiovascular:      Rate and Rhythm: Normal rate and regular rhythm.      Heart sounds: Normal heart sounds.   Pulmonary:      Effort: Pulmonary effort is normal.      Breath sounds: Normal breath sounds.   Abdominal:      General: Abdomen is flat.      Palpations: Abdomen is soft.   Musculoskeletal:      Cervical back: Normal range of motion and neck supple.   Skin:     General: Skin is warm and dry.   Neurological:      General: No focal deficit present.      Mental Status: She is alert and oriented to  person, place, and time.   Psychiatric:         Mood and Affect: Mood normal.         Behavior: Behavior normal.          Laboratory:    Reviewed labs    Imaging:  Reviewed       Assessment Plan  Jacobo Alvarado is a 27 y.o. female presenting to clinic for weight loss, establishing care, costochondritis mild TTP.     1. Class 3 severe obesity due to excess calories with body mass index (BMI) of 40.0 to 44.9 in adult, unspecified whether serious comorbidity present  CBC Auto Differential    Comprehensive Metabolic Panel    TSH    T4, Free    Hemoglobin A1C    Ambulatory referral/consult to Bariatric Medicine   2. Costochondritis  diclofenac sodium (VOLTAREN) 1 % Gel       Follow Up:     The patient's diagnosis and medications were discussed.    I will review labs and notify patient with results either by mail or contact by phone.      05/12/2022  Valentina Beltran M.D.  [facility] PGY-3    *This note was dictated using the M*Modal Fluency Direct word recognition program. There are word recognition mistakes that are occasionally missed on review.

## 2022-05-13 ENCOUNTER — LAB VISIT (OUTPATIENT)
Dept: LAB | Facility: HOSPITAL | Age: 27
End: 2022-05-13
Attending: STUDENT IN AN ORGANIZED HEALTH CARE EDUCATION/TRAINING PROGRAM
Payer: COMMERCIAL

## 2022-05-13 DIAGNOSIS — E66.01 CLASS 3 SEVERE OBESITY DUE TO EXCESS CALORIES WITH BODY MASS INDEX (BMI) OF 40.0 TO 44.9 IN ADULT, UNSPECIFIED WHETHER SERIOUS COMORBIDITY PRESENT: ICD-10-CM

## 2022-05-13 LAB
ALBUMIN SERPL BCP-MCNC: 3.8 G/DL (ref 3.5–5.2)
ALP SERPL-CCNC: 88 U/L (ref 55–135)
ALT SERPL W/O P-5'-P-CCNC: 10 U/L (ref 10–44)
ANION GAP SERPL CALC-SCNC: 12 MMOL/L (ref 8–16)
AST SERPL-CCNC: 16 U/L (ref 10–40)
BASOPHILS # BLD AUTO: 0.04 K/UL (ref 0–0.2)
BASOPHILS NFR BLD: 0.5 % (ref 0–1.9)
BILIRUB SERPL-MCNC: 0.3 MG/DL (ref 0.1–1)
BUN SERPL-MCNC: 6 MG/DL (ref 6–20)
CALCIUM SERPL-MCNC: 9.7 MG/DL (ref 8.7–10.5)
CHLORIDE SERPL-SCNC: 104 MMOL/L (ref 95–110)
CO2 SERPL-SCNC: 24 MMOL/L (ref 23–29)
CREAT SERPL-MCNC: 0.7 MG/DL (ref 0.5–1.4)
DIFFERENTIAL METHOD: ABNORMAL
EOSINOPHIL # BLD AUTO: 1.2 K/UL (ref 0–0.5)
EOSINOPHIL NFR BLD: 13.8 % (ref 0–8)
ERYTHROCYTE [DISTWIDTH] IN BLOOD BY AUTOMATED COUNT: 14.3 % (ref 11.5–14.5)
EST. GFR  (AFRICAN AMERICAN): >60 ML/MIN/1.73 M^2
EST. GFR  (NON AFRICAN AMERICAN): >60 ML/MIN/1.73 M^2
ESTIMATED AVG GLUCOSE: 108 MG/DL (ref 68–131)
GLUCOSE SERPL-MCNC: 87 MG/DL (ref 70–110)
HBA1C MFR BLD: 5.4 % (ref 4–5.6)
HCT VFR BLD AUTO: 40.8 % (ref 37–48.5)
HGB BLD-MCNC: 12.8 G/DL (ref 12–16)
IMM GRANULOCYTES # BLD AUTO: 0.03 K/UL (ref 0–0.04)
IMM GRANULOCYTES NFR BLD AUTO: 0.3 % (ref 0–0.5)
LYMPHOCYTES # BLD AUTO: 1.8 K/UL (ref 1–4.8)
LYMPHOCYTES NFR BLD: 21 % (ref 18–48)
MCH RBC QN AUTO: 26.1 PG (ref 27–31)
MCHC RBC AUTO-ENTMCNC: 31.4 G/DL (ref 32–36)
MCV RBC AUTO: 83 FL (ref 82–98)
MONOCYTES # BLD AUTO: 0.9 K/UL (ref 0.3–1)
MONOCYTES NFR BLD: 10.3 % (ref 4–15)
NEUTROPHILS # BLD AUTO: 4.7 K/UL (ref 1.8–7.7)
NEUTROPHILS NFR BLD: 54.1 % (ref 38–73)
NRBC BLD-RTO: 0 /100 WBC
PLATELET # BLD AUTO: 351 K/UL (ref 150–450)
PMV BLD AUTO: 10.1 FL (ref 9.2–12.9)
POTASSIUM SERPL-SCNC: 4.3 MMOL/L (ref 3.5–5.1)
PROT SERPL-MCNC: 7.9 G/DL (ref 6–8.4)
RBC # BLD AUTO: 4.9 M/UL (ref 4–5.4)
SODIUM SERPL-SCNC: 140 MMOL/L (ref 136–145)
T4 FREE SERPL-MCNC: 0.84 NG/DL (ref 0.71–1.51)
TSH SERPL DL<=0.005 MIU/L-ACNC: 1.33 UIU/ML (ref 0.4–4)
WBC # BLD AUTO: 8.75 K/UL (ref 3.9–12.7)

## 2022-05-13 PROCEDURE — 80053 COMPREHEN METABOLIC PANEL: CPT | Performed by: STUDENT IN AN ORGANIZED HEALTH CARE EDUCATION/TRAINING PROGRAM

## 2022-05-13 PROCEDURE — 36415 COLL VENOUS BLD VENIPUNCTURE: CPT | Performed by: STUDENT IN AN ORGANIZED HEALTH CARE EDUCATION/TRAINING PROGRAM

## 2022-05-13 PROCEDURE — 85025 COMPLETE CBC W/AUTO DIFF WBC: CPT | Performed by: STUDENT IN AN ORGANIZED HEALTH CARE EDUCATION/TRAINING PROGRAM

## 2022-05-13 PROCEDURE — 84439 ASSAY OF FREE THYROXINE: CPT | Performed by: STUDENT IN AN ORGANIZED HEALTH CARE EDUCATION/TRAINING PROGRAM

## 2022-05-13 PROCEDURE — 84443 ASSAY THYROID STIM HORMONE: CPT | Performed by: STUDENT IN AN ORGANIZED HEALTH CARE EDUCATION/TRAINING PROGRAM

## 2022-05-13 PROCEDURE — 83036 HEMOGLOBIN GLYCOSYLATED A1C: CPT | Performed by: STUDENT IN AN ORGANIZED HEALTH CARE EDUCATION/TRAINING PROGRAM

## 2022-05-16 ENCOUNTER — PATIENT MESSAGE (OUTPATIENT)
Dept: FAMILY MEDICINE | Facility: HOSPITAL | Age: 27
End: 2022-05-16
Payer: COMMERCIAL

## 2022-06-16 ENCOUNTER — TELEPHONE (OUTPATIENT)
Dept: BARIATRICS | Facility: CLINIC | Age: 27
End: 2022-06-16
Payer: COMMERCIAL

## 2022-07-12 ENCOUNTER — TELEPHONE (OUTPATIENT)
Dept: BARIATRICS | Facility: CLINIC | Age: 27
End: 2022-07-12
Payer: COMMERCIAL

## 2022-07-12 NOTE — TELEPHONE ENCOUNTER
Pt wt: 224lbs, ht: 5', BMI:43.7.    Spoke with pt and would like to proceed with appointments to workup for wt loss surgery. Discussed Bariatric program, standard and possible orders, insurance requirements found in guarantor note, introduced the assigned nurse coordinator and explained any of the bariatric nurses could help if assigned coordinator not available.   Scheduled KORIN consult and nutrition consult. Pt aware to look out for portal message with seminar link and to complete the online seminar and the assigned questionnaire. Dashboard updated to consult scheduled and guarantor note copied into bariatric dashboard. Scheduling information added to spreadsheet for Karoline Casarez, access navigator. Mailing address confirmed, explained  to expect a packet in mail  to complete and bring to nutrition appointment.  Instructed pt to reply to portal message or call [phone] with any further questions.

## 2022-07-12 NOTE — TELEPHONE ENCOUNTER
----- Message from Carlie Blum sent at 7/11/2022  5:04 PM CDT -----      Patient states she was waiting on a call to schedule surgery but hadn't heard from anyone  Please reach out to patient at your earliest opportunity to schedule    Patient can be contacted @# [phone]

## 2022-07-22 ENCOUNTER — TELEPHONE (OUTPATIENT)
Dept: BARIATRICS | Facility: CLINIC | Age: 27
End: 2022-07-22
Payer: COMMERCIAL

## 2022-07-22 NOTE — TELEPHONE ENCOUNTER
----- Message from Samm Ortiz sent at 7/22/2022  4:38 PM CDT -----  Regarding: resched her appt from 7/18      The Pt states that she would like a call back about resched her appt and was requesting either this coming Mon or Thurs of next week.     # [phone]

## 2022-07-28 ENCOUNTER — OFFICE VISIT (OUTPATIENT)
Dept: BARIATRICS | Facility: CLINIC | Age: 27
End: 2022-07-28
Payer: COMMERCIAL

## 2022-07-28 ENCOUNTER — CLINICAL SUPPORT (OUTPATIENT)
Dept: BARIATRICS | Facility: CLINIC | Age: 27
End: 2022-07-28
Payer: COMMERCIAL

## 2022-07-28 VITALS — HEIGHT: 60 IN | BODY MASS INDEX: 43.78 KG/M2 | WEIGHT: 223 LBS

## 2022-07-28 DIAGNOSIS — E66.01 MORBID OBESITY WITH BMI OF 40.0-44.9, ADULT: ICD-10-CM

## 2022-07-28 DIAGNOSIS — Z71.3 DIETARY COUNSELING AND SURVEILLANCE: Primary | ICD-10-CM

## 2022-07-28 DIAGNOSIS — E66.01 CLASS 3 SEVERE OBESITY DUE TO EXCESS CALORIES WITH SERIOUS COMORBIDITY AND BODY MASS INDEX (BMI) OF 40.0 TO 44.9 IN ADULT: Primary | ICD-10-CM

## 2022-07-28 PROCEDURE — 1160F PR REVIEW ALL MEDS BY PRESCRIBER/CLIN PHARMACIST DOCUMENTED: ICD-10-PCS | Mod: CPTII,95,, | Performed by: PHYSICIAN ASSISTANT

## 2022-07-28 PROCEDURE — 99205 OFFICE O/P NEW HI 60 MIN: CPT | Mod: 95,,, | Performed by: PHYSICIAN ASSISTANT

## 2022-07-28 PROCEDURE — 99499 UNLISTED E&M SERVICE: CPT | Mod: 95,,, | Performed by: DIETITIAN, REGISTERED

## 2022-07-28 PROCEDURE — 99205 PR OFFICE/OUTPT VISIT, NEW, LEVL V, 60-74 MIN: ICD-10-PCS | Mod: 95,,, | Performed by: PHYSICIAN ASSISTANT

## 2022-07-28 PROCEDURE — 1160F RVW MEDS BY RX/DR IN RCRD: CPT | Mod: CPTII,95,, | Performed by: PHYSICIAN ASSISTANT

## 2022-07-28 PROCEDURE — 1159F MED LIST DOCD IN RCRD: CPT | Mod: CPTII,95,, | Performed by: PHYSICIAN ASSISTANT

## 2022-07-28 PROCEDURE — 99499 NO LOS: ICD-10-PCS | Mod: 95,,, | Performed by: DIETITIAN, REGISTERED

## 2022-07-28 PROCEDURE — 3008F BODY MASS INDEX DOCD: CPT | Mod: CPTII,95,, | Performed by: PHYSICIAN ASSISTANT

## 2022-07-28 PROCEDURE — 3008F PR BODY MASS INDEX (BMI) DOCUMENTED: ICD-10-PCS | Mod: CPTII,95,, | Performed by: PHYSICIAN ASSISTANT

## 2022-07-28 PROCEDURE — 1159F PR MEDICATION LIST DOCUMENTED IN MEDICAL RECORD: ICD-10-PCS | Mod: CPTII,95,, | Performed by: PHYSICIAN ASSISTANT

## 2022-07-28 PROCEDURE — 3044F PR MOST RECENT HEMOGLOBIN A1C LEVEL <7.0%: ICD-10-PCS | Mod: CPTII,95,, | Performed by: PHYSICIAN ASSISTANT

## 2022-07-28 PROCEDURE — 3044F HG A1C LEVEL LT 7.0%: CPT | Mod: CPTII,95,, | Performed by: PHYSICIAN ASSISTANT

## 2022-07-28 NOTE — PATIENT INSTRUCTIONS
Prior to surgery you will need to complete:  - Dietitian consult and follow up appointments as needed  - Labs  - Chest X-ray  - EKG  - Psychological evaluation, Please call psychiatry [phone] to schedule    In preparation for bariatric surgery, please complete the following:   Discuss your current medications with your primary care provider, remember your medications will need to be crushed, chewable, or in liquid form for the first 3-6 months after a gastric bypass or sleeve.  For a gastric band, your medications will need to be crushed indefinitely.    If you take a blood thinner such as: Coumadin (warfarin), Pradaxa (dabigatran), or Plavix (clopidogrel), you will need to speak with your prescribing provider on how or if this medication can be stopped before surgery.   If you take a medication for depression or anxiety, you will need to begin crushing or opening the capsule 1-3 months prior to surgery.  Remember to discuss this with the psychologist or psychiatrist that you see.   If you take medication for arthritis on a daily basis that is considered a non-steroidal anti-inflammatory (NSAID), please discuss with your prescribing physician an alternative medication.  After having gastric bypass or gastric sleeve, this group of medications is not appropriate to take due to increased risk of bleeding stomach ulcers.      DEFINITIONS  Appointments: Pre-scheduled meetings or consultations with any physician, advanced practice provider, dietitian, or psychologist, and labs, imaging studies, sleep studies, etc.   Late cancellation: Cancelling an appointment 24-48 hours prior to scheduled time.  No-Show appointment:  is when   You do NOT arrive to your appointment at the time its scheduled.  You call to cancel or cancel via [employer] less than 24 hours in advance of your scheduled appointment  You show up 15 minutes AFTER your scheduled appointment time without any notification of being late.     POLICY  You are  allowed up to 3 cancellations for appointments.   After 3 cancellations your case will be placed on hold for 2 months and after that time you can resume the program.   You are allowed only 1 no-show for an appointment.   You will be re-scheduled one time and if there is a 2nd no-show at any point, your case will be placed on hold for 3 months.  After 3 months you can resume the program.     Upon resuming the program after being placed on hold for either above mentioned reasons, if you have a late cancel or no show for any appointment, the bariatric team will review if youre an appropriate candidate for surgery at the monthly meeting.

## 2022-07-28 NOTE — PROGRESS NOTES
BARIATRIC NEW PATIENT EVALUATION  The patient location is: home  The chief complaint leading to consultation is: consult     Visit type: audiovisual    Face to Face time with patient:30  45minutes of total time spent on the encounter, which includes face to face time and non-face to face time preparing to see the patient (eg, review of tests), Obtaining and/or reviewing separately obtained history, Documenting clinical information in the electronic or other health record, Independently interpreting results (not separately reported) and communicating results to the patient/family/caregiver, or Care coordination (not separately reported).         Each patient to whom he or she provides medical services by telemedicine is:  (1) informed of the relationship between the physician and patient and the respective role of any other health care provider with respect to management of the patient; and (2) notified that he or she may decline to receive medical services by telemedicine and may withdraw from such care at any time.    Notes:     CHIEF COMPLAINT:   Morbid Obesity Body mass index is 43.55 kg/m². and inability to lose weight.    HISTORY OF PRESENT ILLNESS:  Jacobo Alvarado  is a 27 y.o. female presenting for morbid obesity Body mass index is 43.55 kg/m². and inability to lose weight. Pt states that she has not always been overweight. 150 lb after her first daughter, states that she then was around 175-180 lbs. December 2017 started school working two jobs on August 2018 at 200 lbs states that with second daughter gained weight to where she is now ( daughter is currently is 3) and cannot lose it. The patient has tried medical weight loss, calorie deficit, exercise( limited by torn meniscus) . Presently at highest weight.    IBW: 126 lbs  PT goal: 150-175 lbs      PAST MEDICAL HISTORY:  Past Medical History:   Diagnosis Date    Anemia     Chlamydia     Disorder of kidney and ureter     Miscarriage           PAST SURGICAL HISTORY:  Past Surgical History:   Procedure Laterality Date    DILATION AND CURETTAGE OF UTERUS  04/30/2018       FAMILY HISTORY:  Family History   Problem Relation Age of Onset    Breast cancer Other     Diabetes Mother     Kidney disease Mother     Crohn's disease Father     No Known Problems Sister     No Known Problems Brother     No Known Problems Daughter     No Known Problems Daughter     Ovarian cancer Neg Hx     Colon cancer Neg Hx        SOCIAL HISTORY:  Social History     Socioeconomic History    Marital status: Single   Tobacco Use    Smoking status: Never Smoker    Smokeless tobacco: Never Used   Substance and Sexual Activity    Alcohol use: Yes    Drug use: No    Sexual activity: Yes     Partners: Male     Birth control/protection: I.U.D.     Social Determinants of Health     Financial Resource Strain: Unknown    Difficulty of Paying Living Expenses: Patient refused   Food Insecurity: Unknown    Worried About Running Out of Food in the Last Year: Patient refused    Ran Out of Food in the Last Year: Patient refused   Transportation Needs: Unknown    Lack of Transportation (Medical): Patient refused    Lack of Transportation (Non-Medical): Patient refused   Physical Activity: Insufficiently Active    Days of Exercise per Week: 3 days    Minutes of Exercise per Session: 20 min   Stress: No Stress Concern Present    Feeling of Stress : Not at all   Social Connections: Unknown    Frequency of Communication with Friends and Family: More than three times a week    Frequency of Social Gatherings with Friends and Family: Twice a week    Active Member of Clubs or Organizations: No    Attends Club or Organization Meetings: Never    Marital Status: Patient refused   Housing Stability: Unknown    Unable to Pay for Housing in the Last Year: No    Number of Places Lived in the Last Year: 1    Unstable Housing in the Last Year: Patient refused        MEDICATIONS:  Current Outpatient Medications   Medication Sig Dispense Refill    acetaminophen (TYLENOL) 500 MG tablet Take 1 tablet (500 mg total) by mouth every 6 (six) hours as needed for Pain. (Patient not taking: Reported on 2/18/2022) 20 tablet 0    benzonatate (TESSALON) 100 MG capsule       diclofenac sodium (VOLTAREN) 1 % Gel Apply 2 g topically 4 (four) times daily. 450 g 0    ibuprofen (ADVIL,MOTRIN) 400 MG tablet Take 1.5 tablets (600 mg total) by mouth every 6 (six) hours as needed for Other (pain). (Patient not taking: Reported on 2/18/2022) 20 tablet 0     No current facility-administered medications for this visit.       ALLERGIES:  Review of patient's allergies indicates:  No Known Allergies    ROS:  Review of Systems   Constitutional: Negative for chills, fever and weight loss.   HENT: Negative for sore throat and tinnitus.    Eyes: Negative for blurred vision and double vision.   Respiratory: Negative for cough and shortness of breath.    Cardiovascular: Negative for chest pain, palpitations and leg swelling.   Gastrointestinal: Positive for constipation (intermittent). Negative for abdominal pain, diarrhea, heartburn, nausea and vomiting.   Genitourinary: Negative for dysuria and hematuria.   Musculoskeletal: Positive for joint pain. Negative for back pain and neck pain.   Skin: Negative for rash.   Neurological: Negative for dizziness, tingling and headaches.   Psychiatric/Behavioral: Negative for depression and suicidal ideas. The patient is not nervous/anxious.        PE:  Vitals:    07/28/22 1006   Weight: 101.2 kg (223 lb)   Height: 5' (1.524 m)       Physical Exam  Constitutional:       Appearance: She is obese.   HENT:      Head: Normocephalic and atraumatic.   Eyes:      Extraocular Movements: Extraocular movements intact.      Conjunctiva/sclera: Conjunctivae normal.   Pulmonary:      Effort: Pulmonary effort is normal. No respiratory distress.   Abdominal:      General: Bowel  sounds are normal.      Palpations: Abdomen is soft.      Tenderness: There is no abdominal tenderness (per pt palpation ).   Musculoskeletal:      Cervical back: Normal range of motion and neck supple.   Skin:     Capillary Refill: Capillary refill takes less than 2 seconds.   Neurological:      General: No focal deficit present.      Mental Status: She is alert and oriented to person, place, and time.   Psychiatric:         Mood and Affect: Mood normal.         Behavior: Behavior normal.         Thought Content: Thought content normal.         Judgment: Judgment normal.          DIAGNOSIS:  1. Morbid Obesity with Body mass index is 43.55 kg/m². and inability to maintain weight loss.  2. Co-morbidities: N/a    PLAN:  The patient is Good candidate for Bariatric Surgery. she is interested in sleeve gastrectomy with Dr. Allen. The surgery and post-op care were discussed in detail with the patient. All questions were answered.    she understands that bariatric surgery is a tool to aid in weight loss and that she needs to be committed to the diet and exercise post-operatively for successful weight loss.  Discussed expected weight loss outcomes after surgery which is 50% of the excess weight on her frame.  Goal weight is 50%EWs.  Discussed with patient that bariatric surgery is not the easy way out and that it will take plenty of dedication on the patient's part to be successful. Also discussed the possibility of weight regain if the patient strays from the diet guidelines or exercise requirements. Patient verbalized understanding and wishes to proceed with the work-up.    ORDERS:  1. EKG and EGD  2. Psychological Consult and Bariatric Dietician Consult  3. Bariatric Labs: BMP, CBC, Folate Serum, H. pylori, HgA1C, Hepatic Panel/LFT, Iron & TIBC, Lipid Profile, Magnesium, Phosphate, T3, T4, TSH, Free T4, Vitamin B12, and Vitamin B1.  4. No NSAIDS after surgery due to increased risk of stomach ulcers. Talk to your  prescribing provider about alternative options for your pain.  5. Discussed the possibility of hyper-fertility surrounding 2 week liquid diet before and after surgery: advised patient to use 2 forms of birth control during this time period to avoid conception.  Patient verbalized understanding and will discuss options with her GYN.      Primary Physician: Barrera Koch MD  RTC: As scheduled.    60 minute visit, over 50% of time spent counseling patient face to face on surgical options, risks, benefits, expected diet, recommended exercise regimen, and expected weight loss.

## 2022-07-28 NOTE — PATIENT INSTRUCTIONS
NUTRITION    Before & After  BARIATRIC SURGERY        [facility]  Surgical Weight Loss Program          Table of Contents    Preparing for Bariatric Surgery: Nutrition3    Bariatric Nutrition Core Points and Recommendations for Optimal Results....5    Pre-op Liquid Protein Diet...6    Pre-op Protein Powder Suggestions.....7    Dietary Progression After Surgery..8    Bariatric High Protein Liquid Diet..9    Bariatric High Protein Puree Diet..10    Bariatric High Protein Soft Diet.12    Bariatric Regular Diet.15    Protein Content of Foods Recommended after Weight Loss Surgery16    Lifelong Nutrition Guidelines after Weight Loss Surgery...17    Common Nutritional Problems and Prevention Tips...18    Physical Activity...19    Required Vitamin/Mineral Supplements...20    Resources for Bariatric Patients.21    Ten Tip for Healthy And Conscious Eating..22                  Preparing for Bariatric Surgery: Nutrition    Bariatric surgery is a great tool in helping you lose weight. However, we need your help to make your surgery successful by following the nutrition plans before and after surgery. You will meet with a dietitian who will go over pre and post-surgery nutrition plans and will work with you to change your nutrition lifestyle. After your decision to have bariatric surgery, we ask that you start a high-protein/low- fat & low carbohydrate diet.     Below is a guideline to get you started.   All meals should only include lean meats/proteins and non-starchy vegetables. Fruits can be used as snacks or desserts. Try to avoid high sugar canned fruit (most canned fruit in syrup).   Eliminate empty calorie  snacks (cake, candy, chips) and eat high-protein snacks instead (cheese sticks, rolled deli meat, cottage cheese and tomatoes)     Do not include any additional foods to your meal, such as: Breads or starchy vegetables (ex. corn, potatoes, sweet potatoes, green peas) Exercise at least 3 times a week for 30 minutes, it does not have to be 30 minutes all at once!   Switch to smaller plates to help you with portion control.  Begin limiting carbonated beverages   You may begin substituting one meal for a protein shake. Limit Caffeine        All liquids should be sugar-free and low calorie.  No Juices Practice taking small bites and sips. Practice not drinking while eating.     Meal Base Options  Flavor your food with lemon, vinegar, herbs and spices for big flavor without added calories.     Lean Meats/Proteins: How to prepare: bake, broil, boil, roast, grill, sauté in Bridget or I can't believe it's not butter spray. Remove all visible fat before and after cooking.  NO BREADING or FRYING.    Poultry: skinless chicken or turkey (light/dark), ground (90% lean). Take off skin from poultry.  Fish/Shellfish: catfish, clams, crab, crawfish, lobster, salmon, shrimp, squid, tilapia, trout, tuna  Beef: tenderloin, roast (rib, alcira, rump), steak (sirloin, round, cubed, T-bone, flank), ground (90% lean)  Pork: lean ham, Wallisian mishra, tenderloin, center loin chop  Game: venison, rabbit, duck  Deli meats: turkey, roast beef, ham, chicken  Dairy: low fat or fat free (3gm fat per ounce), sliced or shredded cheese, string cheese, hard cheese, cottage cheese, Greek or low-fat yogurt (aim for less than 10g sugar)  Soy: tofu  Eggs: However you like them!  Beans and Legumes: red, white, black, lima, black-eyed-peas, chickpeas, lentils, edamame  Nuts and Seeds: unsalted, ¼ cup    Non-Starchy Vegetables: How to prepare: boil, bake, steam, roast, microwave, grill, sauté in cooking spray. Do not add cream or cheese sauce.    Broccoli  Cauliflower Carrots Onions Cabbage Radishes   Zucchini Okra Greens Peppers Spinach Turnips   Mushrooms Tomatoes Celery Lettuce Asparagus Eggplant   Green Onions  Kale  Green Beans Artichoke Squash  Cucumber Beets Brussel    Leeks Lettuce  Snow peas Sprouts     How To Choose A Protein Shake: Check label for no more than 4gm of total sugar.     Premade options:  Premier Protein and Premier Clear Muscle Milk     Clear Protein 2.0   Ensure Max Protein Pure Protein Shake   Slim fast: High Protein Quest Protein Shake   Equate High Performance  Total Lean from [facility]                                                      Zero Carb Isopure  [facility] Nutrition Plan or Core Power     Adding sugar-free flavor extracts and syrups can help add variety to your shakes.  You may create your own protein shake with protein powder; just make sure it fits into the rules.    How to Choose Fluids: All fluids before and after surgery should be sugar free, non-carbonated and low calorie (less than 15 calories per serving).    Options:  Plain Water (or Infused with lemon/lime/orange, berries, mint leaves, cucumber slices)  Flavored roberts - Propel Zero, Nestle Pure Life Splash, Aquafina Flavor Splash, Hint Water        Coffee or tea - (limit to one caffeinated drink per day) [place] Diet Green Tea, Diet Snapple Tea, Genaro diet green tea  Sugar free (SF) flavorings/Water enhancers - Crystal Light, Nino, Wyler's Light, SF Wesly-aid, SF Hawaiian Punch, Dasani Drops, Great Value/Market Pantry SF drink mix  Diet lemonade  Powerade or Gatorade Zero   Fuze Slenderize (Low carb)  Diet ocean spray cranberry juices or Diet V-8 Splash  SF Jello and SF popsicles  Low sodium broth  Aim to drink a minimum of 64 oz fluid per day!      Bariatric Nutrition Core Points and Contract Agreement  AVOID THESE FOODS   High in Fat/Sugar:   High fat milk (whole, 2%)  Butter, margarine, oil instead use Bridget sprays or I Can't Believe It's Not Butter Spray    Mayonnaise, sour cream, cream cheese, salad dressing (may use low fat versions of these items)  Ice Cream  Cakes, cookies, pies, desserts  Candy  Luncheon meats (bologna, salami, chopped ham)  Sausage, De La Paz  Gravy  Breaded and Fried Foods  Sugary drinks  Alcohol     Starchy Carbohydrates.    White and wheat Bread, muffins, bagels, English muffins, biscuits, buns, rolls, cornbread,   Rice, Pasta   Cereals (including grits, oatmeal)   Crackers, Pretzels, Chips, Granola  Corn, Popcorn, Peas, Quinoa  White Potatoes, Sweet potatoes  Flour and corn tortillas   Practice NOT DRINKING   Carbonated drinks  Using a straw     c     Eat protein-source for breakfast (i.e. eggs, low-fat cottage cheese, Greek yogurt, sliced deli turkey, low-fat sliced cheese, protein drinks or bars with 4 gms of sugar or less)  c     Limit starchy carbohydrates (bread, rice, pasta, potatoes, corn, grits, oatmeal, etc)  c     Plan to eat 4-6 small meals per day. Protein drinks should be used for 1-2 of the small meals.  c     Measure portion sizes. Small plates, bowls and cups make smaller portions look bigger.  c     Limit eating out; make better choices when eating out (low fat/low carb)  c     Include fruits and vegetables in the diet DAILY  c     Avoid/Limit Desserts/candy   c     Low-fat diet (Baked, broiled, grilled, and boiled instead of fried, sautéed, creamed)  c     Increase activity (walking, swimming, exercise videos)  c     Limit sugary, caffeinated and carbonated beverages  c     Aim for 64 oz. water per day  Limit alcohol  Practice chewing foods thoroughly.  Practice sipping beverages--no chugging or gulping.  No Straws.  NO LIQUIDS 30 MIN. BEFORE, DURING, AND 30 MIN. AFTER MEALS.  Keep food logs and bring to each visit for review    I have been educated on the above lifestyle and nutrition changes regarding weight loss surgery.  I understand and agree that following these guidelines will help me to lose weight and maintain my  "health long-term.    Patient Signature ______________________________________   Date ____________________    Dietitian Signature ______________________________________         CONTACT INFORMATION  Maxine Chester, RD, LDN  Sonam Espino, MS, RD, LDN, CSOWM [facility], 2nd Floor [address] [place] [address] PHONE: [phone] FAX: [phone]     Send us a message anytime using your [facility] account.  Pre-op Liquid Protein Diet        Two weeks prior to your bariatric surgery, your surgeon and dietitian will have you start a liquid diet. (One week liquid diet if your BMI is under 40). You will lose weight by making these changes before surgery, which will shrink your liver and decrease the size of your abdomen.  This will help to decrease your risk of complications during surgery.  Your dietitian will help you to decide which protein drinks are right for you.      There are a number of places where protein drinks are available. They can be found at the local grocery, health or drug store and even online. Aim for 600-800 calories and  grams of protein per day while on the liquid diet. Please read the label - no more than 4 grams of sugar per serving. Listed below are some of the protein supplements [facility] recommends, along with where to buy them locally or online.     Ready-to-Drink (RTD) Suggestions    BOTTLED MILK SHAKES Where to Purchase Calories Protein (g) Sugar (g)   Premier Protein Larry's, [employer], [facility] 160 30 1   Equate High Performance [facility] 160 30 1   Muscle Milk (lactose free) [facility], [facility] 160 20 0-3   [place] Nutrition Plan or Core Power (lactose free) Larry's 170 30 4   Total Lean 25 (lactose free) [facility] 170 25 2   Quest protein shakes Larry's, [facility] 160 30 1   SMOOTHIES       "The Gladiator" 20oz (without fruit) Joshua Funes 180 45 0   Protein Velvet Ice   PJ's Coffee 230 21 8   BOTTLED CLEAR DRINKS     "   Premier Clear Maia [facility] 90 20 0   Protein. 2.O [employer], [facility] 60 15 0   Isopure Zero Carb  40 0    Clear Larry's, [facility], Amazon 110 25 0              6    Protein Powder Suggestions    Don't forget to count the calories and protein from the liquids that you add to your protein powders in your food journal. Protein powders may be mixed with fat-free or 1% milk. Lactose-free options include: water, sugar-free flavored beverages such as Crystal Light, Fairlife fat-free milk (13g protein per 8oz!), unsweetened soy milk, or unsweetened almond milk. Do not add fruit, yogurt, honey, peanut butter, or vegetables to protein shakes. You may add flavor extracts or sugar-free syrups (shop online or at [employer]) for flavor variety.    Product Where to Purchase Calories Protein (g) Sugar (g)   Body [place] COINPLUS 140 26 1   Pure Protein COINPLUS  20-23 0-2   Unjury  [email or web address]  20 3-4    Suman Carlos egg white (Stevia) (lactose free) [email or web address] 120 24 0-1   Nectar (lactose free)  (wide variety of flavors) Vitamin Shoppe, Amazon 100 24 0    powder  (fruity pepples/[place] harlan) Larry's, [email or web address] 120 25 1   Isopure Zero Carb  GNC   105 25 0     Lactose-free options: Fairlife shakes, Muscle Milk, GNC Total Lean 25 (RTD and powder), Isopure zero carb (RTD and powder), Unjury PLANTED (powder), Suman Carlos egg white (powder), Nectar (powder), Protein 2 O (RTD)    Unflavored options:  Unjury, Suman Carlos egg white, Isopure Zero Carb    Protein Soup Suggestions    Product Flavors Where to purchase calories protein sugar   Celebrate Cream of Vegetable, Cream of Broccoli & Cheese, Tomato soup [email or web address]  15 < 1   Unjury Chicken soup, [place] [email or web address]   21-22 0-1           Dietary Progression after Bariatric Surgery    DIETARY  PHASE Time frame  POST-SURGERY FOODS AND BEVERAGES   1  LIQUID       First 2 weeks         Sugar-free decaffeinated  non-carbonated beverages  Protein shakes with 4 grams of sugar or less   Protein soups  NO FRUIT, FRUIT JUICES, VEGETABLES OR YOGURT ADDED TO PROTEIN SHAKES   2  PUREE       2-4 weeks after surgery All Phase 1 liquids  &  Pureed lean meats, seafood and beans  Soft scrambled egg  Low fat dairy    3  SOFT       1-2 months after surgery All Phase 2 food and beverages  &  Cooked fork tender lean meats, fish and seafood  Lean deli meats  Eggs-scrambled, boiled and poached  Fruits and cooked vegetables - no peel   4  SOLID     2-3 Months after surgery and continue lifelong All Phase 3 food and beverages.   &  Raw vegetables and lettuce   Fruit with peel  Nuts and seeds  Protein bars with 4 grams of sugar or less   Bariatric High Protein Liquid Diet: Weeks 1+2 after surgery    Begin Protein Liquid Diet when you get home from the hospital.  Day 1: Aim to finish at least 1 protein supplement (protein powder + water, Isopure clear in glass bottle from [facility], Premier Clear, Protein 2.0) and 1 bottle of water or Crystal Light. Drink more if you can.  Days 2-3: Increase protein shakes and fluids as tolerated. Sip on 1 oz every 15 minutes. May begin mixing protein powder with skim-1% milk or Lactaid/Soy milk as tolerated.   Days 4-6: If you haven't already, try to mix protein powder with skim-1% milk or Lactaid/Soy milk instead of water, to increase calorie and protein intake. May begin to sip on ready-to-drink protein shakes (such as Premier protein shakes), as tolerated.    Protein and Fluid Goals:  1 week after surgery: aim for at least 40 grams of protein per day and at least 24 ounces of water or Crystal Light.  2 weeks after surgery: aim for 80 grams of protein per day and 48 ounces of water or Crystal Light.    Tips:  Ready-to-drink protein shakes and smoothies may be too thick for the first week after surgery, making it difficult to reach your protein goals. Start with clear liquids and advance as tolerated.  Sip slowly and  continuously on protein shakes and water throughout the day. Start with 1oz liquids every 15 minutes; advance as tolerated.    Avoid sugary drinks. Limit caffeinated beverages to 8oz per day for the first 2-3 months. Avoid carbonated beverages and drinking through straws to reduce gas and bloating for at least 3 months.     Vitamins/Minerals:  Do not take fish oils, herbal supplements including green tea for 2 weeks before your surgery as a general precaution.  Do not take any vitamin supplements for 1 week prior to surgery  Start taking your vitamins when you get home from the hospital.   See vitamin section on page 20 in the Nutrition Guidebook.    Light Exercise -   If your doctor has cleared you for walking or bike riding before your surgery, you can continue this after surgery.  Try not to lift anything heavier than 10 lbs for the first 6 weeks after surgery.    When you come to the clinic for your 2 week follow up, the mid-level provider and dietitian will discuss advancing your diet. Please bring in a log of your daily protein and fluid intake. Please bring in your vitamins for review if you have not already. If you have any questions about your diet or vitamins call [phone] and ask for a Bariatric Dietitian. For other questions, please ask for the Bariatric Nurses.    Bariatric High Protein Pureed Diet    Two weeks after surgery, you may be ready to add smooth foods to your diet.  All food should be the consistency of baby food, or thinner.  Follow pureed diet for the next 2 weeks.    Protein - It is very important to pay attention to protein intake during this time.      Inadequate protein intake can cause:  Delayed Wound Healing  Hair Loss  Muscle Breakdown    Meal Plan - Eat 3-4 meals per day (2-4 tbsp each), with protein supplements in between to meet protein needs.  Meeting protein needs daily will help increase healing, decrease muscle loss, and increase weight loss.  Your goal is  grams  of protein a day.    Protein First - Always eat the foods with the highest protein first.  Foods high in protein include milk, yogurt, cheese, egg whites, beans. Lean meats and seafood can be pureed with a small amount of liquid, such as broth, in a  or  to baby food consistency.    Fluids - Keep track in your journal of how much you are drinking; you should try to drink at least 48oz of fluids every day.      Foods allowed: Portion size Protein (g)   Sugar-free clear liquids As desired 0   Skim or 1% milk 8oz 8   Greek yogurt 5 oz 15   Lean meats or shrimp, pureed 1 oz 7   Beans (red, white, black, lima, terrell, fat free refried, hummus) and lentils ¼ cup 4   Low-fat/fat free cheese.(cottage cheese, mozzarella string cheese, ricotta cheese, Laughing Cow, Baby Bell, cheddar, etc) ¼ cup 7-8   Scrambled eggs or Egg Beaters 1 or ¼ cup 6   Edamame or Tofu, mashed ¼ cup 5   Unflavored protein powder (add to 1 scoop to  98% fat free soups or SF pudding) 3 Tbsp 9   *PB2: peanut powder (45 calories) 2 Tbsp 5     *PB2 powdered peanut butter: 45 calories vs. 190 calories in 2 tbsp of regular peanut butter. Purchase online at Good.Co, or at various [employer], [employer], [employer], [employer] and [employer].        Bariatric Liquid/Pureed Sample Menu    3-4 small meals plus 2-3 protein drinks per day.    8-8:30am 1 egg or ¼ cup Egg Beaters   9-9:30am 1 cup water, or decaf coffee or tea   10-10:30am Protein drink, 30g protein   11-11:30am 2 tbsp low-fat cottage cheese, and 1 tbsp pureed peaches   12-12:30pm 1 cup water, or sugar-free lemonade    1-1:30pm 2 tbsp pureed chicken, and 1 tbsp pureed carrots    2-2:30pm 1 cup water, or sugar-free lemonade   3-3:30pm Protein drink, 30g protein   5-5:30pm 1 cup water    6-6:30pm 1 cup hi-protein creamy chicken soup 14g protein (see Recipe below)   7-7:30pm 1 cup water, or sugar-free fruit punch    8-8:30pm 1 cup water     This sample menu provides approx. 80g protein  and 48oz fluids.  Liquid protein supplements should contain 20-30g protein and less than 4 grams of sugar each.    Sip fluids continuously in between meals.  Drink at least ¼ cup every 15 minutes.  For fluids: ¼ cup = 2 oz = 4 tbsp       RECIPE IDEAS for Bariatric Pureed Diet:    Hi-Protein Creamy Chicken Soup: (10g protein per 1 cup serving)  Empty 1 can of 98% fat free cream of chicken soup into saucepan. Then blend 1 scoop of unflavored protein powder with 1 can of skim milk until smooth.  Add protein milk to saucepan and heat to warm. (Note: Do NOT boil. Protein powder may clump if heated too hot).     Hi-Protein Pudding: (14g protein per ½ cup serving)  Add 2 scoops protein powder to 2 cups cold skim milk and mix well.  Stir in dry Jell-O Sugar-Free Instant Pudding mix.  Chill and Enjoy!    Tuna Mousse (12g protein per ¼ cup serving) Page 135 in book Eating Well After Weight Loss Surgery.  In a  or , combine all ingredients and pulse until smooth.  2 6-ounce cans tuna packed in water, drained  2 tbsp low-fat mayonnaise  2 tbsp fat-free sour cream  2 tbsp fat-free cream cheese, softened  ½ cup shallots, finely chopped  1 tbsp lemon juice  ¼ tsp ground pepper  ½ tsp celery seed    Chocolate Peanut Butter Mousse  (28g protein total)  6oz plain Greek yogurt  4 tbsp chocolate PB2  Bariatric High Protein Soft Diet   Begins 4 weeks after surgery               Four weeks after surgery, your stomach may be healed enough to add soft foods to your diet.  Soft foods are those which can be easily mashed with a fork. This diet usually lasts for the next 1-2 months but can last longer depending on each individual.     Remember these principles:  No liquids with meals. Do no drink 30 minutes before meals and wait 30 minutes to 1 hour after meals to start drinking.  Sip on water, sugar-free beverages or non-fat milk throughout the day.  You will most likely need to continue drinking at least 1 protein drink  daily to meet protein needs.  Chew foods slowly; at least 30 times. One meal should take 20-30 minutes.  Eat 3-5 meals per day, without any additional snacking.  Stop eating as soon as you feel full.  Avoid using table sugar and foods made with refined sugar, which can trigger dumping syndrome and slow down weight loss.  Marinating meats with a low sugar marinade, adding low-fat salad dressing, or adding low calorie gravy (made from powder and water) can help meats to digest easier.     Adding Vegetables and Fruits:  As long as you are consuming >80g total protein daily from combination of foods and protein drinks, you may start adding small bites of fruits and vegetables to your meals. Cooked, tender vegetables and ripe fruits without the peel are tolerated best.    *SOFT Non-starchy vegetables include fork tender: green beans, beets, broccoli, brussels sprouts, cabbage, carrots, cauliflower, eggplant, greens, leeks, mushrooms, okra, onions, peppers, spinach, squash, tomatoes and salsa, turnips, low sodium V8, zucchini*          Bariatric High Protein Soft Diet  Begins 4 weeks after surgery    EAT THESE FOODS AVOID THESE FOODS   High in Protein: High in Fat/Sugar:   Canned tuna or chicken (packed in water)  Ground beef or turkey (at least 90% lean)  Turkey or chicken (no skin); cooked tender and cut in small pieces  Lean pork or beef (cook in crock pot until very tender; cut in small pieces  Scrambled, poached, or boiled eggs  Baked, broiled, grilled or boiled fish and seafood (not fried!)  Silken tofu, Edamame (soybeans)  Beans, hummus and lentils  Lean deli meats (turkey and chicken breast, ham, roast beef)  1% or Skim Milk, Lactaid, or Soymilk  Low-fat or fat-free cottage cheese, soft cheese, mozzarella string cheese, or ricotta  Light yogurt, Greek yogurt, SF pudding High fat milk (whole, 2%)  Butter, margarine, oil, mayonnaise  Sour cream, cream cheese, salad dressing  Ice Cream  Cakes, cookies, pies,  desserts  Candy  Luncheon meats (bologna, salami, chopped ham)  Sausage, De La Paz  Gravy  Breaded and Fried Foods  ___________________________________  Tough/Crunchy--------------------------------  Tough or dry meats  Corn   Granola/cereal with nuts  Shredded Coconut    May add after 2 months, as tolerated:  Raw veggies  Lettuce  Plain, Unsalted Nuts and Seeds  Protein bars with 0-4 grams of sugar   As long as you are getting >80g PRO: Starchy Carbohydrates. At goal weight, some may include whole grains in small amounts.   Cooked tender vegetables without peel  Ripe fruits without peel  Frozen fruits with no added sugar  Fruit canned in its own juice or in water  Fat free, sugar free, frozen yogurt White and wheat Bread, Rice, Pasta   Cereals (including grits, oatmeal)   Crackers, Pretzels, Chips, Granola  Corn, Popcorn, Peas, Quinoa  White Potatoes, Sweet potatoes  Flour and corn tortillas     Fluids: Always Avoid:   Skim/1% milk, Lactaid, Soymilk  Water and Sugar-free beverages  (decaf and non-carbonated)  Decaf coffee & decaf tea  Sugary drinks  Carbonated drinks  Alcohol  Drinking through straws     Sample Menu for Bariatric High Protein Soft Diet            3 meals + 2 protein drinks  Remember: No drinking with meals.    Time of Day Day 1 Day 2   7am:    1 egg (or ¼ cup Egg Beaters) ¼ cup low-fat cottage cheese, 1 tbsp berries   7:30-9:30am:  Water/SF beverage Water/SF beverage   10am:  Protein drink  Protein drink   10:30-11:30am:  Water/SF beverage Water/SF beverage   12pm:    1-2 oz grilled shrimp, ¼ cup green beans   1-2oz canned chicken, shredded cheese, 1 tbsp salsa   12:30-2:30pm:  Water/SF beverage Water/SF beverage   3pm:  Protein drink   Protein drink   3:30-5:30pm:  Water/SF beverage Water/SF beverage   6pm:  ½ cup low fat chili, 1oz low-fat cheese, ¼ cup broccoli 2 oz grilled fish,  ¼ cup lima beans   6:30-9pm:  Water/SF beverage Water/SF beverage     This sample menu provides approx. 80g protein  total, including about 40g protein from foods and at least 40g protein from protein drinks.  Drinking protein drinks daily helps decrease muscle loss, increase weight loss, and prevent hair loss.    Sip fluids continuously in between meals.    For fluids: 1 cup = 8 oz   No drinking from 30 minutes before meals to 30 minutes after meals.    For food: ¼ cup = 4 tablespoons   3oz meat is approx. the size of a deck of cards.    A food scale will help you determine portion size.                     Regular Bariatric Diet: 2-3 months and Beyond  Follow a high protein, low carb, low fat diet LIFELONG:    MAY ADD THE FOLLOWING: slowly, one by one, back in the diet. Please chew foods well and if you do not tolerate certain foods at first, wait a few more week and try again. Raw/crunchy vegetables (artichoke, asparagus, baby corn, bamboo shoots, bean sprouts, carrots, celery, cherry tomatoes, cucumber, green onions or scallions, lettuce, pea pods, radishes, water chestnuts), plain/unsalted nuts and seeds and Protein bars w/ 0-4 g of sugar per serving (such as: Think Thin, Atkins, Pure Protein, Quest, Cake Bites, Power Crunch, Oh Yeah, ONE).   CONTINUE TO GET IN BETWEEN  GRAMS OF PROTEIN PER DAY EVERY DAY using foods and low sugar protein shakes   FLUID INTAKE: AIM FOR 64 OUNCES TOTAL FOR THE DAY. This includes ice, sugar-free popsicles, sugar-free jello, low sodium broths and any sugar-free non-carbonated beverages. You may resume caffeinated beverages.  LIMIT FRUITS TO 2 SERVINGS PER DAY. One serving of fruit is 1 small piece of fruit, 1 - ½ cup container of canned fruit (in its own juices or water) or ½ cup cubed fruit  LIMIT UNSALTED PLAIN NUTS AND SEEDS TO ¼ CUP TOTAL PER DAY  Aim to stay between 800-1000 calories per day    SAMPLE MENU STARTING 2-3 MONTHS AFTER SURGERY  Time of Day Day 1 Day 2   7am:    Egg omelet made with 1 egg and 1 slice low-fat cheese Quest Cinnamon Roll Protein bar (warm in microwave for  10-15 seconds)   7:30am-9:30am:  Water/SF beverage water/SF beverage   10am:  1 ounce turkey with 1 light string cheese ¼ cup unsalted nuts + ½ banana   10:30-11:30am:  water/SF beverage water/SF beverage   12pm:    Grilled chicken (2 ounces) salad with 1 Tablespoon of low-fat Italian dressing and 1 apple  Taco Lettuce wraps: 1-2 oz of lean ground meat, sprinkle of low fat cheese, tomatoes, salsa wrapped in lettuce leaves   12:30pm-2:30pm:  water/SF beverage water/SF beverage   3pm:  Protein drink   Light yogurt   3:30-5:30pm:  water/SF beverage water/SF beverage   6pm:  Grilled shrimp kebobs (2 ounces shrimp, pineapple chunks, bell pepper and onions) with ¼ cup sautéed spinach and garlic (use Bridget spray)   ½ cup Red Beans (no rice) served over ¼ cup cauliflower rice   6:30pm-9pm:  water/SF beverage water/SF beverage   9pm:  1 tbsp slivered almonds sprinkled over 6 oz Greek yogurt container Protein shake         Protein Content of Foods Recommended after  Weight Loss Surgery    Food Name Portion Calories Protein (gms)   Almonds (unsalted) 1/4 cup 160 6   [place] milk, unsweetened 1 cup 30  1   Beef, Roast 1 oz 46 8   Beef, Steak, sirloin, trimmed 1 oz 55 9   Catfish, broiled or baked 1 oz 30 5   Cheese, American FF 1 oz 40 6   Cheese, Cottage 1% fat ¼ cup 41 7   Cheese, Parmesan, grated 2 tsp 20 2   Cheese, Mozzarella, part skim 1 oz 78 8   Cheese, part skim Ricotta ¼ cup 90 8   Chicken, white breast w/o skin 1 oz 46 9   Chicken, leg w/o skin 1 oz 54 7   Crab, steamed ¼ cup  40 9   Crawfish tails, boiled ¼ cup 35 8   [place], shelled ¼ cup 50 4   Egg 1 78 6   Ham, lean 5% 1 oz 44 7   Hamburger, lean 1 oz 56 7   Hummus ¼ cup 100 5   Milk, skim or 1%  1 cup 90 8   Milk, Fairlife non-fat 1 cup 80 13   Pork Tenderloin 1 oz 46 7   Pudding, SF 1 serv 60 2   Red beans ¼ cup 56 4   Refried beans, fat free ¼ cup 65 4   [place], baked 1 oz 52 7   Shrimp, steamed 1 oz 28 6   Soymilk, plain ½ cup 40 3   Tilapia, white fish, cooked  1 oz 36 8   Tofu ¼ cup 47 5   Trout 1 oz 48 7   Tuna, canned in water 1 oz 37 8   Turkey, white meat 1 oz 35 7   Veal Loin 1 oz 50 7   Yogurt, SF, frozen vanilla 6 oz 140 7   Yogurt, light 5 oz 80 5   Yogurt, Greek 5 oz  12-15     *Abbreviations: SF=sugar free, LF=low fat, FF= fat free, gms=grams  *3oz of cooked meat/protein = size of deck of cards or ladies palm   *1oz cheese = 1 inch cube or 1 slice American cheese  Lifelong Nutrition Guidelines after   Weight Loss Surgery    Weight Loss Surgery is designed to help people lose weight after previous attempts at weight loss have failed.  However, safe and successful weight loss with this procedure requires you to make a commitment.  A commitment to change current eating habits and behavior is essential to develop substantial weight loss.    As your stomach is greatly reduced following surgery (to that of a small egg), your nutritional intake is one of the most important aspects of your treatment.  Adequate nutrition helps in the healing of your incisions, preventing gastric discomfort, and in maintaining your nutritional health.    Why should you follow this diet?  You could develop nutrient deficiencies, which may consequently affect your health.  You may not achieve the maximal amount of weight loss, or the rate of weight loss may slow down.    The following guidelines have been developed to assist you in making these changes.      Eat Slowly.  Immediately after surgery the stomach is swollen and needs time to heal.  Eating too quickly may cause you to over fill your pouch and bring forth discomfort (i.e. nausea and vomiting).  Eat and Drink Small Amounts at a Time.  Learn to sip.  Try not to fill your entire mouth with food or fluid.  Use a baby spoon and a 2oz medicine cup to help determine a safe amount.  Stop Eating or Drinking When You Feel Full.  Learn to listen to your body.  If you are unable to recognize fullness, consume only the quantity of food  recommended.  Eating or drinking too much may eventually stretch your pouch and prevent you from achieving maximal weight loss.  Also, overeating may cause you to have nausea and vomiting.  Chew Food Thoroughly Before Swallowing.  Try to chew each bite 30 times before swallowing.  A big chunk of food could get caught and make you very uncomfortable.  Drink Adequate Fluids in between meals to Prevent Dehydration.  Consume at least 6 cups of liquids per day (>48 oz).  No drinking with meals!  Eat Protein Rich Foods First.  This is necessary to meet your protein needs.  Protein is necessary to promote adequate healing and to help you maintain lean body mass as you lose weight.  Aim for >80 grams of protein per day.  Keep Your Food Choices Sugar Free and Low in Fat.  Foods high in sugar and fat may cause diarrhea and abdominal discomfort, or the Dumping Syndrome.  Avoid Starchy Carbohydrates (bread, rice, pasta, corn, peas, potatoes, crackers, pretzels, chips, grits, oatmeal, dry cereals/granola, tortillas).         Common Nutritional Problems and Prevention Tips   Nausea and vomiting   Cause: overeating or eating too quickly   Prevention tip: eat slowly, chew your food very well, and stop eating as soon as you feel full   Chronic malnutrition problems   Cause: nutrients are absorbed differently following surgery   Symptoms: fatigue and aching muscles; tingling feet, calves or hands  Prevention tip: eat a healthy diet and always take your vitamin and mineral supplements   Lactose intolerance   Symptoms: gas, bloating, cramping and diarrhea after drinking milk   Prevention tips: Switch to lactose-free milk such as: Fairlife non-fat milk, unsweetened soy milk, unsweetened almond milk. Refer to lactose-free protein supplement suggestions on pg. 4.  Gas and Bloating  Cause: digestive tract changes after surgery  Prevention tips: eat slowly, avoid overeating, avoid carbonated beverages and drinking through straws. Try  switching to lactose-free protein drinks. Try using Gas-X chewables.  Temporary hair loss   Cause: rapid weight loss and/or lack of protein in the diet   Prevention tip: eat the amount of protein recommended by your Registered Dietitian   Dehydration   Cause: Not drinking enough fluids; or persistent vomiting  Symptoms: dark and strong smelling urine, dry mouth, headache and fatigue   Prevention tip: take frequent sips of liquid throughout the day   Dumping syndrome (Gastric Bypass)  Cause: food emptying too quickly from the stomach   Symptoms: diarrhea, nausea, cold sweats and light-headedness   Prevention tips: avoid consuming sugary foods or beverages, drinking fluids too soon after a meal, or eating high fat foods   Constipation   Cause: food and fiber intake are reduced following surgery   Prevention tips:   Drink at least 48 ounces water daily in addition to protein drinks  Exercise daily   Try Miralax (stool softener). No laxatives if possible.  Try a fiber supplement (Metamucil or Benefiber)                Physical Activity    Physical activity and exercise are essential to achieve and maintain your weight loss goals. Activity and exercise beginning right after your surgery will help you feel better, recover faster and minimize the likelihood of post-surgical complications. Staying active promotes mental well-being, relieves stress and reduces feelings of depression and anxiety. You feel good about your body when you exercise regularly, and therefore have a healthier body image.       Choose a form of activity or exercise that you enjoy.    Ask a friend or family member to participate in an activity or exercise with you. Think of it as a michelle system.    Join an exercise club or class.   Listen to your favorite music as you exercise.    Try mall walking, aerobics, swimming or dancing.    Park 15 minutes from your destination and walk.   Use stairs instead of the elevator.    Prioritize activity and exercise  time into your schedule.    Keep a record or journal of your activity.          Required Vitamin/Mineral Supplements:    Sleeve Gastrectomy: No swallowing large whole vitamins/minerals for 2 weeks after surgery  Gastric Bypass: No swallowing large whole vitamins/minerals for 1 month after surgery    *NO gummy multivitamins due to poor quality and sugar content.  *Do NOT take calcium citrate and Iron within 2 hours of each other due to poor absorption.  *Pills may be swallowed if the size of a No. 2 pencil eraser (or smaller). They may be cut to this size with a pill cutter and swallowed if tolerated. Compare to this size:        Bariatric surgery patients will need the following Vit/Min   FOR LIFE:    Multivitamin with or without Iron, 2 per day  At least 18mg Iron, if not included in multivitamin  50mg Thiamine (Vit B1) per day  Calcium Citrate + Vit D  500mg 3 times per day OR 600mg twice per day  Vit B12 sublingual (dissolve under the tongue for 30 sec)   500mcg per day, 2500mcg per wk, or 5000mcg every 2 wks        Suggested vitamin regimen for first 2-4 weeks after surgery:  Flintstones Complete (chewables, not gummies)  EZ Melts 25mg Thiamine (Amazon)  Nature's Way liquid Calcium Citrate + Vit D OR GNC Calcium Citrate soft chews  B12 Sublingual       Bariatric Vitamins Online:  [email or web address].com  [email or web address]      [facility], 1st Floor:  Leader's Children's Multivitamin with Iron  Leader's Calcium Citrate with Vitamin D petites OR Nature's Way Liquid Calcium Citrate  Leader's B-1, 100mg tablet. Take one every other day.  Leader's Sublingual B12          Resources for Bariatric Patients:    Before & After: Living & Eating Well After Weight Loss Surgery by Antonella Siegel. [employer], 2004. [email or web address]   Eating Well After Weight Loss Surgery by Misa Mcallister & Noman Owen. [employer], 2004.  Weight Loss Surgery for Dummies by Rosy  "ADILENE Case MD, FACS. [employer], 2005.  Exodus from Obesity: The Guide to Long-Term Success After Weight Loss Surgery by Kirsten Waggoner. [employer], 2003.  Weight Loss Surgery: Finding the Thin Person Hiding Inside You by Shelley Bobo. [employer], 2003.  The Don't Diet Live It Workbook by Charan Drummond & Justina Benton available from www.Ensemble Discovery   The Overeater's Journal by Lizzy Resendiz. [employer] also available at www.Amazon.com   Moving Away from Diets available from www.nourishingconnectionOpternative   Overeater's Anonymous--online, in-person, and telephone meetings available; www.Analyte Health.GoTable.  Online Food Database at wwwFollowap to find out the calories, protein, sugar, fat, fiber, etc of any certain food.  50 Ways to Soothe Yourself Without Food by Antonella Sosa PSY.D. [employer], 2009.     Helpful Apps for Bariatric Patients:    Baritastic. [facility] Code: 27965  DocSeaPal  Diabetes logbook by Carroll (track blood sugar, upload meal pics)        TEN TIPS FOR HEALTHY AND CONSCIOUS EATING  Keep track of everything you eat and drink. Write it down as soon as you swallow so you don't forget! Include the type of food or beverage, amount, time, physical feeling of hunger vs. fullness, etc.   Base meals around LEAN PROTEIN and VEGETABLES, incorporating them into main dishes and as snacks.   Buy plenty of fresh or frozen FRUITS and VEGETABLES to keep on hand, wash and chop them (if applicable) ASAP, and snack on them ANYTIME! Eat at least 2 servings of fresh fruits and 3 servings of vegetables each day.   Eat throughout the day rather than "saving" your appetite for a huge meal. Your body can only use so much fuel at a time, so extra will more likely be stored as fat! Smaller, more frequent meals (every 3-5 hours) will help keep your energy level more consistent. Start listening to your body's signals regarding hunger and fullness!   Keep "junk food" and "trigger" " "foods out of the house. Make a special trip to the store when you MUST have it and savor it.   Include good sources of protein with your meals: chicken, fish, shellfish, legumes, eggs, dairy products, soy products, and lean meats.   Use low fat, fat free and lean dairy and animal products. High fat animal products tend to have a lot of saturated fat, which promotes high blood cholesterol levels.   Choose calories you can chew - that means drinking more water instead of juice, sports drinks, regular soda, alcohol, and specialty coffees.   Shut off the TV, put down the book or newspaper, and turn off the computer whenever you eat - this includes meals and snacks. People tend to eat larger portions when snacking in front of the tube, and the foods chosen are often high in fat, sugar and calories. What's more, when you associate eating with particular activities, you may automatically look for something to eat when engaging in those activities, regardless of hunger.   Plan ahead for meals and snacks, have foods on hand to prepare them, and pack them "to go" if necessary. If you wait until you're really hungry, there may not be many healthy choices around to choose from.            (Revised 3.2021)                                   1200- 1500 calorie Sample meal plan  80-120g protein per day.   Protein drinks and bars: 0-4 grams sugar  Drink lots of water throughout the day and exercise!  MENU # 1  Breakfast: 2 eggs, 1 turkey sausage dinh, 1 apple  Snack: Atkins bar  Lunch: 2 roll-ups (sliced turkey, low-fat sliced cheese, mustard), 12 baby carrots dipped in 1 Tbsp natural peanut butter  Mid-Day Snack: ¼ cup unsalted almonds, ½ cup fruit  Dinner: 1 chicken thigh simmered in tomato sauce + 2 Tbsp mozzarella cheese, ½ cup black beans, 1/2 cup steamed carrots  Evening Snack: 1/2 cup grapes with 4 cubes low-fat cheddar cheese   ___________________________________________________  MENU # 2  Breakfast: 200 calorie protein " drink  Mid-morning snack : ¼ cup unsalted nuts, medium banana  Lunch: 3oz tuna or chicken salad made with 2 tbsp light ricardo, over salad with tomatoes and cucumbers.   Snack: low-fat cheese stick  Dinner: 3oz hamburger dinh, slice of low-fat cheese, 1 cup boiled yellow squash and zucchini.   Snack: 6oz light yogurt  _______________________________________________________  Menu #3  Breakfast: 6oz plain Greek yogurt + fruit (½ banana, ½ cup fruit - pineapple, blueberries, strawberries, peach), may add Splenda to andrey.  Lunch: Grilled  chicken breast w/ slice low-fat pepper cooper cheese, 1/2 cup grilled/baked asparagus and small salad with Salad Spritzer.    Mid-Day snack: 200 calorie protein drink   Dinner: 4oz Grilled fish, ½ cup white beans, ½ cup cooked spinach  Evening Snack: fudgsicle no-sugar-added    Menu # 4  Breakfast: 1 scoop vanilla protein powder + 4oz skim milk + 4oz coffee   Snack: Pure protein bar  Lunch: 2 Lettuce tacos: 3oz seasoned ground turkey wrapped in a Wil lettuce leaves with 1 Tbsp shredded cheese and dollop low-fat sour cream  Snack: ½ cup cottage cheese, ½ cup pineapple chunks  Dinner: Shrimp omelet: 2 eggs, ½ cup shrimp, green onions, and shredded cheese  ______________________________________________________  Menu #5  Breakfast: 1 cup low-fat cottage cheese, ½ cup peaches (no sugar added)  Snack: 1 apple, 4 cubes cheese  Lunch: 3oz baked pork chop, 1 cup okra and tomato stew  Snack: 1/2 cup black beans + salsa + dollop light sour cream  Dinner: Caprese chicken salad: 3 oz chicken breast, 1oz fresh mozzarella, sliced tomato, 1 Tbsp olive oil, basil  Snack: sugar-free popsicle    Menu #6  Breakfast: ½ cup part-skim ricotta cheese, 2 Tbsp sugar-free strawberry preserves, sprinkle of slivered almonds  Snack: 1 orange  Lunch: 3 oz canned chicken, 1oz shredded cheddar cheese, ½  cup black beans, 2 Tbsp salsa  Snack: 200 calorie Protein drink  Dinner: 4 oz grilled salmon steak, over mixed  green salad with 2 tbsp light dressing      Meal Ideas for Regular Bariatric Diet  *Recipes and products available at www.bariatriceating.com      Breakfast: (15-20g protein)    - Egg white omelet: 2 egg whites or ½ cup Egg Beaters. (Optional proteins: cheese, shrimp, black beans, chicken, sliced turkey) (Optional veggies: tomatoes, salsa, spinach, mushrooms, onions, green peppers, or small slice avocado)     - Egg and sausage: 1 egg or ¼ cup Egg Beaters (any variety), with 1 dinh or 2 links of Turkey sausage or Veggie breakfast sausage ([employer] or Visiarc)    - Crust-less breakfast quiche: To make a glass pie dish, mix 4oz part skim Ricotta, 1 cup skim milk, and 2 eggs as your base. Add protein: shredded cheese, sliced lean ham or turkey, turkey mishra/sausage. Add veggies: tomato, onion, green onion, mushroom, green pepper, spinach, etc.    - Yogurt parfait: Mix 1 - 6oz container Dannon Light N Fit vanilla yogurt, with ¼ cup crushed unsalted nuts    - Cottage cheese and fruit: ½ cup part-skim cottage cheese or ricotta cheese topped with fresh fruit or sugar free preserves     - Antonella Seo's Vanilla Egg custard* (add 2 Tbsp instant coffee granules to make Cappuccino Custard*)    - Hi-Protein café latte (skim milk, decaf coffee, 1 scoop protein powder). Optional to add Sugar free syrup or extract flavoring.    - Breakfast Lox: spread fat free cream cheese on slices of smoked salmon. Serve over scrambled or egg over easy (sauteed with nonstick cookspray) OR on a cucumber slice    - Eggwhich: Scramble or cook 1 large egg over easy using nonstick cookspray. Place between 2 slices of South Korean mishra and low fat cheese.     Lunch: (20-30g protein)    - ½ cup Black bean soup (Homemade or Progresso), with ¼ cup shredded low-fat cheese. Top with chopped tomato or fresh salsa.     - Lean deli turkey breast and low-fat sliced cheese, mustard or light ricardo to moisten, rolled up together, or wrapped in a Wil lettuce  leaf    - Chicken salad made from dinner leftovers, moisten with low-fat salad dressing or light ricardo. Also try leftover salmon, shrimp, tuna or boiled eggs. Serve ½ cup over dark green salad    - Fat-free canned refried beans, topped with ¼ cup shredded low-fat cheese. Top with chopped tomato or fresh salsa.     - Greek salad: Top mixed greens with 1-2oz grilled chicken, tomatoes, red onions, 2-3 kalamata olives, and sprinkle lightly with feta cheese. Spritz with Balsamic vinegar to taste.     - Crust-less lunch quiche: To make a glass pie dish, mix 4oz part skim Ricotta, 1 cup skim milk, and 2 eggs as your base. Add protein: shredded cheese, sliced lean ham or turkey, shrimp, chicken. Add veggies: tomato, onion, green onion, mushroom, green pepper, spinach, artichoke, broccoli, etc.    - Pizza bake: spread a  dayan gordon mushroom with tomato sauce, low-fat shredded mozzarella and turkey pepperoni or Barber mishra. Add any veggies. Roast for 10-15 minutes, until cheese melted.     - Cucumber crab bites: Spread ¼ cup crab dip (lump crabmeat + light cream cheese and green onions) over sliced cucumber.     - Chicken with light spinach and artichoke dip*: Puree in : 6oz cooked and drained spinach, 2 cloves garlic, 1 can cannelloni beans, ½ cup chopped green onions, 1 can drained artichoke hearts (not marinated in oil), lemon juice and basil. Mix in 2oz chopped up chicken.    Supper: (20-30g protein)    - Serve grilled fish over dark green salad tossed with low-fat dressing, served with grilled asparagus canada     - Rotisserie chicken salad: served with sliced strawberries, walnuts, fat-free feta cheese crumbles and 1 tbsp Becerrils Own Light Raspberry [place] Vinaigrette    - Shrimp cocktail: Dip cold boiled shrimp in homemade low-sugar cocktail sauce (1/2 cup Kayla One Carb ketchup, 2 tbsp horseradish, 1/4 tsp hot sauce, 1 tsp Worcestershire sauce, 1 tbsp freshly-squeezed lemon juice). Serve with dark  green salad, walnuts, and crumbled blue cheese drizzled with olive oil and Balsamic vinegar    - Tuna Melt: Spread tuna salad onto 2 thick slices of tomato. Top with low-fat cheese and broil until cheese is melted. May also be made with chicken salad of shrimp salad. [place] with different types of cheeses.    - Chicken or beef fajitas (no tortilla, rice, beans, chips). Top meat and veggies w/ fresh salsa, fat free sour cream.     - Homemade low-fat Chili using extra lean ground beef or ground turkey. Top with shredded cheese and salsa as desired. May add dollop fat-free sour cream if desired    - Chicken parmesan: Top chicken breast w/ low sugar marinara sauce, mozzarella cheese and bake until chicken reaches 165*.  Serve w/ spaghetti SQUASH or Pakistani cut green beans    - Dinner Omelet with shrimp or chicken and onion, green peppers and chives.    - No noodle lasagna: Use sliced zucchini or eggplant in place of noodles.  Layer with part skim ricotta cheese and low sugar meat sauce (use very lean ground beef or ground turkey).    - Mexican chicken bake: Bake chunks of chicken breast or thigh with taco seasoning, Pace brand enchilada sauce, green onions and low-fat cheese. Serve with ¼ cup black beans or fat free refried beans topped with chopped tomatoes or salsa.    - Vincenzo frozen meatballs, simmered in Classico Marinara sauce. Different flavors of salsa or spaghetti sauce create different dishes! Sprinkle with parmesan cheese. Serve with grilled or steamed veggies, or a dark green salad.    - Simmer boneless skinless chicken thigh chunks in Classico Marinara sauce or roasted salsa until tender with chopped onion, bell pepper, garlic, mushrooms, spinach, etc.     - Hamburger or veggie burger, without the bun, dressed the way you like. Served with grilled or steamed veggies.    - Eggplant parmesan: Bake slices of eggplant at 350 degrees for 15 minutes. Layer tomato sauce, sliced eggplant and low-fat  mozzarella cheese in a baking dish and cover with foil. Bake 30-40 more minutes or until bubbly. Uncover and bake at 400 degrees for about 15 more minutes, or until top is slightly crisp.    - Fish tacos: grilled/baked white fish, wrapped in Wil lettuce leaf, topped with salsa, shredded low-fat cheese, and light coleslaw.    - Chicken giovanni: Sprinkle chicken w/ 1 tsp of hidden [place] ranch dip mix. Then grill chicken and top with black beans, salsa and 1 tsp fat free sour cream.     - Cauliflower pizza crust: Use cauliflower as crust (see recipe on pinterest, no flour!). Top w/ low fat cheese, turkey pepperoni and veggies and bake again    - chicken or turkey crust pizza: use ground chicken or turkey instead of cauliflower, spread in [place] and bake at 350 for about 20-30 minutes(may want to add garlic, black pepper, oregano and other herbs to ground meat mixture).  Remove and top w/ low fat cheese, turkey pepperoni and veggies and bake again for another 10 minutes or until cheese is browned.     Snacks: (100-200 calories; >5g protein)    - 1 low-fat cheese stick with 8 cherry tomatoes or 1 serving fresh fruit  - 4 thin slices fat-free turkey breast and 1 slice low-fat cheese  - 4 thin slices fat-free honey ham with wedge of melon  - 6-8 edamame pods (equivalent to about 1/4 cup edamame without pods).   - 1/4 cup unsalted nuts with ½ cup fruit  - 6-oz container Dannon Light n Fit vanilla yogurt, topped with 1oz unsalted nuts         - apple, celery or baby carrots spread with 2 Tbsp PB2  - apple slices with 1 oz slice low-fat cheese  - Apple slices dipped in 2 Tbsp of PB2  - celery, cucumber, bell pepper or baby carrots dipped in ¼ cup hummus bean spread or light spinach and artichoke dip (*recipe in lunch section)  - celery, cucumber, baby carrots dipped in high protein greek yogurt (Mix 16 oz plain greek yogurt + 1 packet of hidden valley ranch dip mix)  - Gordon Wade Beef Steak - 14g protein! (similar to  beef jerky)  - 2 wedges Laughing Cow - Light Herb & Garlic Cheese with sliced cucumber or green bell pepper  - 1/2 cup low-fat cottage cheese with ¼ cup fruit or ¼ cup salsa  - RTD Protein drinks: Atkins, Low Carb Slim Fast, EAS light, Muscle Milk Light, etc.  - Homemade Protein drinks: GNC Soy95, Isopure, Nectar, UNJURY, Whey Gourmet, etc. Mix 1 scoop powder with 8oz skim/1% milk or light soymilk.  - Protein bars: Atkins, EAS, Pure Protein, Think Thin, Detour, etc. Must have 0-4 grams sugar - Read the label.    Takeout Options: No more than twice/week  Deli - Salads (no pasta or rice), meats, cheeses. Roasted chicken. Lox (salmon)    Mexican - Platters which don't include tortillas, chips, or rice. Go easy on the beans. Example: Fajitas without the tortillas. Ask the  not to bring chips to the table if they are too tempting.    Greek - Meat or fish and vegetable, but no bread or rice. Including hummus, baba ganoush, etc, is OK. Most sit-down Greek restaurants can provide you with cucumber slices for dipping instead of dionicio bread.    Fast Food (Avoid as much as possible) - Salads (no croutons and limit salad dressing to 2 tbsp), grilled chicken sandwich without the bun and ask for no ricardo. Hannahs low fat chili or Taco Bell pintos and cheese.    BBQ - The meats are fine if you ask for sauces on the side, but most of the traditional side dishes are loaded with carbs. Michael slaw, baked beans and BBQ sauce are typically made with sugar.    Chinese - Nothing deep-fried, no rice or noodles. Many Chinese sauces have starch and sugar in them, so you'll have to use your judgement. If you find that these sauces trigger cravings, or cause Dumping, you can ask for the sauce to be made without sugar or just use soy sauce.

## 2022-07-28 NOTE — PROGRESS NOTES
NUTRITIONAL CONSULT    Referring Physician:   Reason for MNT Referral: Initial assessment for work-up    PAST MEDICAL HISTORY:   27 y.o. female  Weight history includes weighing 150 lbs before having kids at age 17. Gained weight with each of 2 pregnancies and has had difficulty losing weight since.  Dieting attempts include diet pills, calorie restriction, exercise at the gym. Admits she has irregular eating pattern and needs help to structure her meals and snacks.    Past Medical History:   Diagnosis Date    Chlamydia     Miscarriage        CLINICAL DATA:  27 y.o.-year-old Black or  female.  Height: 5 ft.   Weight: 223 lbs  IBW: 126 lbs  BMI: 43.5  The patient's goal weight (50% EBW): 175 lbs  Personal goal weight: 150 lbs    Goal for Bariatric Surgery: to improve health, to improve quality of life, to lose weight and to prevent future medical conditions    DAILY NUTRITIONAL NEEDS: pre-op nutritional bariatric guidelines to promote weight loss  2652-6734 Calories    Grams Protein    NUTRITION & HEALTH HISTORY:  Greatest challenge: dining out frequency, sweets, starchy CHO, portion control, snacking at night, irregular meal patterns and high-fat diet    Current diet recall:     12pm: 1 Pop Tart  3pm: Ramen noodle soup  8pm: home cooked - shrimp fried rice, glass of sweet tea  11pm: bowl of Frosted Flakes + 2% milk    12pm: plum  3pm: canteloupe  - candy  8pm: salmon and mixed veggies/spinach  10pm: candy    Current Diet:  Meal pattern: irregular  Protein supplements: fruit smoothies  Snacking: chips, crackers, popcorn, sugar candy, fruits  Vegetables: Likes a variety. Doesn't like tomatoes. Eats 2-3 times per week.  Fruits: Likes a variety. Eats almost daily.  Beverages: water, sugary beverages, sweet tea and 2% milk  Dining out: 50/50. Mostly fast food, restaurants and take-out. Burgers, fried chicken, po boys.   Cooking at home: Several times Weekly. Mostly baked, grilled and smothered  meat, fish, starchy CHO and vegetables.    Exercise:  Past exercise: Adequate    Current exercise: None  Restrictions to exercise: knee pain - torn meniscus    Vitamins / Minerals / Herbs:   none    Labs:   None available at time of visit    Food Allergies:   None known    Social:  Works regular daytime shifts. Works from home.  Lives with her fiance and her 2 kids and her mother.  Grocery shopping and food prep done by the pt.  Patient believes the household will be supportive after surgery.  Alcohol: Socially. Weekends. Vodka/red bull.  Smoking: None.    ASSESSMENT:  · Patient reports attempts at weight loss, only to regain lost weight.  · Patient demonstrated knowledge of healthy eating behaviors and exercise patterns; admits to not eating healthy and not exercising at this point.  · Patient states willingness to change lifestyle and make behavior modifications.        Barriers to Education: none    Stage of change: determination    NUTRITION DIAGNOSIS:     Morbid Obesity related to Excessive carbohydrate intake, Excessive calorie intake and Physical inactivity as evidence by BMI.    BARIATRIC DIET DISCUSSION/PLAN:  Discussed diet after surgery and related to patient's food record.  Reviewed nutrition guidelines for before and after surgery.  Answered all questions.  Continue to review Bariatric Nutrition Guidebook at home and call with any questions.  Work on Bariatric Nutrition Checklist.  Work on gradually cutting back on starchy CHO in the diet.  Begin trying various protein supplements to determine preference.  1200-calorie diet.  1500-calorie diet.  5-6 meals per day.  Start including protein supplements in the diet plan daily.  Reduce frequency of dining out.  More grocery shopping and meal preparation at home.  Increase exercise.    RECOMMENDATIONS:  Patient is a potential candidate for bariatric surgery - Sleeve.    Follow up in one month.  Needs additional visit(s) with RD to work on dietary and  lifestyle changes in preparation for surgery.    Patient verbalized understanding.    Expect fair  compliance after surgery at this time.    Communicated nutrition plan with bariatric team.    SESSION TIME:  60 minutes

## 2022-08-02 ENCOUNTER — PATIENT MESSAGE (OUTPATIENT)
Dept: BARIATRICS | Facility: CLINIC | Age: 27
End: 2022-08-02
Payer: COMMERCIAL

## 2022-08-04 ENCOUNTER — PATIENT MESSAGE (OUTPATIENT)
Dept: BARIATRICS | Facility: CLINIC | Age: 27
End: 2022-08-04
Payer: COMMERCIAL

## 2022-08-05 ENCOUNTER — TELEPHONE (OUTPATIENT)
Dept: BARIATRICS | Facility: CLINIC | Age: 27
End: 2022-08-05
Payer: COMMERCIAL

## 2022-08-14 ENCOUNTER — HOSPITAL ENCOUNTER (EMERGENCY)
Facility: HOSPITAL | Age: 27
Discharge: HOME | End: 2022-08-14
Attending: EMERGENCY MEDICINE
Payer: COMMERCIAL

## 2022-08-14 VITALS
SYSTOLIC BLOOD PRESSURE: 114 MMHG | BODY MASS INDEX: 43.55 KG/M2 | RESPIRATION RATE: 16 BRPM | DIASTOLIC BLOOD PRESSURE: 59 MMHG | OXYGEN SATURATION: 99 % | TEMPERATURE: 98 F | WEIGHT: 223 LBS | HEART RATE: 72 BPM

## 2022-08-14 DIAGNOSIS — R10.9 ABDOMINAL PAIN, UNSPECIFIED ABDOMINAL LOCATION: Primary | ICD-10-CM

## 2022-08-14 LAB
ALBUMIN SERPL BCP-MCNC: 3.6 G/DL (ref 3.5–5.2)
ALP SERPL-CCNC: 86 U/L (ref 55–135)
ALT SERPL W/O P-5'-P-CCNC: 13 U/L (ref 10–44)
ANION GAP SERPL CALC-SCNC: 10 MMOL/L (ref 8–16)
AST SERPL-CCNC: 19 U/L (ref 10–40)
B-HCG UR QL: NEGATIVE
BACTERIA #/AREA URNS AUTO: ABNORMAL /HPF
BASOPHILS # BLD AUTO: 0.04 K/UL (ref 0–0.2)
BASOPHILS NFR BLD: 0.6 % (ref 0–1.9)
BILIRUB SERPL-MCNC: 0.2 MG/DL (ref 0.1–1)
BILIRUB UR QL STRIP: NEGATIVE
BUN SERPL-MCNC: 7 MG/DL (ref 6–20)
CALCIUM SERPL-MCNC: 9.1 MG/DL (ref 8.7–10.5)
CHLORIDE SERPL-SCNC: 107 MMOL/L (ref 95–110)
CLARITY UR REFRACT.AUTO: CLEAR
CO2 SERPL-SCNC: 22 MMOL/L (ref 23–29)
COLOR UR AUTO: YELLOW
CREAT SERPL-MCNC: 0.7 MG/DL (ref 0.5–1.4)
CTP QC/QA: YES
DIFFERENTIAL METHOD: ABNORMAL
EOSINOPHIL # BLD AUTO: 0.6 K/UL (ref 0–0.5)
EOSINOPHIL NFR BLD: 8.2 % (ref 0–8)
ERYTHROCYTE [DISTWIDTH] IN BLOOD BY AUTOMATED COUNT: 14.6 % (ref 11.5–14.5)
EST. GFR  (NO RACE VARIABLE): >60 ML/MIN/1.73 M^2
GLUCOSE SERPL-MCNC: 96 MG/DL (ref 70–110)
GLUCOSE UR QL STRIP: NEGATIVE
HCT VFR BLD AUTO: 39.3 % (ref 37–48.5)
HGB BLD-MCNC: 12.5 G/DL (ref 12–16)
HGB UR QL STRIP: NEGATIVE
HYALINE CASTS UR QL AUTO: 0 /LPF
IMM GRANULOCYTES # BLD AUTO: 0.01 K/UL (ref 0–0.04)
IMM GRANULOCYTES NFR BLD AUTO: 0.1 % (ref 0–0.5)
KETONES UR QL STRIP: ABNORMAL
LEUKOCYTE ESTERASE UR QL STRIP: ABNORMAL
LYMPHOCYTES # BLD AUTO: 2.6 K/UL (ref 1–4.8)
LYMPHOCYTES NFR BLD: 36.4 % (ref 18–48)
MCH RBC QN AUTO: 27.4 PG (ref 27–31)
MCHC RBC AUTO-ENTMCNC: 31.8 G/DL (ref 32–36)
MCV RBC AUTO: 86 FL (ref 82–98)
MICROSCOPIC COMMENT: ABNORMAL
MONOCYTES # BLD AUTO: 0.7 K/UL (ref 0.3–1)
MONOCYTES NFR BLD: 9.3 % (ref 4–15)
NEUTROPHILS # BLD AUTO: 3.3 K/UL (ref 1.8–7.7)
NEUTROPHILS NFR BLD: 45.4 % (ref 38–73)
NITRITE UR QL STRIP: NEGATIVE
NRBC BLD-RTO: 0 /100 WBC
PH UR STRIP: 7 [PH] (ref 5–8)
PLATELET # BLD AUTO: 372 K/UL (ref 150–450)
PMV BLD AUTO: 9.7 FL (ref 9.2–12.9)
POTASSIUM SERPL-SCNC: 4 MMOL/L (ref 3.5–5.1)
PROT SERPL-MCNC: 7.9 G/DL (ref 6–8.4)
PROT UR QL STRIP: ABNORMAL
RBC # BLD AUTO: 4.57 M/UL (ref 4–5.4)
RBC #/AREA URNS AUTO: 3 /HPF (ref 0–4)
SODIUM SERPL-SCNC: 139 MMOL/L (ref 136–145)
SP GR UR STRIP: >1.03 (ref 1–1.03)
SQUAMOUS #/AREA URNS AUTO: 5 /HPF
URN SPEC COLLECT METH UR: ABNORMAL
WBC # BLD AUTO: 7.22 K/UL (ref 3.9–12.7)
WBC #/AREA URNS AUTO: 3 /HPF (ref 0–5)

## 2022-08-14 PROCEDURE — 99282 EMERGENCY DEPT VISIT SF MDM: CPT | Mod: ,,, | Performed by: EMERGENCY MEDICINE

## 2022-08-14 PROCEDURE — 80053 COMPREHEN METABOLIC PANEL: CPT | Performed by: EMERGENCY MEDICINE

## 2022-08-14 PROCEDURE — 81001 URINALYSIS AUTO W/SCOPE: CPT | Performed by: EMERGENCY MEDICINE

## 2022-08-14 PROCEDURE — 36000 PLACE NEEDLE IN VEIN: CPT

## 2022-08-14 PROCEDURE — 85025 COMPLETE CBC W/AUTO DIFF WBC: CPT | Performed by: EMERGENCY MEDICINE

## 2022-08-14 PROCEDURE — 99283 EMERGENCY DEPT VISIT LOW MDM: CPT | Mod: 25

## 2022-08-14 PROCEDURE — 81025 URINE PREGNANCY TEST: CPT | Performed by: EMERGENCY MEDICINE

## 2022-08-14 PROCEDURE — 99282 PR EMERGENCY DEPT VISIT,LEVEL II: ICD-10-PCS | Mod: ,,, | Performed by: EMERGENCY MEDICINE

## 2022-08-14 PROCEDURE — 86803 HEPATITIS C AB TEST: CPT | Performed by: PHYSICIAN ASSISTANT

## 2022-08-14 PROCEDURE — 87389 HIV-1 AG W/HIV-1&-2 AB AG IA: CPT | Performed by: PHYSICIAN ASSISTANT

## 2022-08-14 NOTE — DISCHARGE INSTRUCTIONS
Take tylenol or motrin if needed for pain  Follow up with your doctor.  Return to ED for fevers, vomiting, worsening pain or any other concerns

## 2022-08-14 NOTE — ED PROVIDER NOTES
Encounter Date: 8/14/2022     History     Chief Complaint   Patient presents with    Flank Pain     Pt c/ R flank pain starting earlier today. Reports feeling constipated. Denies dysuria, fever, n/v/d.      HPI   26 y/o F with history of anemia presents with rt sided flank and abdominal pain. Onset a few hours ago. Feels like pain is just over lower ribs on right side. No fevers. No nausea, vomiting or diarrhea. No trauma.  No dysuria.   No chest pain/pleuritic component to pain  No sob    Review of patient's allergies indicates:  No Known Allergies     Past Medical History:   Diagnosis Date    Anemia     Chlamydia     Disorder of kidney and ureter     Miscarriage      Past Surgical History:   Procedure Laterality Date    DILATION AND CURETTAGE OF UTERUS  04/30/2018     Family History   Problem Relation Age of Onset    Breast cancer Other     Diabetes Mother     Kidney disease Mother     Crohn's disease Father     No Known Problems Sister     No Known Problems Brother     No Known Problems Daughter     No Known Problems Daughter     Ovarian cancer Neg Hx     Colon cancer Neg Hx      Social History     Tobacco Use    Smoking status: Never Smoker    Smokeless tobacco: Never Used   Substance Use Topics    Alcohol use: Yes    Drug use: No     Review of Systems   Constitutional: Negative for fever.   HENT: Negative for sore throat.    Eyes: Negative for visual disturbance.   Respiratory: Negative for shortness of breath.    Cardiovascular: Negative for chest pain and leg swelling.   Gastrointestinal: Positive for abdominal pain. Negative for abdominal distention, diarrhea, nausea and vomiting.   Genitourinary: Positive for flank pain. Negative for dysuria, frequency and vaginal discharge.   Musculoskeletal: Negative for back pain.   Skin: Negative for rash and wound.   Neurological: Negative for light-headedness and headaches.       Physical Exam     Initial Vitals [08/14/22 0103]   BP Pulse Resp Temp  SpO2   (!) 141/80 92 16 98.4 °F (36.9 °C) 99 %      MAP       --         Physical Exam    Nursing note and vitals reviewed.  Constitutional: She appears well-developed and well-nourished. She is not diaphoretic. No distress.   HENT:   Head: Normocephalic and atraumatic.   Eyes: Conjunctivae are normal. Pupils are equal, round, and reactive to light. No scleral icterus.   Neck: Neck supple.   Cardiovascular: Normal rate, regular rhythm, normal heart sounds and intact distal pulses.   Pulmonary/Chest: Breath sounds normal. No respiratory distress. She has no wheezes.   Abdominal: Abdomen is soft. Bowel sounds are normal. She exhibits no distension.   Minimal ttp rt lower ribs, rt uq as well as epigastrium, no rebound or guarding. No CVAT   Musculoskeletal:         General: No tenderness or edema.      Cervical back: Neck supple.     Neurological: She is alert and oriented to person, place, and time. GCS score is 15. GCS eye subscore is 4. GCS verbal subscore is 5. GCS motor subscore is 6.   Skin: Skin is warm and dry. No rash noted.         ED Course   Procedures  Labs Reviewed   URINALYSIS, REFLEX TO URINE CULTURE - Abnormal; Notable for the following components:       Result Value    Specific Gravity, UA >1.030 (*)     Protein, UA 1+ (*)     Ketones, UA Trace (*)     Leukocytes, UA Trace (*)     All other components within normal limits    Narrative:     Specimen Source->Urine   CBC W/ AUTO DIFFERENTIAL - Abnormal; Notable for the following components:    MCHC 31.8 (*)     RDW 14.6 (*)     Eos # 0.6 (*)     Eosinophil % 8.2 (*)     All other components within normal limits    Narrative:     Release to patient->Immediate   COMPREHENSIVE METABOLIC PANEL - Abnormal; Notable for the following components:    CO2 22 (*)     All other components within normal limits    Narrative:     Release to patient->Immediate   URINALYSIS MICROSCOPIC - Abnormal; Notable for the following components:    Bacteria Few (*)     All other  components within normal limits    Narrative:     Specimen Source->Urine   HIV 1 / 2 ANTIBODY   HEPATITIS C ANTIBODY   POCT URINE PREGNANCY          Imaging Results    None          Medications - No data to display     Medical Decision Making:   History:   Old Medical Records: I decided to obtain old medical records.  Initial Assessment:   26 y/o F with rt sided upper abd pain. No CVAT  afebrile  Ddx: cholecystitis, sx cholelithiasis, pancreatitis, gerd, gastritis  Have considered but do not suspect PE0 wels score 0  Unlikely nephrolithiasis  Will send routine blood work, UA and urine poc preg  Clinical Tests:   Lab Tests: Ordered and Reviewed  ED Management:  Poc preg negative  No leukoytosis. Unremarkable blood work. UA poor specimen but no concern for UTI  Possible sx cholelithiasis- normal LFTs  No indication for emergent imaging  Discharge home. Routine return precautions. Follow up PCP.                      Clinical Impression:   Final diagnoses:  [R10.9] Abdominal pain, unspecified abdominal location (Primary)          ED Disposition Condition    Discharge Stable        ED Prescriptions     None        Follow-up Information     Follow up With Specialties Details Why Contact Info    Barrera Koch MD Family Medicine Schedule an appointment as soon as possible for a visit in 1 week  [address] [facility] [id number]  [phone]             Trang Johnston MD  08/14/22 0914

## 2022-08-15 LAB — HIV 1+2 AB+HIV1 P24 AG SERPL QL IA: NEGATIVE

## 2022-08-17 LAB — HCV AB SERPL QL IA: NEGATIVE

## 2022-08-25 ENCOUNTER — HOSPITAL ENCOUNTER (OUTPATIENT)
Dept: RADIOLOGY | Facility: HOSPITAL | Age: 27
Discharge: HOME | End: 2022-08-25
Attending: PHYSICIAN ASSISTANT
Payer: COMMERCIAL

## 2022-08-25 ENCOUNTER — HOSPITAL ENCOUNTER (OUTPATIENT)
Dept: CARDIOLOGY | Facility: CLINIC | Age: 27
Discharge: HOME | End: 2022-08-25
Payer: COMMERCIAL

## 2022-08-25 ENCOUNTER — TELEPHONE (OUTPATIENT)
Dept: BARIATRICS | Facility: CLINIC | Age: 27
End: 2022-08-25
Payer: COMMERCIAL

## 2022-08-25 DIAGNOSIS — E66.01 CLASS 3 SEVERE OBESITY DUE TO EXCESS CALORIES WITH SERIOUS COMORBIDITY AND BODY MASS INDEX (BMI) OF 40.0 TO 44.9 IN ADULT: ICD-10-CM

## 2022-08-25 PROCEDURE — 93000 ELECTROCARDIOGRAM COMPLETE: CPT | Mod: S$GLB,,, | Performed by: INTERNAL MEDICINE

## 2022-08-25 PROCEDURE — 93000 EKG 12-LEAD: ICD-10-PCS | Mod: S$GLB,,, | Performed by: INTERNAL MEDICINE

## 2022-08-25 PROCEDURE — 93005 ELECTROCARDIOGRAM TRACING: CPT | Mod: PBBFAC | Performed by: INTERNAL MEDICINE

## 2022-08-25 PROCEDURE — 71046 XR CHEST PA AND LATERAL: ICD-10-PCS | Mod: 26,,, | Performed by: RADIOLOGY

## 2022-08-25 PROCEDURE — 71046 X-RAY EXAM CHEST 2 VIEWS: CPT | Mod: 26,,, | Performed by: RADIOLOGY

## 2022-08-25 PROCEDURE — 71046 X-RAY EXAM CHEST 2 VIEWS: CPT | Mod: TC,FY

## 2022-08-25 NOTE — TELEPHONE ENCOUNTER
Called and spoke to pt to reschedule missed appts for today. Pt agreed on scheduled dates/times. Pt stated she updated her [employer] insurance.

## 2022-08-25 NOTE — TELEPHONE ENCOUNTER
----- Message from Samm Ortiz sent at 8/25/2022 12:50 PM CDT -----  Regarding: call bk to resche EKG    The Pt states that she did add her [employer] Ins to the chart and would like to resched the EKG please and the Pt was hoping to get it sched for later today around the 3:45 time since she is having a chest xray this afternoon.    The Pt states that if you can't resched it for before or after the appt this after noon - she would like to do the appt next Fri 9/02/2022 anytime after 11:00AM     # [phone]

## 2022-08-31 ENCOUNTER — OFFICE VISIT (OUTPATIENT)
Dept: PSYCHIATRY | Facility: CLINIC | Age: 27
End: 2022-08-31
Payer: COMMERCIAL

## 2022-08-31 DIAGNOSIS — E66.01 CLASS 3 SEVERE OBESITY DUE TO EXCESS CALORIES WITH SERIOUS COMORBIDITY AND BODY MASS INDEX (BMI) OF 40.0 TO 44.9 IN ADULT: ICD-10-CM

## 2022-08-31 DIAGNOSIS — Z01.818 PREOPERATIVE EVALUATION TO RULE OUT SURGICAL CONTRAINDICATION: Primary | ICD-10-CM

## 2022-08-31 PROCEDURE — 90791 PR PSYCHIATRIC DIAGNOSTIC EVALUATION: ICD-10-PCS | Mod: 95,,, | Performed by: PSYCHOLOGIST

## 2022-08-31 PROCEDURE — 90791 PSYCH DIAGNOSTIC EVALUATION: CPT | Mod: 95,,, | Performed by: PSYCHOLOGIST

## 2022-08-31 PROCEDURE — 3044F HG A1C LEVEL LT 7.0%: CPT | Mod: CPTII,95,, | Performed by: PSYCHOLOGIST

## 2022-08-31 PROCEDURE — 3044F PR MOST RECENT HEMOGLOBIN A1C LEVEL <7.0%: ICD-10-PCS | Mod: CPTII,95,, | Performed by: PSYCHOLOGIST

## 2022-08-31 NOTE — PROGRESS NOTES
Psychiatry Initial Visit (PhD/LCSW)   Diagnostic Interview - CPT 88595     Date: 08/31/2022    Site:  The patient location is: [place], [place]  The chief complaint leading to consultation is: presurgical eval    Visit type: audiovisual    Face to Face time with patient: 35  50 minutes of total time spent on the encounter, which includes face to face time and non-face to face time preparing to see the patient (eg, review of tests), Obtaining and/or reviewing separately obtained history, Documenting clinical information in the electronic or other health record, Independently interpreting results (not separately reported) and communicating results to the patient/family/caregiver, or Care coordination (not separately reported).         Each patient to whom he or she provides medical services by telemedicine is:  (1) informed of the relationship between the physician and patient and the respective role of any other health care provider with respect to management of the patient; and (2) notified that he or she may decline to receive medical services by telemedicine and may withdraw from such care at any time.    Notes:       Referral source: Dwight Edmondson Jr., M.D.      Clinical status of patient: Outpatient     [facility] Nalini Alvarado, a 27-year-old female, presented for initial evaluation visit. Before this evaluation was initiated, the purposes and process of the assessment and the limits of confidentiality were discussed with the patient who expressed understanding of these issues and orally consented to proceed with the evaluation.     Chief complaint/reason for encounter: Routine psychological evaluation prior to bariatric surgery.     Type of surgery sought: Sleeve gastrectomy     History of present illness:   Ms. Alvarado is a 27-year-old  female who is pursuing bariatric surgery to improve her health and quality of life. She has no history of significant psychological difficulties. She has never  taken psychotropic medication, has never been hospitalized for psychiatric reasons and denied any history of suicidality.  She has begun making positive lifestyle changes in anticipation for surgery, with good benefit. The patient has a Body Mass Index of 43.55 as documented by the referring provider.    Ms. Alvarado has struggled with weight about four years ago. She stated at that time, she was working two jobs and going to school. She stated over the course of a few months she gained 20-25 pounds. She then got pregnant and after birth could not get the weight off. Factors that have contributed to her weight gain over the years include poor diet and inconsistent eating patterns.  She denied a history of emotional eating.  She has tried many weight loss methods on her own (i.e., medical weight loss, calorie deficit, exercise) with little success, and she believes that her biggest weight loss challenge is not having enough time to focus on her eating habits due to all of her other responsibilities.  Her motivation for seeking surgery now is to be more active with her daughter and to be healthier.     Ms. Alvarado has met with Ms. Mercedes RD, bariatric dietician, and reports she demonstrated knowledge of healthy eating behaviors and exercise patterns but admits to not eating healthy and not exercising at this point.  She must continue meeting with Ms. Long to demonstrate the implementation of lifestyle changes prior to clearance for bariatric surgery.    Co-morbidities: none    Knowledge of surgery information:  - Basics of procedure: make stomach size and shape of a banana  - Risks: GERD, leakage, dumping syndrome  - Basics of diet: liquid for two weeks before and after, small portions, high protein    Pain: 4/10 (knee, torn meniscus)     Psychiatric Symptoms:   Depression -Denied depressed mood, loss of interest in pleasurable activities, anhedonia, sleep changes, decreased motivation, decreased  concentration, feelings of excessive or irrational guilt, helplessness, hopelessness, increased or decreased appetite, weight changes, increased or decreased motor activity, decreased energy, suicidal ideations/thoughts of death.  Nicole/Hypomania -Denied increased goal directed activity, decreased need for sleep, pressured speech or increased talkative, racing thoughts, increased risk-taking behavior, episodic elevated or irritable mood, flight of ideas, distractibility, inflated self-esteem, grandiosity  Anxiety - Denied excessive worry, difficulty controlling worrying, feeling keyed up, easily fatigued, difficulty concentrating or mind going blank, irritability, muscle tension, sleep disturbance, racing thoughts, being unable to relax, specific phobia.  Panic Attacks: Denied palpitations, sweating, trembling, dyspnea, choking sensation, chest pain/discomfort, nausea, dizziness, chills or hot flashes, tingling, derealization, fear of losing control, fear of dying.  Thoughts - Denied any AVH, paranoia, delusions, ideas of reference, thought insertion or thought broadcasting  Suicidal thoughts/behaviors - denied passive or active SI, denied suicidal plans or intent  Self-injury - denied.  Substance abuse - denied abuse or dependence.   Sleep -Denied increased sleep latency, frequent nighttime awakenings, or early morning awakening with inability to return to sleep.    Current psychiatric treatment:  Medications: Denied     Psychotherapy: None    Treating clinicians: None    Health behaviors: Reported adherence to pharmacologic regimen.      Psychiatric history:   Previous diagnosis:  None    Previous hospitalizations: none     History of outpatient treatment: none    Previous suicide attempt: none    Trauma history:  Denies.  PTSD: Denies re-experiencing trauma, nightmares, increased awareness of surroundings, hyperexcitability.    History of eating disorders:  History of bulimia:  She denied recurrent episodes of  eating then engaging in inappropriate compensatory behaviors.      History of binge-eating episodes:  She denied eating excessive amounts of food within a discrete time period with a lack of control during eating.  She denied eating more rapidly than normal, eating until uncomfortably full, eating large amounts of food when not physically hungry, eating alone due to embarrassment, or negative emotions (i.e., disgusted, guilty, depressed) afterwards.    Family history of psychiatric illness: None reported.     Social history (marriage, employment, etc.): Ms. Alvarado was born and raised in [place], [place] by her biological parents.  She described her childhood as average.  She denied childhood trauma, abuse, and neglect. 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  She is currently a .  She is not on disability. She is single.  She has 2 children (ages 10 and 3).  She currently lives with her children and boyfriend.     Current psychosocial stressors: none    Report of coping skills: take some time to herself    Support system: boyfriend, sister, but I try not to rely on anyone    Substance use:   Alcohol:  a few drinks on holidays or a friend's birthday  Drugs: Denied current use; denied history of abuse or dependency.  Tobacco: None.   Caffeine:  caffeine pills from [employer] - 2-3 a week    Current medications and drug reactions (include OTC, herbal): see medication list     Current Evaluation:    Mental Status Exam:   General Appearance:  age appropriate, well dressed, neatly groomed, overweight    Speech:  normal tone, normal rate, normal pitch, normal volume    Level of Cooperation:  cooperative    Thought Processes:  normal and logical    Mood:  euthymic    Thought Content:  normal, no suicidality, no homicidality, delusions, or paranoia    Affect:  congruent and appropriate    Orientation:  oriented x3    Memory:  recent memory intact; immediate and delayed  word recall 3/3  remote memory intact; able to recall remote personal events   Attention Span & Concentration:  appropriate   Fund of General Knowledge:  appropriate for education    Abstract Reasoning:  Not directly assessed   Judgment & Insight:  good    Language  intact        Diagnostic Impression - Plan:      Diagnostic Impression:    ICD-10-CM ICD-9-CM   1. Preoperative evaluation to rule out surgical contraindication  Z01.818 V72.83   2. Class 3 severe obesity due to excess calories with serious comorbidity and body mass index (BMI) of 40.0 to 44.9 in adult  E66.01 278.01    Z68.41 V85.41       Summary/Conclusion:   There are no overt psychological contraindications for proceeding with bariatric surgery.  The patient has no significant psychiatric history and reports no current psychiatric problems or major adjustment issues.  The patient has reasonable expectations for surgery, good social support, and has already begun making appropriate lifestyle changes in anticipation for surgery. The patient has verbalized appropriate awareness and commitment to the necessary behavioral changes associated with bariatric surgery and appears willing to comply with long-term lifestyle changes.     Recommendations:  -This patient is psychologically cleared to proceed with bariatric surgery.  - There are no recommendations for psychological treatment at this time. The patient is aware of resources available should her needs change in the future.

## 2022-08-31 NOTE — PROGRESS NOTES
Psychiatry Initial Visit (PhD/LCSW)   Diagnostic Interview - CPT 64626     Date:     Site:      Referral source: Dwight Edmondson Jr., M.D.      Clinical status of patient: Outpatient     NAME, a 27-year-old female, presented for initial evaluation visit. Before this evaluation was initiated, the purposes and process of the assessment and the limits of confidentiality were discussed with the patient who expressed understanding of these issues and orally consented to proceed with the evaluation.     Chief complaint/reason for encounter: Routine psychological evaluation prior to bariatric surgery.     Type of surgery sought: Sleeve gastrectomy     History of present illness:   Ms. Alvarado is a 27-year-old  female who is pursuing bariatric surgery to improve her health and quality of life. She has no history of significant psychological difficulties. She has never taken psychotropic medication, has never been hospitalized for psychiatric reasons and denied any history of suicidality.  She has begun making positive lifestyle changes in anticipation for surgery, with good benefit. The patient has a Body Mass Index of 43.55 as documented by the referring provider.    Ms. Alvarado has struggled with weight about four years ago. She stated at that time, she was working two jobs and going to school. She stated over the course of a few months she gained 20-25 pounds. She then got pregnant and after birth could not get the weight off. Factors that have contributed to her weight gain over the years include poor diet and inconsistent eating patterns.  She denied a history of emotional eating.  She has tried many weight loss methods on her own (i.e., medical weight loss, calorie deficit, exercise) with little success, and she believes that her biggest weight loss challenge is not having enough time to focus on her eating habits due to all of her other responsibilities.  Her motivation for seeking surgery now is  to be more active with her daughter and to be healthier.     Ms. Alvarado has met with Ms. Mercedes RD, bariatric dietician, and reports she demonstrated knowledge of healthy eating behaviors and exercise patterns but admits to not eating healthy and not exercising at this point.  She must continue meeting with Ms. Long to demonstrate the implementation of lifestyle changes prior to clearance for bariatric surgery.    Co-morbidities: none    Knowledge of surgery information:  - Basics of procedure: make stomach size and shape of a banana  - Risks: GERD, leakage, dumping syndrome  - Basics of diet: liquid for two weeks before and after, small portions, high protein    Pain: 4/10 (knee, torn meniscus)     Psychiatric Symptoms:   Depression -Denied depressed mood, loss of interest in pleasurable activities, anhedonia, sleep changes, decreased motivation, decreased concentration, feelings of excessive or irrational guilt, helplessness, hopelessness, increased or decreased appetite, weight changes, increased or decreased motor activity, decreased energy, suicidal ideations/thoughts of death.  Nicole/Hypomania -Denied increased goal directed activity, decreased need for sleep, pressured speech or increased talkative, racing thoughts, increased risk-taking behavior, episodic elevated or irritable mood, flight of ideas, distractibility, inflated self-esteem, grandiosity  Anxiety - Denied excessive worry, difficulty controlling worrying, feeling keyed up, easily fatigued, difficulty concentrating or mind going blank, irritability, muscle tension, sleep disturbance, racing thoughts, being unable to relax, specific phobia.  Panic Attacks: Denied palpitations, sweating, trembling, dyspnea, choking sensation, chest pain/discomfort, nausea, dizziness, chills or hot flashes, tingling, derealization, fear of losing control, fear of dying.  Thoughts - Denied any AVH, paranoia, delusions, ideas of reference, thought insertion  or thought broadcasting  Suicidal thoughts/behaviors - denied passive or active SI, denied suicidal plans or intent  Self-injury - denied.  Substance abuse - denied abuse or dependence.   Sleep -Denied increased sleep latency, frequent nighttime awakenings, or early morning awakening with inability to return to sleep.    Current psychiatric treatment:  Medications: Denied     Psychotherapy: None    Treating clinicians: None    Health behaviors: Reported adherence to pharmacologic regimen.      Psychiatric history:   Previous diagnosis:  None    Previous hospitalizations: none     History of outpatient treatment: none    Previous suicide attempt: none    Trauma history:  Denies.  PTSD: Denies re-experiencing trauma, nightmares, increased awareness of surroundings, hyperexcitability.    History of eating disorders:  History of bulimia:  She denied recurrent episodes of eating then engaging in inappropriate compensatory behaviors.      History of binge-eating episodes:  She denied eating excessive amounts of food within a discrete time period with a lack of control during eating.  She denied eating more rapidly than normal, eating until uncomfortably full, eating large amounts of food when not physically hungry, eating alone due to embarrassment, or negative emotions (i.e., disgusted, guilty, depressed) afterwards.    Family history of psychiatric illness: None reported.     Social history (marriage, employment, etc.): Ms. Alvarado was born and raised in [place], [place] by her biological parents.  She described her childhood as average.  She denied childhood trauma, abuse, and neglect. 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  She is currently a .  She is not on disability. She is single.  She has 2 children (ages 10 and 3).  She currently lives with her children and boyfriend.     Current psychosocial stressors: none    Report of coping skills: take some time to  herself    Support system: boyfriend, sister, but I try not to rely on anyone    Substance use:   Alcohol:  a few drinks on holidays or a friend's birthday  Drugs: Denied current use; denied history of abuse or dependency.  Tobacco: None.   Caffeine:  caffeine pills from [employer] - 2-3 a week    Current medications and drug reactions (include OTC, herbal): see medication list     Current Evaluation:    Mental Status Exam:   General Appearance:  age appropriate, well dressed, neatly groomed, overweight    Speech:  normal tone, normal rate, normal pitch, normal volume    Level of Cooperation:  cooperative    Thought Processes:  normal and logical    Mood:  euthymic    Thought Content:  normal, no suicidality, no homicidality, delusions, or paranoia    Affect:  congruent and appropriate    Orientation:  oriented x3    Memory:  recent memory intact; immediate and delayed word recall 3/3  remote memory intact; able to recall remote personal events   Attention Span & Concentration:  appropriate   Fund of General Knowledge:  appropriate for education    Abstract Reasoning:  Not directly assessed   Judgment & Insight:  good    Language  intact        Diagnostic Impression - Plan:      Diagnostic Impression:    Summary/Conclusion:   There are no overt psychological contraindications for proceeding with bariatric surgery.  The patient has no significant psychiatric history and reports no current psychiatric problems or major adjustment issues.  The patient has reasonable expectations for surgery, good social support, and has already begun making appropriate lifestyle changes in anticipation for surgery. The patient has verbalized appropriate awareness and commitment to the necessary behavioral changes associated with bariatric surgery and appears willing to comply with long-term lifestyle changes.     Recommendations:  -This patient is psychologically cleared to proceed with bariatric surgery.  - There are no  recommendations for psychological treatment at this time. The patient is aware of resources available should her needs change in the future.

## 2022-09-07 ENCOUNTER — PATIENT MESSAGE (OUTPATIENT)
Dept: BARIATRICS | Facility: CLINIC | Age: 27
End: 2022-09-07
Payer: COMMERCIAL

## 2022-09-09 ENCOUNTER — CLINICAL SUPPORT (OUTPATIENT)
Dept: BARIATRICS | Facility: CLINIC | Age: 27
End: 2022-09-09
Payer: COMMERCIAL

## 2022-09-09 DIAGNOSIS — Z71.3 DIETARY COUNSELING: Primary | ICD-10-CM

## 2022-09-09 DIAGNOSIS — E66.01 MORBID OBESITY WITH BMI OF 40.0-44.9, ADULT: ICD-10-CM

## 2022-09-09 PROCEDURE — 99499 NO LOS: ICD-10-PCS | Mod: 95,,, | Performed by: DIETITIAN, REGISTERED

## 2022-09-09 PROCEDURE — 99499 UNLISTED E&M SERVICE: CPT | Mod: 95,,, | Performed by: DIETITIAN, REGISTERED

## 2022-09-09 PROCEDURE — 97803 PR MED NUTR THER, SUBSQ, INDIV, EA 15 MIN: ICD-10-PCS | Mod: 95,,, | Performed by: DIETITIAN, REGISTERED

## 2022-09-09 PROCEDURE — 97803 MED NUTRITION INDIV SUBSEQ: CPT | Mod: 95,,, | Performed by: DIETITIAN, REGISTERED

## 2022-09-09 NOTE — PROGRESS NOTES
The patient location is: home ([place])  The chief complaint leading to consultation is: morbid obesity    Visit type: audiovisual    Face to Face time with patient: 45 min  45 minutes of total time spent on the encounter, which includes face to face time and non-face to face time preparing to see the patient (eg, review of tests), Obtaining and/or reviewing separately obtained history, Documenting clinical information in the electronic or other health record, Independently interpreting results (not separately reported) and communicating results to the patient/family/caregiver, or Care coordination (not separately reported).         Each patient to whom he or she provides medical services by telemedicine is:  (1) informed of the relationship between the physician and patient and the respective role of any other health care provider with respect to management of the patient; and (2) notified that he or she may decline to receive medical services by telemedicine and may withdraw from such care at any time.      NUTRITIONAL Note     Referring Physician: Dr. Allen  Reason for MNT Referral: Follow up assessment for Sleeve work-up     27 y.o. female presents with 5 lbs weight loss over the past month by making numerous dietary and lifestyle changes in preparation for surgery.          Past Medical History:   Diagnosis Date    Chlamydia      Miscarriage           CLINICAL DATA:  27 y.o.-year-old Black or  female.  Height: 5 ft.   Weight: 218 lbs  IBW: 126 lbs  BMI: 43.5     DAILY NUTRITIONAL NEEDS: pre-op nutritional bariatric guidelines to promote weight loss  [id number] Calories    Grams Protein     NUTRITION & HEALTH HISTORY:  Greatest challenge: dining out frequency, sweets, starchy CHO, portion control, snacking at night, irregular meal patterns and high-fat diet     Current diet recall:      B 9am: 2 boiled eggs, 2 strips turkey mishra OR 1 cup Raisin Bran with almond milk  L 4pm: skips OR SK  smoothie (banana boat or the Hulk) OR bag of Sun Chips OR pbj sandwich on ww  D 8pm: salmon and stir fried veggies OR dine out caesar salad with grilled chicken OR baked/smothered chicken/turkey necks with broccoli and ch  Tries not to eat after 8pm     Current Diet:  Meal pattern: 3 meals  Protein supplements: SK smoothies  Snacking: greek yogurt, chips, fruits  Vegetables: Likes a variety. Doesn't like tomatoes. Eats daily.  Fruits: Likes a variety. Eats almost daily.  Beverages: Mostly water, diet cran-grape, sf apple juice  Dining out: Weekly. Mostly fast food, restaurants and take-out.    Cooking at home: Several times Weekly. Mostly baked, grilled and smothered meat, fish, starchy CHO and vegetables.     Exercise:  [employer] work out video a few times/week.  Restrictions to exercise: knee pain - torn meniscus     Vitamins / Minerals / Herbs:   none     Labs:   None available at time of visit     Food Allergies:   None known     Social:  Works regular daytime shifts. Works from home. 11am-8pm.  Lives with her fiance and her 2 kids and her mother.  Grocery shopping and food prep done by the pt.  Patient believes the household will be supportive after surgery.  Alcohol: Socially. Weekends. Vodka/red bull.  Smoking: None.     ASSESSMENT:  Patient demonstrated knowledge of healthy eating behaviors and exercise patterns  Patient demonstrates willingness to change lifestyle and make behavior modifications AEB weight loss, dietary changes.           Barriers to Education: none     Stage of change: action     NUTRITION DIAGNOSIS:    Morbid Obesity related to Excessive carbohydrate intake, Excessive calorie intake and Physical inactivity as evidence by BMI.  Status: Improved     BARIATRIC DIET DISCUSSION/PLAN:  Discussed diet after surgery and related to patient's food record.  Reviewed nutrition guidelines for before and after surgery.  Answered all questions.  Continue to review Bariatric Nutrition Guidebook at home and  call with any questions.  Work on Bariatric Nutrition Checklist.  Continue to cut back on starchy CHO in the diet.  1200-calorie diet.  1500-calorie diet.  5-6 meals per day.  Begin trying various protein supplements to determine preference.  Start including protein supplements in the diet plan daily.  Discussed better lunch options. Lean protein/veg/fruit OR protein shake/smoothie 0-4g sugar.     RECOMMENDATIONS:  Patient is a good candidate for bariatric surgery - Sleeve.     Patient verbalized understanding.     Expect good compliance after surgery at this time.    Will f/u before/after surgery as needed.     Communicated nutrition plan with bariatric team.     SESSION TIME:  45 minutes

## 2022-09-15 ENCOUNTER — PATIENT MESSAGE (OUTPATIENT)
Dept: BARIATRICS | Facility: CLINIC | Age: 27
End: 2022-09-15
Payer: COMMERCIAL

## 2022-10-06 ENCOUNTER — PATIENT MESSAGE (OUTPATIENT)
Dept: BARIATRICS | Facility: CLINIC | Age: 27
End: 2022-10-06
Payer: COMMERCIAL

## 2022-10-09 ENCOUNTER — PATIENT MESSAGE (OUTPATIENT)
Dept: BARIATRICS | Facility: CLINIC | Age: 27
End: 2022-10-09
Payer: COMMERCIAL

## 2022-10-18 ENCOUNTER — PATIENT MESSAGE (OUTPATIENT)
Dept: PSYCHIATRY | Facility: CLINIC | Age: 27
End: 2022-10-18
Payer: COMMERCIAL

## 2022-11-02 ENCOUNTER — OFFICE VISIT (OUTPATIENT)
Dept: PSYCHIATRY | Facility: CLINIC | Age: 27
End: 2022-11-02
Payer: COMMERCIAL

## 2022-11-02 DIAGNOSIS — F32.1 MDD (MAJOR DEPRESSIVE DISORDER), SINGLE EPISODE, MODERATE: Primary | ICD-10-CM

## 2022-11-02 DIAGNOSIS — F41.0 GENERALIZED ANXIETY DISORDER WITH PANIC ATTACKS: ICD-10-CM

## 2022-11-02 DIAGNOSIS — F41.1 GENERALIZED ANXIETY DISORDER WITH PANIC ATTACKS: ICD-10-CM

## 2022-11-02 PROCEDURE — 3044F HG A1C LEVEL LT 7.0%: CPT | Mod: CPTII,95,S$GLB, | Performed by: PSYCHOLOGIST

## 2022-11-02 PROCEDURE — 3044F PR MOST RECENT HEMOGLOBIN A1C LEVEL <7.0%: ICD-10-PCS | Mod: CPTII,95,S$GLB, | Performed by: PSYCHOLOGIST

## 2022-11-02 PROCEDURE — 99999 PR PBB SHADOW E&M-EST. PATIENT-LVL I: CPT | Mod: PBBFAC,,, | Performed by: PSYCHOLOGIST

## 2022-11-02 PROCEDURE — 90791 PSYCH DIAGNOSTIC EVALUATION: CPT | Mod: 95,S$GLB,, | Performed by: PSYCHOLOGIST

## 2022-11-02 PROCEDURE — 99999 PR PBB SHADOW E&M-EST. PATIENT-LVL I: ICD-10-PCS | Mod: PBBFAC,,, | Performed by: PSYCHOLOGIST

## 2022-11-02 PROCEDURE — 90791 PR PSYCHIATRIC DIAGNOSTIC EVALUATION: ICD-10-PCS | Mod: 95,S$GLB,, | Performed by: PSYCHOLOGIST

## 2022-11-02 NOTE — PROGRESS NOTES
Psychiatry Initial Visit (PhD/LCSW)  Diagnostic Interview - CPT 84982    Date: 11/2/2022    Site: Telemed    The patient location is: Patient's home/ Patient reported that her location at the time of this visit was in the [facility]     Visit type: Virtual visit with synchronous audio and video     Each patient to whom he or she provides medical services by telemedicine is: (1) informed of the relationship between the physician and patient and the respective role of any other health care provider with respect to management of the patient; and (2) notified that he or she may decline to receive medical services by telemedicine and may withdraw from such care at any time.     Referral source: self    Clinical status of patient: Outpatient    [facility] Nalini Alvarado, a 27 y.o. female, for initial evaluation visit.  Met with patient.    Chief complaint/reason for encounter: depression and anxiety    History of present illness: Patient reported symptoms of anxiety and depression since May 2022.  Symptoms include depressed mood, diminished interest, insomnia, fatigue, worthlessness/guilt, poor concentration, tearfulness, and social isolation, decreased memory, excessive anxiety/worry, irritability, muscle tension, and panic attacks.  Patient reported the following stressors including work related stress, caring for her children, upcoming weight loss surgery, and being a caregiver to her mother who has health problems.  Patient discussed wanting current provider's approval to take leave from work due to work related stress and increased irritability.  Patient was informed that leave may be considered at a later time as the need is unable to be determined from only one session.  Patient denied history of self-harm behaviors.  Patient denied current suicidal and homicidal ideations.     Pain: noncontributory    Symptoms:   Mood: depressed mood, diminished interest, insomnia, fatigue, worthlessness/guilt, poor  "concentration, tearfulness, and social isolation  Anxiety: decreased memory, excessive anxiety/worry, irritability, muscle tension, and panic attacks  Substance abuse: denied  Cognitive functioning: denied  Health behaviors: noncontributory    Psychiatric history: has participated in counseling/psychotherapy on an outpatient basis in the past - during adolescence for strained relationship with her mother    Medical history: none    Family history of psychiatric illness: Maternal grandmother - Schizophrenia, bipolar disorder; Mother - anxiety, depression; Brother - bipolar disorder and depression    Social history (marriage, employment, etc.): Patient reported growing up in [place], [place] living with her mother.  She noted that she had frequent weekend and summer visitations with her father.  She is the oldest of three children.  She reports having good relationships with her younger brother and sister.  Patient reports previously having a strained relationship with her mother during her adolescence, but now they have a good relationship.  She noted that they mended their relationship about three years ago prior to her mother being in a coma. Patient reported that her father is like her "best friend."   Patient reported that she graduated from high school and has some college experience.  She currently works as an Escalation Specialist in Customer Service.  She has never been , but has been with her partner for 14 years.  She considers her partner to be supportive.  She has two children (daughters ages 10 and 3 years old).    Substance use:   Alcohol: 20 ounce daiquiri twice per week    Drugs: none   Tobacco: none   Caffeine: Coffee - up to two cups daily, Energy drinks - four per week    Current medications and drug reactions (include OTC, herbal): see medication list     Strengths and liabilities: Strength: Patient is expressive/articulate., Liability: Patient lacks coping skills.    Current Evaluation: "     Mental Status Exam:  General Appearance:  unremarkable, age appropriate   Speech: normal tone, normal rate, normal pitch, normal volume      Level of Cooperation: cooperative      Thought Processes: normal and logical   Mood: anxious, depressed      Thought Content: normal, no suicidality, no homicidality, delusions, or paranoia   Affect: congruent and appropriate   Orientation: Oriented x3   Memory: recent >  words after brief delay: 3 of 3 words, remote >  intact   Attention Span & Concentration: completed serial 7s adequately   Fund of General Knowledge: intact and appropriate to age and level of education   Judgment & Insight: fair     Language  intact     PHQ8 Score : (P) 16  PHQ8 Interpretation: (P) Moderately Severe    YADY-7 Score: (P) 15  Interpretation: (P) Severe Anxiety    Diagnostic Impression - Plan:       ICD-10-CM ICD-9-CM   1. MDD (major depressive disorder), single episode, moderate  F32.1 296.22   2. Generalized anxiety disorder with panic attacks  F41.1 300.02    F41.0 300.01     Plan:individual psychotherapy and consult psychiatrist for medication evaluation    Return to Clinic: 2 weeks    Length of Service (minutes): 45

## 2022-11-04 ENCOUNTER — PATIENT MESSAGE (OUTPATIENT)
Dept: BARIATRICS | Facility: CLINIC | Age: 27
End: 2022-11-04
Payer: COMMERCIAL

## 2022-11-26 ENCOUNTER — PATIENT MESSAGE (OUTPATIENT)
Dept: BARIATRICS | Facility: CLINIC | Age: 27
End: 2022-11-26
Payer: COMMERCIAL

## 2022-11-28 ENCOUNTER — DOCUMENTATION ONLY (OUTPATIENT)
Dept: BARIATRICS | Facility: CLINIC | Age: 27
End: 2022-11-28

## 2022-11-28 NOTE — PROGRESS NOTES
Michelle 14       [id number]  Bariatrics - Benefit Info Called [employer] [place] spoke with Jennifer HASSAN. ref# [id number],pt plan is active bene... Guarantor Daphne Fitzgerald    Note text:  Called [employer] [place] spoke with Jennifer HASSANSamir ref# [id number],pt plan is active benefits are only good for the calendar year.stated [facility] facility is in network. Pt has secondary Healthy [employer] Medicaid    Surgery auth required [phone]  CO-INSURANCE 80/20  Copay-$0  Ded- $2000.00,$482.97 met  OOP- $4,500.00 met $482.97  Life time max - 0  Blue Distinction-Required      Per Rep pt insurance is covered for Morbid Obesity  non surgical medical weight loss  coins 20% after ded has been met    Medical Criteria-      Patient Selection Criteria: Initial Procedure    The surgical treatment of morbid obesity may be considered MEDICALLY NECESSARY AND APPROPRIATE for patients who meet ALL the following criteria:    Meets one of the following:  Age 18 years or older; OR  Bone age of = 13 years in girls or = 15 years in boys; OR  Attainment of 95% of adult height based on estimates of bone age;   AND  Body mass index (BMI) of ONE of the following:   BMI of = 40 kg/m2 OR   BMI of 35 kg/m2 to < 40 kg/m2 with AT LEAST ONE of the following comorbid conditions:  Hypertension refractory to standard treatment; OR  Cardiovascular disease; OR  Type 2 diabetes mellitus; OR   Obstructive sleep apnea requiring continuous positive airway pressure (CPAP) or other related treatment; OR   Obesity-hypoventilation syndrome (OHS); OR  Pickwickian syndrome (a combination of APPLE and OHS); OR  Nonalcoholic fatty liver disease (NAFLD); OR  Nonalcoholic steatohepatitis (MELENDEZ); OR  Pseudotumor cerebri;   AND  Patient has been evaluated by an eligible licensed mental health professional within 12 months prior to the surgery. The mental health professional's notes must document ALL of the following:  Absence of active substance use disorder; AND  If a mental health condition is present, it  is under successful treatment; AND  Patient able to provide informed consent; AND  Personal barriers to making and continuing required life changes have been identified, and strategies to overcome those barriers have been recommended; AND  Family and social supports have been assessed, and strategies to strengthen those supports have been recommended;   AND  Patient has actively participated in a preoperative program supervised by a physician, physician's assistant, nurse practitioner/advanced practice nurse, or registered dietician. The supervising medical professional's notes must document ALL of the following:  Correctable endocrine disorders and other medical conditions have been ruled out, or are under successful treatment; AND  Medications that may contribute to the patient's obesity, such as antipsychotic medications, have been identified; AND  Patient has demonstrated consistency in adherence to recommended behavior and nutrition modifications; AND  Recommended strategies to address identified personal barriers to making and continuing needed life changes have been implemented;   AND  Patient has completed a surgical preparatory program. The surgical preparatory program's notes must document that the patient has been informed of BOTH of the following:  Required appointments with the surgery team the patient will need to attend before and after surgery; AND  Life changes patient must make and continue to make, including diet and exercise programs before and after surgery.  II. Patient Selection Criteria: Reoperation    Revision bariatric surgery OR conversion of one type of bariatric surgery to a different procedure may be considered MEDICALLY NECESSARY AND APPROPRIATE using one of the procedures identified in section III as medically necessary, for EITHER of the following indications:  Treatment of surgical complications or technical failures following the original bariatric surgery (e.g., staple line  failure, band migration or slippage, pouch dilation, narrowing or constriction of the stoma); OR  Inadequate weight loss following the original surgery when ALL the following criteria are met:  At least two (2) years have elapsed since the original bariatric surgery; AND  Patient has been and continues to be compliant with the required appointments with the surgery team and the required life changes including diet and exercise recommended by the surgery team from the time of surgery up to the present time without any period of non-compliance; AND  Patient currently has a BMI = 40 kg/m2 OR a BMI of 35 kg/m2 to < 40 kg/m2 with an obesity related comorbid condition as described in section I; AND  Evaluation by a mental health professional indicates any barriers to successful reoperation have been identified and addressed.  Revision or conversion surgery is considered NOT MEDICALLY NECESSARY when performed for inadequate weight loss due to documentation of individual noncompliance with prescribed postoperative nutrition and exercise.  III. Surgical Procedures    The following surgical procedures may be considered MEDICALLY NECESSARY AND APPROPRIATE in the treatment of morbid obesity when the previous patient selection criteria in section I have been met:  Open gastric bypass using a Sallie-en-Y anastomosis with an alimentary or Sallie limb of = 150 cm  Laparoscopic gastric bypass using a Sallie-en-Y anastomosis  Open or laparoscopic sleeve gastrectomy  Open or laparoscopic biliopancreatic diversion (i.e., Scopinaro procedure) with duodenal switch  Laparoscopic adjustable gastric banding, (i.e., Lap-Band® and REALIZE Band)  Any other surgical or minimally invasive procedure is considered EXPERIMENTAL/INVESTIGATIVE as a treatment of morbid obesity including but not limited to the following due to the lack of evidence demonstrating an impact on improved health outcomes:  Open or laparoscopic vertical banded gastroplasty  Open  adjustable gastric banding  Gastric bypass using a Billroth II type of anastomosis, known as the mini-gastric bypass one anastomosis gastric bypass (OAGB)  Biliopancreatic diversion (i.e., the Scopinaro procedure) without duodenal switch  Long limb gastric bypass procedure (i.e., > 150 cm)  Single anastomosis duodenal switch (i.e., stomach intestinal pylorus-sparing [SIPS] or single anastomosis duodenoileal bypass with sleeve gastrectomy [ROCIO-S])  Laproscopic gastric plication  Bariatric surgery (any procedure) for patients with a BMI < 35 kg/m2 including but not limited to solely as a cure for type 2 diabetes mellitus  Endoluminal (also called endosurgical, endoscopic, sclerosing endotherapy or natural orifice transluminal endoscopic) procedure as a primary bariatric procedure or as a revision procedure by any method including but not limited to:  Aspiration therapy device (e.g., AspireAssist® Weight Loss Therapy System)  Duodenal-jejunal sleeve  Intragastric balloon therapy (e.g., Obalon, Orbera®, ReShape Duo systems)  Primary Obesity Surgery, Endoluminal (POSE)  StomaphyX  OverStitch  Transpyloric Shuttle    dical Criteria-

## 2022-12-02 ENCOUNTER — CLINICAL SUPPORT (OUTPATIENT)
Dept: BARIATRICS | Facility: CLINIC | Age: 27
End: 2022-12-02
Payer: COMMERCIAL

## 2022-12-02 DIAGNOSIS — Z71.3 DIETARY COUNSELING: Primary | ICD-10-CM

## 2022-12-02 DIAGNOSIS — E66.01 MORBID OBESITY WITH BMI OF 40.0-44.9, ADULT: ICD-10-CM

## 2022-12-02 PROCEDURE — 97803 PR MED NUTR THER, SUBSQ, INDIV, EA 15 MIN: ICD-10-PCS | Mod: 95,,, | Performed by: DIETITIAN, REGISTERED

## 2022-12-02 PROCEDURE — 99499 NO LOS: ICD-10-PCS | Mod: 95,,, | Performed by: DIETITIAN, REGISTERED

## 2022-12-02 PROCEDURE — 97803 MED NUTRITION INDIV SUBSEQ: CPT | Mod: 95,,, | Performed by: DIETITIAN, REGISTERED

## 2022-12-02 PROCEDURE — 99499 UNLISTED E&M SERVICE: CPT | Mod: 95,,, | Performed by: DIETITIAN, REGISTERED

## 2022-12-02 NOTE — PATIENT INSTRUCTIONS
1200- 1500 calorie Sample meal plan  80-120g protein per day.   Protein drinks and bars: 0-4 grams sugar  Drink lots of water throughout the day and exercise!  MENU # 1  Breakfast: 2 eggs, 1 turkey sausage dinh, 1 apple  Snack: Atkins bar  Lunch: 2 roll-ups (sliced turkey, low-fat sliced cheese, mustard), 12 baby carrots dipped in 1 Tbsp natural peanut butter  Mid-Day Snack: ¼ cup unsalted almonds, ½ cup fruit  Dinner: 1 chicken thigh simmered in tomato sauce + 2 Tbsp mozzarella cheese, ½ cup black beans, 1/2 cup steamed carrots  Evening Snack: 1/2 cup grapes with 4 cubes low-fat cheddar cheese   ___________________________________________________  MENU # 2  Breakfast: 200 calorie protein drink  Mid-morning snack : ¼ cup unsalted nuts, medium banana  Lunch: 3oz tuna or chicken salad made with 2 tbsp light ricardo, over salad with tomatoes and cucumbers.   Snack: low-fat cheese stick  Dinner: 3oz hamburger dinh, slice of low-fat cheese, 1 cup boiled yellow squash and zucchini.   Snack: 6oz light yogurt  _______________________________________________________  Menu #3  Breakfast: 6oz plain Greek yogurt + fruit (½ banana, ½ cup fruit - pineapple, blueberries, strawberries, peach), may add Splenda to andrey.  Lunch: Grilled  chicken breast w/ slice low-fat pepper cooper cheese, 1/2 cup grilled/baked asparagus and small salad with Salad Spritzer.    Mid-Day snack: 200 calorie protein drink   Dinner: 4oz Grilled fish, ½ cup white beans, ½ cup cooked spinach  Evening Snack: fudgsicle no-sugar-added    Menu # 4  Breakfast: 1 scoop vanilla protein powder + 4oz skim milk + 4oz coffee   Snack: Pure protein bar  Lunch: 2 Lettuce tacos: 3oz seasoned ground turkey wrapped in a Wil lettuce leaves with 1 Tbsp shredded cheese and dollop low-fat sour cream  Snack: ½ cup cottage cheese, ½ cup pineapple chunks  Dinner: Shrimp omelet: 2 eggs, ½ cup shrimp, green onions, and shredded  cheese  ______________________________________________________  Menu #5  Breakfast: 1 cup low-fat cottage cheese, ½ cup peaches (no sugar added)  Snack: 1 apple, 4 cubes cheese  Lunch: 3oz baked pork chop, 1 cup okra and tomato stew  Snack: 1/2 cup black beans + salsa + dollop light sour cream  Dinner: Caprese chicken salad: 3 oz chicken breast, 1oz fresh mozzarella, sliced tomato, 1 Tbsp olive oil, basil  Snack: sugar-free popsicle    Menu #6  Breakfast: ½ cup part-skim ricotta cheese, 2 Tbsp sugar-free strawberry preserves, sprinkle of slivered almonds  Snack: 1 orange  Lunch: 3 oz canned chicken, 1oz shredded cheddar cheese, ½  cup black beans, 2 Tbsp salsa  Snack: 200 calorie Protein drink  Dinner: 4 oz grilled salmon steak, over mixed green salad with 2 tbsp light dressing      Meal Ideas for Regular Bariatric Diet  *Recipes and products available at [email or web address]      Breakfast: (15-20g protein)    - Egg white omelet: 2 egg whites or ½ cup Egg Beaters. (Optional proteins: cheese, shrimp, black beans, chicken, sliced turkey) (Optional veggies: tomatoes, salsa, spinach, mushrooms, onions, green peppers, or small slice avocado)     - Egg and sausage: 1 egg or ¼ cup Egg Beaters (any variety), with 1 dinh or 2 links of Turkey sausage or Veggie breakfast sausage (Facishare or [employer])    - Crust-less breakfast quiche: To make a glass pie dish, mix 4oz part skim Ricotta, 1 cup skim milk, and 2 eggs as your base. Add protein: shredded cheese, sliced lean ham or turkey, turkey mishra/sausage. Add veggies: tomato, onion, green onion, mushroom, green pepper, spinach, etc.    - Yogurt parfait: Mix 1 - 6oz container Dannon Light N Fit vanilla yogurt, with ¼ cup crushed unsalted nuts    - Cottage cheese and fruit: ½ cup part-skim cottage cheese or ricotta cheese topped with fresh fruit or sugar free preserves     - Antonella Seo's Vanilla Egg custard* (add 2 Tbsp instant coffee granules to make Cappuccino  Custard*)    - Hi-Protein café latte (skim milk, decaf coffee, 1 scoop protein powder). Optional to add Sugar free syrup or extract flavoring.    - Breakfast Lox: spread fat free cream cheese on slices of smoked salmon. Serve over scrambled or egg over easy (sauteed with nonstick cookspray) OR on a cucumber slice    - Eggwhich: Scramble or cook 1 large egg over easy using nonstick cookspray. Place between 2 slices of Peruvian mishra and low fat cheese.     Lunch: (20-30g protein)    - ½ cup Black bean soup (Homemade or Progresso), with ¼ cup shredded low-fat cheese. Top with chopped tomato or fresh salsa.     - Lean deli turkey breast and low-fat sliced cheese, mustard or light ricardo to moisten, rolled up together, or wrapped in a Wil lettuce leaf    - Chicken salad made from dinner leftovers, moisten with low-fat salad dressing or light ricardo. Also try leftover salmon, shrimp, tuna or boiled eggs. Serve ½ cup over dark green salad    - Fat-free canned refried beans, topped with ¼ cup shredded low-fat cheese. Top with chopped tomato or fresh salsa.     - Greek salad: Top mixed greens with 1-2oz grilled chicken, tomatoes, red onions, 2-3 kalamata olives, and sprinkle lightly with feta cheese. Spritz with Balsamic vinegar to taste.     - Crust-less lunch quiche: To make a glass pie dish, mix 4oz part skim Ricotta, 1 cup skim milk, and 2 eggs as your base. Add protein: shredded cheese, sliced lean ham or turkey, shrimp, chicken. Add veggies: tomato, onion, green onion, mushroom, green pepper, spinach, artichoke, broccoli, etc.    - Pizza bake: spread a  dayan gordon mushroom with tomato sauce, low-fat shredded mozzarella and turkey pepperoni or [place]undy mishra. Add any veggies. Roast for 10-15 minutes, until cheese melted.     - Cucumber crab bites: Spread ¼ cup crab dip (lump crabmeat + light cream cheese and green onions) over sliced cucumber.     - Chicken with light spinach and artichoke dip*: Puree in food  processor: 6oz cooked and drained spinach, 2 cloves garlic, 1 can cannelloni beans, ½ cup chopped green onions, 1 can drained artichoke hearts (not marinated in oil), lemon juice and basil. Mix in 2oz chopped up chicken.    Supper: (20-30g protein)    - Serve grilled fish over dark green salad tossed with low-fat dressing, served with grilled asparagus canada     - Rotisserie chicken salad: served with sliced strawberries, walnuts, fat-free feta cheese crumbles and 1 tbsp Becerrils Own Light Raspberry Porter Vinaigrette    - Shrimp cocktail: Dip cold boiled shrimp in homemade low-sugar cocktail sauce (1/2 cup Kayla One Carb ketchup, 2 tbsp horseradish, 1/4 tsp hot sauce, 1 tsp Worcestershire sauce, 1 tbsp freshly-squeezed lemon juice). Serve with dark green salad, walnuts, and crumbled blue cheese drizzled with olive oil and Balsamic vinegar    - Tuna Melt: Spread tuna salad onto 2 thick slices of tomato. Top with low-fat cheese and broil until cheese is melted. May also be made with chicken salad of shrimp salad. [place] with different types of cheeses.    - Chicken or beef fajitas (no tortilla, rice, beans, chips). Top meat and veggies w/ fresh salsa, fat free sour cream.     - Homemade low-fat Chili using extra lean ground beef or ground turkey. Top with shredded cheese and salsa as desired. May add dollop fat-free sour cream if desired    - Chicken parmesan: Top chicken breast w/ low sugar marinara sauce, mozzarella cheese and bake until chicken reaches 165*.  Serve w/ spaghetti SQUASH or Wolof cut green beans    - Dinner Omelet with shrimp or chicken and onion, green peppers and chives.    - No noodle lasagna: Use sliced zucchini or eggplant in place of noodles.  Layer with part skim ricotta cheese and low sugar meat sauce (use very lean ground beef or ground turkey).    - Mexican chicken bake: Bake chunks of chicken breast or thigh with taco seasoning, Pace brand enchilada sauce, green onions and low-fat  cheese. Serve with ¼ cup black beans or fat free refried beans topped with chopped tomatoes or salsa.    - Vincenzo frozen meatballs, simmered in Classico Marinara sauce. Different flavors of salsa or spaghetti sauce create different dishes! Sprinkle with parmesan cheese. Serve with grilled or steamed veggies, or a dark green salad.    - Simmer boneless skinless chicken thigh chunks in Classico Marinara sauce or roasted salsa until tender with chopped onion, bell pepper, garlic, mushrooms, spinach, etc.     - Hamburger or veggie burger, without the bun, dressed the way you like. Served with grilled or steamed veggies.    - Eggplant parmesan: Bake slices of eggplant at 350 degrees for 15 minutes. Layer tomato sauce, sliced eggplant and low-fat mozzarella cheese in a baking dish and cover with foil. Bake 30-40 more minutes or until bubbly. Uncover and bake at 400 degrees for about 15 more minutes, or until top is slightly crisp.    - Fish tacos: grilled/baked white fish, wrapped in Wil lettuce leaf, topped with salsa, shredded low-fat cheese, and light coleslaw.    - Chicken giovanni: Sprinkle chicken w/ 1 tsp of hidden [place] ranch dip mix. Then grill chicken and top with black beans, salsa and 1 tsp fat free sour cream.     - Cauliflower pizza crust: Use cauliflower as crust (see recipe on rena, no flour!). Top w/ low fat cheese, turkey pepperoni and veggies and bake again    - chicken or turkey crust pizza: use ground chicken or turkey instead of cauliflower, spread in [place] and bake at 350 for about 20-30 minutes(may want to add garlic, black pepper, oregano and other herbs to ground meat mixture).  Remove and top w/ low fat cheese, turkey pepperoni and veggies and bake again for another 10 minutes or until cheese is browned.     Snacks: (100-200 calories; >5g protein)    - 1 low-fat cheese stick with 8 cherry tomatoes or 1 serving fresh fruit  - 4 thin slices fat-free turkey breast and 1 slice low-fat  cheese  - 4 thin slices fat-free honey ham with wedge of melon  - 6-8 edamame pods (equivalent to about 1/4 cup edamame without pods).   - 1/4 cup unsalted nuts with ½ cup fruit  - 6-oz container Dannon Light n Fit vanilla yogurt, topped with 1oz unsalted nuts         - apple, celery or baby carrots spread with 2 Tbsp PB2  - apple slices with 1 oz slice low-fat cheese  - Apple slices dipped in 2 Tbsp of PB2  - celery, cucumber, bell pepper or baby carrots dipped in ¼ cup hummus bean spread or light spinach and artichoke dip (*recipe in lunch section)  - celery, cucumber, baby carrots dipped in high protein greek yogurt (Mix 16 oz plain greek yogurt + 1 packet of hidden valley ranch dip mix)  - Gordon Links Beef Steak - 14g protein! (similar to beef jerky)  - 2 wedges Laughing Cow - Light Herb & Garlic Cheese with sliced cucumber or green bell pepper  - 1/2 cup low-fat cottage cheese with ¼ cup fruit or ¼ cup salsa  - RTD Protein drinks: Atkins, Low Carb Slim Fast, EAS light, Muscle Milk Light, etc.  - Homemade Protein drinks: [employer]NC Soy95, Isopure, Nectar, UNJURY, Whey Gourmet, etc. Mix 1 scoop powder with 8oz skim/1% milk or light soymilk.  - Protein bars: Atkins, EAS, Pure Protein, Think Thin, Detour, etc. Must have 0-4 grams sugar - Read the label.    Takeout Options: No more than twice/week  Deli - Salads (no pasta or rice), meats, cheeses. Roasted chicken. Lox (salmon)    Mexican - Platters which don't include tortillas, chips, or rice. Go easy on the beans. Example: Fajitas without the tortillas. Ask the  not to bring chips to the table if they are too tempting.    Greek - Meat or fish and vegetable, but no bread or rice. Including hummus, baba ganoush, etc, is OK. Most sit-down Greek restaurants can provide you with cucumber slices for dipping instead of dionicio bread.    Fast Food (Avoid as much as possible) - Salads (no croutons and limit salad dressing to 2 tbsp), grilled chicken sandwich without the bun  and ask for no ricardo. Hannahs low fat chili or Taco Bell pintos and cheese.    BBQ - The meats are fine if you ask for sauces on the side, but most of the traditional side dishes are loaded with carbs. Michael slaw, baked beans and BBQ sauce are typically made with sugar.    Chinese - Nothing deep-fried, no rice or noodles. Many Chinese sauces have starch and sugar in them, so you'll have to use your judgement. If you find that these sauces trigger cravings, or cause Dumping, you can ask for the sauce to be made without sugar or just use soy sauce.

## 2022-12-02 NOTE — PROGRESS NOTES
The patient location is: home ([place])  The chief complaint leading to consultation is: morbid obesity    Visit type: audiovisual    Face to Face time with patient: 30 min  30 minutes of total time spent on the encounter, which includes face to face time and non-face to face time preparing to see the patient (eg, review of tests), Obtaining and/or reviewing separately obtained history, Documenting clinical information in the electronic or other health record, Independently interpreting results (not separately reported) and communicating results to the patient/family/caregiver, or Care coordination (not separately reported).         Each patient to whom he or she provides medical services by telemedicine is:  (1) informed of the relationship between the physician and patient and the respective role of any other health care provider with respect to management of the patient; and (2) notified that he or she may decline to receive medical services by telemedicine and may withdraw from such care at any time.        NUTRITIONAL Note     Referring Physician: Dr. Allen  Reason for MNT Referral: Follow up assessment for Sleeve work-up     27 y.o. female presents with self reported weight stable over the past 3 months; total 5 lbs weight loss by making dietary and lifestyle changes in preparation for surgery. States she got down to 210 lbs following bariatric prep diet plan but gained it back over the past 2 months d/t mother being in the hospital and stress at work. Admits she was skipping meals or eating hospital foods/fast foods and not exercising during that time and also was a bit depressed. Still has some work to do to get back on track with good eating habits.             Past Medical History:   Diagnosis Date    Chlamydia      Miscarriage           CLINICAL DATA:  27 y.o.-year-old Black or  female.  Height: 5 ft.   Weight: 218 lbs  IBW: 126 lbs  BMI: 43.5     DAILY NUTRITIONAL NEEDS: pre-op  nutritional bariatric guidelines to promote weight loss  4783-9476 Calories    Grams Protein     NUTRITION & HEALTH HISTORY:  Greatest challenge: dining out frequency, sweets, starchy CHO, portion control, snacking at night, irregular meal patterns and high-fat diet     Current diet recall:      B 12pm: Premier shake  - 6 pack ritz crackers and cheese or bag of chips   L 4-6pm: bowl of cereal  (Corn Pop's) OR fruit cup OR home cooked (5 small baked fish fillets or baked chicken wings, serving spoon of dirty rice or mac and cheese, steamed broccoli or green beans)  9 or 10am: 2 packs of fruit snacks/gummies or brownie     Current Diet:  Meal pattern: 1 meal + 2-3 meals + 0-1 protein supplements  Protein supplements: Premier shakes and occ SK smoothies  Snacking: greek yogurt, chips, sweets, fruits  Vegetables: Likes a variety. Doesn't like tomatoes. Eats daily.  Fruits: Likes a variety. Eats almost daily.  Beverages: Mostly water, diet cran-grape, sf apple juice. Occ Body [place] and regular juices.  Dining out: Weekly. Mostly fast food, restaurants and take-out.    Cooking at home: Several times Weekly. Mostly baked, grilled and smothered meat, fish, starchy CHO and vegetables.     Exercise:  Oculus work out video a few times/week.  Increased steps going to the hospital to visit her mom.  Restrictions to exercise: knee pain - torn meniscus     Vitamins / Minerals / Herbs:   none     Labs:   None available at time of visit     Food Allergies:   None known     Social:  Works regular daytime shifts. Works from home. 11am-8pm.  Lives with her MARLA and both of their kids and her mother.  Grocery shopping and food prep done by the pt or MARLA.  Patient believes the household will be supportive after surgery.  Alcohol: Socially. Weekends. Vodka/red bull.  Smoking: None.     ASSESSMENT:  Patient demonstrated knowledge of healthy eating behaviors and exercise patterns  Patient states and demonstrates some willingness to  change lifestyle and make behavior modifications AEB weight loss, dietary changes, protein supplements, exercise.           Barriers to Education: none     Stage of change: action     NUTRITION DIAGNOSIS:    Morbid Obesity related to Excessive carbohydrate intake, Excessive calorie intake and Physical inactivity as evidence by BMI.  Status: Improved     BARIATRIC DIET DISCUSSION/PLAN:  Discussed diet after surgery and related to patient's food record.  Reviewed nutrition guidelines for before and after surgery.  Answered all questions.  Continue to review Bariatric Nutrition Guidebook at home and call with any questions.  Work on Bariatric Nutrition Checklist.  Continue to cut back on starchy CHO, gummies, and chips in the diet.  Eliminate sugary drinks.  1200-calorie diet.  1500-calorie diet.  5-6 meals per day.  Maintain or Increase exercise.     RECOMMENDATIONS:  Patient is a potential candidate for bariatric surgery - Sleeve.    Follow up in one month.  Needs additional visit(s) with RD to work on dietary and lifestyle changes in preparation for bariatric surgery.     Patient verbalized understanding.     Expect good compliance after surgery at this time.     Communicated nutrition plan with bariatric team.     SESSION TIME:  30 minutes

## 2022-12-05 ENCOUNTER — LAB VISIT (OUTPATIENT)
Dept: LAB | Facility: HOSPITAL | Age: 27
End: 2022-12-05
Payer: COMMERCIAL

## 2022-12-05 DIAGNOSIS — E66.01 CLASS 3 SEVERE OBESITY DUE TO EXCESS CALORIES WITH SERIOUS COMORBIDITY AND BODY MASS INDEX (BMI) OF 40.0 TO 44.9 IN ADULT: ICD-10-CM

## 2022-12-05 LAB
25(OH)D3+25(OH)D2 SERPL-MCNC: 8 NG/ML (ref 30–96)
ALBUMIN SERPL BCP-MCNC: 3.5 G/DL (ref 3.5–5.2)
ALP SERPL-CCNC: 80 U/L (ref 55–135)
ALT SERPL W/O P-5'-P-CCNC: 14 U/L (ref 10–44)
ANION GAP SERPL CALC-SCNC: 10 MMOL/L (ref 8–16)
AST SERPL-CCNC: 15 U/L (ref 10–40)
BASOPHILS # BLD AUTO: 0.02 K/UL (ref 0–0.2)
BASOPHILS NFR BLD: 0.3 % (ref 0–1.9)
BILIRUB DIRECT SERPL-MCNC: 0.2 MG/DL (ref 0.1–0.3)
BILIRUB SERPL-MCNC: 0.4 MG/DL (ref 0.1–1)
BUN SERPL-MCNC: 8 MG/DL (ref 6–20)
CALCIUM SERPL-MCNC: 9.2 MG/DL (ref 8.7–10.5)
CHLORIDE SERPL-SCNC: 106 MMOL/L (ref 95–110)
CHOLEST SERPL-MCNC: 182 MG/DL (ref 120–199)
CHOLEST/HDLC SERPL: 4.6 {RATIO} (ref 2–5)
CO2 SERPL-SCNC: 24 MMOL/L (ref 23–29)
CREAT SERPL-MCNC: 0.7 MG/DL (ref 0.5–1.4)
DIFFERENTIAL METHOD: ABNORMAL
EOSINOPHIL # BLD AUTO: 0.4 K/UL (ref 0–0.5)
EOSINOPHIL NFR BLD: 5.8 % (ref 0–8)
ERYTHROCYTE [DISTWIDTH] IN BLOOD BY AUTOMATED COUNT: 14.3 % (ref 11.5–14.5)
EST. GFR  (NO RACE VARIABLE): >60 ML/MIN/1.73 M^2
ESTIMATED AVG GLUCOSE: 108 MG/DL (ref 68–131)
FOLATE SERPL-MCNC: 10.2 NG/ML (ref 4–24)
GLUCOSE SERPL-MCNC: 85 MG/DL (ref 70–110)
HBA1C MFR BLD: 5.4 % (ref 4–5.6)
HCT VFR BLD AUTO: 40.1 % (ref 37–48.5)
HDLC SERPL-MCNC: 40 MG/DL (ref 40–75)
HDLC SERPL: 22 % (ref 20–50)
HGB BLD-MCNC: 12.3 G/DL (ref 12–16)
IMM GRANULOCYTES # BLD AUTO: 0.01 K/UL (ref 0–0.04)
IMM GRANULOCYTES NFR BLD AUTO: 0.2 % (ref 0–0.5)
IRON SERPL-MCNC: 50 UG/DL (ref 30–160)
LDLC SERPL CALC-MCNC: 126 MG/DL (ref 63–159)
LYMPHOCYTES # BLD AUTO: 2.4 K/UL (ref 1–4.8)
LYMPHOCYTES NFR BLD: 38.1 % (ref 18–48)
MAGNESIUM SERPL-MCNC: 1.8 MG/DL (ref 1.6–2.6)
MCH RBC QN AUTO: 26.3 PG (ref 27–31)
MCHC RBC AUTO-ENTMCNC: 30.7 G/DL (ref 32–36)
MCV RBC AUTO: 86 FL (ref 82–98)
MONOCYTES # BLD AUTO: 0.5 K/UL (ref 0.3–1)
MONOCYTES NFR BLD: 8 % (ref 4–15)
NEUTROPHILS # BLD AUTO: 3 K/UL (ref 1.8–7.7)
NEUTROPHILS NFR BLD: 47.6 % (ref 38–73)
NONHDLC SERPL-MCNC: 142 MG/DL
NRBC BLD-RTO: 0 /100 WBC
PHOSPHATE SERPL-MCNC: 2.7 MG/DL (ref 2.7–4.5)
PLATELET # BLD AUTO: 355 K/UL (ref 150–450)
PMV BLD AUTO: 10 FL (ref 9.2–12.9)
POTASSIUM SERPL-SCNC: 4.1 MMOL/L (ref 3.5–5.1)
PROT SERPL-MCNC: 7.6 G/DL (ref 6–8.4)
RBC # BLD AUTO: 4.67 M/UL (ref 4–5.4)
SATURATED IRON: 17 % (ref 20–50)
SODIUM SERPL-SCNC: 140 MMOL/L (ref 136–145)
T3 SERPL-MCNC: 98 NG/DL (ref 60–180)
T4 FREE SERPL-MCNC: 0.82 NG/DL (ref 0.71–1.51)
T4 SERPL-MCNC: 7.1 UG/DL (ref 4.5–11.5)
TOTAL IRON BINDING CAPACITY: 289 UG/DL (ref 250–450)
TRANSFERRIN SERPL-MCNC: 195 MG/DL (ref 200–375)
TRIGL SERPL-MCNC: 80 MG/DL (ref 30–150)
TSH SERPL DL<=0.005 MIU/L-ACNC: 0.85 UIU/ML (ref 0.4–4)
VIT B12 SERPL-MCNC: 537 PG/ML (ref 210–950)
WBC # BLD AUTO: 6.35 K/UL (ref 3.9–12.7)

## 2022-12-05 PROCEDURE — 84480 ASSAY TRIIODOTHYRONINE (T3): CPT | Performed by: PHYSICIAN ASSISTANT

## 2022-12-05 PROCEDURE — 83735 ASSAY OF MAGNESIUM: CPT | Performed by: PHYSICIAN ASSISTANT

## 2022-12-05 PROCEDURE — 84443 ASSAY THYROID STIM HORMONE: CPT | Performed by: PHYSICIAN ASSISTANT

## 2022-12-05 PROCEDURE — 84466 ASSAY OF TRANSFERRIN: CPT | Performed by: PHYSICIAN ASSISTANT

## 2022-12-05 PROCEDURE — 84439 ASSAY OF FREE THYROXINE: CPT | Performed by: PHYSICIAN ASSISTANT

## 2022-12-05 PROCEDURE — 84425 ASSAY OF VITAMIN B-1: CPT | Performed by: PHYSICIAN ASSISTANT

## 2022-12-05 PROCEDURE — 82306 VITAMIN D 25 HYDROXY: CPT | Performed by: PHYSICIAN ASSISTANT

## 2022-12-05 PROCEDURE — 80048 BASIC METABOLIC PNL TOTAL CA: CPT | Performed by: PHYSICIAN ASSISTANT

## 2022-12-05 PROCEDURE — 80061 LIPID PANEL: CPT | Performed by: PHYSICIAN ASSISTANT

## 2022-12-05 PROCEDURE — 86677 HELICOBACTER PYLORI ANTIBODY: CPT | Performed by: PHYSICIAN ASSISTANT

## 2022-12-05 PROCEDURE — 84100 ASSAY OF PHOSPHORUS: CPT | Performed by: PHYSICIAN ASSISTANT

## 2022-12-05 PROCEDURE — 85025 COMPLETE CBC W/AUTO DIFF WBC: CPT | Performed by: PHYSICIAN ASSISTANT

## 2022-12-05 PROCEDURE — 82607 VITAMIN B-12: CPT | Performed by: PHYSICIAN ASSISTANT

## 2022-12-05 PROCEDURE — 82746 ASSAY OF FOLIC ACID SERUM: CPT | Performed by: PHYSICIAN ASSISTANT

## 2022-12-05 PROCEDURE — 80076 HEPATIC FUNCTION PANEL: CPT | Performed by: PHYSICIAN ASSISTANT

## 2022-12-05 PROCEDURE — 84436 ASSAY OF TOTAL THYROXINE: CPT | Performed by: PHYSICIAN ASSISTANT

## 2022-12-05 PROCEDURE — 83036 HEMOGLOBIN GLYCOSYLATED A1C: CPT | Performed by: PHYSICIAN ASSISTANT

## 2022-12-06 LAB — H PYLORI IGG SERPL QL IA: NORMAL

## 2022-12-07 ENCOUNTER — PATIENT MESSAGE (OUTPATIENT)
Dept: BARIATRICS | Facility: CLINIC | Age: 27
End: 2022-12-07
Payer: COMMERCIAL

## 2022-12-07 RX ORDER — ERGOCALCIFEROL 1.25 MG/1
50000 CAPSULE ORAL
Qty: 24 CAPSULE | Refills: 0 | Status: SHIPPED | OUTPATIENT
Start: 2022-12-08

## 2022-12-09 LAB — VIT B1 BLD-MCNC: 49 UG/L (ref 38–122)

## 2022-12-12 ENCOUNTER — TELEPHONE (OUTPATIENT)
Dept: PSYCHIATRY | Facility: CLINIC | Age: 27
End: 2022-12-12
Payer: COMMERCIAL

## 2022-12-15 ENCOUNTER — PATIENT MESSAGE (OUTPATIENT)
Dept: BARIATRICS | Facility: CLINIC | Age: 27
End: 2022-12-15
Payer: COMMERCIAL

## 2023-01-05 ENCOUNTER — CLINICAL SUPPORT (OUTPATIENT)
Dept: BARIATRICS | Facility: CLINIC | Age: 28
End: 2023-01-05
Payer: COMMERCIAL

## 2023-01-05 DIAGNOSIS — E66.01 MORBID OBESITY WITH BMI OF 40.0-44.9, ADULT: ICD-10-CM

## 2023-01-05 DIAGNOSIS — Z71.3 DIETARY COUNSELING: Primary | ICD-10-CM

## 2023-01-05 PROCEDURE — 99499 UNLISTED E&M SERVICE: CPT | Mod: 95,,, | Performed by: DIETITIAN, REGISTERED

## 2023-01-05 PROCEDURE — 97803 MED NUTRITION INDIV SUBSEQ: CPT | Mod: 95,,, | Performed by: DIETITIAN, REGISTERED

## 2023-01-05 PROCEDURE — 97803 PR MED NUTR THER, SUBSQ, INDIV, EA 15 MIN: ICD-10-PCS | Mod: 95,,, | Performed by: DIETITIAN, REGISTERED

## 2023-01-05 PROCEDURE — 99499 NO LOS: ICD-10-PCS | Mod: 95,,, | Performed by: DIETITIAN, REGISTERED

## 2023-01-05 NOTE — PROGRESS NOTES
The patient location is: [place] ([place])  The chief complaint leading to consultation is: morbid obesity    Visit type: audiovisual    Face to Face time with patient: 25 min  30 minutes of total time spent on the encounter, which includes face to face time and non-face to face time preparing to see the patient (eg, review of tests), Obtaining and/or reviewing separately obtained history, Documenting clinical information in the electronic or other health record, Independently interpreting results (not separately reported) and communicating results to the patient/family/caregiver, or Care coordination (not separately reported).         Each patient to whom he or she provides medical services by telemedicine is:  (1) informed of the relationship between the physician and patient and the respective role of any other health care provider with respect to management of the patient; and (2) notified that he or she may decline to receive medical services by telemedicine and may withdraw from such care at any time.    NUTRITIONAL Note     Referring Physician: Dr. Allen  Reason for MNT Referral: Follow up assessment for Sleeve work-up     28 y.o. female presents with self reported 9 lbs weight loss over the past month; total 14 lbs weight loss by making dietary and lifestyle changes in preparation for surgery.              Past Medical History:   Diagnosis Date    Chlamydia      Miscarriage           CLINICAL DATA:  28 y.o.-year-old Black or  female.  Height: 5 ft.   Weight: 209 lbs  IBW: 126 lbs  BMI: 40.8 (from 43.5)     DAILY NUTRITIONAL NEEDS: pre-op nutritional bariatric guidelines to promote weight loss  [id number] Calories    Grams Protein     NUTRITION & HEALTH HISTORY:  Greatest challenge: dining out frequency, sweets, starchy CHO, portion control, snacking at night, irregular meal patterns and high-fat diet     Current diet recall:     10:30am: yogurt with fruit OR oatmeal  1:30pm: a few handfuls  of trail mix with dried fruits and nuts and sunflower seeds  6:30pm: sauteed shrimps, broccoli and zucchini and squash OR Premier shake OR soft taco      Current Diet:  Meal pattern: 2 meal + 2 snacks + 0-1 protein supplements  Protein supplements: Premier shakes and occ SK smoothies  Snacking: greek yogurt, trail mix, fruits  Vegetables: Likes a variety. Doesn't like tomatoes. Eats daily.  Fruits: Likes a variety. Eats almost daily.  Beverages: Mostly water, diet cran-grape. Some coconut water.  Dining out: Weekly. Mostly fast food, restaurants and take-out.    Cooking at home: Daily. Weekly. Mostly baked, grilled and smothered meat, fish, (limited starchy CHO) and vegetables.     Exercise:  [employer]culus work out video a few times/week.  Restrictions to exercise: knee pain - torn meniscus     Vitamins / Minerals / Herbs:   none     Labs:   None available at time of visit     Food Allergies:   None known     Social:  Works regular daytime shifts. Works from home. 11am-8pm.  Lives with her MARLA and both of their kids and her mother.  Grocery shopping and food prep done by the pt or MARLA.  Patient believes the household will be supportive after surgery.  Alcohol: Socially. Weekends. Vodka/red bull.  Smoking: None.     ASSESSMENT:  Patient demonstrated knowledge of healthy eating behaviors and exercise patterns  Patient demonstrates willingness to change lifestyle and make behavior modifications AEB weight loss, dietary changes, protein supplements, exercise.           Barriers to Education: none     Stage of change: action     NUTRITION DIAGNOSIS:    Morbid Obesity related to Excessive carbohydrate intake, Excessive calorie intake and Physical inactivity as evidence by BMI.  Status: Improved     BARIATRIC DIET DISCUSSION/PLAN:  Discussed diet after surgery and related to patient's food record.  Reviewed nutrition guidelines for before and after surgery.  Answered all questions.  Continue to review Bariatric Nutrition  Guidebook at home and call with any questions.  Work on Bariatric Nutrition Checklist.  Continue to cut back on starchy CHO.  1200-calorie diet.  1500-calorie diet.  5-6 meals per day.     RECOMMENDATIONS:  Patient is a good candidate for bariatric surgery - Sleeve.     Patient verbalized understanding.     Expect good compliance after surgery at this time.    Will f/u before/after surgery as needed.     Communicated nutrition plan with bariatric team.     SESSION TIME:  30 minutes

## 2023-01-09 ENCOUNTER — PATIENT MESSAGE (OUTPATIENT)
Dept: BARIATRICS | Facility: CLINIC | Age: 28
End: 2023-01-09
Payer: COMMERCIAL

## 2023-01-10 ENCOUNTER — PATIENT MESSAGE (OUTPATIENT)
Dept: BARIATRICS | Facility: CLINIC | Age: 28
End: 2023-01-10
Payer: COMMERCIAL

## 2023-01-11 ENCOUNTER — TELEPHONE (OUTPATIENT)
Dept: BARIATRICS | Facility: CLINIC | Age: 28
End: 2023-01-11
Payer: COMMERCIAL

## 2023-01-11 DIAGNOSIS — E66.01 MORBID OBESITY WITH BMI OF 40.0-44.9, ADULT: Primary | ICD-10-CM

## 2023-01-11 NOTE — LETTER
[facility] B[facility] 2nd Fl  [address]  [facility] [address]  Phone: [phone]  Fax: [phone] 2023       Attn: Pre-determination Dept.    RE:  Jacobo Alvarado          OC #: [id number]          : 1995    To Whom It May Concern:  I am sending this letter on behalf of Jacobo Alvarado (a 28 y.o. female) for pre-approval for Bariatric Surgery; specifically the laparoscopic sleeve gastrectomy (CPT 11414). Jacobo  has a past medical history of Anemia, Chlamydia, Disorder of kidney and ureter, and Miscarriage. Jacobo Alvarado  has made numerous attempts at dieting and exercise programs, and has failed to maintain any sustained weight loss.  Weight/Height/BMI  Estimated body mass index is 43.55 kg/m² as calculated from the following:    Height as of 22: 5' (1.524 m).    Weight as of 22: 101.2 kg (223 lb).   Jacobo Alvarado has been evaluated in our bariatric program by myself, the , and the program dietician and is felt to be an excellent candidate for surgery.  In addition, she has undergone a psychological evaluation, from which a letter is enclosed.  Jacobo Alvarado has undergone extensive pre-operative education and understands all the risks, benefits, and possible complications of surgery.  She has also undergone dietary education and thorough nutritional evaluation via a registered dietician.  Our program provides long term nutritional counseling with unlimited consults with the dietician.    Our team is sending this letter to receive pre-approval for the indicated procedure.  Please let us know if you have any questions or require any further information.  Sincerely,        Dwight Allen MD  General, Laparoscopic, and Bariatric Surgery  [facility] - [place], [place]

## 2023-01-11 NOTE — TELEPHONE ENCOUNTER
Spoke with patient and confirmed the surgical procedure of Lap gastric sleeve with Dr Allen on 3/6/23.  Scheduled preop appts/surgery date/2 week and 8 week post op appts. All dates and times agreed upon. Pt aware that if they are required to have PCP clearance, it must be within 6 months of surgery, unless their medical history has changed, it should be dated, signed and in chart for preop appointment. All medications have been reviewed regarding the necessity to be crushed or broken into pieces smaller that the tip of a pencil eraser for 2 weeks following gastric sleeve surgery and 4 weeks following gastric bypass surgery. Pt instructed to stop taking all NSAIDS 1 week before surgery and for life after surgery. Pt aware that protein liquid diet start date is 2/20/23. Patient is doing well with their diet. Patient was instructed about the progression of the diet phases. The patient's current weight is 223 lb, height is 5', and BMI is 43.5. Refer to medical letter of necessity from Surgeon. Discussed the importance of increased physical activity and dieting lifestyle changes to improve weight loss and meet goals. Screened patient for history of UTI per protocol. Discussed with patient to avoid antibiotics and elective procedures involving sedation/anesthesia within 30 days of surgery unless cleared by the bariatric department. Patient instructed to call the bariatric clinic post op for any s/s of UTI. Patient's mailing address confirmed and informed to expect a manilla envelop containing bariatric surgery pre op booklet, appointment reminders, protein and fluid log sheets, and liquid diet and vitamin information sheets. Pt aware that all appts can be seen in my [facility] patient portal at this time. Confirmed email address and informed patient that they will be enrolled in the Patient Reported Outcomes program to track their progress and successes. The first email will be sent 2-3 weeks before surgery and then  every year on your surgery anniversary date. Office phone and fax number given to patient for any future questions/concerns. Discussed the pre-surgery complex carbohydrate beverage to purchase in [facility] to drink 30 minutes before the surgery arrival time. Reviewed the policy of scheduling a covid test 72 hours prior to surgery if necessary.

## 2023-01-13 DIAGNOSIS — E66.01 MORBID OBESITY WITH BMI OF 40.0-44.9, ADULT: Primary | ICD-10-CM

## 2023-01-19 ENCOUNTER — PATIENT MESSAGE (OUTPATIENT)
Dept: BARIATRICS | Facility: CLINIC | Age: 28
End: 2023-01-19
Payer: COMMERCIAL

## 2023-01-30 ENCOUNTER — PATIENT MESSAGE (OUTPATIENT)
Dept: BARIATRICS | Facility: CLINIC | Age: 28
End: 2023-01-30
Payer: COMMERCIAL

## 2023-02-09 ENCOUNTER — PATIENT MESSAGE (OUTPATIENT)
Dept: BARIATRICS | Facility: CLINIC | Age: 28
End: 2023-02-09
Payer: COMMERCIAL

## 2023-02-14 ENCOUNTER — PATIENT MESSAGE (OUTPATIENT)
Dept: BARIATRICS | Facility: CLINIC | Age: 28
End: 2023-02-14
Payer: COMMERCIAL

## 2023-02-20 ENCOUNTER — CLINICAL SUPPORT (OUTPATIENT)
Dept: BARIATRICS | Facility: CLINIC | Age: 28
End: 2023-02-20
Payer: COMMERCIAL

## 2023-02-20 PROCEDURE — 99499 NO LOS: ICD-10-PCS | Mod: 95,,, | Performed by: DIETITIAN, REGISTERED

## 2023-02-20 PROCEDURE — 99499 UNLISTED E&M SERVICE: CPT | Mod: 95,,, | Performed by: DIETITIAN, REGISTERED

## 2023-02-20 NOTE — PROGRESS NOTES
Established Patient - Audio Only Telehealth Visit     The patient location is: [place] ([place])  The chief complaint leading to consultation is: pre op  Visit type: Virtual visit with audio only (telephone)  Total time spent with patient: 15 min       The reason for the audio only service rather than synchronous audio and video virtual visit was related to technical difficulties or patient preference/necessity.     Each patient to whom I provide medical services by telemedicine is:  (1) informed of the relationship between the physician and patient and the respective role of any other health care provider with respect to management of the patient; and (2) notified that they may decline to receive medical services by telemedicine and may withdraw from such care at any time. Patient verbally consented to receive this service via voice-only telephone call.       NUTRITION NOTE    Bariatric Surgeon: Dwight Allen M.D.  Reason for MNT Referral: Pre-op liquid diet and nutrition instructions  Sleeve   Date of Surgery 3/6/23    Pre-op liquid diet  Pt using Premier x 4/day for preop liquid diet    Discussion:  -  gms of protein per day  - 600-800 calories per day   - Less than 4gm sugar per shake  - SF, Decaf, non-carbonated Fluids  - No Fruits, juices, yogurt or pudding on liquid diet  - No vitamins for 1 week prior to surgery  - No herbal supplements including green tea and fish oils for 2 weeks prior to surgery    Remind pt per nursing and medical team to inform our department if taking antibiotics within the 30 days post bariatric surgery or it any other surgeries/procedures are scheduled within 30 days after bariatric surgery.    2 week post-op instructions  Pt will use protein powders/clears for first three days after surgery.  If using protein powder, reminded pt of mixing with water for days 1 and 2, by day 3 may try using skim, 1% milk or unsweetened almond/soy milk.  By Day 4, may use RTD protein shakes as  tolerated    Pt has the following vitamins and minerals to start taking once discharged from hospital    Multivitamin with 18 mg iron take one tablet or chewable twice a day  B-complex with 50 mg thiamin taken once daily  Calcium citrate 500 mg with vitamin D three times per day  Vitamin B-12 500 mcg  Sublingually daily    Reviewed dosage and timing of vitamin/mineral guidelines.    Reviewed nutritional guidelines for protein and fluid requirements for week 1 and week 2 post-surgery.  Handout provided to log protein and fluid daily.  1 ounce medicine cups provided for patient to measure fluid intake after surgery.  Reviewed common nutritional concerns and prevention tips after bariatric surgery.  Reminded not to lift anything greater than 10 lbs for 6 week post-surgery  Pt verbalized understanding of information provided with appropriate questions and comments.         SESSION TIME: 15 minutes                          This service was not originating from a related E/M service provided within the previous 7 days nor will  to an E/M service or procedure within the next 24 hours or my soonest available appointment.  Prevailing standard of care was able to be met in this audio-only visit.

## 2023-02-22 ENCOUNTER — PATIENT MESSAGE (OUTPATIENT)
Dept: BARIATRICS | Facility: CLINIC | Age: 28
End: 2023-02-22
Payer: COMMERCIAL

## 2023-03-01 ENCOUNTER — OFFICE VISIT (OUTPATIENT)
Dept: BARIATRICS | Facility: CLINIC | Age: 28
End: 2023-03-01
Payer: COMMERCIAL

## 2023-03-01 ENCOUNTER — TELEPHONE (OUTPATIENT)
Dept: BARIATRICS | Facility: CLINIC | Age: 28
End: 2023-03-01

## 2023-03-01 VITALS
HEART RATE: 98 BPM | OXYGEN SATURATION: 99 % | WEIGHT: 220.88 LBS | BODY MASS INDEX: 43.36 KG/M2 | SYSTOLIC BLOOD PRESSURE: 116 MMHG | DIASTOLIC BLOOD PRESSURE: 73 MMHG | TEMPERATURE: 98 F | HEIGHT: 60 IN

## 2023-03-01 DIAGNOSIS — E66.01 MORBID OBESITY WITH BMI OF 40.0-44.9, ADULT: Primary | ICD-10-CM

## 2023-03-01 PROCEDURE — 3078F PR MOST RECENT DIASTOLIC BLOOD PRESSURE < 80 MM HG: ICD-10-PCS | Mod: CPTII,S$GLB,, | Performed by: SURGERY

## 2023-03-01 PROCEDURE — 3074F SYST BP LT 130 MM HG: CPT | Mod: CPTII,S$GLB,, | Performed by: SURGERY

## 2023-03-01 PROCEDURE — 3078F DIAST BP <80 MM HG: CPT | Mod: CPTII,S$GLB,, | Performed by: SURGERY

## 2023-03-01 PROCEDURE — 99999 PR PBB SHADOW E&M-EST. PATIENT-LVL III: CPT | Mod: PBBFAC,,, | Performed by: SURGERY

## 2023-03-01 PROCEDURE — 99213 OFFICE O/P EST LOW 20 MIN: CPT | Mod: S$GLB,,, | Performed by: SURGERY

## 2023-03-01 PROCEDURE — 99213 PR OFFICE/OUTPT VISIT, EST, LEVL III, 20-29 MIN: ICD-10-PCS | Mod: S$GLB,,, | Performed by: SURGERY

## 2023-03-01 PROCEDURE — 1159F MED LIST DOCD IN RCRD: CPT | Mod: CPTII,S$GLB,, | Performed by: SURGERY

## 2023-03-01 PROCEDURE — 3008F PR BODY MASS INDEX (BMI) DOCUMENTED: ICD-10-PCS | Mod: CPTII,S$GLB,, | Performed by: SURGERY

## 2023-03-01 PROCEDURE — 1159F PR MEDICATION LIST DOCUMENTED IN MEDICAL RECORD: ICD-10-PCS | Mod: CPTII,S$GLB,, | Performed by: SURGERY

## 2023-03-01 PROCEDURE — 3074F PR MOST RECENT SYSTOLIC BLOOD PRESSURE < 130 MM HG: ICD-10-PCS | Mod: CPTII,S$GLB,, | Performed by: SURGERY

## 2023-03-01 PROCEDURE — 99999 PR PBB SHADOW E&M-EST. PATIENT-LVL III: ICD-10-PCS | Mod: PBBFAC,,, | Performed by: SURGERY

## 2023-03-01 PROCEDURE — 3008F BODY MASS INDEX DOCD: CPT | Mod: CPTII,S$GLB,, | Performed by: SURGERY

## 2023-03-01 RX ORDER — ONDANSETRON 2 MG/ML
8 INJECTION INTRAMUSCULAR; INTRAVENOUS EVERY 6 HOURS PRN
Status: CANCELLED | OUTPATIENT
Start: 2023-03-01

## 2023-03-01 RX ORDER — POLYETHYLENE GLYCOL 3350 17 G/17G
17 POWDER, FOR SOLUTION ORAL DAILY
Qty: 238 G | Refills: 0 | Status: SHIPPED | OUTPATIENT
Start: 2023-03-01 | End: 2023-03-22

## 2023-03-01 RX ORDER — MUPIROCIN 20 MG/G
OINTMENT TOPICAL
Status: CANCELLED | OUTPATIENT
Start: 2023-03-01

## 2023-03-01 RX ORDER — OMEPRAZOLE 40 MG/1
40 CAPSULE, DELAYED RELEASE ORAL EVERY MORNING
Qty: 30 CAPSULE | Refills: 2 | Status: SHIPPED | OUTPATIENT
Start: 2023-03-01

## 2023-03-01 RX ORDER — ENOXAPARIN SODIUM 100 MG/ML
40 INJECTION SUBCUTANEOUS EVERY 12 HOURS
Status: CANCELLED | OUTPATIENT
Start: 2023-03-01

## 2023-03-01 RX ORDER — URSODIOL 500 MG/1
TABLET, FILM COATED ORAL
Qty: 90 TABLET | Refills: 1 | Status: SHIPPED | OUTPATIENT
Start: 2023-03-01

## 2023-03-01 RX ORDER — SODIUM CHLORIDE, SODIUM LACTATE, POTASSIUM CHLORIDE, CALCIUM CHLORIDE 600; 310; 30; 20 MG/100ML; MG/100ML; MG/100ML; MG/100ML
INJECTION, SOLUTION INTRAVENOUS CONTINUOUS
Status: CANCELLED | OUTPATIENT
Start: 2023-03-01

## 2023-03-01 RX ORDER — SODIUM CHLORIDE 9 MG/ML
INJECTION, SOLUTION INTRAVENOUS CONTINUOUS
Status: CANCELLED | OUTPATIENT
Start: 2023-03-01

## 2023-03-01 RX ORDER — ONDANSETRON 8 MG/1
8 TABLET, ORALLY DISINTEGRATING ORAL EVERY 6 HOURS PRN
Qty: 30 TABLET | Refills: 0 | Status: SHIPPED | OUTPATIENT
Start: 2023-03-01

## 2023-03-01 RX ORDER — HYDROCODONE BITARTRATE AND ACETAMINOPHEN 7.5; 325 MG/15ML; MG/15ML
15 SOLUTION ORAL 4 TIMES DAILY PRN
Qty: 118 ML | Refills: 0 | Status: SHIPPED | OUTPATIENT
Start: 2023-03-01

## 2023-03-01 RX ORDER — PROMETHAZINE HYDROCHLORIDE 25 MG/1
SUPPOSITORY RECTAL
Qty: 15 SUPPOSITORY | Refills: 0 | Status: SHIPPED | OUTPATIENT
Start: 2023-03-01

## 2023-03-01 RX ORDER — FAMOTIDINE 10 MG/ML
20 INJECTION INTRAVENOUS ONCE
Status: CANCELLED | OUTPATIENT
Start: 2023-03-01 | End: 2023-03-01

## 2023-03-01 RX ORDER — DEXTROMETHORPHAN/PSEUDOEPHED 2.5-7.5/.8
40 DROPS ORAL 4 TIMES DAILY PRN
Status: CANCELLED | OUTPATIENT
Start: 2023-03-01

## 2023-03-01 RX ORDER — PROCHLORPERAZINE EDISYLATE 5 MG/ML
5 INJECTION INTRAMUSCULAR; INTRAVENOUS EVERY 6 HOURS PRN
Status: CANCELLED | OUTPATIENT
Start: 2023-03-01

## 2023-03-01 RX ORDER — PANTOPRAZOLE SODIUM 40 MG/10ML
40 INJECTION, POWDER, LYOPHILIZED, FOR SOLUTION INTRAVENOUS 2 TIMES DAILY
Status: CANCELLED | OUTPATIENT
Start: 2023-03-01

## 2023-03-01 RX ORDER — ACETAMINOPHEN 650 MG/20.3ML
500 LIQUID ORAL EVERY 8 HOURS
Status: CANCELLED | OUTPATIENT
Start: 2023-03-01 | End: 2023-03-02

## 2023-03-01 RX ORDER — HEPARIN SODIUM 5000 [USP'U]/ML
5000 INJECTION, SOLUTION INTRAVENOUS; SUBCUTANEOUS ONCE
Status: CANCELLED | OUTPATIENT
Start: 2023-03-01 | End: 2023-03-01

## 2023-03-01 RX ORDER — GABAPENTIN 250 MG/5ML
300 SOLUTION ORAL 3 TIMES DAILY
Status: CANCELLED | OUTPATIENT
Start: 2023-03-01

## 2023-03-01 RX ORDER — LIDOCAINE HYDROCHLORIDE 10 MG/ML
1 INJECTION, SOLUTION EPIDURAL; INFILTRATION; INTRACAUDAL; PERINEURAL ONCE
Status: CANCELLED | OUTPATIENT
Start: 2023-03-01 | End: 2023-03-01

## 2023-03-01 RX ORDER — HYDROMORPHONE HYDROCHLORIDE 1 MG/ML
0.5 INJECTION, SOLUTION INTRAMUSCULAR; INTRAVENOUS; SUBCUTANEOUS
Status: CANCELLED | OUTPATIENT
Start: 2023-03-01

## 2023-03-01 RX ORDER — HYDROCODONE BITARTRATE AND ACETAMINOPHEN 7.5; 325 MG/15ML; MG/15ML
15 SOLUTION ORAL EVERY 4 HOURS PRN
Status: CANCELLED | OUTPATIENT
Start: 2023-03-02

## 2023-03-01 NOTE — TELEPHONE ENCOUNTER
----- Message from Violet Randhawa sent at 3/1/2023  4:46 PM CST -----  [facility] calling regarding Patient Advice (message) for #to change her high set to oxycodine liquid, asking for call back

## 2023-03-01 NOTE — PROGRESS NOTES
Subjective:  The patient is a 28 y.o. obese female who presents for pre op for gastric sleeve surgery.  Pt states that she has not always been overweight. 150 lb after her first daughter, states that she then was around 175-180 lbs. 2017 started school working two jobs on 2018 at 200 lbs states that with second daughter gained weight to where she is now ( daughter is currently is 3) and cannot lose it. The patient has tried medical weight loss, calorie deficit, exercise( limited by torn meniscus).  All workup has been reviewed in clinic today and there is nothing on the review that would prevent us from proceeding with surgery.  All questions were answered in clinic today prior to leaving.  Body mass index is 43.14 kg/m².       Patient Active Problem List    Diagnosis Date Noted    Acute pain of right knee 10/25/2021    Class 3 severe obesity due to excess calories with body mass index (BMI) of 40.0 to 44.9 in adult 2020    STD exposure 2020    Acute pyelonephritis 2020    History of  delivery -- NEEDS MIKENA @ 16 weeks 2018    Recurrent pregnancy loss 2018     Past Medical History:   Diagnosis Date    Anemia     Chlamydia     Disorder of kidney and ureter     Miscarriage       Past Surgical History:   Procedure Laterality Date    DILATION AND CURETTAGE OF UTERUS  2018      (Not in a hospital admission)    Review of patient's allergies indicates:  No Known Allergies   Social History     Tobacco Use    Smoking status: Never    Smokeless tobacco: Never   Substance Use Topics    Alcohol use: Yes      Family History   Problem Relation Age of Onset    Breast cancer Other     Diabetes Mother     Kidney disease Mother     Crohn's disease Father     No Known Problems Sister     No Known Problems Brother     No Known Problems Daughter     No Known Problems Daughter     Ovarian cancer Neg Hx     Colon cancer Neg Hx         Review of Systems  Constitutional: negative  for anorexia, chills and fatigue  Eyes: negative for icterus, irritation and redness  Respiratory: negative for cough and dyspnea on exertion  Cardiovascular: negative for chest pain and chest pressure/discomfort  Gastrointestinal: negative for abdominal pain, change in bowel habits, constipation and diarrhea  Musculoskeletal:negative for arthralgias and back pain  Neurological: negative for coordination problems and dizziness  Behavioral/Psych: negative for anxiety and bad mood    Objective:  Vital signs in last 24 hours:  Vitals:    03/01/23 1540   BP: 116/73   Pulse: 98   Temp: 98.3 °F (36.8 °C)   SpO2: 99%   Weight: 100.2 kg (220 lb 14.4 oz)   Height: 5' (1.524 m)               General appearance: alert, appears stated age and cooperative  Head: Normocephalic, without obvious abnormality, atraumatic  Eyes: negative findings: lids and lashes normal and conjunctivae and sclerae normal  Lungs: non labored breathing  Heart: regular rate and rhythm  Abdomen: soft, non-tender  Extremities: extremities normal, atraumatic, no cyanosis or edema  Skin: Skin color, texture, turgor normal. No rashes or lesions  Neurologic: Grossly normal    Data Review:  All Reviewed    Assessment/Plan:  Morbid obesity with failure of conservative therapy.    The patient was informed that risks include, but are not limited to: death, leak, obstruction, bleeding, and sepsis. Any of these could require further surgery. Other risks include DVT, PE, pneumonia, wound dehiscence, hernia, wound infection, the need for dilatations and the inability to lose appropriate weight and keep it off.     We discussed that our goal is to ameliorate her medical problems and not to obtain a specific body mass index. She understands the risks and benefits and wishes to proceed with the procedure. She has signed a consent form.       Dwight Allen MD

## 2023-03-02 ENCOUNTER — TELEPHONE (OUTPATIENT)
Dept: BARIATRICS | Facility: CLINIC | Age: 28
End: 2023-03-02
Payer: COMMERCIAL

## 2023-03-02 NOTE — TELEPHONE ENCOUNTER
Called pt to complete preop teaching. Pt stated she needed to  her booklet from her house and asked for a call back in 15 minutes.

## 2023-03-02 NOTE — TELEPHONE ENCOUNTER
Spoke to pt to complete preop teaching. Pt had no questions at this time. Pt told she will be called tomorrow with her surgery times. Pt verbalized understanding.

## 2023-03-03 ENCOUNTER — TELEPHONE (OUTPATIENT)
Dept: BARIATRICS | Facility: CLINIC | Age: 28
End: 2023-03-03
Payer: COMMERCIAL

## 2023-03-03 NOTE — TELEPHONE ENCOUNTER
Notified patient of arrival time to the [facility] at 0630 with expected surgery start time 0840 on 3/6/2023. Instructed patient regarding pre-op instructions including no protein shakes or sugar free clear liquids after midnight but can have a rare sip of water for comfort, showering and preop medications to hold/take per anesthesia/preop. Reminded pt to drink pre-surgery beverage or 8 oz water at 0600. Instructed patient on the s/s of dehydration and for patient to call at the first sign of dehydration. Informed patient that someone from bariatrics will call them 1 week post op to review diet/fluid intake and to ensure adequate hydration. Reminded patient not to take antibiotics for 30 days following surgery or schedule elective procedures involving anesthesia/sedation for 30 days following surgery unless checking with the bariatric clinic first. Pt verbalized understanding. Pt given office phone number for any additional questions/concerns.

## 2023-03-06 ENCOUNTER — ANESTHESIA EVENT (OUTPATIENT)
Dept: SURGERY | Facility: HOSPITAL | Age: 28
DRG: 621 | End: 2023-03-06
Payer: COMMERCIAL

## 2023-03-06 ENCOUNTER — ANESTHESIA (OUTPATIENT)
Dept: SURGERY | Facility: HOSPITAL | Age: 28
DRG: 621 | End: 2023-03-06
Payer: COMMERCIAL

## 2023-03-06 ENCOUNTER — HOSPITAL ENCOUNTER (INPATIENT)
Facility: HOSPITAL | Age: 28
LOS: 1 days | Discharge: HOME | DRG: 621 | End: 2023-03-07
Attending: SURGERY | Admitting: SURGERY
Payer: COMMERCIAL

## 2023-03-06 DIAGNOSIS — E66.01 MORBID OBESITY WITH BMI OF 40.0-44.9, ADULT: ICD-10-CM

## 2023-03-06 PROBLEM — E66.9 OBESITY: Status: ACTIVE | Noted: 2023-03-06

## 2023-03-06 LAB
B-HCG UR QL: NEGATIVE
CTP QC/QA: YES

## 2023-03-06 PROCEDURE — 25000003 PHARM REV CODE 250: Performed by: STUDENT IN AN ORGANIZED HEALTH CARE EDUCATION/TRAINING PROGRAM

## 2023-03-06 PROCEDURE — 37000008 HC ANESTHESIA 1ST 15 MINUTES: Performed by: SURGERY

## 2023-03-06 PROCEDURE — 71000033 HC RECOVERY, INTIAL HOUR: Performed by: SURGERY

## 2023-03-06 PROCEDURE — 43775 PR LAP, GAST RESTRICT PROC, LONGITUDINAL GASTRECTOMY: ICD-10-PCS | Mod: ,,, | Performed by: SURGERY

## 2023-03-06 PROCEDURE — 63600175 PHARM REV CODE 636 W HCPCS

## 2023-03-06 PROCEDURE — 94761 N-INVAS EAR/PLS OXIMETRY MLT: CPT

## 2023-03-06 PROCEDURE — D9220A PRA ANESTHESIA: ICD-10-PCS | Mod: ,,, | Performed by: ANESTHESIOLOGY

## 2023-03-06 PROCEDURE — 25000003 PHARM REV CODE 250: Performed by: SURGERY

## 2023-03-06 PROCEDURE — 88307 TISSUE EXAM BY PATHOLOGIST: CPT | Mod: 26,,, | Performed by: PATHOLOGY

## 2023-03-06 PROCEDURE — 81025 URINE PREGNANCY TEST: CPT | Performed by: SURGERY

## 2023-03-06 PROCEDURE — 63600175 PHARM REV CODE 636 W HCPCS: Performed by: STUDENT IN AN ORGANIZED HEALTH CARE EDUCATION/TRAINING PROGRAM

## 2023-03-06 PROCEDURE — 88307 TISSUE EXAM BY PATHOLOGIST: CPT | Performed by: PATHOLOGY

## 2023-03-06 PROCEDURE — 43775 LAP SLEEVE GASTRECTOMY: CPT | Mod: ,,, | Performed by: SURGERY

## 2023-03-06 PROCEDURE — D9220A PRA ANESTHESIA: Mod: ,,, | Performed by: ANESTHESIOLOGY

## 2023-03-06 PROCEDURE — C1781 MESH (IMPLANTABLE): HCPCS | Performed by: SURGERY

## 2023-03-06 PROCEDURE — 27201423 OPTIME MED/SURG SUP & DEVICES STERILE SUPPLY: Performed by: SURGERY

## 2023-03-06 PROCEDURE — C9113 INJ PANTOPRAZOLE SODIUM, VIA: HCPCS | Performed by: SURGERY

## 2023-03-06 PROCEDURE — 99900035 HC TECH TIME PER 15 MIN (STAT)

## 2023-03-06 PROCEDURE — 11000001 HC ACUTE MED/SURG PRIVATE ROOM

## 2023-03-06 PROCEDURE — 36000711: Performed by: SURGERY

## 2023-03-06 PROCEDURE — 63600175 PHARM REV CODE 636 W HCPCS: Performed by: SURGERY

## 2023-03-06 PROCEDURE — 37000009 HC ANESTHESIA EA ADD 15 MINS: Performed by: SURGERY

## 2023-03-06 PROCEDURE — 88307 PR  SURG PATH,LEVEL V: ICD-10-PCS | Mod: 26,,, | Performed by: PATHOLOGY

## 2023-03-06 PROCEDURE — 71000015 HC POSTOP RECOV 1ST HR: Performed by: SURGERY

## 2023-03-06 PROCEDURE — 36000710: Performed by: SURGERY

## 2023-03-06 PROCEDURE — 94799 UNLISTED PULMONARY SVC/PX: CPT

## 2023-03-06 DEVICE — SEAMGUARD ESCHELON 60 MM.: Type: IMPLANTABLE DEVICE | Site: ABDOMEN | Status: FUNCTIONAL

## 2023-03-06 RX ORDER — DEXAMETHASONE SODIUM PHOSPHATE 4 MG/ML
INJECTION, SOLUTION INTRA-ARTICULAR; INTRALESIONAL; INTRAMUSCULAR; INTRAVENOUS; SOFT TISSUE
Status: DISCONTINUED | OUTPATIENT
Start: 2023-03-06 | End: 2023-03-06

## 2023-03-06 RX ORDER — LIDOCAINE HYDROCHLORIDE 10 MG/ML
1 INJECTION, SOLUTION EPIDURAL; INFILTRATION; INTRACAUDAL; PERINEURAL ONCE
Status: COMPLETED | OUTPATIENT
Start: 2023-03-06 | End: 2023-03-06

## 2023-03-06 RX ORDER — HALOPERIDOL 5 MG/ML
0.5 INJECTION INTRAMUSCULAR EVERY 10 MIN PRN
Status: COMPLETED | OUTPATIENT
Start: 2023-03-06 | End: 2023-03-06

## 2023-03-06 RX ORDER — SODIUM CHLORIDE, SODIUM LACTATE, POTASSIUM CHLORIDE, CALCIUM CHLORIDE 600; 310; 30; 20 MG/100ML; MG/100ML; MG/100ML; MG/100ML
INJECTION, SOLUTION INTRAVENOUS CONTINUOUS
Status: DISCONTINUED | OUTPATIENT
Start: 2023-03-06 | End: 2023-03-07 | Stop reason: HOSPADM

## 2023-03-06 RX ORDER — CEFAZOLIN SODIUM 1 G/3ML
INJECTION, POWDER, FOR SOLUTION INTRAMUSCULAR; INTRAVENOUS
Status: DISCONTINUED | OUTPATIENT
Start: 2023-03-06 | End: 2023-03-06

## 2023-03-06 RX ORDER — FAMOTIDINE 10 MG/ML
20 INJECTION INTRAVENOUS ONCE
Status: COMPLETED | OUTPATIENT
Start: 2023-03-06 | End: 2023-03-06

## 2023-03-06 RX ORDER — HALOPERIDOL 5 MG/ML
INJECTION INTRAMUSCULAR
Status: COMPLETED
Start: 2023-03-06 | End: 2023-03-06

## 2023-03-06 RX ORDER — ONDANSETRON 2 MG/ML
INJECTION INTRAMUSCULAR; INTRAVENOUS
Status: DISCONTINUED | OUTPATIENT
Start: 2023-03-06 | End: 2023-03-06

## 2023-03-06 RX ORDER — DEXTROMETHORPHAN/PSEUDOEPHED 2.5-7.5/.8
40 DROPS ORAL 4 TIMES DAILY PRN
Status: DISCONTINUED | OUTPATIENT
Start: 2023-03-06 | End: 2023-03-07

## 2023-03-06 RX ORDER — PROPOFOL 10 MG/ML
VIAL (ML) INTRAVENOUS
Status: DISCONTINUED | OUTPATIENT
Start: 2023-03-06 | End: 2023-03-06

## 2023-03-06 RX ORDER — GABAPENTIN 250 MG/5ML
300 SOLUTION ORAL 3 TIMES DAILY
Status: DISCONTINUED | OUTPATIENT
Start: 2023-03-06 | End: 2023-03-07 | Stop reason: HOSPADM

## 2023-03-06 RX ORDER — SODIUM CHLORIDE 9 MG/ML
INJECTION, SOLUTION INTRAVENOUS CONTINUOUS PRN
Status: DISCONTINUED | OUTPATIENT
Start: 2023-03-06 | End: 2023-03-06

## 2023-03-06 RX ORDER — PHENYLEPHRINE HYDROCHLORIDE 10 MG/ML
INJECTION INTRAVENOUS
Status: DISCONTINUED | OUTPATIENT
Start: 2023-03-06 | End: 2023-03-06

## 2023-03-06 RX ORDER — FENTANYL CITRATE 50 UG/ML
INJECTION, SOLUTION INTRAMUSCULAR; INTRAVENOUS
Status: DISCONTINUED | OUTPATIENT
Start: 2023-03-06 | End: 2023-03-06

## 2023-03-06 RX ORDER — SCOLOPAMINE TRANSDERMAL SYSTEM 1 MG/1
1 PATCH, EXTENDED RELEASE TRANSDERMAL
Status: DISCONTINUED | OUTPATIENT
Start: 2023-03-06 | End: 2023-03-07 | Stop reason: HOSPADM

## 2023-03-06 RX ORDER — MUPIROCIN 20 MG/G
OINTMENT TOPICAL
Status: DISCONTINUED | OUTPATIENT
Start: 2023-03-06 | End: 2023-03-06 | Stop reason: HOSPADM

## 2023-03-06 RX ORDER — LIDOCAINE HYDROCHLORIDE AND EPINEPHRINE 10; 10 MG/ML; UG/ML
INJECTION, SOLUTION INFILTRATION; PERINEURAL
Status: DISCONTINUED | OUTPATIENT
Start: 2023-03-06 | End: 2023-03-06 | Stop reason: HOSPADM

## 2023-03-06 RX ORDER — MIDAZOLAM HYDROCHLORIDE 1 MG/ML
INJECTION INTRAMUSCULAR; INTRAVENOUS
Status: DISCONTINUED | OUTPATIENT
Start: 2023-03-06 | End: 2023-03-06

## 2023-03-06 RX ORDER — HEPARIN SODIUM 5000 [USP'U]/ML
5000 INJECTION, SOLUTION INTRAVENOUS; SUBCUTANEOUS ONCE
Status: COMPLETED | OUTPATIENT
Start: 2023-03-06 | End: 2023-03-06

## 2023-03-06 RX ORDER — ACETAMINOPHEN 650 MG/20.3ML
500 LIQUID ORAL EVERY 8 HOURS
Status: COMPLETED | OUTPATIENT
Start: 2023-03-06 | End: 2023-03-07

## 2023-03-06 RX ORDER — PANTOPRAZOLE SODIUM 40 MG/10ML
40 INJECTION, POWDER, LYOPHILIZED, FOR SOLUTION INTRAVENOUS 2 TIMES DAILY
Status: DISCONTINUED | OUTPATIENT
Start: 2023-03-06 | End: 2023-03-07 | Stop reason: HOSPADM

## 2023-03-06 RX ORDER — KETAMINE HCL IN 0.9 % NACL 50 MG/5 ML
SYRINGE (ML) INTRAVENOUS
Status: DISCONTINUED | OUTPATIENT
Start: 2023-03-06 | End: 2023-03-06

## 2023-03-06 RX ORDER — FENTANYL CITRATE 50 UG/ML
25 INJECTION, SOLUTION INTRAMUSCULAR; INTRAVENOUS EVERY 5 MIN PRN
Status: COMPLETED | OUTPATIENT
Start: 2023-03-06 | End: 2023-03-06

## 2023-03-06 RX ORDER — ROCURONIUM BROMIDE 10 MG/ML
INJECTION, SOLUTION INTRAVENOUS
Status: DISCONTINUED | OUTPATIENT
Start: 2023-03-06 | End: 2023-03-06

## 2023-03-06 RX ORDER — ONDANSETRON 2 MG/ML
8 INJECTION INTRAMUSCULAR; INTRAVENOUS EVERY 6 HOURS PRN
Status: DISCONTINUED | OUTPATIENT
Start: 2023-03-06 | End: 2023-03-07 | Stop reason: HOSPADM

## 2023-03-06 RX ORDER — HYDROMORPHONE HYDROCHLORIDE 1 MG/ML
0.5 INJECTION, SOLUTION INTRAMUSCULAR; INTRAVENOUS; SUBCUTANEOUS
Status: DISCONTINUED | OUTPATIENT
Start: 2023-03-06 | End: 2023-03-07 | Stop reason: HOSPADM

## 2023-03-06 RX ORDER — BUPIVACAINE HYDROCHLORIDE 5 MG/ML
INJECTION, SOLUTION EPIDURAL; INTRACAUDAL
Status: DISCONTINUED | OUTPATIENT
Start: 2023-03-06 | End: 2023-03-06 | Stop reason: HOSPADM

## 2023-03-06 RX ORDER — LIDOCAINE HYDROCHLORIDE 20 MG/ML
INJECTION INTRAVENOUS
Status: DISCONTINUED | OUTPATIENT
Start: 2023-03-06 | End: 2023-03-06

## 2023-03-06 RX ORDER — HYDROCODONE BITARTRATE AND ACETAMINOPHEN 7.5; 325 MG/15ML; MG/15ML
15 SOLUTION ORAL EVERY 4 HOURS PRN
Status: DISCONTINUED | OUTPATIENT
Start: 2023-03-06 | End: 2023-03-07 | Stop reason: HOSPADM

## 2023-03-06 RX ORDER — PROCHLORPERAZINE EDISYLATE 5 MG/ML
5 INJECTION INTRAMUSCULAR; INTRAVENOUS EVERY 6 HOURS PRN
Status: DISCONTINUED | OUTPATIENT
Start: 2023-03-06 | End: 2023-03-07 | Stop reason: HOSPADM

## 2023-03-06 RX ORDER — DEXMEDETOMIDINE HYDROCHLORIDE 100 UG/ML
INJECTION, SOLUTION INTRAVENOUS
Status: DISCONTINUED | OUTPATIENT
Start: 2023-03-06 | End: 2023-03-06

## 2023-03-06 RX ORDER — ACETAMINOPHEN 500 MG
1000 TABLET ORAL
Status: COMPLETED | OUTPATIENT
Start: 2023-03-06 | End: 2023-03-06

## 2023-03-06 RX ORDER — SODIUM CHLORIDE 9 MG/ML
INJECTION, SOLUTION INTRAVENOUS CONTINUOUS
Status: DISCONTINUED | OUTPATIENT
Start: 2023-03-06 | End: 2023-03-07 | Stop reason: HOSPADM

## 2023-03-06 RX ORDER — SODIUM CHLORIDE 0.9 % (FLUSH) 0.9 %
10 SYRINGE (ML) INJECTION
Status: DISCONTINUED | OUTPATIENT
Start: 2023-03-06 | End: 2023-03-06 | Stop reason: HOSPADM

## 2023-03-06 RX ORDER — ENOXAPARIN SODIUM 100 MG/ML
40 INJECTION SUBCUTANEOUS EVERY 12 HOURS
Status: DISCONTINUED | OUTPATIENT
Start: 2023-03-06 | End: 2023-03-07 | Stop reason: HOSPADM

## 2023-03-06 RX ADMIN — PANTOPRAZOLE SODIUM 40 MG: 40 INJECTION, POWDER, FOR SOLUTION INTRAVENOUS at 10:03

## 2023-03-06 RX ADMIN — SODIUM CHLORIDE, POTASSIUM CHLORIDE, SODIUM LACTATE AND CALCIUM CHLORIDE: 600; 310; 30; 20 INJECTION, SOLUTION INTRAVENOUS at 10:03

## 2023-03-06 RX ADMIN — HALOPERIDOL LACTATE 0.5 MG: 5 INJECTION, SOLUTION INTRAMUSCULAR at 10:03

## 2023-03-06 RX ADMIN — ACETAMINOPHEN 499.51 MG: 650 SOLUTION ORAL at 01:03

## 2023-03-06 RX ADMIN — ENOXAPARIN SODIUM 40 MG: 40 INJECTION SUBCUTANEOUS at 01:03

## 2023-03-06 RX ADMIN — SODIUM CHLORIDE: 0.9 INJECTION, SOLUTION INTRAVENOUS at 08:03

## 2023-03-06 RX ADMIN — CEFAZOLIN 2 G: 330 INJECTION, POWDER, FOR SOLUTION INTRAMUSCULAR; INTRAVENOUS at 08:03

## 2023-03-06 RX ADMIN — ROCURONIUM BROMIDE 20 MG: 10 INJECTION INTRAVENOUS at 09:03

## 2023-03-06 RX ADMIN — HEPARIN SODIUM 5000 UNITS: 5000 INJECTION INTRAVENOUS; SUBCUTANEOUS at 08:03

## 2023-03-06 RX ADMIN — SCOPALAMINE 1 PATCH: 1 PATCH, EXTENDED RELEASE TRANSDERMAL at 03:03

## 2023-03-06 RX ADMIN — SUGAMMADEX 400 MG: 100 INJECTION, SOLUTION INTRAVENOUS at 10:03

## 2023-03-06 RX ADMIN — SIMETHICONE 40 MG: 20 SUSPENSION/ DROPS ORAL at 11:03

## 2023-03-06 RX ADMIN — ACETAMINOPHEN 499.51 MG: 650 SOLUTION ORAL at 09:03

## 2023-03-06 RX ADMIN — FAMOTIDINE 20 MG: 10 INJECTION, SOLUTION INTRAVENOUS at 08:03

## 2023-03-06 RX ADMIN — Medication 10 MG: at 09:03

## 2023-03-06 RX ADMIN — HYDROCODONE BITARTRATE AND ACETAMINOPHEN 15 ML: 7.5; 325 SOLUTION ORAL at 03:03

## 2023-03-06 RX ADMIN — ENOXAPARIN SODIUM 40 MG: 40 INJECTION SUBCUTANEOUS at 08:03

## 2023-03-06 RX ADMIN — LIDOCAINE HYDROCHLORIDE 100 MG: 20 INJECTION INTRAVENOUS at 08:03

## 2023-03-06 RX ADMIN — PROCHLORPERAZINE EDISYLATE 5 MG: 5 INJECTION INTRAMUSCULAR; INTRAVENOUS at 10:03

## 2023-03-06 RX ADMIN — PANTOPRAZOLE SODIUM 40 MG: 40 INJECTION, POWDER, FOR SOLUTION INTRAVENOUS at 08:03

## 2023-03-06 RX ADMIN — DEXAMETHASONE SODIUM PHOSPHATE 8 MG: 4 INJECTION, SOLUTION INTRAMUSCULAR; INTRAVENOUS at 08:03

## 2023-03-06 RX ADMIN — LIDOCAINE HYDROCHLORIDE 1 MG: 10 INJECTION, SOLUTION EPIDURAL; INFILTRATION; INTRACAUDAL; PERINEURAL at 08:03

## 2023-03-06 RX ADMIN — ONDANSETRON 4 MG: 2 INJECTION INTRAMUSCULAR; INTRAVENOUS at 09:03

## 2023-03-06 RX ADMIN — FENTANYL CITRATE 25 MCG: 50 INJECTION, SOLUTION INTRAMUSCULAR; INTRAVENOUS at 10:03

## 2023-03-06 RX ADMIN — GABAPENTIN 300 MG: 250 SOLUTION ORAL at 08:03

## 2023-03-06 RX ADMIN — HYDROCODONE BITARTRATE AND ACETAMINOPHEN 15 ML: 7.5; 325 SOLUTION ORAL at 10:03

## 2023-03-06 RX ADMIN — Medication 20 MG: at 08:03

## 2023-03-06 RX ADMIN — ACETAMINOPHEN 1000 MG: 500 TABLET ORAL at 08:03

## 2023-03-06 RX ADMIN — GABAPENTIN 300 MG: 250 SOLUTION ORAL at 03:03

## 2023-03-06 RX ADMIN — PHENYLEPHRINE HYDROCHLORIDE 100 MCG: 10 INJECTION INTRAVENOUS at 09:03

## 2023-03-06 RX ADMIN — MUPIROCIN: 20 OINTMENT TOPICAL at 08:03

## 2023-03-06 RX ADMIN — DEXMEDETOMIDINE HYDROCHLORIDE 4 MCG: 100 INJECTION, SOLUTION INTRAVENOUS at 10:03

## 2023-03-06 RX ADMIN — ROCURONIUM BROMIDE 50 MG: 10 INJECTION INTRAVENOUS at 08:03

## 2023-03-06 RX ADMIN — MIDAZOLAM HYDROCHLORIDE 2 MG: 1 INJECTION, SOLUTION INTRAMUSCULAR; INTRAVENOUS at 08:03

## 2023-03-06 RX ADMIN — CEFAZOLIN 1 G: 330 INJECTION, POWDER, FOR SOLUTION INTRAMUSCULAR; INTRAVENOUS at 09:03

## 2023-03-06 RX ADMIN — PROPOFOL 160 MG: 10 INJECTION, EMULSION INTRAVENOUS at 08:03

## 2023-03-06 RX ADMIN — ONDANSETRON 8 MG: 2 INJECTION INTRAMUSCULAR; INTRAVENOUS at 03:03

## 2023-03-06 RX ADMIN — SODIUM CHLORIDE: 9 INJECTION, SOLUTION INTRAVENOUS at 08:03

## 2023-03-06 RX ADMIN — FENTANYL CITRATE 100 MCG: 50 INJECTION, SOLUTION INTRAMUSCULAR; INTRAVENOUS at 08:03

## 2023-03-06 RX ADMIN — HYDROCODONE BITARTRATE AND ACETAMINOPHEN 15 ML: 7.5; 325 SOLUTION ORAL at 08:03

## 2023-03-06 RX ADMIN — FENTANYL CITRATE 25 MCG: 50 INJECTION, SOLUTION INTRAMUSCULAR; INTRAVENOUS at 11:03

## 2023-03-06 RX ADMIN — DEXMEDETOMIDINE HYDROCHLORIDE 8 MCG: 100 INJECTION, SOLUTION INTRAVENOUS at 09:03

## 2023-03-06 NOTE — PLAN OF CARE
Problem: Adult Inpatient Plan of Care  Goal: Plan of Care Review  Outcome: Ongoing, Progressing  Goal: Patient-Specific Goal (Individualized)  Outcome: Ongoing, Progressing  Goal: Absence of Hospital-Acquired Illness or Injury  Outcome: Ongoing, Progressing  Goal: Optimal Comfort and Wellbeing  Outcome: Ongoing, Not Progressing     Problem: Bariatric Environmental Safety  Goal: Safety Maintained with Care  Outcome: Ongoing, Progressing     Problem: Bariatric Care Environmental Safety (Bariatric Surgery)  Goal: Safety Maintained with Care  Outcome: Ongoing, Progressing     Problem: Bleeding (Bariatric Surgery)  Goal: Absence of Bleeding  Outcome: Ongoing, Progressing     Problem: Fluid and Electrolyte Imbalance (Bariatric Surgery)  Goal: Fluid and Electrolyte Balance  Outcome: Ongoing, Progressing     Problem: Infection (Bariatric Surgery)  Goal: Absence of Infection Signs and Symptoms  Outcome: Ongoing, Progressing     Problem: Ongoing Anesthesia Effects (Bariatric Surgery)  Goal: Anesthesia/Sedation Recovery  Outcome: Ongoing, Progressing     Problem: Pain (Bariatric Surgery)  Goal: Acceptable Pain Control  Outcome: Ongoing, Not Progressing     Problem: Postoperative Nausea and Vomiting (Bariatric Surgery)  Goal: Nausea and Vomiting Relief  Outcome: Ongoing, Not Progressing     Problem: Postoperative Urinary Retention (Bariatric Surgery)  Goal: Effective Urinary Elimination  Outcome: Ongoing, Progressing     Problem: Respiratory Compromise (Bariatric Surgery)  Goal: Effective Oxygenation and Ventilation  Outcome: Ongoing, Progressing

## 2023-03-06 NOTE — BRIEF OP NOTE
[facility] - Surgery ([facility])  Brief Operative Note    SUMMARY     Surgery Date: 3/6/2023     Surgeon(s) and Role:     * Niurka Malhotra Jr., MD - Primary     * Kirt Xie MD - Resident - Assisting        Pre-op Diagnosis:  Morbid obesity with BMI of 40.0-44.9, adult [E66.01, Z68.41]    Post-op Diagnosis:  Post-Op Diagnosis Codes:     * Morbid obesity with BMI of 40.0-44.9, adult [E66.01, Z68.41]    Procedure(s) (LRB):  GASTRECTOMY, SLEEVE, LAPAROSCOPIC with intraop egd (N/A)    Anesthesia: General    Operative Findings: Laparoscopic sleeve gastrectomy, no apparent complication.     Estimated Blood Loss: * No values recorded between 3/6/2023  9:11 AM and 3/6/2023 10:03 AM *    Estimated Blood Loss has been documented.         Specimens:   Specimen (24h ago, onward)       Start     Ordered    03/06/23 0945  Specimen to Pathology, Surgery General Surgery  Once        Comments: Pre-op Diagnosis: Morbid obesity with BMI of 40.0-44.9, adult [E66.01, Z68.41]Procedure(s):GASTRECTOMY, SLEEVE, LAPAROSCOPIC with intraop egd Number of specimens: 1Name of specimens: 1. Stomach - perm     References:    Click here for ordering Quick Tip   Question Answer Comment   Procedure Type: General Surgery    Specimen Class: Routine/Screening    Which provider would you like to cc? NIURKA MALHOTRA JR    Release to patient Immediate        03/06/23 0945                    [id number]

## 2023-03-06 NOTE — TRANSFER OF CARE
Anesthesia Transfer of Care Note    Patient: Jacobo Alvarado    Procedure(s) Performed: Procedure(s) (LRB):  GASTRECTOMY, SLEEVE, LAPAROSCOPIC with intraop egd (N/A)    Patient location: PACU    Anesthesia Type: general    Transport from OR: Transported from OR on 6-10 L/min O2 by face mask with adequate spontaneous ventilation    Post pain: adequate analgesia    Post assessment: no apparent anesthetic complications and tolerated procedure well    Post vital signs: stable    Level of consciousness: awake and responds to stimulation    Nausea/Vomiting: nausea    Complications: none    Transfer of care protocol was followed      Last vitals:   Visit Vitals  /66 (BP Location: Right arm, Patient Position: Lying)   Pulse 90   Temp 36.3 °C (97.3 °F) (Temporal)   Resp 18   Ht 5' (1.524 m)   Wt 98.3 kg (216 lb 11.4 oz)   LMP 03/02/2023 (Exact Date)   SpO2 100%   Breastfeeding No   BMI 42.32 kg/m²

## 2023-03-06 NOTE — OP NOTE
DATE OF PROCEDURE: 03/06/2023   SERVICE: Bariatric Surgery.   Surgeon(s) and Role:     * Dwight Allen Jr., MD - Primary     * Kirt Xie MD - Resident - Assisting  PREOPERATIVE DIAGNOSES: Morbid obesity with a Body mass index is 42.32 kg/m².  POSTOPERATIVE DIAGNOSES:Same  PROCEDURE: Laparoscopic sleeve gastrectomy and EGD.  ANESTHESIA: General endotracheal and local.   DESCRIPTION OF PROCEDURE: The patient was taken to the Operating Room, placed under general anesthesia, prepped and draped in sterile fashion. At this time,   incision was made approximately 15 cm from xiphoid and 5 cm to the left of   midline after infiltrating with local anesthetic. Using Optiview trocar,   intraabdominal access was obtained under direct visualization without difficulty   and pneumoperitoneum was obtained. Further ports were then placed including   bilateral anterior axillary subcostal 5 mm ports and midclavicular subcostal 5   mm port on the right and a second 12 port approximately 15 cm from xiphoid just   to right of midline. These were all placed after infiltrating with local   anesthetic and under direct visualization. Once the ports were placed, the   patient was placed in steep reverse Trendelenburg. A liver retractor was   placed. The hiatus was then examined. No hernia was noted. Once this was   complete, we turned attention to the sleeve itself. An area on the greater   curve approximately 6 cm proximal from the pylorus was identified and using a   Harmonic scalpel, the gastrocolic ligament was opened, exposing the lesser sac.   We continued to take down attachments along the greater curve including the   short gastrics to the angle of His, freeing the lateral part of the stomach. A   42-Divehi bougie was then guided by Anesthesia into the stomach and from the   laparoscopic view guided along the lesser curve. Once the bougie was in   position along the lesser curve, we began the sleeve with a green  load stapler   with SeamGuard at the area 6 cm proximal from the pylorus using the bougie as a   guide on the lesser curve and fired the green load stapler beginning the sleeve.   Further staple loads, blue with SeamGuard were fired along the bougie on the   lesser curve towards the angle of His, completely freeing the lateral part of   the stomach. Once this was complete, the stomach was removed from the incision   approximately 15 cm from xiphoid just to right of midline after it was slightly   incising the skin and stretching the fascia with a Judy. The specimen was   removed and passed off the table with ease. At this time, an 0 Vicryl suture   was then used to close the fascial defect on a suture passer device under direct   visualization. The suture was placed in figure-of-8 fashion, however, it was   not tied down at this time, the patient was laid flat. The bougie was removed.   Attention then turned to an EGD. The scope was placed in the oropharynx,   guided down the esophagus and into the sleeve through the sleeve and into the   antrum with ease. At this time, the area was infiltrated with air and we slowly   pulled the scope back inspecting the sleeve itself. The staple line showed no   signs of bleeding or other abnormalities. The sleeve itself was in good   condition with no abnormalities, no twisting or obstruction and nice uniform   size. After inspection, we suctioned all the air from the antrum and sleeve and   removed the scope under direct visualization, turned our attention back to the   laparoscopic view. We inspected the staple line, no signs of bleeding or other   abnormalities from the staple line. The liver retractor was removed. The ports   were removed under direct visualization. The suture was then placed in the   fascia of the incision 15 cm from xiphoid just to right of midline, was tied   down closing the fascia of this incision and the incision was irrigated   copiously. At this  time, the skin of all five port sites was closed with 4-0   Monocryl suture in subcuticular fashion. Mastisol and Steri-Strips were placed.   The patient was allowed to awake from general anesthesia, transferred to bed   for transfer to Recovery.   COMPLICATIONS: None.   SPONGE COUNT: Correct.   BLOOD LOSS: 15 mL.   FLUIDS: Per Anesthesia.   BLOOD GIVEN: None.   DRAINS: None.   SPECIMENS: Stomach.   CONDITION OF THE PATIENT: Good.   I was present for the entire procedure.

## 2023-03-06 NOTE — ANESTHESIA PROCEDURE NOTES
Intubation    Date/Time: 3/6/2023 8:51 AM  Performed by: José Covarrubias MD  Authorized by: Hugh Schofield MD     Intubation:     Induction:  Intravenous    Intubated:  Postinduction    Mask Ventilation:  Easy mask    Attempts:  1    Attempted By:  Resident anesthesiologist    Method of Intubation:  Video laryngoscopy    Blade:  Brink 3    Laryngeal View Grade: Grade I - full view of cords      Difficult Airway Encountered?: No      Complications:  None    Airway Device:  Oral endotracheal tube    Airway Device Size:  7.0    Style/Cuff Inflation:  Cuffed (inflated to minimal occlusive pressure)    Inflation Amount (mL):  5    Tube secured:  21    Secured at:  The lips    Placement Verified By:  Capnometry    Complicating Factors:  Anterior larynx and short neck    Findings Post-Intubation:  BS equal bilateral and atraumatic/condition of teeth unchanged

## 2023-03-06 NOTE — NURSING TRANSFER
Nursing Transfer Note      3/6/2023     Reason patient is being transferred: postop    Transfer To: 556    Transfer via bed    Transfer with iv pole/fluids    Transported by transport    Medicines sent: n/a    Any special needs or follow-up needed: site care, pain management    Chart send with patient: Yes    Notified: significant other    Patient reassessed at: 1120

## 2023-03-06 NOTE — ANESTHESIA PREPROCEDURE EVALUATION
[facility]  Anesthesia Pre-Operative Evaluation         Patient Name: Jacobo Alvarado  YOB: 1995  MRN: [id number]    SUBJECTIVE:     Pre-operative evaluation for Procedure(s) (LRB):  GASTRECTOMY, SLEEVE, LAPAROSCOPIC with intraop egd (N/A)     2023    Jacobo Alvarado is a 28 y.o. female w/ a significant PMHx of morbid obesity (BMI 43).    Patient now presents for the above procedure(s).      LDA: None documented.     Prev airway: None documented.    Drips: None documented.      Patient Active Problem List   Diagnosis    Recurrent pregnancy loss    History of  delivery -- NEEDS MIKENA @ 16 weeks    Acute pyelonephritis    Class 3 severe obesity due to excess calories with body mass index (BMI) of 40.0 to 44.9 in adult    STD exposure    Acute pain of right knee       Review of patient's allergies indicates:  No Known Allergies    Current Inpatient Medications:      No current facility-administered medications on file prior to encounter.     Current Outpatient Medications on File Prior to Encounter   Medication Sig Dispense Refill    acetaminophen (TYLENOL) 500 MG tablet Take 1 tablet (500 mg total) by mouth every 6 (six) hours as needed for Pain. (Patient not taking: Reported on 2022) 20 tablet 0    benzonatate (TESSALON) 100 MG capsule       diclofenac sodium (VOLTAREN) 1 % Gel Apply 2 g topically 4 (four) times daily. 450 g 0    ergocalciferol (ERGOCALCIFEROL) 50,000 unit Cap Take 1 capsule (50,000 Units total) by mouth twice a week. 24 capsule 0    ibuprofen (ADVIL,MOTRIN) 400 MG tablet Take 1.5 tablets (600 mg total) by mouth every 6 (six) hours as needed for Other (pain). (Patient not taking: Reported on 2022) 20 tablet 0       Past Surgical History:   Procedure Laterality Date    DILATION AND CURETTAGE OF UTERUS  2018       Social History     Socioeconomic History    Marital status: Single   Tobacco Use    Smoking status:  Never    Smokeless tobacco: Never   Substance and Sexual Activity    Alcohol use: Yes    Drug use: No    Sexual activity: Yes     Partners: Male     Birth control/protection: I.U.D.     Social Determinants of Health     Financial Resource Strain: Unknown    Difficulty of Paying Living Expenses: Patient refused   Food Insecurity: Unknown    Worried About Running Out of Food in the Last Year: Patient refused    Ran Out of Food in the Last Year: Patient refused   Transportation Needs: Unknown    Lack of Transportation (Medical): Patient refused    Lack of Transportation (Non-Medical): Patient refused   Physical Activity: Insufficiently Active    Days of Exercise per Week: 3 days    Minutes of Exercise per Session: 20 min   Stress: No Stress Concern Present    Feeling of Stress : Not at all   Social Connections: Unknown    Frequency of Communication with Friends and Family: More than three times a week    Frequency of Social Gatherings with Friends and Family: Twice a week    Active Member of Clubs or Organizations: No    Attends Club or Organization Meetings: Never    Marital Status: Patient refused   Housing Stability: Unknown    Unable to Pay for Housing in the Last Year: No    Number of Places Lived in the Last Year: 1    Unstable Housing in the Last Year: Patient refused       OBJECTIVE:     Vital Signs Range (Last 24H):         Significant Labs:  Lab Results   Component Value Date    WBC 6.35 12/05/2022    HGB 12.3 12/05/2022    HCT 40.1 12/05/2022     12/05/2022    CHOL 182 12/05/2022    TRIG 80 12/05/2022    HDL 40 12/05/2022    ALT 14 12/05/2022    AST 15 12/05/2022     12/05/2022    K 4.1 12/05/2022     12/05/2022    CREATININE 0.7 12/05/2022    BUN 8 12/05/2022    CO2 24 12/05/2022    TSH 0.854 12/05/2022    HGBA1C 5.4 12/05/2022       Diagnostic Studies: No relevant studies.    EKG:   Results for orders placed or performed during the hospital encounter of 08/25/22   EKG     Collection Time: 08/25/22  4:23 PM    Narrative    Test Reason : E66.01,Z68.41,    Vent. Rate : 086 BPM     Atrial Rate : 086 BPM     P-R Int : 170 ms          QRS Dur : 068 ms      QT Int : 350 ms       P-R-T Axes : 053 012 018 degrees     QTc Int : 418 ms    Normal sinus rhythm  Normal ECG  When compared with ECG of 13-FEB-2016 19:42,  No significant change was found  Confirmed by Hugh Snell MD ([id number]) on 8/25/2022 4:35:00 PM    Referred By: RADHA CAVAZOS           Confirmed By:Hugh Snell MD       2D ECHO:  TTE:  No results found for this or any previous visit.    MAKAYLA:  No results found for this or any previous visit.    ASSESSMENT/PLAN:           Pre-op Assessment    I have reviewed the Patient Summary Reports.     I have reviewed the Nursing Notes. I have reviewed the NPO Status.   I have reviewed the Medications.     Review of Systems  Anesthesia Hx:  No problems with previous Anesthesia  Denies Family Hx of Anesthesia complications.   Denies Personal Hx of Anesthesia complications.   Hematology/Oncology:  Hematology Normal        Cardiovascular:  Cardiovascular Normal     Pulmonary:  Pulmonary Normal    Renal/:  Renal/ Normal     Neurological:  Neurology Normal    Endocrine:  Morbid Obesity / BMI > 40      Physical Exam  General: Well nourished, Cooperative, Alert and Oriented    Airway:  Mallampati: III   Mouth Opening: Normal  TM Distance: Normal  Tongue: Normal  Neck ROM: Normal ROM    Dental:  Intact        Anesthesia Plan  Type of Anesthesia, risks & benefits discussed:    Anesthesia Type: Gen ETT  Intra-op Monitoring Plan: Standard ASA Monitors  Post Op Pain Control Plan: multimodal analgesia  Induction:  IV  Airway Plan: Video, Post-Induction  Informed Consent: Informed consent signed with the Patient and all parties understand the risks and agree with anesthesia plan.  All questions answered.   ASA Score: 2  Day of Surgery Review of History & Physical: H&P Update referred to the  surgeon/provider.    Ready For Surgery From Anesthesia Perspective.     .

## 2023-03-07 ENCOUNTER — TELEPHONE (OUTPATIENT)
Dept: SURGERY | Facility: CLINIC | Age: 28
End: 2023-03-07
Payer: COMMERCIAL

## 2023-03-07 ENCOUNTER — PATIENT MESSAGE (OUTPATIENT)
Dept: BARIATRICS | Facility: CLINIC | Age: 28
End: 2023-03-07
Payer: COMMERCIAL

## 2023-03-07 VITALS
HEIGHT: 60 IN | SYSTOLIC BLOOD PRESSURE: 125 MMHG | TEMPERATURE: 98 F | HEART RATE: 70 BPM | WEIGHT: 216.69 LBS | BODY MASS INDEX: 42.54 KG/M2 | RESPIRATION RATE: 18 BRPM | OXYGEN SATURATION: 98 % | DIASTOLIC BLOOD PRESSURE: 75 MMHG

## 2023-03-07 LAB
ANION GAP SERPL CALC-SCNC: 9 MMOL/L (ref 8–16)
BASOPHILS # BLD AUTO: 0.01 K/UL (ref 0–0.2)
BASOPHILS NFR BLD: 0.2 % (ref 0–1.9)
BUN SERPL-MCNC: 4 MG/DL (ref 6–20)
CALCIUM SERPL-MCNC: 8.9 MG/DL (ref 8.7–10.5)
CHLORIDE SERPL-SCNC: 108 MMOL/L (ref 95–110)
CO2 SERPL-SCNC: 22 MMOL/L (ref 23–29)
CREAT SERPL-MCNC: 0.7 MG/DL (ref 0.5–1.4)
DIFFERENTIAL METHOD: ABNORMAL
EOSINOPHIL # BLD AUTO: 0 K/UL (ref 0–0.5)
EOSINOPHIL NFR BLD: 0 % (ref 0–8)
ERYTHROCYTE [DISTWIDTH] IN BLOOD BY AUTOMATED COUNT: 14.6 % (ref 11.5–14.5)
EST. GFR  (NO RACE VARIABLE): >60 ML/MIN/1.73 M^2
GLUCOSE SERPL-MCNC: 85 MG/DL (ref 70–110)
HCT VFR BLD AUTO: 32.9 % (ref 37–48.5)
HGB BLD-MCNC: 10 G/DL (ref 12–16)
IMM GRANULOCYTES # BLD AUTO: 0.02 K/UL (ref 0–0.04)
IMM GRANULOCYTES NFR BLD AUTO: 0.3 % (ref 0–0.5)
LYMPHOCYTES # BLD AUTO: 1.4 K/UL (ref 1–4.8)
LYMPHOCYTES NFR BLD: 23.1 % (ref 18–48)
MAGNESIUM SERPL-MCNC: 1.7 MG/DL (ref 1.6–2.6)
MCH RBC QN AUTO: 26.3 PG (ref 27–31)
MCHC RBC AUTO-ENTMCNC: 30.4 G/DL (ref 32–36)
MCV RBC AUTO: 87 FL (ref 82–98)
MONOCYTES # BLD AUTO: 0.7 K/UL (ref 0.3–1)
MONOCYTES NFR BLD: 11.7 % (ref 4–15)
NEUTROPHILS # BLD AUTO: 3.8 K/UL (ref 1.8–7.7)
NEUTROPHILS NFR BLD: 64.7 % (ref 38–73)
NRBC BLD-RTO: 0 /100 WBC
PHOSPHATE SERPL-MCNC: 2.3 MG/DL (ref 2.7–4.5)
PLATELET # BLD AUTO: 288 K/UL (ref 150–450)
PMV BLD AUTO: 10.4 FL (ref 9.2–12.9)
POTASSIUM SERPL-SCNC: 4 MMOL/L (ref 3.5–5.1)
RBC # BLD AUTO: 3.8 M/UL (ref 4–5.4)
SODIUM SERPL-SCNC: 139 MMOL/L (ref 136–145)
WBC # BLD AUTO: 5.88 K/UL (ref 3.9–12.7)

## 2023-03-07 PROCEDURE — C9113 INJ PANTOPRAZOLE SODIUM, VIA: HCPCS | Performed by: SURGERY

## 2023-03-07 PROCEDURE — 63600175 PHARM REV CODE 636 W HCPCS: Performed by: SURGERY

## 2023-03-07 PROCEDURE — 25000003 PHARM REV CODE 250: Performed by: SURGERY

## 2023-03-07 PROCEDURE — 84100 ASSAY OF PHOSPHORUS: CPT | Performed by: SURGERY

## 2023-03-07 PROCEDURE — 83735 ASSAY OF MAGNESIUM: CPT | Performed by: SURGERY

## 2023-03-07 PROCEDURE — 85025 COMPLETE CBC W/AUTO DIFF WBC: CPT | Performed by: SURGERY

## 2023-03-07 PROCEDURE — 80048 BASIC METABOLIC PNL TOTAL CA: CPT | Performed by: SURGERY

## 2023-03-07 PROCEDURE — 36415 COLL VENOUS BLD VENIPUNCTURE: CPT | Performed by: SURGERY

## 2023-03-07 RX ORDER — DEXTROMETHORPHAN/PSEUDOEPHED 2.5-7.5/.8
40 DROPS ORAL 4 TIMES DAILY PRN
Status: DISCONTINUED | OUTPATIENT
Start: 2023-03-07 | End: 2023-03-07 | Stop reason: HOSPADM

## 2023-03-07 RX ORDER — OXYCODONE HCL 5 MG/5 ML
5 SOLUTION, ORAL ORAL EVERY 4 HOURS PRN
Qty: 80 ML | Refills: 0 | Status: SHIPPED | OUTPATIENT
Start: 2023-03-07

## 2023-03-07 RX ADMIN — GABAPENTIN 300 MG: 250 SOLUTION ORAL at 10:03

## 2023-03-07 RX ADMIN — PROCHLORPERAZINE EDISYLATE 5 MG: 5 INJECTION INTRAMUSCULAR; INTRAVENOUS at 12:03

## 2023-03-07 RX ADMIN — ONDANSETRON 8 MG: 2 INJECTION INTRAMUSCULAR; INTRAVENOUS at 06:03

## 2023-03-07 RX ADMIN — HYDROCODONE BITARTRATE AND ACETAMINOPHEN 15 ML: 7.5; 325 SOLUTION ORAL at 06:03

## 2023-03-07 RX ADMIN — ENOXAPARIN SODIUM 40 MG: 40 INJECTION SUBCUTANEOUS at 10:03

## 2023-03-07 RX ADMIN — HYDROCODONE BITARTRATE AND ACETAMINOPHEN 15 ML: 7.5; 325 SOLUTION ORAL at 12:03

## 2023-03-07 RX ADMIN — ACETAMINOPHEN 499.51 MG: 650 SOLUTION ORAL at 06:03

## 2023-03-07 RX ADMIN — SODIUM CHLORIDE, POTASSIUM CHLORIDE, SODIUM LACTATE AND CALCIUM CHLORIDE: 600; 310; 30; 20 INJECTION, SOLUTION INTRAVENOUS at 03:03

## 2023-03-07 RX ADMIN — SIMETHICONE 40 MG: 20 SUSPENSION/ DROPS ORAL at 10:03

## 2023-03-07 RX ADMIN — PANTOPRAZOLE SODIUM 40 MG: 40 INJECTION, POWDER, FOR SOLUTION INTRAVENOUS at 10:03

## 2023-03-07 RX ADMIN — GABAPENTIN 300 MG: 250 SOLUTION ORAL at 02:03

## 2023-03-07 NOTE — DISCHARGE INSTRUCTIONS
Post-op instructions:    - Keep steri-strips on for 10 days or until the fall off on their own  - OK for Bariatric clear liquid diet  - No driving while taking narcotic medication  - No lifting more than 10 pounds for 4 weeks  - OK to shower with warm soap and water. No soaking in any water  - Clinic visit to be scheduled in two weeks

## 2023-03-07 NOTE — TELEPHONE ENCOUNTER
----- Message from Leslee Sood sent at 3/7/2023 10:05 AM CST -----  Regarding: Param/ MATTHEW  Contact: param CASTRO / [phone]  Param CASTRO is calling regarding Pt Appt 3/6. Reff # [phone] # [phone] . Please Call

## 2023-03-07 NOTE — SUBJECTIVE & OBJECTIVE
Interval History: No major events overnight. AFVSS. Mild nausea, but able to tolerate water challenge this morning. Pain controlled.    Medications:  Continuous Infusions:   sodium chloride 0.9% 10 mL/hr at 03/06/23 0804    lactated ringers 125 mL/hr at 03/07/23 0310     Scheduled Meds:   enoxaparin  40 mg Subcutaneous Q12H    gabapentin  300 mg Oral TID    pantoprazole  40 mg Intravenous BID    scopolamine  1 patch Transdermal Q3 Days     PRN Meds:hydrocodone-apap 7.5-325 MG/15 ML, HYDROmorphone, ondansetron, prochlorperazine, simethicone     Review of patient's allergies indicates:  No Known Allergies  Objective:     Vital Signs (Most Recent):  Temp: 98.2 °F (36.8 °C) (03/07/23 0408)  Pulse: 88 (03/07/23 0408)  Resp: 18 (03/07/23 0613)  BP: 117/73 (03/07/23 0408)  SpO2: 98 % (03/07/23 0408) Vital Signs (24h Range):  Temp:  [96.1 °F (35.6 °C)-99.3 °F (37.4 °C)] 98.2 °F (36.8 °C)  Pulse:  [66-92] 88  Resp:  [12-23] 18  SpO2:  [96 %-100 %] 98 %  BP: (109-129)/(59-73) 117/73     Weight: 98.3 kg (216 lb 11.4 oz)  Body mass index is 42.32 kg/m².    Intake/Output - Last 3 Shifts         03/05 0700  03/06 0659 03/06 0700 03/07 0659 03/07 0700 03/08 0659    P.O.  0     I.V. (mL/kg)  975.6 (9.9)     Total Intake(mL/kg)  975.6 (9.9)     Net  +975.6            Urine Occurrence  2 x     Stool Occurrence  0 x     Emesis Occurrence  0 x             Physical Exam  Vitals and nursing note reviewed.   Constitutional:       Appearance: Normal appearance. She is obese. She is not ill-appearing.   HENT:      Head: Normocephalic.      Mouth/Throat:      Mouth: Mucous membranes are moist.      Pharynx: Oropharynx is clear.   Eyes:      General: No scleral icterus.     Extraocular Movements: Extraocular movements intact.      Conjunctiva/sclera: Conjunctivae normal.   Cardiovascular:      Rate and Rhythm: Normal rate.      Pulses: Normal pulses.   Pulmonary:      Effort: Pulmonary effort is normal. No respiratory distress.      Breath  sounds: No wheezing.   Abdominal:      General: Abdomen is flat. Bowel sounds are normal. There is no distension.      Palpations: Abdomen is soft.      Comments: Incisions CDI  Appropriate TTP   Musculoskeletal:         General: No swelling or tenderness. Normal range of motion.      Cervical back: Normal range of motion.   Skin:     General: Skin is warm and dry.      Coloration: Skin is not jaundiced or pale.   Neurological:      General: No focal deficit present.      Mental Status: She is alert and oriented to person, place, and time.   Psychiatric:         Mood and Affect: Mood normal.       Significant Labs:  I have reviewed all pertinent lab results within the past 24 hours.  CBC: No results for input(s): WBC, RBC, HGB, HCT, PLT, MCV, MCH, MCHC in the last 168 hours.  CMP: No results for input(s): GLU, CALCIUM, ALBUMIN, PROT, NA, K, CO2, CL, BUN, CREATININE, ALKPHOS, ALT, AST, BILITOT in the last 168 hours.    Significant Diagnostics:  I have reviewed all pertinent imaging results/findings within the past 24 hours.

## 2023-03-07 NOTE — PLAN OF CARE
[facility] - Surgery  Initial Discharge Assessment       Primary Care Provider: Barrera Koch MD    Admission Diagnosis: Morbid obesity with BMI of 40.0-44.9, adult [E66.01, Z68.41]  Obesity [E66.9]    Admission Date: 3/6/2023  Expected Discharge Date: 3/7/2023    Discharge Barriers Identified: None    Payor: BLUE CROSS BLUE SHIELD / Plan: BCBS ALL OUT OF STATE / Product Type: PPO /     Extended Emergency Contact Information  Primary Emergency Contact: Shakeel Borjas   [place]  Mobile Phone: [phone]  Relation: Sister  Secondary Emergency Contact: ValeriaelRicardo singh  Mobile Phone: [phone]  Relation: Significant other    Discharge Plan A: Home with family  Discharge Plan B: Home with family, Home Health      [id number] - [place] - [address]  [address]  [place] [id number]  Phone: [phone] Fax: [phone]      Initial Assessment (most recent)       Adult Discharge Assessment - 03/07/23 1054          Discharge Assessment    Assessment Type Discharge Planning Assessment     Confirmed/corrected address, phone number and insurance Yes     Confirmed Demographics Correct on Facesheet     Source of Information patient     Communicated ILDA with patient/caregiver Yes     People in Home parent(s);child(elizabeth), dependent;significant other     Do you expect to return to your current living situation? Yes     Do you have help at home or someone to help you manage your care at home? Yes     Who are your caregiver(s) and their phone number(s)? mother     Prior to hospitilization cognitive status: Alert/Oriented     Current cognitive status: Alert/Oriented     Home Layout Bathroom on 2nd floor;Bedroom on 2nd floor     Equipment Currently Used at Home none     Do you currently have service(s) that help you manage your care at home? No     Do you take prescription medications? No     Do you have prescription coverage? Yes     Do you have any problems affording any of your  prescribed medications? No     Are you on dialysis? No     Do you take coumadin? No     Discharge Plan A Home with family     Discharge Plan B Home with family;Home Health     DME Needed Upon Discharge  none     Discharge Plan discussed with: Patient     Discharge Barriers Identified None                   Patient lives with her mother, two dependent children, and her significant other in a second floor apartment with 17 entry steps. Patient does not feel she will need any post acute services upon hospital discharge. Patient boyfriend will provide transportation home.

## 2023-03-07 NOTE — PLAN OF CARE
Problem: Adult Inpatient Plan of Care  Goal: Plan of Care Review  Outcome: Ongoing, Progressing  Goal: Patient-Specific Goal (Individualized)  Outcome: Ongoing, Progressing  Goal: Absence of Hospital-Acquired Illness or Injury  Outcome: Ongoing, Progressing  Goal: Optimal Comfort and Wellbeing  Outcome: Ongoing, Progressing  Goal: Readiness for Transition of Care  Outcome: Ongoing, Progressing     Problem: Bariatric Environmental Safety  Goal: Safety Maintained with Care  Outcome: Ongoing, Progressing     Problem: Bariatric Care Environmental Safety (Bariatric Surgery)  Goal: Safety Maintained with Care  Outcome: Ongoing, Progressing     Problem: Bleeding (Bariatric Surgery)  Goal: Absence of Bleeding  Outcome: Ongoing, Progressing     Problem: Bowel Motility Impaired (Bariatric Surgery)  Goal: Effective Bowel Motility and Elimination  Outcome: Ongoing, Progressing     Problem: Fluid and Electrolyte Imbalance (Bariatric Surgery)  Goal: Fluid and Electrolyte Balance  Outcome: Ongoing, Progressing     Problem: Glycemic Control Impaired (Bariatric Surgery)  Goal: Blood Glucose Level Within Desired Range  Outcome: Ongoing, Progressing     Problem: Infection (Bariatric Surgery)  Goal: Absence of Infection Signs and Symptoms  Outcome: Ongoing, Progressing     Problem: Ongoing Anesthesia Effects (Bariatric Surgery)  Goal: Anesthesia/Sedation Recovery  Outcome: Ongoing, Progressing     Problem: Pain (Bariatric Surgery)  Goal: Acceptable Pain Control  Outcome: Ongoing, Progressing     Problem: Postoperative Nausea and Vomiting (Bariatric Surgery)  Goal: Nausea and Vomiting Relief  Outcome: Ongoing, Progressing     Problem: Postoperative Urinary Retention (Bariatric Surgery)  Goal: Effective Urinary Elimination  Outcome: Ongoing, Progressing     Problem: Respiratory Compromise (Bariatric Surgery)  Goal: Effective Oxygenation and Ventilation  Outcome: Ongoing, Progressing

## 2023-03-07 NOTE — ANESTHESIA POSTPROCEDURE EVALUATION
Anesthesia Post Evaluation    Patient: Jacobo Alvarado    Procedure(s) Performed: Procedure(s) (LRB):  GASTRECTOMY, SLEEVE, LAPAROSCOPIC with intraop egd (N/A)    Final Anesthesia Type: general      Patient location during evaluation: PACU  Patient participation: Yes- Able to Participate  Level of consciousness: awake  Post-procedure vital signs: reviewed and stable  Pain management: adequate  Airway patency: patent    PONV status at discharge: No PONV  Anesthetic complications: no      Cardiovascular status: blood pressure returned to baseline  Respiratory status: unassisted  Hydration status: euvolemic  Follow-up not needed.          Vitals Value Taken Time   /73 03/07/23 0408   Temp 36.8 °C (98.2 °F) 03/07/23 0408   Pulse 88 03/07/23 0408   Resp 18 03/07/23 0613   SpO2 98 % 03/07/23 0408         Event Time   Out of Recovery 10:30:00         Pain/Yoselin Score: Pain Rating Prior to Med Admin: 6 (3/7/2023  6:13 AM)  Pain Rating Post Med Admin: 3 (3/6/2023 10:34 PM)  Yoselin Score: 9 (3/6/2023 10:30 AM)

## 2023-03-07 NOTE — NURSING
Discharge instructions given and reviewed with pt.  Pt verbalizes understanding. IV removed. Tolerated water protocol and tolerated liquids today. Pt ready for discharge.

## 2023-03-07 NOTE — TELEPHONE ENCOUNTER
Returned call to Sky with [employer] / [phone].  No extension left.  Contact number was general line. I informed the staff that I spoke with that I was returning a call from Sky.  She asked me a series of questions which I responded to.  I provided my direct line if there are any further questions.

## 2023-03-07 NOTE — ASSESSMENT & PLAN NOTE
28 year old female with hx of morbid obesity. Now s/p laparoscopic sleeve gastrectomy on 3/06/23.    - Water protocol with transition to CLD following completion  - mIVF DC'd  - PRN Hycet  - PRN Anti-emetics  - Necessary home medications restarted  - Protonix  - PPx Lovenox  - Daily labs    Dispo: Likely stable for discharge this afternoon if she continues to progress

## 2023-03-07 NOTE — PROGRESS NOTES
[facility]  General Surgery  Progress Note    Subjective:     History of Present Illness:  No notes on file    Post-Op Info:  Procedure(s) (LRB):  GASTRECTOMY, SLEEVE, LAPAROSCOPIC with intraop egd (N/A)   1 Day Post-Op     Interval History: No major events overnight. AFVSS. Mild nausea, but able to tolerate water challenge this morning. Pain controlled.    Medications:  Continuous Infusions:   sodium chloride 0.9% 10 mL/hr at 03/06/23 0804    lactated ringers 125 mL/hr at 03/07/23 0310     Scheduled Meds:   enoxaparin  40 mg Subcutaneous Q12H    gabapentin  300 mg Oral TID    pantoprazole  40 mg Intravenous BID    scopolamine  1 patch Transdermal Q3 Days     PRN Meds:hydrocodone-apap 7.5-325 MG/15 ML, HYDROmorphone, ondansetron, prochlorperazine, simethicone     Review of patient's allergies indicates:  No Known Allergies  Objective:     Vital Signs (Most Recent):  Temp: 98.2 °F (36.8 °C) (03/07/23 0408)  Pulse: 88 (03/07/23 0408)  Resp: 18 (03/07/23 0613)  BP: 117/73 (03/07/23 0408)  SpO2: 98 % (03/07/23 0408) Vital Signs (24h Range):  Temp:  [96.1 °F (35.6 °C)-99.3 °F (37.4 °C)] 98.2 °F (36.8 °C)  Pulse:  [66-92] 88  Resp:  [12-23] 18  SpO2:  [96 %-100 %] 98 %  BP: (109-129)/(59-73) 117/73     Weight: 98.3 kg (216 lb 11.4 oz)  Body mass index is 42.32 kg/m².    Intake/Output - Last 3 Shifts         03/05 0700  03/06 0659 03/06 0700 03/07 0659 03/07 0700 03/08 0659    P.O.  0     I.V. (mL/kg)  975.6 (9.9)     Total Intake(mL/kg)  975.6 (9.9)     Net  +975.6            Urine Occurrence  2 x     Stool Occurrence  0 x     Emesis Occurrence  0 x             Physical Exam  Vitals and nursing note reviewed.   Constitutional:       Appearance: Normal appearance. She is obese. She is not ill-appearing.   HENT:      Head: Normocephalic.      Mouth/Throat:      Mouth: Mucous membranes are moist.      Pharynx: Oropharynx is clear.   Eyes:      General: No scleral icterus.     Extraocular Movements:  Extraocular movements intact.      Conjunctiva/sclera: Conjunctivae normal.   Cardiovascular:      Rate and Rhythm: Normal rate.      Pulses: Normal pulses.   Pulmonary:      Effort: Pulmonary effort is normal. No respiratory distress.      Breath sounds: No wheezing.   Abdominal:      General: Abdomen is flat. Bowel sounds are normal. There is no distension.      Palpations: Abdomen is soft.      Comments: Incisions CDI  Appropriate TTP   Musculoskeletal:         General: No swelling or tenderness. Normal range of motion.      Cervical back: Normal range of motion.   Skin:     General: Skin is warm and dry.      Coloration: Skin is not jaundiced or pale.   Neurological:      General: No focal deficit present.      Mental Status: She is alert and oriented to person, place, and time.   Psychiatric:         Mood and Affect: Mood normal.       Significant Labs:  I have reviewed all pertinent lab results within the past 24 hours.  CBC: No results for input(s): WBC, RBC, HGB, HCT, PLT, MCV, MCH, MCHC in the last 168 hours.  CMP: No results for input(s): GLU, CALCIUM, ALBUMIN, PROT, NA, K, CO2, CL, BUN, CREATININE, ALKPHOS, ALT, AST, BILITOT in the last 168 hours.    Significant Diagnostics:  I have reviewed all pertinent imaging results/findings within the past 24 hours.    Assessment/Plan:     * Obesity  28 year old female with hx of morbid obesity. Now s/p laparoscopic sleeve gastrectomy on 3/06/23.    - Water protocol with transition to CLD following completion  - mIVF DC'd  - PRN Hycet  - PRN Anti-emetics  - Necessary home medications restarted  - Protonix  - PPx Lovenox  - Daily labs    Dispo: Likely stable for discharge this afternoon if she continues to progress        Micheal Momin MD  General Surgery  [facility]

## 2023-03-07 NOTE — DISCHARGE SUMMARY
[facility]  General Surgery  Discharge Summary      Patient Name: Jacobo Alvarado  MRN: [id number]  Admission Date: 3/6/2023  Hospital Length of Stay: 1 days  Discharge Date and Time:  03/07/2023 1:38 PM  Attending Physician: Dwight Allen Jr.,*   Discharging Provider: Micheal Momin MD  Primary Care Provider: Barrera Koch MD     HPI: The patient is a 28 y.o. obese female who presents for pre op for gastric sleeve surgery.  Pt states that she has not always been overweight. 150 lb after her first daughter, states that she then was around 175-180 lbs. December 2017 started school working two jobs on August 2018 at 200 lbs states that with second daughter gained weight to where she is now ( daughter is currently is 3) and cannot lose it. The patient has tried medical weight loss, calorie deficit, exercise( limited by torn meniscus).  All workup has been reviewed in clinic today and there is nothing on the review that would prevent us from proceeding with surgery.  All questions were answered in clinic today prior to leaving.  Body mass index is 43.14 kg/m².     Procedure(s) (LRB):  GASTRECTOMY, SLEEVE, LAPAROSCOPIC with intraop egd (N/A)     Hospital Course: Jacobo Alvarado presented to [facility] on the morning of 3/6/2023 for laparoscopic sleeve gastrectomy. The procedure was performed without complication and she was transferred to the floor for further post op care and monitoring. Their postoperative course was uneventful and she progressed according to plan.  Her pain was controlled with a combination of IV and PO narcotic pain medications. Her diet was advanced on the morning of postoperative day 1 following successful completion of a water challenge. She is now ambulating without assistance, tolerating a clear liquid diet, pain is well controlled with PO pain medication, and she is voiding appropriately. She now meets all criteria for discharge.     Significant Diagnostic Studies:  Labs: All labs within the past 24 hours have been reviewed    Pending Diagnostic Studies:       Procedure Component Value Units Date/Time    Specimen to Pathology, Surgery General Surgery [[id number]] Collected: 03/06/23 0945    Order Status: Sent Lab Status: In process Updated: 03/07/23 0210    Specimen: Tissue           Final Active Diagnoses:    Diagnosis Date Noted POA    PRINCIPAL PROBLEM:  Obesity [E66.9] 03/06/2023 Yes      Problems Resolved During this Admission:      Discharged Condition: good    Disposition: Home or Self Care    Follow Up:   Follow-up Information       Dwight Allen Jr, MD Follow up in 2 week(s).    Specialties: General Surgery, Bariatrics  Contact information:  [address] [facility]  [facility] [id number] [phone]                           Patient Instructions:   No discharge procedures on file.  Medications:  Reconciled Home Medications:      Medication List        START taking these medications      oxyCODONE 5 mg/5 mL Soln  Commonly known as: ROXICODONE  Take 5 mLs (5 mg total) by mouth every 4 (four) hours as needed.            CONTINUE taking these medications      benzonatate 100 MG capsule  Commonly known as: TESSALON     diclofenac sodium 1 % Gel  Commonly known as: VOLTAREN  Apply 2 g topically 4 (four) times daily.     ergocalciferol 50,000 unit Cap  Commonly known as: ERGOCALCIFEROL  Take 1 capsule (50,000 Units total) by mouth twice a week.     hydrocodone-apap 7.5-325 MG/15 ML oral solution  Commonly known as: HYCET  Take 15 mLs by mouth 4 (four) times daily as needed for Pain.     omeprazole 40 MG capsule  Commonly known as: PRILOSEC  Take 1 capsule (40 mg total) by mouth every morning. Open capsule and take with apple sauce     ondansetron 8 MG Tbdl  Commonly known as: ZOFRAN-ODT  Take 1 tablet (8 mg total) by mouth every 6 (six) hours as needed.     polyethylene glycol 17 gram/dose powder  Commonly known as: GLYCOLAX  Mix 1 capful (17 g) in liquid and drink by mouth  once daily. for 14 days     PROMETHEGAN 25 MG suppository  Generic drug: promethazine  Unwrap and insert 1 rectally every 6 hours as needed.  Okay to change to liquid or pills.     ursodioL 500 MG tablet  Commonly known as: EDWARD FORTE  Crush one tablet daily for gall bladder. Please compound into liquid and supply for 90 days if insurance approves            STOP taking these medications      acetaminophen 500 MG tablet  Commonly known as: TYLENOL     ibuprofen 400 MG tablet  Commonly known as: ARGELIA PACHECO MD  General Surgery  [facility] - Surgery

## 2023-03-08 ENCOUNTER — PATIENT OUTREACH (OUTPATIENT)
Dept: ADMINISTRATIVE | Facility: CLINIC | Age: 28
End: 2023-03-08
Payer: COMMERCIAL

## 2023-03-08 ENCOUNTER — PATIENT MESSAGE (OUTPATIENT)
Dept: BARIATRICS | Facility: CLINIC | Age: 28
End: 2023-03-08
Payer: COMMERCIAL

## 2023-03-08 DIAGNOSIS — E66.9 OBESITY, UNSPECIFIED CLASSIFICATION, UNSPECIFIED OBESITY TYPE, UNSPECIFIED WHETHER SERIOUS COMORBIDITY PRESENT: Primary | ICD-10-CM

## 2023-03-08 NOTE — PROGRESS NOTES
C3 nurse spoke with Jacobo Alvarado  for a TCC post hospital discharge follow up call. The patient reports does not have a scheduled HOSFU appointment. C3 nurse was unable to schedule [facility]OSFU appointment [facility]. Patient advised to contact their PCP to schedule a HOSPFU within 5-7 days.

## 2023-03-08 NOTE — PLAN OF CARE
[facility]  Discharge Final Note    Primary Care Provider: Barrera Koch MD    Expected Discharge Date: 3/7/2023    Final Discharge Note (most recent)       Final Note - 03/07/23 1054          Final Note    Assessment Type Discharge Planning Assessment                   Future Appointments   Date Time Provider Department [place]   3/22/2023  8:30 AM LAB, APPOINTMENT [place] [facility]   3/22/2023  9:00 AM [facility]   3/22/2023  9:30 AM [facility]   5/3/2023  8:30 AM LAB, APPOINTMENT [place] [facility]   5/3/2023  9:00 AM [facility]   5/3/2023  9:30 AM [facility]         Contact Info       Dwight Allen Jr, MD   Specialty: General Surgery, Bariatrics    [address]  [facility] [address]   Phone: [phone]       Next Steps: Follow up in 2 week(s)

## 2023-03-13 ENCOUNTER — PES CALL (OUTPATIENT)
Dept: ADMINISTRATIVE | Facility: CLINIC | Age: 28
End: 2023-03-13
Payer: COMMERCIAL

## 2023-03-14 ENCOUNTER — PATIENT MESSAGE (OUTPATIENT)
Dept: BARIATRICS | Facility: CLINIC | Age: 28
End: 2023-03-14
Payer: COMMERCIAL

## 2023-03-15 LAB
FINAL PATHOLOGIC DIAGNOSIS: NORMAL
GROSS: NORMAL
Lab: NORMAL

## 2023-03-20 ENCOUNTER — TELEPHONE (OUTPATIENT)
Dept: BARIATRICS | Facility: CLINIC | Age: 28
End: 2023-03-20
Payer: COMMERCIAL

## 2023-03-20 ENCOUNTER — PATIENT MESSAGE (OUTPATIENT)
Dept: BARIATRICS | Facility: CLINIC | Age: 28
End: 2023-03-20
Payer: COMMERCIAL

## 2023-03-20 NOTE — TELEPHONE ENCOUNTER
Discussed with Halley Patel NP who recommends pt keep site clean and dry.  Will be assessed 3/22/2023 during appt.     Called and spoke with the pt.  Fully updated her on PEDRO PABLO Patel's recommendations.  Pt also had questions about her pureed diet. Referred to the dietitian bariatric guidebook.  Reached out to the dietitian to please follow up.     Pt stated she was concerned that she was constipated.  Provided recommendations and followed up with portal message for pt's convenience.

## 2023-03-20 NOTE — TELEPHONE ENCOUNTER
Called PT to answer questions on what she can eat on a pureed diet. I reviewed the information in the nutrition booklet and answered all questions.

## 2023-03-22 ENCOUNTER — PATIENT MESSAGE (OUTPATIENT)
Dept: BARIATRICS | Facility: CLINIC | Age: 28
End: 2023-03-22

## 2023-03-22 ENCOUNTER — LAB VISIT (OUTPATIENT)
Dept: LAB | Facility: HOSPITAL | Age: 28
End: 2023-03-22
Payer: COMMERCIAL

## 2023-03-22 ENCOUNTER — OFFICE VISIT (OUTPATIENT)
Dept: BARIATRICS | Facility: CLINIC | Age: 28
End: 2023-03-22
Payer: COMMERCIAL

## 2023-03-22 ENCOUNTER — CLINICAL SUPPORT (OUTPATIENT)
Dept: BARIATRICS | Facility: CLINIC | Age: 28
End: 2023-03-22
Payer: COMMERCIAL

## 2023-03-22 VITALS
BODY MASS INDEX: 40.86 KG/M2 | HEART RATE: 87 BPM | WEIGHT: 208.13 LBS | OXYGEN SATURATION: 99 % | TEMPERATURE: 99 F | SYSTOLIC BLOOD PRESSURE: 115 MMHG | HEIGHT: 60 IN | DIASTOLIC BLOOD PRESSURE: 58 MMHG

## 2023-03-22 DIAGNOSIS — Z71.3 DIETARY COUNSELING AND SURVEILLANCE: ICD-10-CM

## 2023-03-22 DIAGNOSIS — E66.01 CLASS 3 SEVERE OBESITY DUE TO EXCESS CALORIES WITH BODY MASS INDEX (BMI) OF 40.0 TO 44.9 IN ADULT, UNSPECIFIED WHETHER SERIOUS COMORBIDITY PRESENT: ICD-10-CM

## 2023-03-22 DIAGNOSIS — E66.01 MORBID OBESITY WITH BMI OF 40.0-44.9, ADULT: ICD-10-CM

## 2023-03-22 DIAGNOSIS — Z98.84 S/P BARIATRIC SURGERY: Primary | ICD-10-CM

## 2023-03-22 DIAGNOSIS — R63.4 WEIGHT LOSS: ICD-10-CM

## 2023-03-22 DIAGNOSIS — Z98.890 POST-OPERATIVE STATE: Primary | ICD-10-CM

## 2023-03-22 DIAGNOSIS — Z90.3 S/P GASTRIC SLEEVE PROCEDURE: ICD-10-CM

## 2023-03-22 LAB
ALBUMIN SERPL BCP-MCNC: 3.8 G/DL (ref 3.5–5.2)
ALP SERPL-CCNC: 74 U/L (ref 55–135)
ALT SERPL W/O P-5'-P-CCNC: 21 U/L (ref 10–44)
ANION GAP SERPL CALC-SCNC: 10 MMOL/L (ref 8–16)
AST SERPL-CCNC: 28 U/L (ref 10–40)
BASOPHILS # BLD AUTO: 0.02 K/UL (ref 0–0.2)
BASOPHILS NFR BLD: 0.4 % (ref 0–1.9)
BILIRUB SERPL-MCNC: 0.4 MG/DL (ref 0.1–1)
BUN SERPL-MCNC: 5 MG/DL (ref 6–20)
CALCIUM SERPL-MCNC: 9.6 MG/DL (ref 8.7–10.5)
CHLORIDE SERPL-SCNC: 105 MMOL/L (ref 95–110)
CO2 SERPL-SCNC: 28 MMOL/L (ref 23–29)
CREAT SERPL-MCNC: 0.8 MG/DL (ref 0.5–1.4)
DIFFERENTIAL METHOD: ABNORMAL
EOSINOPHIL # BLD AUTO: 0.2 K/UL (ref 0–0.5)
EOSINOPHIL NFR BLD: 3.8 % (ref 0–8)
ERYTHROCYTE [DISTWIDTH] IN BLOOD BY AUTOMATED COUNT: 14.7 % (ref 11.5–14.5)
EST. GFR  (NO RACE VARIABLE): >60 ML/MIN/1.73 M^2
GLUCOSE SERPL-MCNC: 96 MG/DL (ref 70–110)
HCT VFR BLD AUTO: 40.3 % (ref 37–48.5)
HGB BLD-MCNC: 12.6 G/DL (ref 12–16)
IMM GRANULOCYTES # BLD AUTO: 0.01 K/UL (ref 0–0.04)
IMM GRANULOCYTES NFR BLD AUTO: 0.2 % (ref 0–0.5)
LYMPHOCYTES # BLD AUTO: 2.2 K/UL (ref 1–4.8)
LYMPHOCYTES NFR BLD: 44.8 % (ref 18–48)
MCH RBC QN AUTO: 26.7 PG (ref 27–31)
MCHC RBC AUTO-ENTMCNC: 31.3 G/DL (ref 32–36)
MCV RBC AUTO: 85 FL (ref 82–98)
MONOCYTES # BLD AUTO: 0.6 K/UL (ref 0.3–1)
MONOCYTES NFR BLD: 12.8 % (ref 4–15)
NEUTROPHILS # BLD AUTO: 1.9 K/UL (ref 1.8–7.7)
NEUTROPHILS NFR BLD: 38 % (ref 38–73)
NRBC BLD-RTO: 0 /100 WBC
PLATELET # BLD AUTO: 333 K/UL (ref 150–450)
PMV BLD AUTO: 11 FL (ref 9.2–12.9)
POTASSIUM SERPL-SCNC: 3.9 MMOL/L (ref 3.5–5.1)
PROT SERPL-MCNC: 7.6 G/DL (ref 6–8.4)
RBC # BLD AUTO: 4.72 M/UL (ref 4–5.4)
SODIUM SERPL-SCNC: 143 MMOL/L (ref 136–145)
VIT B12 SERPL-MCNC: 1272 PG/ML (ref 210–950)
WBC # BLD AUTO: 5 K/UL (ref 3.9–12.7)

## 2023-03-22 PROCEDURE — 84425 ASSAY OF VITAMIN B-1: CPT | Performed by: PHYSICIAN ASSISTANT

## 2023-03-22 PROCEDURE — 99999 PR PBB SHADOW E&M-EST. PATIENT-LVL IV: ICD-10-PCS | Mod: PBBFAC,,, | Performed by: PHYSICIAN ASSISTANT

## 2023-03-22 PROCEDURE — 80053 COMPREHEN METABOLIC PANEL: CPT | Performed by: PHYSICIAN ASSISTANT

## 2023-03-22 PROCEDURE — 1160F RVW MEDS BY RX/DR IN RCRD: CPT | Mod: CPTII,S$GLB,, | Performed by: PHYSICIAN ASSISTANT

## 2023-03-22 PROCEDURE — 99999 PR PBB SHADOW E&M-EST. PATIENT-LVL IV: CPT | Mod: PBBFAC,,, | Performed by: PHYSICIAN ASSISTANT

## 2023-03-22 PROCEDURE — 3074F PR MOST RECENT SYSTOLIC BLOOD PRESSURE < 130 MM HG: ICD-10-PCS | Mod: CPTII,S$GLB,, | Performed by: PHYSICIAN ASSISTANT

## 2023-03-22 PROCEDURE — 1159F MED LIST DOCD IN RCRD: CPT | Mod: CPTII,S$GLB,, | Performed by: PHYSICIAN ASSISTANT

## 2023-03-22 PROCEDURE — 99499 NO LOS: ICD-10-PCS | Mod: S$GLB,,, | Performed by: DIETITIAN, REGISTERED

## 2023-03-22 PROCEDURE — 36415 COLL VENOUS BLD VENIPUNCTURE: CPT | Performed by: PHYSICIAN ASSISTANT

## 2023-03-22 PROCEDURE — 99024 PR POST-OP FOLLOW-UP VISIT: ICD-10-PCS | Mod: S$GLB,,, | Performed by: PHYSICIAN ASSISTANT

## 2023-03-22 PROCEDURE — 1159F PR MEDICATION LIST DOCUMENTED IN MEDICAL RECORD: ICD-10-PCS | Mod: CPTII,S$GLB,, | Performed by: PHYSICIAN ASSISTANT

## 2023-03-22 PROCEDURE — 3008F BODY MASS INDEX DOCD: CPT | Mod: CPTII,S$GLB,, | Performed by: PHYSICIAN ASSISTANT

## 2023-03-22 PROCEDURE — 99499 UNLISTED E&M SERVICE: CPT | Mod: S$GLB,,, | Performed by: DIETITIAN, REGISTERED

## 2023-03-22 PROCEDURE — 1160F PR REVIEW ALL MEDS BY PRESCRIBER/CLIN PHARMACIST DOCUMENTED: ICD-10-PCS | Mod: CPTII,S$GLB,, | Performed by: PHYSICIAN ASSISTANT

## 2023-03-22 PROCEDURE — 3074F SYST BP LT 130 MM HG: CPT | Mod: CPTII,S$GLB,, | Performed by: PHYSICIAN ASSISTANT

## 2023-03-22 PROCEDURE — 85025 COMPLETE CBC W/AUTO DIFF WBC: CPT | Performed by: PHYSICIAN ASSISTANT

## 2023-03-22 PROCEDURE — 99999 PR PBB SHADOW E&M-EST. PATIENT-LVL I: CPT | Mod: PBBFAC,,, | Performed by: DIETITIAN, REGISTERED

## 2023-03-22 PROCEDURE — 3008F PR BODY MASS INDEX (BMI) DOCUMENTED: ICD-10-PCS | Mod: CPTII,S$GLB,, | Performed by: PHYSICIAN ASSISTANT

## 2023-03-22 PROCEDURE — 99999 PR PBB SHADOW E&M-EST. PATIENT-LVL I: ICD-10-PCS | Mod: PBBFAC,,, | Performed by: DIETITIAN, REGISTERED

## 2023-03-22 PROCEDURE — 3078F DIAST BP <80 MM HG: CPT | Mod: CPTII,S$GLB,, | Performed by: PHYSICIAN ASSISTANT

## 2023-03-22 PROCEDURE — 82607 VITAMIN B-12: CPT | Performed by: PHYSICIAN ASSISTANT

## 2023-03-22 PROCEDURE — 99024 POSTOP FOLLOW-UP VISIT: CPT | Mod: S$GLB,,, | Performed by: PHYSICIAN ASSISTANT

## 2023-03-22 PROCEDURE — 3078F PR MOST RECENT DIASTOLIC BLOOD PRESSURE < 80 MM HG: ICD-10-PCS | Mod: CPTII,S$GLB,, | Performed by: PHYSICIAN ASSISTANT

## 2023-03-22 NOTE — PROGRESS NOTES
NUTRITION NOTE    Referring Physician: Dwight Allen M.D.  Reason for MNT Referral: Follow-up 2 Weeks s/p Gastric Sleeve    Denies nausea, vomiting, constipation, and diarrhea.  Reports doing well.    Past Medical History:   Diagnosis Date    Anemia     Chlamydia     Disorder of kidney and ureter     Miscarriage        CLINICAL DATA:  28 y.o. female.    CURRENT DIET:  Bariatric Liquid Diet    Diet Recall: 60-90 grams of protein/day; 48 oz of fluids/day    Diet Includes:   Protein Supplements: Premier shakes 30g and Fairlife core powder 26g and Atkins shakes 15g  Tried and tolerated mashed fish and mashed veg.    EXERCISE:  Walking 15-30 min    Restrictions to Exercise: None.    VITAMINS/MINERALS:  Multivitamins: chewables  B-Complex  Calcium Citrate + Vitamin D: chewables  Vitamin B12: Sublingual.    ASSESSMENT:  Doing well overall.  Adequate protein intake.  Adequate fluid intake.    BARIATRIC DIET DISCUSSION:  Instructed and provided written materials on bariatric pureed and soft diet plans.  Reinforced post-op nutrition guidelines.    PLAN/RECOMMONDATIONS:  Advance to bariatric pureed diet.  May advance to bariatric soft diet in 2 weeks as manda.  Maintain protein intake.  Maintain fluid intake.  Continue light exercise.  Continue appropriate vitamins & minerals.      Return to clinic in 6 weeks.    SESSION TIME: 15 minutes

## 2023-03-22 NOTE — PATIENT INSTRUCTIONS
High Protein Pureed Diet    2 weeks after gastric bypass and sleeve you may be ready to add pureed food to your diet.  All food should be the consistency of baby food, or thinner.  Follow pureed diet for the next 2 weeks.    Protein - It is very important to pay attention to protein intake during this time.      Inadequate protein intake can cause:  Delayed Wound Healing  Hair Loss  Muscle Breakdown    Meal Plan - Eat 3-4 meals per day (2-4 tbsp each), with protein supplements in between to meet protein needs.  Meeting protein needs daily will help increase healing, decrease muscle loss, and increase weight loss.  Your goal is  grams of protein a day.    Protein First - Always eat the foods with the highest protein first.  Foods high in protein include milk, yogurt, cheese, egg whites, and blenderized meat, seafood, and beans.    Fluids - Keep track in your journal of how much you are drinking; you should try to drink at least 64oz of fluids every day.      Foods allowed: Portion size Protein (g)   Sugar-free clear liquids As desired 0   Skim or 1% milk ½ cup 4   Sugar free pudding, light yogurt, custard (use skim or 1% milk in preparation) 3 oz 2.5   Strained baby food meats, or home-made pureed lean meats and shrimp 1 oz 7   Beans (red, white, black, lima, terrell, fat free refried, hummus) and lentils ¼ cup 4   Low-fat/fat free cheese.(cottage cheese, mozzarella string cheese, ricotta cheese, Laughing Cow, Baby Bell, cheddar, etc) ¼ cup 7-8   Scrambled eggs or Egg Beaters 1 or ¼ cup 6   Edamame or Tofu, mashed ¼ cup 5   Unflavored protein powder (add to 1 scoop to  98% fat free soups or SF pudding) 3 Tbsp 9   *PB2: peanut powder (45 calories) 2 Tbsp 5     *PB2 powdered peanut butter: 45 calories vs. 190 calories in 2 tbsp of regular peanut butter. Purchase online at [employer], or  at various [employer], [employer], [employer], [employer] and "[employer]".        Bariatric Liquid/Pureed Sample Menu    3-4 small meals  plus 2-3 protein drinks per day.    8am 1 egg or ¼ cup Egg Beaters   9am 1 cup water, or decaf coffee or tea   10am Protein drink, 30g protein   11am 2 tbsp low-fat cottage cheese, and 1 tbsp pureed peaches   12pm 1 cup water, or sugar-free lemonade    1pm 2 tbsp pureed chicken, and 1 tbsp pureed carrots    2pm 1 cup water, or sugar-free lemonade   3pm Protein drink, 30g protein   5pm 1 cup water    6pm 1 cup hi-protein creamy chicken soup 14g protein (see Recipe below)   7pm 1 cup water, or sugar-free fruit punch    8pm 1 cup water     This sample menu provides approx. 80g protein and 64oz fluids.  Liquid protein supplements should contain 20-30g protein and less than 4 grams of sugar each.    Sip fluids continuously in between meals.  Drink at least ¼ cup every 15 minutes.  For fluids: ¼ cup = 2 oz = 4 tbsp       RECIPE IDEAS for Bariatric Pureed Diet:    Hi-Protein Creamy Chicken Soup: (10g protein per 1 cup serving)  Empty 1 can of 98% fat free cream of chicken soup into saucepan. Then  blend 1 scoop of unflavored protein powder with 1 can of skim milk until smooth.  Add protein milk to saucepan and heat to warm. (Note: Do NOT boil. Protein powder may clump if heated too hot).     Hi-Protein Pudding: (14g protein per ½ cup serving)  Add 2 scoops protein powder to 2 cups cold skim milk and mix well.  Stir in dry Jell-O Sugar-Free Instant Pudding mix.  Chill and Enjoy!    Tuna Mousse (12g protein per ¼ cup serving) Page 135 in book Eating Well After Weight Loss Surgery.  In a  or , combine all ingredients and pulse until smooth.  2 6-ounce cans tuna packed in water, drained  2 tbsp low-fat mayonnaise  2 tbsp fat-free sour cream  2 tbsp fat-free cream cheese, softened  ½ cup shallots, finely chopped  1 tbsp lemon juice  ¼ tsp ground pepper  ½ tsp celery seed    Chocolate Peanut Butter Mousse  (28g protein total)  6oz plain Greek yogurt  4 tbsp chocolate PB2

## 2023-03-22 NOTE — PROGRESS NOTES
BARIATRIC POST-OPERATIVE VISIT:    HPI:  Jacobo Alvarado is a 28 y.o. year old female presents for 2 week post op visit following s/p sleeve.  she is doing well and tolerating the diet without difficulty.      Pt states that she is doing fine. States that she had an incision that popped open but has since healed. States that gas pain is real. Attempting to find ways to relieve it     States that she is not pooping had to give herself an enema states that she does not have the urge to have a bowel movement but not going as often      Denies: nausea, vomiting, abdominal pain, changes in bowel movement pattern, fever, chills, dysphagia, chest pain, and shortness of breath.    Review of Systems   Constitutional:  Negative for fatigue and fever.   HENT:  Negative for tinnitus and trouble swallowing.    Eyes:  Negative for visual disturbance.   Respiratory:  Negative for cough, shortness of breath and wheezing.    Cardiovascular:  Negative for chest pain, palpitations and leg swelling.   Gastrointestinal:  Positive for abdominal pain (cramping). Negative for constipation, diarrhea, nausea and vomiting.   Genitourinary:  Negative for decreased urine volume.   Musculoskeletal:  Negative for arthralgias and joint swelling.   Skin:  Negative for rash.   Neurological:  Negative for dizziness, light-headedness and headaches.   Psychiatric/Behavioral:  Negative for dysphoric mood, sleep disturbance and suicidal ideas. The patient is not nervous/anxious.      EXERCISE & VITAMINS:  See Bariatric Assessment  Adherent to vitamin regimen   MEDICATIONS/ALLERGIES:  Have been reviewed.    DIET: Liquid Bariatric Diet.  1-2 protein shakes daily, ~26-52 grams protein.  32  fl oz SF clear beverage.  See Dietician note from today for a more detailed assessment.    Fish (smashed)   Soft veggies   Seafood  Dairy is ok   Cannot eat eggs ( gas pain extreme)  Fruit cups   Applesauce    Physical Exam  Constitutional:       Appearance: She is  obese.   HENT:      Head: Normocephalic and atraumatic.   Eyes:      Extraocular Movements: Extraocular movements intact.      Conjunctiva/sclera: Conjunctivae normal.      Pupils: Pupils are equal, round, and reactive to light.   Cardiovascular:      Rate and Rhythm: Normal rate and regular rhythm.   Abdominal:      General: Bowel sounds are normal. There is no distension.      Palpations: Abdomen is soft.      Tenderness: There is no abdominal tenderness.   Musculoskeletal:         General: Normal range of motion.      Cervical back: Normal range of motion and neck supple.   Skin:     General: Skin is warm and dry.      Capillary Refill: Capillary refill takes less than 2 seconds.   Neurological:      General: No focal deficit present.      Mental Status: She is alert and oriented to person, place, and time.   Psychiatric:         Mood and Affect: Mood normal.         Behavior: Behavior normal.         Thought Content: Thought content normal.         Judgment: Judgment normal.       ASSESSMENT:  - Morbid obesity s/p sleeve gastrectomy   - Co-morbidities:  N/a  - Good Weight loss, 12#'s and 13% EWL  - No formal Exercise routine  - Fair Diet  - Good Vitamin regimen    PLAN:  - Ursodiol 300 mg twice daily for 6 months  - Anti-Acid medication, PPI daily for 3 months  - No lifting more than 10 lbs for 6 weeks  - Miralax daily for constipation  - Emphasized the importance of regular exercise and adherence to bariatric diet to achieve maximum weight loss.  - Encouraged patient to start/continue regular exercise.  - Follow-up with dietician to advance diet.  - Continue daily vitamins and medications.  - RTC per post op   - Call the office for any issues.  - Check labs today.

## 2023-03-27 LAB — VIT B1 BLD-MCNC: 44 UG/L (ref 38–122)

## 2023-04-05 ENCOUNTER — PATIENT MESSAGE (OUTPATIENT)
Dept: BARIATRICS | Facility: CLINIC | Age: 28
End: 2023-04-05
Payer: COMMERCIAL

## 2023-04-11 ENCOUNTER — PATIENT MESSAGE (OUTPATIENT)
Dept: BARIATRICS | Facility: CLINIC | Age: 28
End: 2023-04-11
Payer: COMMERCIAL

## 2023-05-02 ENCOUNTER — TELEPHONE (OUTPATIENT)
Dept: BARIATRICS | Facility: CLINIC | Age: 28
End: 2023-05-02
Payer: COMMERCIAL

## 2023-05-02 NOTE — TELEPHONE ENCOUNTER
Reached out to pt in regard to rescheduling 8 week post op appts. Left vm for pt to call back to be rescheduled.

## 2023-05-03 ENCOUNTER — PATIENT MESSAGE (OUTPATIENT)
Dept: BARIATRICS | Facility: CLINIC | Age: 28
End: 2023-05-03

## 2023-05-09 ENCOUNTER — PATIENT MESSAGE (OUTPATIENT)
Dept: BARIATRICS | Facility: CLINIC | Age: 28
End: 2023-05-09
Payer: COMMERCIAL

## 2023-05-15 ENCOUNTER — PATIENT MESSAGE (OUTPATIENT)
Dept: BARIATRICS | Facility: CLINIC | Age: 28
End: 2023-05-15
Payer: COMMERCIAL

## 2023-06-07 ENCOUNTER — PATIENT MESSAGE (OUTPATIENT)
Dept: BARIATRICS | Facility: CLINIC | Age: 28
End: 2023-06-07
Payer: COMMERCIAL

## 2023-06-09 ENCOUNTER — PATIENT MESSAGE (OUTPATIENT)
Dept: BARIATRICS | Facility: CLINIC | Age: 28
End: 2023-06-09
Payer: COMMERCIAL

## 2023-06-13 ENCOUNTER — PATIENT MESSAGE (OUTPATIENT)
Dept: BARIATRICS | Facility: CLINIC | Age: 28
End: 2023-06-13
Payer: COMMERCIAL

## 2023-06-24 ENCOUNTER — HOSPITAL ENCOUNTER (EMERGENCY)
Facility: HOSPITAL | Age: 28
Discharge: HOME | End: 2023-06-24
Attending: EMERGENCY MEDICINE
Payer: COMMERCIAL

## 2023-06-24 VITALS
WEIGHT: 185 LBS | HEART RATE: 66 BPM | DIASTOLIC BLOOD PRESSURE: 62 MMHG | HEIGHT: 60 IN | OXYGEN SATURATION: 100 % | RESPIRATION RATE: 20 BRPM | BODY MASS INDEX: 36.32 KG/M2 | TEMPERATURE: 98 F | SYSTOLIC BLOOD PRESSURE: 105 MMHG

## 2023-06-24 DIAGNOSIS — R10.9 ABDOMINAL CRAMPING: Primary | ICD-10-CM

## 2023-06-24 DIAGNOSIS — N76.0 BACTERIAL VAGINOSIS: ICD-10-CM

## 2023-06-24 DIAGNOSIS — R51.9 ACUTE NONINTRACTABLE HEADACHE, UNSPECIFIED HEADACHE TYPE: ICD-10-CM

## 2023-06-24 DIAGNOSIS — B96.89 BACTERIAL VAGINOSIS: ICD-10-CM

## 2023-06-24 DIAGNOSIS — Z90.3 H/O GASTRIC SLEEVE: ICD-10-CM

## 2023-06-24 LAB
ALBUMIN SERPL BCP-MCNC: 4 G/DL (ref 3.5–5.2)
ALP SERPL-CCNC: 82 U/L (ref 55–135)
ALT SERPL W/O P-5'-P-CCNC: 17 U/L (ref 10–44)
ANION GAP SERPL CALC-SCNC: 10 MMOL/L (ref 8–16)
AST SERPL-CCNC: 20 U/L (ref 10–40)
B-HCG UR QL: NEGATIVE
BACTERIA #/AREA URNS AUTO: NORMAL /HPF
BACTERIA GENITAL QL WET PREP: ABNORMAL
BASOPHILS # BLD AUTO: 0.02 K/UL (ref 0–0.2)
BASOPHILS NFR BLD: 0.2 % (ref 0–1.9)
BILIRUB SERPL-MCNC: 0.2 MG/DL (ref 0.1–1)
BILIRUB UR QL STRIP: NEGATIVE
BUN SERPL-MCNC: 6 MG/DL (ref 6–20)
CALCIUM SERPL-MCNC: 9.5 MG/DL (ref 8.7–10.5)
CHLORIDE SERPL-SCNC: 107 MMOL/L (ref 95–110)
CLARITY UR REFRACT.AUTO: ABNORMAL
CLUE CELLS VAG QL WET PREP: ABNORMAL
CO2 SERPL-SCNC: 24 MMOL/L (ref 23–29)
COLOR UR AUTO: YELLOW
CREAT SERPL-MCNC: 0.7 MG/DL (ref 0.5–1.4)
CTP QC/QA: YES
DIFFERENTIAL METHOD: ABNORMAL
EOSINOPHIL # BLD AUTO: 0.1 K/UL (ref 0–0.5)
EOSINOPHIL NFR BLD: 0.7 % (ref 0–8)
ERYTHROCYTE [DISTWIDTH] IN BLOOD BY AUTOMATED COUNT: 14.2 % (ref 11.5–14.5)
EST. GFR  (NO RACE VARIABLE): >60 ML/MIN/1.73 M^2
FILAMENT FUNGI VAG WET PREP-#/AREA: ABNORMAL
GLUCOSE SERPL-MCNC: 91 MG/DL (ref 70–110)
GLUCOSE UR QL STRIP: NEGATIVE
HCT VFR BLD AUTO: 40.1 % (ref 37–48.5)
HCV AB SERPL QL IA: NORMAL
HGB BLD-MCNC: 12.8 G/DL (ref 12–16)
HGB UR QL STRIP: NEGATIVE
HIV 1+2 AB+HIV1 P24 AG SERPL QL IA: NORMAL
HYALINE CASTS UR QL AUTO: 0 /LPF
IMM GRANULOCYTES # BLD AUTO: 0.02 K/UL (ref 0–0.04)
IMM GRANULOCYTES NFR BLD AUTO: 0.2 % (ref 0–0.5)
KETONES UR QL STRIP: ABNORMAL
LEUKOCYTE ESTERASE UR QL STRIP: NEGATIVE
LIPASE SERPL-CCNC: 19 U/L (ref 4–60)
LYMPHOCYTES # BLD AUTO: 2.8 K/UL (ref 1–4.8)
LYMPHOCYTES NFR BLD: 32.9 % (ref 18–48)
MCH RBC QN AUTO: 27.1 PG (ref 27–31)
MCHC RBC AUTO-ENTMCNC: 31.9 G/DL (ref 32–36)
MCV RBC AUTO: 85 FL (ref 82–98)
MICROSCOPIC COMMENT: NORMAL
MONOCYTES # BLD AUTO: 0.7 K/UL (ref 0.3–1)
MONOCYTES NFR BLD: 8.7 % (ref 4–15)
NEUTROPHILS # BLD AUTO: 4.8 K/UL (ref 1.8–7.7)
NEUTROPHILS NFR BLD: 57.3 % (ref 38–73)
NITRITE UR QL STRIP: NEGATIVE
NRBC BLD-RTO: 0 /100 WBC
PH UR STRIP: 5 [PH] (ref 5–8)
PLATELET # BLD AUTO: 372 K/UL (ref 150–450)
PMV BLD AUTO: 10 FL (ref 9.2–12.9)
POTASSIUM SERPL-SCNC: 3.9 MMOL/L (ref 3.5–5.1)
PROT SERPL-MCNC: 8.3 G/DL (ref 6–8.4)
PROT UR QL STRIP: ABNORMAL
RBC # BLD AUTO: 4.72 M/UL (ref 4–5.4)
RBC #/AREA URNS AUTO: 1 /HPF (ref 0–4)
SODIUM SERPL-SCNC: 141 MMOL/L (ref 136–145)
SP GR UR STRIP: >=1.03 (ref 1–1.03)
SPECIMEN SOURCE: ABNORMAL
SQUAMOUS #/AREA URNS AUTO: 26 /HPF
T VAGINALIS GENITAL QL WET PREP: ABNORMAL
URN SPEC COLLECT METH UR: ABNORMAL
WBC # BLD AUTO: 8.35 K/UL (ref 3.9–12.7)
WBC #/AREA URNS AUTO: 3 /HPF (ref 0–5)
WBC #/AREA VAG WET PREP: ABNORMAL
YEAST GENITAL QL WET PREP: ABNORMAL

## 2023-06-24 PROCEDURE — 85025 COMPLETE CBC W/AUTO DIFF WBC: CPT | Performed by: STUDENT IN AN ORGANIZED HEALTH CARE EDUCATION/TRAINING PROGRAM

## 2023-06-24 PROCEDURE — 81001 URINALYSIS AUTO W/SCOPE: CPT | Performed by: STUDENT IN AN ORGANIZED HEALTH CARE EDUCATION/TRAINING PROGRAM

## 2023-06-24 PROCEDURE — 86803 HEPATITIS C AB TEST: CPT | Performed by: EMERGENCY MEDICINE

## 2023-06-24 PROCEDURE — 25000003 PHARM REV CODE 250: Performed by: STUDENT IN AN ORGANIZED HEALTH CARE EDUCATION/TRAINING PROGRAM

## 2023-06-24 PROCEDURE — 87591 N.GONORRHOEAE DNA AMP PROB: CPT | Performed by: STUDENT IN AN ORGANIZED HEALTH CARE EDUCATION/TRAINING PROGRAM

## 2023-06-24 PROCEDURE — 80053 COMPREHEN METABOLIC PANEL: CPT | Performed by: STUDENT IN AN ORGANIZED HEALTH CARE EDUCATION/TRAINING PROGRAM

## 2023-06-24 PROCEDURE — 87210 SMEAR WET MOUNT SALINE/INK: CPT | Performed by: STUDENT IN AN ORGANIZED HEALTH CARE EDUCATION/TRAINING PROGRAM

## 2023-06-24 PROCEDURE — 96360 HYDRATION IV INFUSION INIT: CPT

## 2023-06-24 PROCEDURE — 81025 URINE PREGNANCY TEST: CPT | Performed by: EMERGENCY MEDICINE

## 2023-06-24 PROCEDURE — 87389 HIV-1 AG W/HIV-1&-2 AB AG IA: CPT | Performed by: EMERGENCY MEDICINE

## 2023-06-24 PROCEDURE — 99284 EMERGENCY DEPT VISIT MOD MDM: CPT | Mod: 25

## 2023-06-24 PROCEDURE — 83690 ASSAY OF LIPASE: CPT | Performed by: STUDENT IN AN ORGANIZED HEALTH CARE EDUCATION/TRAINING PROGRAM

## 2023-06-24 RX ORDER — METRONIDAZOLE 500 MG/1
500 TABLET ORAL 2 TIMES DAILY
Qty: 14 TABLET | Refills: 0 | Status: SHIPPED | OUTPATIENT
Start: 2023-06-24 | End: 2023-07-01

## 2023-06-24 RX ORDER — ACETAMINOPHEN 500 MG
1000 TABLET ORAL
Status: COMPLETED | OUTPATIENT
Start: 2023-06-24 | End: 2023-06-24

## 2023-06-24 RX ORDER — HYOSCYAMINE SULFATE 0.12 MG/1
0.12 TABLET SUBLINGUAL
Status: COMPLETED | OUTPATIENT
Start: 2023-06-24 | End: 2023-06-24

## 2023-06-24 RX ADMIN — ACETAMINOPHEN 1000 MG: 500 TABLET ORAL at 05:06

## 2023-06-24 RX ADMIN — HYOSCYAMINE SULFATE 0.12 MG: 0.12 TABLET ORAL at 05:06

## 2023-06-24 RX ADMIN — SODIUM CHLORIDE 1000 ML: 9 INJECTION, SOLUTION INTRAVENOUS at 05:06

## 2023-06-24 NOTE — DISCHARGE INSTRUCTIONS
Wet prep notable for bacterial vaginosis take antibiotics as prescribed.  IUD is in place follow-up with OBGYN for continued care.      Return to emergency department if he develops abdominal pain nausea vomiting unable to tolerate oral intake, vaginal bleeding, vaginal discharge, fevers, mental status changes or any other new or concerning symptoms.

## 2023-06-24 NOTE — PROVIDER PROGRESS NOTES - EMERGENCY DEPT.
Encounter Date: 6/24/2023    ED Physician Progress Notes        ED Resident HAND-OFF NOTE:  7:17 AM 6/24/2023  Jacobo Alvarado is a 28 y.o. female who presented to the ED on 6/24/2023 and has been managed by  , who reports patient C/O lower abdominal pain and late.  Patient wanted with concern for possible IUD dislodgement.  I assumed care of patient from off-going ED physician team at 7:17 AM pending ultrasound transvaginal for evaluation and confirmation of IUD placement.  Labs were also notable for positive clue cells on wet prep and plan for treatment with Flagyl.  IUD in place on ultrasound.  At this time patient is stable for discharge.  Patient discharged return precautions discussed and understood.  Patient instructed to follow up with primary care physician.     On my evaluation, Jacobo Alvarado appears well, hemodynamically stable and in NAD. Thus far, Jacobo Alvarado has received:  Medications   sodium chloride 0.9% bolus 1,000 mL 1,000 mL (0 mLs Intravenous Stopped 6/24/23 0654)   hyoscyamine ODT 0.125 mg (0.125 mg Oral Given 6/24/23 0534)   acetaminophen tablet 1,000 mg (1,000 mg Oral Given 6/24/23 0534)       On my exam, I appreciate:  /62   Pulse 66   Temp 98.2 °F (36.8 °C)   Resp 20   Ht 5' (1.524 m)   Wt 83.9 kg (185 lb)   SpO2 100%   Breastfeeding No   BMI 36.13 kg/m²       Disposition:  Patient discharged  I have discussed and counseled Jacobo Alvarado regarding exam, results, diagnosis, treatment, and plan.  ______________________  Joao Goode MD  Emergency Medicine Resident  7:17 AM 6/24/2023

## 2023-06-24 NOTE — ED NOTES
Assumed care of this pt at this time.   Patient identifiers verified and correct for Infinity Alvarado  LOC: The patient is awake, alert and aware of environment with an appropriate affect, the patient is oriented x 3 and speaking appropriately.   APPEARANCE: Patient appears comfortable and in no acute distress, patient is clean and well groomed.  SKIN: The skin is warm and dry, color consistent with ethnicity, patient has normal skin turgor and moist mucus membranes, skin intact, no breakdown or bruising noted.   MUSCULOSKELETAL: Patient moving all extremities spontaneously, no swelling noted.  RESPIRATORY: Airway is open and patent, respirations are spontaneous, patient has a normal effort and rate, no accessory muscle use noted, pt placed on continuous pulse ox with O2 sats noted at 100% on room air.  CARDIAC:  Patient has a normal rate, no edema noted, capillary refill < 3 seconds.   GASTRO: Soft and non tender to palpation, no distention noted   : Pt c/o missing strings for IUD  NEURO: Pt opens eyes spontaneously, behavior appropriate to situation, follows commands, facial expression symmetrical, bilateral hand grasp equal and even, purposeful motor response noted, normal sensation in all extremities when touched with a finger.

## 2023-06-24 NOTE — ED PROVIDER NOTES
Encounter Date: 6/24/2023       History     Chief Complaint   Patient presents with    Abdominal Pain     Lower abdominal cramping x a few hours. States is late on period x 8 days. States has a IUD but cant feel the strings. Also complains of headache     28-year-old female with PMH of gastric sleeve (03/2023) presents with abdominal cramping and headache. Pt was at a party drinking and developed a mild frontal headache.  It was not worst at onset and is not the worst headache of her life.  She denies nausea, vomiting, lightheadedness, dizziness, changes in vision.  Her suprapubic abdominal cramping began yesterday and has about a 4/10 in intensity.  She has not taken any medications for her symptoms. Pt reports she's 8 days late for her period; typically have them monthly. Denies vaginal pain, vaginal bleeding, vaginal discharge. She had her boyfriend look for the strings of her IUD but he couldn't find them, so they came to the ED for evaluation.     The history is provided by the patient.   Review of patient's allergies indicates:  No Known Allergies  Past Medical History:   Diagnosis Date    Anemia     Chlamydia     Disorder of kidney and ureter     Miscarriage      Past Surgical History:   Procedure Laterality Date    DILATION AND CURETTAGE OF UTERUS  04/30/2018    LAPAROSCOPIC SLEEVE GASTRECTOMY N/A 3/6/2023    Procedure: GASTRECTOMY, SLEEVE, LAPAROSCOPIC with intraop egd;  Surgeon: Dwight Allen Jr., MD;  Location: [facility] OR [address] [place] [address];  Service: General;  Laterality: N/A;     Family History   Problem Relation Age of Onset    Breast cancer Other     Diabetes Mother     Kidney disease Mother     Crohn's disease Father     No Known Problems Sister     No Known Problems Brother     No Known Problems Daughter     No Known Problems Daughter     Ovarian cancer Neg Hx     Colon cancer Neg Hx      Social History     Tobacco Use    Smoking status: Never    Smokeless tobacco: Never   Substance Use Topics    Alcohol  use: Not Currently    Drug use: No     Review of Systems    Physical Exam     Initial Vitals [06/24/23 0456]   BP Pulse Resp Temp SpO2   117/80 (!) 111 14 98.2 °F (36.8 °C) 100 %      MAP       --         Physical Exam    Nursing note and vitals reviewed.  Constitutional: She appears well-developed and well-nourished. She is not diaphoretic. No distress.   HENT:   Head: Normocephalic and atraumatic.   Eyes: Conjunctivae and EOM are normal. Pupils are equal, round, and reactive to light.   Neck: Neck supple.   Normal range of motion.  Cardiovascular:  Normal rate, regular rhythm, normal heart sounds and intact distal pulses.           No murmur heard.  Pulmonary/Chest: Breath sounds normal. No respiratory distress. She has no wheezes. She has no rhonchi. She has no rales.   No CVA tenderness b/l   Abdominal: Abdomen is soft. She exhibits no distension. There is no abdominal tenderness.   No abdominal TTP diffusely There is no rebound and no guarding.   Genitourinary:    Genitourinary Comments: RN chaperoned exam.     Speculum: unable to visualize strings. Mild amount of white discharge.   Digital: no CMT or adnexal tenderness b/l     Musculoskeletal:         General: No tenderness or edema.      Cervical back: Normal range of motion and neck supple.     Neurological: She is alert and oriented to person, place, and time.   Skin: Skin is warm and dry. Capillary refill takes less than 2 seconds.       ED Course   Procedures  Labs Reviewed   VAGINAL SCREEN - Abnormal; Notable for the following components:       Result Value    Clue Cells Few (*)     WBC - Vaginal Screen Rare (*)     Bacteria - Vaginal Screen Many (*)     All other components within normal limits    Narrative:     Release to patient->Immediate   URINALYSIS, REFLEX TO URINE CULTURE - Abnormal; Notable for the following components:    Appearance, UA Hazy (*)     Specific Gravity, UA >=1.030 (*)     Protein, UA 1+ (*)     Ketones, UA 1+ (*)     All other  components within normal limits    Narrative:     Specimen Source->Urine   CBC W/ AUTO DIFFERENTIAL - Abnormal; Notable for the following components:    MCHC 31.9 (*)     All other components within normal limits    Narrative:     Release to patient->Immediate   C. TRACHOMATIS/N. GONORRHOEAE BY AMP DNA   HIV 1 / 2 ANTIBODY    Narrative:     Release to patient->Immediate   HEPATITIS C ANTIBODY    Narrative:     Release to patient->Immediate   COMPREHENSIVE METABOLIC PANEL    Narrative:     Release to patient->Immediate   LIPASE    Narrative:     Release to patient->Immediate   URINALYSIS MICROSCOPIC    Narrative:     Specimen Source->Urine   POCT URINE PREGNANCY          Imaging Results    None          Medications   sodium chloride 0.9% bolus 1,000 mL 1,000 mL (1,000 mLs Intravenous New Bag 6/24/23 0536)   hyoscyamine ODT 0.125 mg (0.125 mg Oral Given 6/24/23 0534)   acetaminophen tablet 1,000 mg (1,000 mg Oral Given 6/24/23 0534)     Medical Decision Making:   Initial Assessment:   Afebrile, hemodynamically stable and well-appearing 28-year-old female presents with mild abdominal cramping, concerned that her IUD fell out, and headache  Differential Diagnosis:   IUD complication, pregnancy, ectopic, constipation, gaseous pain, UTI, tension headache, dehydration, migraine  Clinical Tests:   Lab Tests: Ordered and Reviewed  Radiological Study: Ordered and Reviewed  ED Management:  Will give Tylenol, Levsin, and 1L NS for initial management. See ED course for additional info.            ED Course as of 06/24/23 0647   Sat Jun 24, 2023   0516 Preg Test, Ur: Negative [BD]   0545 CBC W/ AUTO DIFFERENTIAL(!)  CBC unremarkable without leukocytosis, significant anemia, or decreased platelets   [BD]   0605 Comp. Metabolic Panel  CMP unremarkable without significant electrolyte derangement, impaired renal function, or elevated LFTs   [BD]   0606 Lipase: 19  Inconsistent with pancreatitis [BD]   0606 Vaginal Screen(!)  Clue  cells, consistent with BV. Will treat with flagyl [BD]   0606 Urinalysis, Reflex to Urine Culture Urine, Clean Catch(!)  UA unremarkable without evidence of infection [BD]   0646 Patient care will be handed off to oncoming team at 7:00 a.m., pending ultrasound pelvis. Pt remains hemodynamically stable.  [BD]      ED Course User Index  [BD] Emery Padron MD                   Clinical Impression:   Final diagnoses:  [R10.9] Abdominal cramping (Primary)  [R51.9] Acute nonintractable headache, unspecified headache type  [Z90.3] H/O gastric sleeve               Emery Padron MD  Resident  06/24/23 0602

## 2023-06-24 NOTE — ED TRIAGE NOTES
Jacobo Alvarado, a 28 y.o. female presents to the ED w/ complaint of  lower abdominal cramping/discomfort that started earlier tonight. Pt states being at a party and started having abdominal cramping and headache 6/10 pt believed she was about to start her period when she relized she is 8 days late and later had her partner check her to see if he could feel her IUD strings and couldn't. Pt endorses lower abdominal pain. Pt denies C/P, SOB.     Triage note:  Chief Complaint   Patient presents with    Abdominal Pain     Lower abdominal cramping x a few hours. States is late on period x 8 days. States has a IUD but cant feel the strings. Also complains of headache     Review of patient's allergies indicates:  No Known Allergies  Past Medical History:   Diagnosis Date    Anemia     Chlamydia     Disorder of kidney and ureter     Miscarriage

## 2023-06-26 LAB
C TRACH DNA SPEC QL NAA+PROBE: NOT DETECTED
N GONORRHOEA DNA SPEC QL NAA+PROBE: NOT DETECTED

## 2023-08-02 ENCOUNTER — PATIENT MESSAGE (OUTPATIENT)
Dept: BARIATRICS | Facility: CLINIC | Age: 28
End: 2023-08-02
Payer: COMMERCIAL

## 2023-09-06 ENCOUNTER — PATIENT MESSAGE (OUTPATIENT)
Dept: BARIATRICS | Facility: CLINIC | Age: 28
End: 2023-09-06
Payer: COMMERCIAL

## 2023-09-12 ENCOUNTER — PATIENT MESSAGE (OUTPATIENT)
Dept: BARIATRICS | Facility: CLINIC | Age: 28
End: 2023-09-12
Payer: COMMERCIAL

## 2023-10-04 ENCOUNTER — PATIENT MESSAGE (OUTPATIENT)
Dept: BARIATRICS | Facility: CLINIC | Age: 28
End: 2023-10-04
Payer: COMMERCIAL

## 2023-10-10 ENCOUNTER — PATIENT MESSAGE (OUTPATIENT)
Dept: BARIATRICS | Facility: CLINIC | Age: 28
End: 2023-10-10
Payer: COMMERCIAL

## 2023-11-06 ENCOUNTER — PATIENT MESSAGE (OUTPATIENT)
Dept: PSYCHIATRY | Facility: CLINIC | Age: 28
End: 2023-11-06
Payer: COMMERCIAL

## 2023-11-14 ENCOUNTER — PATIENT MESSAGE (OUTPATIENT)
Dept: BARIATRICS | Facility: CLINIC | Age: 28
End: 2023-11-14
Payer: COMMERCIAL

## 2023-11-20 ENCOUNTER — TELEPHONE (OUTPATIENT)
Dept: BARIATRICS | Facility: CLINIC | Age: 28
End: 2023-11-20
Payer: COMMERCIAL

## 2023-11-20 ENCOUNTER — PATIENT MESSAGE (OUTPATIENT)
Dept: BARIATRICS | Facility: CLINIC | Age: 28
End: 2023-11-20
Payer: COMMERCIAL

## 2023-12-12 ENCOUNTER — PATIENT MESSAGE (OUTPATIENT)
Dept: BARIATRICS | Facility: CLINIC | Age: 28
End: 2023-12-12
Payer: COMMERCIAL

## 2023-12-13 ENCOUNTER — PATIENT MESSAGE (OUTPATIENT)
Dept: BARIATRICS | Facility: CLINIC | Age: 28
End: 2023-12-13
Payer: COMMERCIAL

## 2023-12-19 ENCOUNTER — HOSPITAL ENCOUNTER (EMERGENCY)
Facility: HOSPITAL | Age: 28
Discharge: HOME | End: 2023-12-20
Attending: EMERGENCY MEDICINE
Payer: COMMERCIAL

## 2023-12-19 VITALS
DIASTOLIC BLOOD PRESSURE: 69 MMHG | HEART RATE: 90 BPM | RESPIRATION RATE: 17 BRPM | BODY MASS INDEX: 31.25 KG/M2 | OXYGEN SATURATION: 100 % | TEMPERATURE: 98 F | SYSTOLIC BLOOD PRESSURE: 137 MMHG | WEIGHT: 160 LBS

## 2023-12-19 DIAGNOSIS — R10.2 PELVIC CRAMPING: ICD-10-CM

## 2023-12-19 DIAGNOSIS — Z97.5 IUD (INTRAUTERINE DEVICE) IN PLACE: ICD-10-CM

## 2023-12-19 DIAGNOSIS — N89.8 VAGINAL DISCHARGE: ICD-10-CM

## 2023-12-19 DIAGNOSIS — R10.9 ABDOMINAL CRAMPING: Primary | ICD-10-CM

## 2023-12-19 LAB
B-HCG UR QL: NEGATIVE
CTP QC/QA: YES

## 2023-12-19 PROCEDURE — 81025 URINE PREGNANCY TEST: CPT | Performed by: EMERGENCY MEDICINE

## 2023-12-19 PROCEDURE — 99284 EMERGENCY DEPT VISIT MOD MDM: CPT

## 2023-12-19 PROCEDURE — 81003 URINALYSIS AUTO W/O SCOPE: CPT | Performed by: EMERGENCY MEDICINE

## 2023-12-19 PROCEDURE — 87210 SMEAR WET MOUNT SALINE/INK: CPT | Performed by: EMERGENCY MEDICINE

## 2023-12-20 LAB
BACTERIA GENITAL QL WET PREP: ABNORMAL
BILIRUB UR QL STRIP: NEGATIVE
C TRACH DNA SPEC QL NAA+PROBE: NOT DETECTED
CLARITY UR REFRACT.AUTO: CLEAR
CLUE CELLS VAG QL WET PREP: ABNORMAL
COLOR UR AUTO: YELLOW
FILAMENT FUNGI VAG WET PREP-#/AREA: ABNORMAL
GLUCOSE UR QL STRIP: NEGATIVE
HGB UR QL STRIP: NEGATIVE
KETONES UR QL STRIP: NEGATIVE
LEUKOCYTE ESTERASE UR QL STRIP: NEGATIVE
N GONORRHOEA DNA SPEC QL NAA+PROBE: NOT DETECTED
NITRITE UR QL STRIP: NEGATIVE
PH UR STRIP: 6 [PH] (ref 5–8)
PROT UR QL STRIP: ABNORMAL
SP GR UR STRIP: 1.03 (ref 1–1.03)
SPECIMEN SOURCE: ABNORMAL
T VAGINALIS GENITAL QL WET PREP: ABNORMAL
URN SPEC COLLECT METH UR: ABNORMAL
WBC #/AREA VAG WET PREP: ABNORMAL
YEAST GENITAL QL WET PREP: ABNORMAL

## 2023-12-20 PROCEDURE — 87491 CHLMYD TRACH DNA AMP PROBE: CPT | Performed by: EMERGENCY MEDICINE

## 2023-12-20 NOTE — ED NOTES
Unable to locate patient for discharge. Pt answered cell phone stating that she left the hospital. Patient eloped.

## 2023-12-20 NOTE — ED PROVIDER NOTES
"Encounter Date: 12/19/2023       History     Chief Complaint   Patient presents with    Abdominal Cramping     Reports she thinks her IUD is "hanging out". +spotting     HPI  Infinity Nalini Alvarado is a 28-year-old female with a history of anemia, chlamydia, miscarriage presenting with abdominal cramping and concern for IUD string hanging out.  She reports having spotting over the last several days, and previously had a ultrasound showing that her IUD was in proper placement however the strings were disconnected.  She now feel strings or something in her vaginal canal and she is concerned about IUD misplacement.  She reports cramping abdominal pain without fevers, chills, nausea, vomiting, back pain, leg pain, headaches or visual changes.  Her symptoms were moderate severe.  There symptoms are associated with cramping in the feeling of IUD displacement.  Unknown last menstrual period, denies current pregnancy.  Reports vaginal discharge but denies any hematuria, dysuria, hematochezia, melena hemoptysis or hematemesis.  No other aggravating or alleviating factors.    Review of patient's allergies indicates:  No Known Allergies  Past Medical History:   Diagnosis Date    Anemia     Chlamydia     Disorder of kidney and ureter     Miscarriage      Past Surgical History:   Procedure Laterality Date    DILATION AND CURETTAGE OF UTERUS  04/30/2018    LAPAROSCOPIC SLEEVE GASTRECTOMY N/A 3/6/2023    Procedure: GASTRECTOMY, SLEEVE, LAPAROSCOPIC with intraop egd;  Surgeon: Dwight Allen Jr., MD;  Location: [facility] OR [address] [place] [address];  Service: General;  Laterality: N/A;     Family History   Problem Relation Age of Onset    Breast cancer Other     Diabetes Mother     Kidney disease Mother     Crohn's disease Father     No Known Problems Sister     No Known Problems Brother     No Known Problems Daughter     No Known Problems Daughter     Ovarian cancer Neg Hx     Colon cancer Neg Hx      Social History     Tobacco Use    Smoking " status: Never    Smokeless tobacco: Never   Substance Use Topics    Alcohol use: Not Currently    Drug use: No     Review of Systems  All other systems reviewed and were negative; see HPI also for additional ROS.    Physical Exam     Initial Vitals [12/19/23 2138]   BP Pulse Resp Temp SpO2   137/69 90 17 98.2 °F (36.8 °C) 100 %      MAP       --         Physical Exam    Nursing note and vitals reviewed.      Gen/Constitutional: Interactive. No acute distress  Head: Normocephalic, Atraumatic  Neck: supple, no masses or LAD, no JVD  Eyes: PERRLA, conjunctiva clear  Ears, Nose and Throat: No rhinorrhea or stridor.  Cardiac:  Regular rate, Reg Rhythm, No murmur  Pulmonary: CTA Bilat, no wheezes, rhonchi, rales.  No increased work of breathing.  GI: Abdomen soft, non-tender, non-distended; no rebound or guarding  : No CVA tenderness.  PELVIC: normal external exam, no lesions, no bleeding but mild white discharge, no cervical motion tenderness, no adnexal tenderness, os closed; performed with female chaperone in room; blue string approximately 4 cm hanging from the cervix, no evidence of extravasation of IUD  Musculoskeletal: Extremities warm, well perfused, no erythema, no edema  Skin: No rashes, cyanosis or jaundice.  Neuro: Alert and Oriented x 3; No focal motor or sensory deficits.    Psych: Normal affect      ED Course   Procedures  Labs Reviewed   VAGINAL SCREEN - Abnormal; Notable for the following components:       Result Value    WBC - Vaginal Screen Rare (*)     Bacteria - Vaginal Screen Rare (*)     All other components within normal limits    Narrative:     Release to patient->Immediate   URINALYSIS, REFLEX TO URINE CULTURE - Abnormal; Notable for the following components:    Protein, UA Trace (*)     All other components within normal limits    Narrative:     Specimen Source->Urine   C. TRACHOMATIS/N. GONORRHOEAE BY AMP DNA   POCT URINE PREGNANCY          Imaging Results              US Pelvis Complete w/  Transvag for IUD (xpd) (Final result)  Result time 12/20/23 05:58:02      Final result by Gildardo Kimble MD (12/20/23 05:58:02)                   Impression:      Since the 06/24/2023 pelvic sonogram, the echogenic IUD has migrated caudally within the endometrium.      Electronically signed by: Gildardo Kimble  Date:    12/20/2023  Time:    05:58               Narrative:    EXAMINATION:  US PELVIS COMPLETE WITH TRANSVAG FOR IUD    CLINICAL HISTORY:  IUD displaced;    TECHNIQUE:  Transabdominal pelvic sonography was performed.  To better visualize the ovaries and uterus transvaginal scanning was performed.  To better visualize the IUD 3D rendering was also performed (without workstation).    COMPARISON:  Pelvic sonogram 06/24/2023    FINDINGS:  Uterus: 8.4 x 5.3 x 2.9 cm.    Thorpe high attenuation projected within the endometrium of the lower uterine segment is compatible with interval caudal displacement of the IUD that was previously demonstrated in the fundal portion of the endometrium.  3D volume rendering redemonstrates and confirms this IUD finding.    Right ovary: 3.4 x 2.7 x 2.1 cm.  RI 0.57.    Left ovary: 3.3 x 3.2 x 2.1 cm.  RI 0.60.  A 1.5 x 1.5 x 1.2 cm left ovarian cystic structure is compatible with prominent follicle.    Intra-ovarian arterial and venous blood flow is demonstrated by color Doppler and/or pulse Doppler interrogation bilaterally.    Free fluid: None seen in the visualized pelvic peritoneum.                                       Medications - No data to display  Medical Decision Making  Jacobo Alvarado is a 28-year-old female with a history of anemia, chlamydia, miscarriage presenting with abdominal cramping and concern for IUD string hanging out.     Differential diagnosis includes:  UTI, STI, Trichomonas, BV, yeast infection, IUD displacement    Amount and/or Complexity of Data Reviewed  Labs: ordered.     Details: UPT:  Negative  UA:  No evidence of UTI  Vaginal screen:   Negative for Trichomonas or BV  Radiology: ordered.     Details: Ultrasound pelvis pending    Risk  OTC drugs.    Patient currently afebrile vital signs are stable.  Physical exam findings remarkable for no abdominal peritoneal findings, nontoxic-appearing, and pelvic exam shows a 3-4 cm string hanging from her cervix of the IUD.  There is no IUD extravasation, significant bleeding with only mild white discharge.  Pelvic swab completed for vaginal screen including yeast, BV and Trichomonas  which were all negative.  UA without evidence of UTI.  Her UPT is negative.  Pelvic ultrasound ordered, patient signed out to the oncoming ED team with final disposition pending ultrasound pelvis to rule out displaced IUD.                                  Clinical Impression:  Final diagnoses:  [R10.9] Abdominal cramping (Primary)  [R10.2] Pelvic cramping  [Z97.5] IUD (intrauterine device) in place  [N89.8] Vaginal discharge          ED Disposition Condition    Eloped                Adam Patterson DO, FAAEM  Emergency Staff Physician   Dept of Emergency Medicine   [facility]  Spectralink: [id number]        Disclaimer: This note has been generated using voice-recognition software. There may be typographical errors that have been missed during proof-reading.       Adam Patterson DO  12/20/23 [id number]

## 2023-12-20 NOTE — ED TRIAGE NOTES
"Jacobo Alvarado, a 28 y.o. female presents to the ED w/ complaint of IUD malfunction. Pt states that she was taking a shower earlier and felt something in her vagina. Pt states that she thinks her IUD is hanging out. Pt endorses spotting, abdominal cramping, and a shocking pain in her pelvic area.     Triage note:  Chief Complaint   Patient presents with    Abdominal Cramping     Reports she thinks her IUD is "hanging out". +spotting     Review of patient's allergies indicates:  No Known Allergies  Past Medical History:   Diagnosis Date    Anemia     Chlamydia     Disorder of kidney and ureter     Miscarriage      "

## 2023-12-20 NOTE — ED NOTES
"Patient identifiers for Jacobo Alvarado checked and correct.  LOC: Patient is awake, alert, and aware of environment with an appropriate affect. Patient is oriented x 3 and speaking appropriately.  APPEARANCE: Patient resting comfortably and in no acute distress. Patient is clean and well groomed, patient's clothing is properly fastened.  SKIN: The skin is warm and dry. Patient has normal skin turgor and moist mucus membrances. Skin is intact; no bruising or breakdown noted.  MUSKULOSKELETAL: Patient is moving all extremities well, no obvious deformities noted. Pulses intact.   RESPIRATORY: Airway is open and patent. Respirations are spontaneous and non-labored with normal effort and rate.  CARDIAC: Patient has a normal rate and rhythm. No peripheral edema noted. Capillary refill < 3 seconds.  ABDOMEN: No distention noted. Bowel sounds active in all 4 quadrants. Pt reports abdominal cramping and 4/10 "shocking like" pain in her pelvic area.   NEUROLOGICAL: PERRL. Facial expression is symmetrical. Hand grasps are equal bilaterally. Normal sensation in all extremities when touched with finger.  Pt endorses vaginal spotting.   Allergies reported: Review of patient's allergies indicates:  No Known Allergies  "

## 2023-12-20 NOTE — PROVIDER PROGRESS NOTES - EMERGENCY DEPT.
Encounter Date: 12/19/2023    ED Physician Progress Notes        Received sign-out from previous team plan was follow-up ultrasound and reassess.  Ultrasound obtained,IUD has migrated caudally within the endometrium.  Pelvic exam performed by other physician revealed strains but no other abnormality.     I went to informed patient of results in discharge her, however she was no longer in her room.  Could not be found in the department. Patient had eloped.

## 2024-01-09 ENCOUNTER — PATIENT MESSAGE (OUTPATIENT)
Dept: BARIATRICS | Facility: CLINIC | Age: 29
End: 2024-01-09
Payer: COMMERCIAL

## 2024-02-14 ENCOUNTER — PATIENT MESSAGE (OUTPATIENT)
Dept: BARIATRICS | Facility: CLINIC | Age: 29
End: 2024-02-14
Payer: COMMERCIAL

## 2024-02-20 ENCOUNTER — PATIENT MESSAGE (OUTPATIENT)
Dept: BARIATRICS | Facility: CLINIC | Age: 29
End: 2024-02-20
Payer: COMMERCIAL

## 2024-02-29 ENCOUNTER — PATIENT MESSAGE (OUTPATIENT)
Dept: BARIATRICS | Facility: CLINIC | Age: 29
End: 2024-02-29
Payer: COMMERCIAL

## 2024-03-06 ENCOUNTER — PATIENT MESSAGE (OUTPATIENT)
Dept: BARIATRICS | Facility: CLINIC | Age: 29
End: 2024-03-06
Payer: COMMERCIAL

## 2024-04-03 ENCOUNTER — PATIENT MESSAGE (OUTPATIENT)
Dept: BARIATRICS | Facility: CLINIC | Age: 29
End: 2024-04-03
Payer: COMMERCIAL

## 2024-04-09 ENCOUNTER — PATIENT MESSAGE (OUTPATIENT)
Dept: BARIATRICS | Facility: CLINIC | Age: 29
End: 2024-04-09
Payer: COMMERCIAL

## 2024-04-17 ENCOUNTER — HOSPITAL ENCOUNTER (EMERGENCY)
Facility: OTHER | Age: 29
Discharge: HOME | End: 2024-04-17
Attending: EMERGENCY MEDICINE
Payer: COMMERCIAL

## 2024-04-17 VITALS
DIASTOLIC BLOOD PRESSURE: 66 MMHG | OXYGEN SATURATION: 100 % | HEIGHT: 60 IN | SYSTOLIC BLOOD PRESSURE: 105 MMHG | TEMPERATURE: 98 F | BODY MASS INDEX: 31.02 KG/M2 | WEIGHT: 158 LBS | HEART RATE: 71 BPM | RESPIRATION RATE: 12 BRPM

## 2024-04-17 DIAGNOSIS — N10 ACUTE PYELONEPHRITIS: Primary | ICD-10-CM

## 2024-04-17 DIAGNOSIS — M54.50 BILATERAL LOW BACK PAIN WITHOUT SCIATICA, UNSPECIFIED CHRONICITY: ICD-10-CM

## 2024-04-17 LAB
ALBUMIN SERPL BCP-MCNC: 3.9 G/DL (ref 3.5–5.2)
ALP SERPL-CCNC: 79 U/L (ref 55–135)
ALT SERPL W/O P-5'-P-CCNC: 11 U/L (ref 10–44)
ANION GAP SERPL CALC-SCNC: 8 MMOL/L (ref 8–16)
AST SERPL-CCNC: 20 U/L (ref 10–40)
B-HCG UR QL: NEGATIVE
BACTERIA #/AREA URNS HPF: ABNORMAL /HPF
BASOPHILS # BLD AUTO: 0.03 K/UL (ref 0–0.2)
BASOPHILS NFR BLD: 0.4 % (ref 0–1.9)
BILIRUB SERPL-MCNC: 0.3 MG/DL (ref 0.1–1)
BILIRUB UR QL STRIP: NEGATIVE
BUN SERPL-MCNC: 7 MG/DL (ref 6–20)
CALCIUM SERPL-MCNC: 9.5 MG/DL (ref 8.7–10.5)
CHLORIDE SERPL-SCNC: 107 MMOL/L (ref 95–110)
CLARITY UR: ABNORMAL
CO2 SERPL-SCNC: 25 MMOL/L (ref 23–29)
COLOR UR: YELLOW
CREAT SERPL-MCNC: 0.5 MG/DL (ref 0.5–1.4)
CREAT SERPL-MCNC: 0.7 MG/DL (ref 0.5–1.4)
CTP QC/QA: YES
DIFFERENTIAL METHOD BLD: ABNORMAL
EOSINOPHIL # BLD AUTO: 0.1 K/UL (ref 0–0.5)
EOSINOPHIL NFR BLD: 1.7 % (ref 0–8)
ERYTHROCYTE [DISTWIDTH] IN BLOOD BY AUTOMATED COUNT: 14.3 % (ref 11.5–14.5)
EST. GFR  (NO RACE VARIABLE): >60 ML/MIN/1.73 M^2
GLUCOSE SERPL-MCNC: 72 MG/DL (ref 70–110)
GLUCOSE UR QL STRIP: NEGATIVE
HCT VFR BLD AUTO: 42.1 % (ref 37–48.5)
HGB BLD-MCNC: 13.4 G/DL (ref 12–16)
HGB UR QL STRIP: ABNORMAL
IMM GRANULOCYTES # BLD AUTO: 0.02 K/UL (ref 0–0.04)
IMM GRANULOCYTES NFR BLD AUTO: 0.3 % (ref 0–0.5)
KETONES UR QL STRIP: NEGATIVE
LEUKOCYTE ESTERASE UR QL STRIP: ABNORMAL
LIPASE SERPL-CCNC: 47 U/L (ref 4–60)
LYMPHOCYTES # BLD AUTO: 2.4 K/UL (ref 1–4.8)
LYMPHOCYTES NFR BLD: 31.8 % (ref 18–48)
MCH RBC QN AUTO: 27.9 PG (ref 27–31)
MCHC RBC AUTO-ENTMCNC: 31.8 G/DL (ref 32–36)
MCV RBC AUTO: 88 FL (ref 82–98)
MICROSCOPIC COMMENT: ABNORMAL
MONOCYTES # BLD AUTO: 0.7 K/UL (ref 0.3–1)
MONOCYTES NFR BLD: 8.9 % (ref 4–15)
NEUTROPHILS # BLD AUTO: 4.2 K/UL (ref 1.8–7.7)
NEUTROPHILS NFR BLD: 56.9 % (ref 38–73)
NITRITE UR QL STRIP: NEGATIVE
NRBC BLD-RTO: 0 /100 WBC
PH UR STRIP: 8 [PH] (ref 5–8)
PLATELET # BLD AUTO: 342 K/UL (ref 150–450)
PMV BLD AUTO: 9.6 FL (ref 9.2–12.9)
POTASSIUM SERPL-SCNC: 4.9 MMOL/L (ref 3.5–5.1)
PROT SERPL-MCNC: 8.1 G/DL (ref 6–8.4)
PROT UR QL STRIP: ABNORMAL
RBC # BLD AUTO: 4.8 M/UL (ref 4–5.4)
RBC #/AREA URNS HPF: 16 /HPF (ref 0–4)
SAMPLE: NORMAL
SODIUM SERPL-SCNC: 140 MMOL/L (ref 136–145)
SP GR UR STRIP: 1.03 (ref 1–1.03)
SQUAMOUS #/AREA URNS HPF: 6 /HPF
URN SPEC COLLECT METH UR: ABNORMAL
UROBILINOGEN UR STRIP-ACNC: >=8 EU/DL
WBC # BLD AUTO: 7.45 K/UL (ref 3.9–12.7)
WBC #/AREA URNS HPF: >100 /HPF (ref 0–5)

## 2024-04-17 PROCEDURE — 87186 SC STD MICRODIL/AGAR DIL: CPT | Performed by: PHYSICIAN ASSISTANT

## 2024-04-17 PROCEDURE — 81025 URINE PREGNANCY TEST: CPT | Performed by: PHYSICIAN ASSISTANT

## 2024-04-17 PROCEDURE — 87086 URINE CULTURE/COLONY COUNT: CPT | Performed by: PHYSICIAN ASSISTANT

## 2024-04-17 PROCEDURE — 83690 ASSAY OF LIPASE: CPT | Performed by: PHYSICIAN ASSISTANT

## 2024-04-17 PROCEDURE — 85025 COMPLETE CBC W/AUTO DIFF WBC: CPT | Performed by: PHYSICIAN ASSISTANT

## 2024-04-17 PROCEDURE — 63600175 PHARM REV CODE 636 W HCPCS: Performed by: PHYSICIAN ASSISTANT

## 2024-04-17 PROCEDURE — 87077 CULTURE AEROBIC IDENTIFY: CPT | Performed by: PHYSICIAN ASSISTANT

## 2024-04-17 PROCEDURE — 99900035 HC TECH TIME PER 15 MIN (STAT)

## 2024-04-17 PROCEDURE — 82565 ASSAY OF CREATININE: CPT

## 2024-04-17 PROCEDURE — 87088 URINE BACTERIA CULTURE: CPT | Performed by: PHYSICIAN ASSISTANT

## 2024-04-17 PROCEDURE — 25000003 PHARM REV CODE 250: Performed by: PHYSICIAN ASSISTANT

## 2024-04-17 PROCEDURE — 99285 EMERGENCY DEPT VISIT HI MDM: CPT | Mod: 25

## 2024-04-17 PROCEDURE — 96365 THER/PROPH/DIAG IV INF INIT: CPT

## 2024-04-17 PROCEDURE — 96361 HYDRATE IV INFUSION ADD-ON: CPT

## 2024-04-17 PROCEDURE — 96375 TX/PRO/DX INJ NEW DRUG ADDON: CPT

## 2024-04-17 PROCEDURE — 81000 URINALYSIS NONAUTO W/SCOPE: CPT | Performed by: PHYSICIAN ASSISTANT

## 2024-04-17 PROCEDURE — 80053 COMPREHEN METABOLIC PANEL: CPT | Performed by: PHYSICIAN ASSISTANT

## 2024-04-17 RX ORDER — ONDANSETRON 4 MG/1
4 TABLET, ORALLY DISINTEGRATING ORAL EVERY 8 HOURS PRN
Qty: 30 TABLET | Refills: 0 | Status: SHIPPED | OUTPATIENT
Start: 2024-04-17

## 2024-04-17 RX ORDER — METHOCARBAMOL 750 MG/1
1500 TABLET, FILM COATED ORAL 3 TIMES DAILY
Qty: 30 TABLET | Refills: 0 | Status: SHIPPED | OUTPATIENT
Start: 2024-04-17 | End: 2024-04-22

## 2024-04-17 RX ORDER — ONDANSETRON HYDROCHLORIDE 2 MG/ML
4 INJECTION, SOLUTION INTRAVENOUS
Status: COMPLETED | OUTPATIENT
Start: 2024-04-17 | End: 2024-04-17

## 2024-04-17 RX ORDER — CEPHALEXIN 500 MG/1
500 CAPSULE ORAL EVERY 12 HOURS
Qty: 14 CAPSULE | Refills: 0 | Status: SHIPPED | OUTPATIENT
Start: 2024-04-17 | End: 2024-04-24

## 2024-04-17 RX ORDER — KETOROLAC TROMETHAMINE 30 MG/ML
10 INJECTION, SOLUTION INTRAMUSCULAR; INTRAVENOUS
Status: COMPLETED | OUTPATIENT
Start: 2024-04-17 | End: 2024-04-17

## 2024-04-17 RX ADMIN — CEFTRIAXONE SODIUM 1 G: 1 INJECTION, POWDER, FOR SOLUTION INTRAMUSCULAR; INTRAVENOUS at 01:04

## 2024-04-17 RX ADMIN — KETOROLAC TROMETHAMINE 10 MG: 30 INJECTION, SOLUTION INTRAMUSCULAR; INTRAVENOUS at 12:04

## 2024-04-17 RX ADMIN — ONDANSETRON 4 MG: 2 INJECTION INTRAMUSCULAR; INTRAVENOUS at 12:04

## 2024-04-17 RX ADMIN — SODIUM CHLORIDE 1000 ML: 900 INJECTION, SOLUTION INTRAVENOUS at 12:04

## 2024-04-17 NOTE — ED TRIAGE NOTES
Pt presents to ED today c/o body aches, urinary frequency and painful urination x 3 days   Denies fever or taking medication for sx

## 2024-04-17 NOTE — ED PROVIDER NOTES
Source of History:  Patient     Chief complaint:  Abdominal Pain (Pt reporting abdominal pain radiating to back x 2 days with increased urinary frequency and burning on urination)      HPI:  Jacobo Alvarado is a 29 y.o. female presenting with  symptoms.  Patient complains of urinary frequency, urgency and some hesitancy.  Patient reports that symptoms began 2 days ago.  She noted slight blood in her urine today. She does report some lower abdominal pain that radiates to mid back.  She states this has been constant since onset.  She endorses some nausea however denies any vomiting.  She denies any fever, rash or additional complaints.  She has not tried any medications for symptoms.    This is the extent to the patients complaints today here in the emergency department.    ROS: As per HPI     Review of patient's allergies indicates:  No Known Allergies    PMH:  As per HPI and below:  Past Medical History:   Diagnosis Date    Anemia     Chlamydia     Disorder of kidney and ureter     Miscarriage      Past Surgical History:   Procedure Laterality Date    DILATION AND CURETTAGE OF UTERUS  04/30/2018    LAPAROSCOPIC SLEEVE GASTRECTOMY N/A 3/6/2023    Procedure: GASTRECTOMY, SLEEVE, LAPAROSCOPIC with intraop egd;  Surgeon: Dwight Allen Jr., MD;  Location: [facility] OR [address] [place] [address];  Service: General;  Laterality: N/A;       Social History     Tobacco Use    Smoking status: Never    Smokeless tobacco: Never   Substance Use Topics    Alcohol use: Not Currently    Drug use: No       Physical Exam:    /66 (BP Location: Left arm, Patient Position: Lying)   Pulse 71   Temp 97.6 °F (36.4 °C) (Oral)   Resp 12   Ht 5' (1.524 m)   Wt 71.7 kg (158 lb)   SpO2 100%   BMI 30.86 kg/m²   Nursing note and vital signs reviewed.  Constitutional: No acute distress.  Nontoxic  Eyes: No conjunctival injection.Extraocular muscles are intact.  ENT: Oropharynx clear.  Normal phonation.   Cardiovascular: Regular rate and  rhythm.  No murmurs. No gallops. No rubs  Respiratory: Clear to auscultation bilaterally.  Good air movement.  No wheezes.  No rhonchi. No rales. No accessory muscle use..  Abdomen: Soft.  Not distended.  Nontender.  No guarding.  No rebound. Non-pe with tenderness palpation of the suprapubic region.  Low back tenderness bilaterally to paraspinal region.  Some mild right-sided CVA tenderness.  Musculoskeletal: Good range of motion all joints.  No deformities.  Neck supple.  No meningismus.  Skin: No rashes seen.  Good turgor.  No abrasions.  No ecchymoses.  Neurologic: Motor intact.  Sensation intact.  No ataxia. No focal neurological deficits.  Psych: Appropriate, conversant    Labs that have been ordered have been independently reviewed and interpreted by myself.    I decided to obtain the patient's medical records.      MDM/ Differential Dx:    Jacobo Alvarado 29 y.o. presented to the ED with c/o  complaint. Physical exam reveals well-appearing female in no significant distress.  Exam notable for TTP of suprapubic and lower paraspinal muscles.  Some mild right-sided CVA tenderness.  Denies any additional  complaints and previously mentioned.  Appears nontoxic.    Differential Diagnosis includes, but is not limited to:  AAA, aortic dissection, SBO/volvulus, intussusception, ileus, appendicitis, cholecystitis, hepatitis, nephrolithiasis, pancreatitis, IBD/IBS, biliary colic, GERD, PUD, constipation, UTI/pyelonephritis, musculoskeletal pain.       ED management:  Plan for IV fluids, analgesics and antiemetic.  Given the constellation of symptoms and right-sided CVA tenderness will obtain CT to evaluate for any obstructive process or additional intra-abdominal process.  Labs reveal no leukocytosis.  No signs of TYLER or electrolyte dyscrasia.  Urine consistent with infection with bacteria, white cells and leukocytes.  Given pain radiation to flank IV Rocephin ordered.  CT reveals no acute obstructive process  or intra-abdominal process.  Discussed findings and will treat for early pyelonephritis.  Instructed on prompt return for any new or worsening symptoms    Impression/Plan: Patient informed of diagnosis The primary encounter diagnosis was Acute pyelonephritis. A diagnosis of Bilateral low back pain without sciatica, unspecified chronicity was also pertinent to this visit. Patient will follow up with Primary.  Patient cautioned on when to return to ED.  Pt. Understands and agrees with current treatment plan      Results for orders placed or performed during the hospital encounter of 04/17/24   Urine culture    Specimen: Urine   Result Value Ref Range    Urine Culture, Routine (A)      PRESUMPTIVE E COLI  >100,000 cfu/ml  Identification and susceptibility pending     CBC W/ AUTO DIFFERENTIAL   Result Value Ref Range    WBC 7.45 3.90 - 12.70 K/uL    RBC 4.80 4.00 - 5.40 M/uL    Hemoglobin 13.4 12.0 - 16.0 g/dL    Hematocrit 42.1 37.0 - 48.5 %    MCV 88 82 - 98 fL    MCH 27.9 27.0 - 31.0 pg    MCHC 31.8 (L) 32.0 - 36.0 g/dL    RDW 14.3 11.5 - 14.5 %    Platelets 342 150 - 450 K/uL    MPV 9.6 9.2 - 12.9 fL    Immature Granulocytes 0.3 0.0 - 0.5 %    Gran # (ANC) 4.2 1.8 - 7.7 K/uL    Immature Grans (Abs) 0.02 0.00 - 0.04 K/uL    Lymph # 2.4 1.0 - 4.8 K/uL    Mono # 0.7 0.3 - 1.0 K/uL    Eos # 0.1 0.0 - 0.5 K/uL    Baso # 0.03 0.00 - 0.20 K/uL    nRBC 0 0 /100 WBC    Gran % 56.9 38.0 - 73.0 %    Lymph % 31.8 18.0 - 48.0 %    Mono % 8.9 4.0 - 15.0 %    Eosinophil % 1.7 0.0 - 8.0 %    Basophil % 0.4 0.0 - 1.9 %    Differential Method Automated    Comp. Metabolic Panel   Result Value Ref Range    Sodium 140 136 - 145 mmol/L    Potassium 4.9 3.5 - 5.1 mmol/L    Chloride 107 95 - 110 mmol/L    CO2 25 23 - 29 mmol/L    Glucose 72 70 - 110 mg/dL    BUN 7 6 - 20 mg/dL    Creatinine 0.7 0.5 - 1.4 mg/dL    Calcium 9.5 8.7 - 10.5 mg/dL    Total Protein 8.1 6.0 - 8.4 g/dL    Albumin 3.9 3.5 - 5.2 g/dL    Total Bilirubin 0.3 0.1 - 1.0  mg/dL    Alkaline Phosphatase 79 55 - 135 U/L    AST 20 10 - 40 U/L    ALT 11 10 - 44 U/L    eGFR >60 >60 mL/min/1.73 m^2    Anion Gap 8 8 - 16 mmol/L   Lipase   Result Value Ref Range    Lipase 47 4 - 60 U/L   Urinalysis, Reflex to Urine Culture Urine, Clean Catch    Specimen: Urine   Result Value Ref Range    Specimen UA Urine, Clean Catch     Color, UA Yellow Yellow, Straw, Sydnee    Appearance, UA Hazy (A) Clear    pH, UA 8.0 5.0 - 8.0    Specific Gravity, UA 1.030 1.005 - 1.030    Protein, UA Trace (A) Negative    Glucose, UA Negative Negative    Ketones, UA Negative Negative    Bilirubin (UA) Negative Negative    Occult Blood UA Trace (A) Negative    Nitrite, UA Negative Negative    Urobilinogen, UA >=8.0 (A) <2.0 EU/dL    Leukocytes, UA 3+ (A) Negative   Urinalysis Microscopic   Result Value Ref Range    RBC, UA 16 (H) 0 - 4 /hpf    WBC, UA >100 (H) 0 - 5 /hpf    Bacteria Moderate (A) None-Occ /hpf    Squam Epithel, UA 6 /hpf    Microscopic Comment SEE COMMENT    POCT urine pregnancy   Result Value Ref Range    POC Preg Test, Ur Negative Negative     Acceptable Yes    ISTAT CREATININE   Result Value Ref Range    POC Creatinine 0.5 0.5 - 1.4 mg/dL    Sample VENOUS      Imaging Results              CT Renal Stone Study ABD Pelvis WO (Final result)  Result time 04/17/24 12:37:48      Final result by Josue Dias MD (04/17/24 12:37:48)                   Impression:      No acute process or CT findings identified to explain patient's symptoms of flank pain on this noncontrast study.  Specifically, no radiopaque calculus within the urinary tract.    Urinary bladder mild wall thickening without adjacent inflammatory change presumably related to underdistention, noting nonspecific cystitis could present similarly.    Remote operative change of gastric bypass without acute complication.    IUD in place, appropriate position.    Few additional findings as above.      Electronically signed by: Josue  MD Larissa  Date:    04/17/2024  Time:    12:37               Narrative:    EXAMINATION:  CT RENAL STONE STUDY ABD PELVIS WO    CLINICAL HISTORY:  Flank pain, kidney stone suspected;    TECHNIQUE:  Low dose axial images, sagittal and coronal reformations were obtained from the lung bases to the pubic symphysis.  Contrast was not administered.    COMPARISON:  Pelvic ultrasound 12/20/2023, right upper quadrant ultrasound 07/28/2020, CT abdomen and pelvis 02/13/2016    FINDINGS:  Lack of IV contrast limits evaluation of soft tissue and vascular structures.    Imaged lung bases are clear.  Base of the heart is within normal limits.    Remote operative change of gastric bypass without acute complication.  Stomach and duodenum are otherwise within normal limits.    Noncontrast appearance of the liver, gallbladder, pancreas, spleen and bilateral adrenal glands are within normal limits.  No biliary ductal dilatation.    Bilateral kidneys are normal in size, shape and location.  No radiopaque calculus seen within the urinary tract.  No hydronephrosis or significant perinephric stranding.  Ureters are normal in course and caliber.    Urinary bladder is suboptimally distended with circumferential wall thickening.    Uterus is tilted towards the right containing a T-shaped IUD which appears appropriate in position.  No adnexal mass definitively seen.  Trace volume nonspecific free fluid in the cul-de-sac, commonly physiologic in this age group.    Appendix and terminal ileum are within normal limits.  Moderate amount of scattered fecal material throughout the colon.  No evidence of bowel obstruction or acute inflammation.  No pneumatosis or portal venous gas.    No ascites, free air or lymphadenopathy CT criteria.  No significant calcific atherosclerosis.  Aorta tapers normally.    Tiny fat containing umbilical hernia.    Osseous structures appear intact.                                                Diagnostic Impression:     1. Acute pyelonephritis    2. Bilateral low back pain without sciatica, unspecified chronicity         ED Disposition Condition    Discharge Stable            ED Prescriptions       Medication Sig Dispense Start Date End Date Auth. Provider    cephALEXin (KEFLEX) 500 MG capsule Take 1 capsule (500 mg total) by mouth every 12 (twelve) hours. for 7 days 14 capsule 4/17/2024 4/24/2024 Shama Nguyen PA    methocarbamoL (ROBAXIN) 750 MG Tab Take 2 tablets (1,500 mg total) by mouth 3 (three) times daily. for 5 days 30 tablet 4/17/2024 4/22/2024 Shama Nguyen PA    ondansetron (ZOFRAN-ODT) 4 MG TbDL Take 1 tablet (4 mg total) by mouth every 8 (eight) hours as needed. 30 tablet 4/17/2024 -- Shama Nguyen PA          Follow-up Information       Follow up With Specialties Details Why Contact Info    Doug Wen [facility] Primary Care Schedule an appointment as soon as possible for a visit   [address]  [address] [id number] [phone]             Shama Nguyen PA  04/19/24 5890

## 2024-04-17 NOTE — FIRST PROVIDER EVALUATION
Emergency Department TeleTriage Encounter Note      CHIEF COMPLAINT    Chief Complaint   Patient presents with    Abdominal Pain     Pt reporting abdominal pain radiating to back x 2 days with increased urinary frequency and burning on urination       VITAL SIGNS   Initial Vitals [04/17/24 0952]   BP Pulse Resp Temp SpO2   118/62 86 16 98.1 °F (36.7 °C) 98 %      MAP       --            ALLERGIES    Review of patient's allergies indicates:  No Known Allergies    PROVIDER TRIAGE NOTE  Patient presents with complaint of nausea, vomiting, back pain, side pain, urinary frequency and dysuria.  Reports symptoms worsened yesterday.  Denies fevers.      Phy:   Constitutional: well nourished, well developed, appearing stated age, NAD        Initial orders will be placed and care will be transferred to an alternate provider when patient is roomed for a full evaluation. Any additional orders and the final disposition will be determined by that provider.        ORDERS  Labs Reviewed - No data to display    ED Orders (720h ago, onward)      None              Virtual Visit Note: The provider triage portion of this emergency department evaluation and documentation was performed via [employer], a HIPAA-compliant telemedicine application, in concert with a tele-presenter in the room. A face to face patient evaluation with one of my colleagues will occur once the patient is placed in an emergency department room.      DISCLAIMER: This note was prepared with [employer] voice recognition transcription software. Garbled syntax, mangled pronouns, and other bizarre constructions may be attributed to that software system.

## 2024-04-17 NOTE — Clinical Note
"Infinity "Jacobo" Christiano was seen and treated in our emergency department on 4/17/2024.  She may return to work on 04/20/2024.       If you have any questions or concerns, please don't hesitate to call.      Shama Nguyen PA"

## 2024-04-20 LAB — BACTERIA UR CULT: ABNORMAL

## 2024-05-14 ENCOUNTER — PATIENT MESSAGE (OUTPATIENT)
Dept: BARIATRICS | Facility: CLINIC | Age: 29
End: 2024-05-14
Payer: COMMERCIAL

## 2024-06-11 ENCOUNTER — PATIENT MESSAGE (OUTPATIENT)
Dept: BARIATRICS | Facility: CLINIC | Age: 29
End: 2024-06-11
Payer: COMMERCIAL

## 2024-07-03 ENCOUNTER — PATIENT MESSAGE (OUTPATIENT)
Dept: BARIATRICS | Facility: CLINIC | Age: 29
End: 2024-07-03
Payer: COMMERCIAL

## 2024-07-09 ENCOUNTER — PATIENT MESSAGE (OUTPATIENT)
Dept: BARIATRICS | Facility: CLINIC | Age: 29
End: 2024-07-09
Payer: COMMERCIAL

## 2024-08-12 ENCOUNTER — PATIENT MESSAGE (OUTPATIENT)
Dept: BARIATRICS | Facility: CLINIC | Age: 29
End: 2024-08-12
Payer: COMMERCIAL

## 2024-08-13 ENCOUNTER — PATIENT MESSAGE (OUTPATIENT)
Dept: BARIATRICS | Facility: CLINIC | Age: 29
End: 2024-08-13
Payer: COMMERCIAL

## 2024-08-13 ENCOUNTER — TELEPHONE (OUTPATIENT)
Dept: BARIATRICS | Facility: CLINIC | Age: 29
End: 2024-08-13
Payer: COMMERCIAL

## 2024-09-03 ENCOUNTER — PATIENT MESSAGE (OUTPATIENT)
Dept: BARIATRICS | Facility: CLINIC | Age: 29
End: 2024-09-03

## 2024-09-10 ENCOUNTER — PATIENT MESSAGE (OUTPATIENT)
Dept: BARIATRICS | Facility: CLINIC | Age: 29
End: 2024-09-10

## 2024-10-01 ENCOUNTER — PATIENT MESSAGE (OUTPATIENT)
Dept: BARIATRICS | Facility: CLINIC | Age: 29
End: 2024-10-01
Payer: MEDICAID

## 2024-10-08 ENCOUNTER — PATIENT MESSAGE (OUTPATIENT)
Dept: BARIATRICS | Facility: CLINIC | Age: 29
End: 2024-10-08
Payer: MEDICAID

## 2024-10-14 ENCOUNTER — OFFICE VISIT (OUTPATIENT)
Dept: OBSTETRICS AND GYNECOLOGY | Facility: CLINIC | Age: 29
End: 2024-10-14
Payer: MEDICAID

## 2024-10-14 ENCOUNTER — TELEPHONE (OUTPATIENT)
Dept: OBSTETRICS AND GYNECOLOGY | Facility: CLINIC | Age: 29
End: 2024-10-14
Payer: MEDICAID

## 2024-10-14 ENCOUNTER — LAB VISIT (OUTPATIENT)
Dept: LAB | Facility: HOSPITAL | Age: 29
End: 2024-10-14
Attending: STUDENT IN AN ORGANIZED HEALTH CARE EDUCATION/TRAINING PROGRAM
Payer: MEDICAID

## 2024-10-14 VITALS — HEIGHT: 60 IN | BODY MASS INDEX: 30.47 KG/M2 | WEIGHT: 155.19 LBS

## 2024-10-14 DIAGNOSIS — N64.59 INVERTED NIPPLE: Primary | ICD-10-CM

## 2024-10-14 DIAGNOSIS — Z32.01 POSITIVE PREGNANCY TEST: ICD-10-CM

## 2024-10-14 DIAGNOSIS — Z33.1 INTRAUTERINE DEVICE (IUD) CONTRACEPTIVE FAILURE RESULTING IN PREGNANCY: ICD-10-CM

## 2024-10-14 DIAGNOSIS — T83.31XA INTRAUTERINE DEVICE (IUD) CONTRACEPTIVE FAILURE RESULTING IN PREGNANCY: ICD-10-CM

## 2024-10-14 LAB — HCG INTACT+B SERPL-ACNC: 41 MIU/ML

## 2024-10-14 PROCEDURE — 1159F MED LIST DOCD IN RCRD: CPT | Mod: CPTII,,, | Performed by: STUDENT IN AN ORGANIZED HEALTH CARE EDUCATION/TRAINING PROGRAM

## 2024-10-14 PROCEDURE — 99999 PR PBB SHADOW E&M-EST. PATIENT-LVL III: CPT | Mod: PBBFAC,,, | Performed by: STUDENT IN AN ORGANIZED HEALTH CARE EDUCATION/TRAINING PROGRAM

## 2024-10-14 PROCEDURE — 58301 REMOVE INTRAUTERINE DEVICE: CPT | Mod: PBBFAC | Performed by: STUDENT IN AN ORGANIZED HEALTH CARE EDUCATION/TRAINING PROGRAM

## 2024-10-14 PROCEDURE — 84702 CHORIONIC GONADOTROPIN TEST: CPT | Performed by: STUDENT IN AN ORGANIZED HEALTH CARE EDUCATION/TRAINING PROGRAM

## 2024-10-14 PROCEDURE — 1160F RVW MEDS BY RX/DR IN RCRD: CPT | Mod: CPTII,,, | Performed by: STUDENT IN AN ORGANIZED HEALTH CARE EDUCATION/TRAINING PROGRAM

## 2024-10-14 PROCEDURE — 99213 OFFICE O/P EST LOW 20 MIN: CPT | Mod: 25,S$PBB,, | Performed by: STUDENT IN AN ORGANIZED HEALTH CARE EDUCATION/TRAINING PROGRAM

## 2024-10-14 PROCEDURE — 99213 OFFICE O/P EST LOW 20 MIN: CPT | Mod: PBBFAC | Performed by: STUDENT IN AN ORGANIZED HEALTH CARE EDUCATION/TRAINING PROGRAM

## 2024-10-14 PROCEDURE — 3008F BODY MASS INDEX DOCD: CPT | Mod: CPTII,,, | Performed by: STUDENT IN AN ORGANIZED HEALTH CARE EDUCATION/TRAINING PROGRAM

## 2024-10-14 PROCEDURE — 36415 COLL VENOUS BLD VENIPUNCTURE: CPT | Performed by: STUDENT IN AN ORGANIZED HEALTH CARE EDUCATION/TRAINING PROGRAM

## 2024-10-14 NOTE — PROGRESS NOTES
Subjective     Patient ID: Jacobo Alvarado is a 29 y.o. female.    Chief Complaint:  Breast Pain (And slight nipple inversion. Right breast only . This is the first time this has every happened.)      History of Present Illness  30 yo  presents for breast complaint and AUB    For a few months (since July) pt noticed R breast shooting pain that occurs randomly, has also noticed the R nipple has been inverted  This has never happened to her before, L breast only hurts when she presses hard    Also, pt happens to have a positive pregnancy test in the setting of an IUD. Was noted on US about 1 year ago to have migrated caudally. She has not felt the strings in a few months and is concerned it may have fallen out        GYN & OB History  Patient's last menstrual period was 2024 (approximate).   Date of Last Pap: 2021    OB History    Para Term  AB Living   5 2 1 1 3 2   SAB IAB Ectopic Multiple Live Births   3     0 2      # Outcome Date GA Lbr Cody/2nd Weight Sex Type Anes PTL Lv   5 Term 19 39w4d   F Vag-Spont EPI N [place]   4 SAB 2018     SAB   FD   3 SAB 18 12w0d    SAB   FD   2 SAB 2017     SAB   FD   1  12 36w0d  2.693 kg (5 lb 15 oz) F Vag-Spont EPI Y [place]       Review of Systems  Review of Systems   Integumentary:  Positive for nipple discharge.   All other systems reviewed and are negative.  Breast: Positive for mastodynia and nipple discharge.         Objective   Physical Exam:   Constitutional: She appears well-developed and well-nourished.    HENT:   Head: Normocephalic and atraumatic.    Eyes: Conjunctivae and EOM are normal.      Pulmonary/Chest: Effort normal. Right breast exhibits inverted nipple (retracted nipple not fully inverted). Right breast exhibits no mass. Left breast exhibits no inverted nipple, no mass and no nipple discharge.        Abdominal: Soft.     Genitourinary:    Vagina, uterus, right adnexa and left adnexa normal.       Pelvic exam was performed with patient in the lithotomy position.   The external female genitalia was normal.   There is no lesion on the right labia. There is no lesion on the left labia. Cervix is normal. Right adnexum displays no mass. Left adnexum displays no mass.    Genitourinary Comments: Female chaperone was present for duration of exam   IUD strings visualized.          Musculoskeletal: Normal range of motion.       Neurological: She is alert.    Skin: Skin is warm and dry.    Psychiatric: She has a normal mood and affect. Her behavior is normal.       Removal of IUD    Date/Time: 10/14/2024 1:45 PM    Performed by: Arya Calvo III, MD  Authorized by: Arya Calvo III, MD    Consent given by:  Patient  Procedure risks and benefits discussed: yes    Patient questions answered: yes    Patient agrees, verbalizes understanding, and wants to proceed: yes    Patient states understanding of procedure being performed: yes    Relevant documents present and verified: yes    Required blood products, implants, devices, and special equipment available: yes    Implant grasped by: forceps  Removal due to infection and inflammatory reaction: no    Removal due to mechanical complications: no    Removed with no complications: yes  no           Assessment and Plan     1. Inverted nipple    2. Intrauterine device (IUD) contraceptive failure resulting in pregnancy         Plan:  1. Inverted nipple (Primary)  - will order imaging due to new exam finding of nipple retraction and right breast pain, consider evaluation by breast surgery if no masses noted and pain continues   - Mammo Digital Diagnostic Bilat with [facility]hael; Future    2. Intrauterine device (IUD) contraceptive failure resulting in pregnancy  - IUD removed today after counseling patient on ACOG recommendations for managing pregnancy with IUD in place. Patient voiced understanding and agreed to proceed with removal. See procedure documentation above  - serial HCGs  ordered    - HCG, QUANTITATIVE, PREGNANCY; Future  - US Pelvis Comp with Transvag NON-OB (xpd; Future  - HCG, QUANTITATIVE, PREGNANCY; Future  - Removal of IUD      Follow up pending labs     Arya Calvo III, MD  [facility] [place] - OB GYN   [address]  [place] [id number] [phone]

## 2024-10-14 NOTE — TELEPHONE ENCOUNTER
Called and spoke to pt re: her hcg levels. Plans to repeat levels on Thursday with US to monitor trend. Pt NAYELY Mckinney MA

## 2024-10-17 ENCOUNTER — HOSPITAL ENCOUNTER (EMERGENCY)
Facility: OTHER | Age: 29
Discharge: HOME | End: 2024-10-17
Attending: EMERGENCY MEDICINE
Payer: MEDICAID

## 2024-10-17 ENCOUNTER — PATIENT MESSAGE (OUTPATIENT)
Dept: OBSTETRICS AND GYNECOLOGY | Facility: CLINIC | Age: 29
End: 2024-10-17
Payer: MEDICAID

## 2024-10-17 VITALS
WEIGHT: 154 LBS | BODY MASS INDEX: 30.23 KG/M2 | SYSTOLIC BLOOD PRESSURE: 110 MMHG | OXYGEN SATURATION: 99 % | TEMPERATURE: 98 F | DIASTOLIC BLOOD PRESSURE: 55 MMHG | HEART RATE: 86 BPM | RESPIRATION RATE: 16 BRPM | HEIGHT: 60 IN

## 2024-10-17 DIAGNOSIS — O20.9 VAGINAL BLEEDING AFFECTING EARLY PREGNANCY: ICD-10-CM

## 2024-10-17 DIAGNOSIS — O20.0 THREATENED MISCARRIAGE IN EARLY PREGNANCY: Primary | ICD-10-CM

## 2024-10-17 LAB
ALBUMIN SERPL BCP-MCNC: 3.7 G/DL (ref 3.5–5.2)
ALP SERPL-CCNC: 59 U/L (ref 40–150)
ALT SERPL W/O P-5'-P-CCNC: 11 U/L (ref 10–44)
ANION GAP SERPL CALC-SCNC: 7 MMOL/L (ref 8–16)
AST SERPL-CCNC: 20 U/L (ref 10–40)
BASOPHILS # BLD AUTO: 0.02 K/UL (ref 0–0.2)
BASOPHILS NFR BLD: 0.4 % (ref 0–1.9)
BILIRUB SERPL-MCNC: 0.2 MG/DL (ref 0.1–1)
BUN SERPL-MCNC: 6 MG/DL (ref 6–20)
CALCIUM SERPL-MCNC: 9.2 MG/DL (ref 8.7–10.5)
CHLORIDE SERPL-SCNC: 110 MMOL/L (ref 95–110)
CO2 SERPL-SCNC: 20 MMOL/L (ref 23–29)
CREAT SERPL-MCNC: 0.7 MG/DL (ref 0.5–1.4)
DIFFERENTIAL METHOD BLD: NORMAL
EOSINOPHIL # BLD AUTO: 0.1 K/UL (ref 0–0.5)
EOSINOPHIL NFR BLD: 1.3 % (ref 0–8)
ERYTHROCYTE [DISTWIDTH] IN BLOOD BY AUTOMATED COUNT: 13.8 % (ref 11.5–14.5)
EST. GFR  (NO RACE VARIABLE): >60 ML/MIN/1.73 M^2
GLUCOSE SERPL-MCNC: 96 MG/DL (ref 70–110)
HCG INTACT+B SERPL-ACNC: 11 MIU/ML
HCT VFR BLD AUTO: 40.5 % (ref 37–48.5)
HGB BLD-MCNC: 13 G/DL (ref 12–16)
IMM GRANULOCYTES # BLD AUTO: 0.02 K/UL (ref 0–0.04)
IMM GRANULOCYTES NFR BLD AUTO: 0.4 % (ref 0–0.5)
LYMPHOCYTES # BLD AUTO: 1.9 K/UL (ref 1–4.8)
LYMPHOCYTES NFR BLD: 34.2 % (ref 18–48)
MCH RBC QN AUTO: 28 PG (ref 27–31)
MCHC RBC AUTO-ENTMCNC: 32.1 G/DL (ref 32–36)
MCV RBC AUTO: 87 FL (ref 82–98)
MONOCYTES # BLD AUTO: 0.7 K/UL (ref 0.3–1)
MONOCYTES NFR BLD: 12.6 % (ref 4–15)
NEUTROPHILS # BLD AUTO: 2.8 K/UL (ref 1.8–7.7)
NEUTROPHILS NFR BLD: 51.1 % (ref 38–73)
NRBC BLD-RTO: 0 /100 WBC
PLATELET # BLD AUTO: 310 K/UL (ref 150–450)
PMV BLD AUTO: 9.8 FL (ref 9.2–12.9)
POTASSIUM SERPL-SCNC: 4.8 MMOL/L (ref 3.5–5.1)
PROT SERPL-MCNC: 7.3 G/DL (ref 6–8.4)
RBC # BLD AUTO: 4.65 M/UL (ref 4–5.4)
SODIUM SERPL-SCNC: 137 MMOL/L (ref 136–145)
WBC # BLD AUTO: 5.46 K/UL (ref 3.9–12.7)

## 2024-10-17 PROCEDURE — 80053 COMPREHEN METABOLIC PANEL: CPT | Performed by: PHYSICIAN ASSISTANT

## 2024-10-17 PROCEDURE — 99284 EMERGENCY DEPT VISIT MOD MDM: CPT | Mod: 25

## 2024-10-17 PROCEDURE — 84702 CHORIONIC GONADOTROPIN TEST: CPT | Performed by: PHYSICIAN ASSISTANT

## 2024-10-17 PROCEDURE — 85025 COMPLETE CBC W/AUTO DIFF WBC: CPT | Performed by: PHYSICIAN ASSISTANT

## 2024-10-17 PROCEDURE — 36415 COLL VENOUS BLD VENIPUNCTURE: CPT | Performed by: PHYSICIAN ASSISTANT

## 2024-10-17 NOTE — ED PROVIDER NOTES
"     Source of History:  The patient    Chief complaint:  Vaginal Bleeding (Vaginal bleeding and cramping that started 20 min ago described as "light" with no clots. Had + pregnancy test Monday and has US scheduled for today.)      HPI:  Jacobo Alvarado is a 30 y.o. female 29-year-old female  presents with about 2 hours of cramping and vaginal bleeding.  Just found out on Monday that she was pregnant while at her OB Gyne office.  She had her IUD removed at that time.  She states the bleeding is dark and a little bit more than a menstrual.  She states her last normal menstrual period was .  She did have some spotting  but not a normal menstruation.  Denies any dysuria or abnormal bowel movements    This is the extent to the patients complaints today here in the emergency department.    ROS:   See HPI.    Review of patient's allergies indicates:  No Known Allergies    PMH:  As per HPI and below:  Past Medical History:   Diagnosis Date    Anemia     Chlamydia     Disorder of kidney and ureter     Miscarriage      Past Surgical History:   Procedure Laterality Date    DILATION AND CURETTAGE OF UTERUS  2018    LAPAROSCOPIC SLEEVE GASTRECTOMY N/A 3/6/2023    Procedure: GASTRECTOMY, SLEEVE, LAPAROSCOPIC with intraop egd;  Surgeon: Dwight Allen Jr., MD;  Location: [facility] OR [address] [place] [address];  Service: General;  Laterality: N/A;       Social History     Tobacco Use    Smoking status: Never    Smokeless tobacco: Never   Substance Use Topics    Alcohol use: Not Currently    Drug use: No       Physical Exam:    /61   Pulse 96   Temp 98.5 °F (36.9 °C) (Oral)   Resp 18   Ht 5' (1.524 m)   Wt 69.9 kg (154 lb)   LMP 2024 (Approximate)   SpO2 100%   Breastfeeding No   BMI 30.08 kg/m²   Nursing note and vital signs reviewed.  Constitutional: No acute distress.  Nontoxic  Cardiovascular: Regular rate and rhythm.  No murmurs. No gallops. No rubs  Respiratory: Clear to auscultation " bilaterally.  Good air movement.  No wheezes.  No rhonchi. No rales. No accessory muscle use.  Abdomen/:  Vaginal mucosa is normal.  Mild-to-moderate bleeding.  No clots.  Cervix is closed.  No CMT.  No products conception  Neuro: Alert. No focal deficits.  Skin: No rashes seen.     I decided to obtain the patient's medical records.  Summary of Medical Records:  Reviewed outpatient OB visit 3 days ago on 2024.  Patient was seen for a chief complaint of breast pain but was found to be pregnant in the setting of an IUD.  The IUD was removed      Differential Dx/ MDM/ Workup:  29-year-old female  with pregnancy and vaginal bleeding.  Concern of threatened .  Will get an ultrasound to rule out ectopic as well as get labs.  Patient is currently stable at this time    Reviewing the patient's medical records 2021 showed a type and screen with a blood type of O positive.  No need for RhoGAM      ED Course as of 10/17/24 1515   Thu Oct 17, 2024   1404 WBC: 5.46 [SM]   1404 Hemoglobin: 13.0 [SM]   1404 Platelet Count: 310 [SM]   1459 US OB <14 Wks, TransAbd, Single Gestation  Ultrasound shows no intrauterine pregnancy seen. [SM]   1459 Beta HCG Quant: 11 [SM]   1513 Discussed the results with the patient.  Also discussed with the patient's OB Gyne Dr Calvo and our gyn service.  Will discharge home.  Her OB will follow up on continuation of care    No further workup is indicated in the emergency department today.  I updated pt regarding results and I counseled pt regarding supportive care measures.  Diagnosis and treatment plan explained to patient. I have answered all questions and the patient is satisfied with the plan of care. Patient discharged home in stable condition.  [SM]      ED Course User Index  [SM] Rahat Galicia, DO               Diagnostic Impression:    1. Threatened miscarriage in early pregnancy    2. Vaginal bleeding affecting early pregnancy         ED Disposition  Condition    Discharge Stable            ED Prescriptions    None       Follow-up Information       Follow up With Specialties Details Why Contact Info    Arya Calvo III, MD Obstetrics and Gynecology Schedule an appointment as soon as possible for a visit   [address] [place] [id number] [phone]               Rahat Galicia, DO  10/17/24 1513

## 2024-10-17 NOTE — FIRST PROVIDER EVALUATION
" Emergency Department TeleTriage Encounter Note      CHIEF COMPLAINT    Chief Complaint   Patient presents with    Vaginal Bleeding     Vaginal bleeding and cramping that started 20 min ago described as "light" with no clots. Had + pregnancy test Monday and has US scheduled for today.       VITAL SIGNS   Initial Vitals [10/17/24 1227]   BP Pulse Resp Temp SpO2   116/61 96 18 98.5 °F (36.9 °C) 100 %      MAP       --            ALLERGIES    Review of patient's allergies indicates:  No Known Allergies    PROVIDER TRIAGE NOTE  Patient  currently with positive pregnancy test unsure of LMP. She is presenting with vaginal bleeding and cramping that started this morning. First ultrasound was scheduled for this morning. Blood type O pos      ORDERS  Labs Reviewed - No data to display    ED Orders (720h ago, onward)      None              Virtual Visit Note: The provider triage portion of this emergency department evaluation and documentation was performed via [employer], a HIPAA-compliant telemedicine application, in concert with a tele-presenter in the room. A face to face patient evaluation with one of my colleagues will occur once the patient is placed in an emergency department room.      DISCLAIMER: This note was prepared with [employer] voice recognition transcription software. Garbled syntax, mangled pronouns, and other bizarre constructions may be attributed to that software system.    "

## 2024-10-24 ENCOUNTER — OFFICE VISIT (OUTPATIENT)
Dept: OBSTETRICS AND GYNECOLOGY | Facility: CLINIC | Age: 29
End: 2024-10-24
Payer: MEDICAID

## 2024-10-24 VITALS — HEIGHT: 60 IN | WEIGHT: 156.31 LBS | BODY MASS INDEX: 30.69 KG/M2

## 2024-10-24 DIAGNOSIS — O03.9 SAB (SPONTANEOUS ABORTION): Primary | ICD-10-CM

## 2024-10-24 DIAGNOSIS — Z30.019 ENCOUNTER FOR INITIAL PRESCRIPTION OF CONTRACEPTIVES, UNSPECIFIED CONTRACEPTIVE: ICD-10-CM

## 2024-10-24 PROCEDURE — 99999 PR PBB SHADOW E&M-EST. PATIENT-LVL III: CPT | Mod: PBBFAC,,, | Performed by: STUDENT IN AN ORGANIZED HEALTH CARE EDUCATION/TRAINING PROGRAM

## 2024-10-24 PROCEDURE — 99213 OFFICE O/P EST LOW 20 MIN: CPT | Mod: PBBFAC,TH | Performed by: STUDENT IN AN ORGANIZED HEALTH CARE EDUCATION/TRAINING PROGRAM

## 2024-10-24 RX ORDER — NORELGESTROMIN AND ETHINYL ESTRADIOL 35; 150 UG/MG; UG/MG
1 PATCH TRANSDERMAL WEEKLY
Qty: 3 PATCH | Refills: 11 | Status: SHIPPED | OUTPATIENT
Start: 2024-10-24 | End: 2025-10-24

## 2024-10-24 NOTE — PROGRESS NOTES
Subjective     Patient ID: Jacobo Alvarado is a 29 y.o. female.    Chief Complaint:  Contraception (Would like paragard again) and Follow-up (ED f/u doing okay. Denies any vaginal bleeding, having some lingering pain on left side)      History of Present Illness  28 yo  presents for office follow up    At last visit had IUD removed due to concurrent pregnancy  Since has likely had SAB, last two quants downtrended and now bleeding has stopped  Desires to have IUD again, considering LNG-IUD until she can have permanent BC    Has not had mammogram yet, no one has reached out to her from radiology to schedule. Had another episode of breast pain recently          GYN & OB History  Patient's last menstrual period was 10/16/2024 (approximate).   Date of Last Pap: 2021    OB History    Para Term  AB Living   5 2 1 1 3 2   SAB IAB Ectopic Multiple Live Births   3     0 2      # Outcome Date GA Lbr [place]/2nd Weight Sex Type Anes PTL Lv   5 Term 19 39w4d   F Vag-Spont EPI N [place]   4 SAB 2018     SAB   FD   3 SAB 18 12w0d    SAB   FD   2 SAB 2017     SAB   FD   1  12 36w0d  2.693 kg (5 lb 15 oz) F Vag-Spont EPI Y [place]       Review of Systems  Review of Systems   All other systems reviewed and are negative.         Objective   Physical Exam:   Constitutional: She is oriented to person, place, and time. She appears well-developed and well-nourished.    HENT:   Head: Normocephalic and atraumatic.    Eyes: Conjunctivae and EOM are normal.      Pulmonary/Chest: Effort normal.                  Musculoskeletal: Normal range of motion.       Neurological: She is alert and oriented to person, place, and time.    Skin: Skin is warm and dry.    Psychiatric: She has a normal mood and affect.            Assessment and Plan     1. SAB (spontaneous )    2. Encounter for initial prescription of contraceptives, unspecified contraceptive          Plan:  1. SAB (spontaneous  ) (Primary)  - POCT Urine Pregnancy    2. Encounter for initial prescription of contraceptives, unspecified contraceptive  - norelgestromin-ethinyl estradiol (XULANE) 150-35 mcg/24 hr; Place 1 patch onto the skin once a week.  Dispense: 3 patch; Refill: 11  - Device Authorization Order      Return around time of next cycle for IUD placement. Start BC patch until then     rAya Calvo III, MD  [facility] [place] - OB GYN   [address]  [facility] [id number] [phone]

## 2024-11-02 ENCOUNTER — PATIENT MESSAGE (OUTPATIENT)
Dept: BARIATRICS | Facility: CLINIC | Age: 29
End: 2024-11-02
Payer: MEDICAID

## 2024-11-12 ENCOUNTER — PATIENT MESSAGE (OUTPATIENT)
Dept: BARIATRICS | Facility: CLINIC | Age: 29
End: 2024-11-12
Payer: MEDICAID

## 2024-11-20 ENCOUNTER — PROCEDURE VISIT (OUTPATIENT)
Dept: OBSTETRICS AND GYNECOLOGY | Facility: CLINIC | Age: 29
End: 2024-11-20
Payer: MEDICAID

## 2024-11-20 ENCOUNTER — HOSPITAL ENCOUNTER (OUTPATIENT)
Dept: RADIOLOGY | Facility: HOSPITAL | Age: 29
Discharge: HOME | End: 2024-11-20
Attending: STUDENT IN AN ORGANIZED HEALTH CARE EDUCATION/TRAINING PROGRAM
Payer: MEDICAID

## 2024-11-20 VITALS
WEIGHT: 158.81 LBS | DIASTOLIC BLOOD PRESSURE: 62 MMHG | HEIGHT: 60 IN | SYSTOLIC BLOOD PRESSURE: 108 MMHG | BODY MASS INDEX: 31.18 KG/M2

## 2024-11-20 DIAGNOSIS — N64.59 INVERTED NIPPLE: ICD-10-CM

## 2024-11-20 DIAGNOSIS — Z30.430 ENCOUNTER FOR IUD INSERTION: Primary | ICD-10-CM

## 2024-11-20 DIAGNOSIS — Z32.01 POSITIVE PREGNANCY TEST: ICD-10-CM

## 2024-11-20 LAB
B-HCG UR QL: POSITIVE
CTP QC/QA: YES

## 2024-11-20 PROCEDURE — 99999PBSHW POCT URINE PREGNANCY: Mod: PBBFAC,,,

## 2024-11-20 PROCEDURE — 81025 URINE PREGNANCY TEST: CPT | Mod: PBBFAC | Performed by: STUDENT IN AN ORGANIZED HEALTH CARE EDUCATION/TRAINING PROGRAM

## 2024-11-20 NOTE — PROGRESS NOTES
30 yo  presented for IUD insertion    UPT in office positive  Pt states she had a negative UPT at home middle of last week    Last seen in office on 10/24 for miscarriage follow up. Last quant drawn was 11 on 10/17/24.    Had started the birth control patches on 10/26 but had difficulty keeping them on.    Vitals:    24 1120   BP: 108/62   Weight: 72 kg (158 lb 13.5 oz)   Height: 5' (1.524 m)      Physical Exam  Vitals reviewed.   Constitutional:       Appearance: Normal appearance.   HENT:      Head: Normocephalic and atraumatic.   Eyes:      Extraocular Movements: Extraocular movements intact.      Conjunctiva/sclera: Conjunctivae normal.   Pulmonary:      Effort: Pulmonary effort is normal.   Musculoskeletal:         General: Normal range of motion.      Cervical back: Normal range of motion.   Skin:     General: Skin is warm and dry.   Neurological:      General: No focal deficit present.      Mental Status: She is alert.   Psychiatric:         Mood and Affect: Mood normal.       Recent Labs   Lab Result Units 24  1136   POC Preg Test, Ur  Positive*         Assessment & Plan    - Quant HCG ordered  - US pending quant level  - discussed bleeding precautions with pt and when to seek evaluation if she is concerned for miscarriage  - Rh+, rhogam not indicated in event of miscarriage     Follow up pending quant levels/in office next week

## 2024-11-21 ENCOUNTER — PATIENT MESSAGE (OUTPATIENT)
Dept: OBSTETRICS AND GYNECOLOGY | Facility: CLINIC | Age: 29
End: 2024-11-21
Payer: MEDICAID

## 2024-11-21 DIAGNOSIS — O36.80X0 PREGNANCY OF UNKNOWN ANATOMIC LOCATION: Primary | ICD-10-CM

## 2024-11-22 ENCOUNTER — LAB VISIT (OUTPATIENT)
Dept: LAB | Facility: HOSPITAL | Age: 29
End: 2024-11-22
Attending: STUDENT IN AN ORGANIZED HEALTH CARE EDUCATION/TRAINING PROGRAM
Payer: MEDICAID

## 2024-11-22 DIAGNOSIS — O36.80X0 PREGNANCY OF UNKNOWN ANATOMIC LOCATION: ICD-10-CM

## 2024-11-22 DIAGNOSIS — O36.80X0 PREGNANCY, LOCATION UNKNOWN: Primary | ICD-10-CM

## 2024-11-22 LAB — HCG INTACT+B SERPL-ACNC: 85 MIU/ML

## 2024-11-22 PROCEDURE — 84702 CHORIONIC GONADOTROPIN TEST: CPT | Performed by: STUDENT IN AN ORGANIZED HEALTH CARE EDUCATION/TRAINING PROGRAM

## 2024-11-22 PROCEDURE — 36415 COLL VENOUS BLD VENIPUNCTURE: CPT | Performed by: STUDENT IN AN ORGANIZED HEALTH CARE EDUCATION/TRAINING PROGRAM

## 2024-11-25 ENCOUNTER — LAB VISIT (OUTPATIENT)
Dept: LAB | Facility: HOSPITAL | Age: 29
End: 2024-11-25
Attending: STUDENT IN AN ORGANIZED HEALTH CARE EDUCATION/TRAINING PROGRAM
Payer: MEDICAID

## 2024-11-25 DIAGNOSIS — O36.80X0 PREGNANCY, LOCATION UNKNOWN: ICD-10-CM

## 2024-11-25 DIAGNOSIS — O36.80X0 PREGNANCY OF UNKNOWN ANATOMIC LOCATION: Primary | ICD-10-CM

## 2024-11-25 LAB — HCG INTACT+B SERPL-ACNC: 30 MIU/ML

## 2024-11-25 PROCEDURE — 36415 COLL VENOUS BLD VENIPUNCTURE: CPT | Performed by: STUDENT IN AN ORGANIZED HEALTH CARE EDUCATION/TRAINING PROGRAM

## 2024-11-25 PROCEDURE — 84702 CHORIONIC GONADOTROPIN TEST: CPT | Performed by: STUDENT IN AN ORGANIZED HEALTH CARE EDUCATION/TRAINING PROGRAM

## 2024-12-03 ENCOUNTER — PATIENT MESSAGE (OUTPATIENT)
Dept: BARIATRICS | Facility: CLINIC | Age: 29
End: 2024-12-03
Payer: MEDICAID

## 2024-12-09 DIAGNOSIS — Z30.430 ENCOUNTER FOR INSERTION OF MIRENA IUD: Primary | ICD-10-CM

## 2024-12-10 ENCOUNTER — PATIENT MESSAGE (OUTPATIENT)
Dept: BARIATRICS | Facility: CLINIC | Age: 29
End: 2024-12-10
Payer: MEDICAID

## 2025-01-06 ENCOUNTER — PATIENT MESSAGE (OUTPATIENT)
Dept: BARIATRICS | Facility: CLINIC | Age: 30
End: 2025-01-06
Payer: MEDICAID

## 2025-01-14 ENCOUNTER — PATIENT MESSAGE (OUTPATIENT)
Dept: BARIATRICS | Facility: CLINIC | Age: 30
End: 2025-01-14
Payer: MEDICAID

## 2025-02-10 ENCOUNTER — PATIENT MESSAGE (OUTPATIENT)
Dept: BARIATRICS | Facility: CLINIC | Age: 30
End: 2025-02-10
Payer: MEDICAID

## 2025-03-10 ENCOUNTER — PATIENT MESSAGE (OUTPATIENT)
Dept: BARIATRICS | Facility: CLINIC | Age: 30
End: 2025-03-10
Payer: MEDICAID

## 2025-04-28 ENCOUNTER — HOSPITAL ENCOUNTER (OUTPATIENT)
Dept: RADIOLOGY | Facility: HOSPITAL | Age: 30
Discharge: HOME | End: 2025-04-28
Attending: STUDENT IN AN ORGANIZED HEALTH CARE EDUCATION/TRAINING PROGRAM
Payer: MEDICAID

## 2025-04-28 DIAGNOSIS — N64.59 INVERTED NIPPLE: ICD-10-CM

## 2025-04-28 PROCEDURE — 76641 ULTRASOUND BREAST COMPLETE: CPT | Mod: TC,LT

## 2025-04-28 PROCEDURE — 77066 DX MAMMO INCL CAD BI: CPT | Mod: TC

## 2025-04-28 PROCEDURE — 76642 ULTRASOUND BREAST LIMITED: CPT | Mod: 26,RT,, | Performed by: RADIOLOGY

## 2025-04-28 PROCEDURE — 77062 BREAST TOMOSYNTHESIS BI: CPT | Mod: 26,,, | Performed by: RADIOLOGY

## 2025-04-28 PROCEDURE — 77066 DX MAMMO INCL CAD BI: CPT | Mod: 26,,, | Performed by: RADIOLOGY

## 2025-04-28 PROCEDURE — 76641 ULTRASOUND BREAST COMPLETE: CPT | Mod: 26,LT,, | Performed by: RADIOLOGY

## 2025-04-28 PROCEDURE — 76642 ULTRASOUND BREAST LIMITED: CPT | Mod: TC,RT

## 2025-05-19 ENCOUNTER — HOSPITAL ENCOUNTER (OUTPATIENT)
Dept: RADIOLOGY | Facility: HOSPITAL | Age: 30
Discharge: HOME | End: 2025-05-19
Attending: STUDENT IN AN ORGANIZED HEALTH CARE EDUCATION/TRAINING PROGRAM
Payer: MEDICAID

## 2025-05-19 DIAGNOSIS — R92.8 ABNORMAL MAMMOGRAM OF LEFT BREAST: ICD-10-CM

## 2025-05-19 PROCEDURE — 19083 BX BREAST 1ST LESION US IMAG: CPT | Mod: LT

## 2025-05-19 PROCEDURE — 63600175 PHARM REV CODE 636 W HCPCS: Performed by: STUDENT IN AN ORGANIZED HEALTH CARE EDUCATION/TRAINING PROGRAM

## 2025-05-19 PROCEDURE — 19083 BX BREAST 1ST LESION US IMAG: CPT | Mod: LT,,, | Performed by: RADIOLOGY

## 2025-05-19 PROCEDURE — A4215 STERILE NEEDLE: HCPCS

## 2025-05-19 PROCEDURE — 19084 BX BREAST ADD LESION US IMAG: CPT | Mod: LT,,, | Performed by: RADIOLOGY

## 2025-05-19 RX ORDER — LIDOCAINE HYDROCHLORIDE AND EPINEPHRINE 10; 20 UG/ML; MG/ML
20 INJECTION, SOLUTION INFILTRATION; PERINEURAL ONCE
Status: COMPLETED | OUTPATIENT
Start: 2025-05-19 | End: 2025-05-19

## 2025-05-19 RX ORDER — LIDOCAINE HYDROCHLORIDE 20 MG/ML
20 INJECTION, SOLUTION INFILTRATION; PERINEURAL ONCE
Status: COMPLETED | OUTPATIENT
Start: 2025-05-19 | End: 2025-05-19

## 2025-05-19 RX ADMIN — LIDOCAINE HYDROCHLORIDE,EPINEPHRINE BITARTRATE 20 ML: 20; .01 INJECTION, SOLUTION INFILTRATION; PERINEURAL at 11:05

## 2025-05-19 RX ADMIN — LIDOCAINE HYDROCHLORIDE 20 ML: 20 INJECTION, SOLUTION INFILTRATION; PERINEURAL at 11:05

## 2025-05-20 ENCOUNTER — OFFICE VISIT (OUTPATIENT)
Dept: OBSTETRICS AND GYNECOLOGY | Facility: CLINIC | Age: 30
End: 2025-05-20
Payer: MEDICAID

## 2025-05-20 ENCOUNTER — LAB VISIT (OUTPATIENT)
Dept: LAB | Facility: HOSPITAL | Age: 30
End: 2025-05-20
Attending: PHYSICIAN ASSISTANT
Payer: MEDICAID

## 2025-05-20 VITALS — WEIGHT: 156.5 LBS | BODY MASS INDEX: 30.57 KG/M2 | SYSTOLIC BLOOD PRESSURE: 118 MMHG | DIASTOLIC BLOOD PRESSURE: 72 MMHG

## 2025-05-20 DIAGNOSIS — N96 RECURRENT PREGNANCY LOSS: ICD-10-CM

## 2025-05-20 DIAGNOSIS — Z11.3 ENCOUNTER FOR SCREENING EXAMINATION FOR SEXUALLY TRANSMITTED DISEASE: ICD-10-CM

## 2025-05-20 DIAGNOSIS — Z11.51 SCREENING FOR HUMAN PAPILLOMAVIRUS (HPV): ICD-10-CM

## 2025-05-20 DIAGNOSIS — Z90.3 H/O GASTRIC SLEEVE: ICD-10-CM

## 2025-05-20 DIAGNOSIS — N91.4 SECONDARY OLIGOMENORRHEA: ICD-10-CM

## 2025-05-20 DIAGNOSIS — Z12.4 ENCOUNTER FOR PAPANICOLAOU SMEAR FOR CERVICAL CANCER SCREENING: ICD-10-CM

## 2025-05-20 DIAGNOSIS — Z87.51 HISTORY OF PRETERM DELIVERY: ICD-10-CM

## 2025-05-20 DIAGNOSIS — N91.4 SECONDARY OLIGOMENORRHEA: Primary | ICD-10-CM

## 2025-05-20 LAB
ABSOLUTE EOSINOPHIL (OHS): 0.03 K/UL
ABSOLUTE MONOCYTE (OHS): 0.69 K/UL (ref 0.3–1)
ABSOLUTE NEUTROPHIL COUNT (OHS): 4.51 K/UL (ref 1.8–7.7)
B-HCG UR QL: POSITIVE
BASOPHILS # BLD AUTO: 0.01 K/UL
BASOPHILS NFR BLD AUTO: 0.1 %
CTP QC/QA: YES
ERYTHROCYTE [DISTWIDTH] IN BLOOD BY AUTOMATED COUNT: 13.7 % (ref 11.5–14.5)
HCG INTACT+B SERPL-ACNC: NORMAL MIU/ML
HCT VFR BLD AUTO: 36 % (ref 37–48.5)
HGB BLD-MCNC: 11.5 GM/DL (ref 12–16)
IMM GRANULOCYTES # BLD AUTO: 0.03 K/UL (ref 0–0.04)
IMM GRANULOCYTES NFR BLD AUTO: 0.4 % (ref 0–0.5)
INDIRECT COOMBS: NORMAL
LYMPHOCYTES # BLD AUTO: 1.7 K/UL (ref 1–4.8)
MCH RBC QN AUTO: 27.6 PG (ref 27–31)
MCHC RBC AUTO-ENTMCNC: 31.9 G/DL (ref 32–36)
MCV RBC AUTO: 86 FL (ref 82–98)
NUCLEATED RBC (/100WBC) (OHS): 0 /100 WBC
PLATELET # BLD AUTO: 283 K/UL (ref 150–450)
PMV BLD AUTO: 9.7 FL (ref 9.2–12.9)
RBC # BLD AUTO: 4.17 M/UL (ref 4–5.4)
RELATIVE EOSINOPHIL (OHS): 0.4 %
RELATIVE LYMPHOCYTE (OHS): 24.4 % (ref 18–48)
RELATIVE MONOCYTE (OHS): 9.9 % (ref 4–15)
RELATIVE NEUTROPHIL (OHS): 64.8 % (ref 38–73)
RH BLD: NORMAL
SPECIMEN OUTDATE: NORMAL
T4 FREE SERPL-MCNC: 0.95 NG/DL (ref 0.71–1.51)
TSH SERPL-ACNC: 0.08 UIU/ML (ref 0.4–4)
WBC # BLD AUTO: 6.97 K/UL (ref 3.9–12.7)

## 2025-05-20 PROCEDURE — 86762 RUBELLA ANTIBODY: CPT

## 2025-05-20 PROCEDURE — 84443 ASSAY THYROID STIM HORMONE: CPT

## 2025-05-20 PROCEDURE — 86593 SYPHILIS TEST NON-TREP QUANT: CPT

## 2025-05-20 PROCEDURE — 3008F BODY MASS INDEX DOCD: CPT | Mod: CPTII,,, | Performed by: PHYSICIAN ASSISTANT

## 2025-05-20 PROCEDURE — 81025 URINE PREGNANCY TEST: CPT | Mod: PBBFAC | Performed by: PHYSICIAN ASSISTANT

## 2025-05-20 PROCEDURE — 99999 PR PBB SHADOW E&M-EST. PATIENT-LVL III: CPT | Mod: PBBFAC,,, | Performed by: PHYSICIAN ASSISTANT

## 2025-05-20 PROCEDURE — 99999PBSHW POCT URINE PREGNANCY: Mod: PBBFAC,,,

## 2025-05-20 PROCEDURE — 99213 OFFICE O/P EST LOW 20 MIN: CPT | Mod: PBBFAC | Performed by: PHYSICIAN ASSISTANT

## 2025-05-20 PROCEDURE — 84702 CHORIONIC GONADOTROPIN TEST: CPT

## 2025-05-20 PROCEDURE — 87389 HIV-1 AG W/HIV-1&-2 AB AG IA: CPT

## 2025-05-20 PROCEDURE — 99214 OFFICE O/P EST MOD 30 MIN: CPT | Mod: S$PBB,,, | Performed by: PHYSICIAN ASSISTANT

## 2025-05-20 PROCEDURE — 86850 RBC ANTIBODY SCREEN: CPT | Performed by: PHYSICIAN ASSISTANT

## 2025-05-20 PROCEDURE — 3078F DIAST BP <80 MM HG: CPT | Mod: CPTII,,, | Performed by: PHYSICIAN ASSISTANT

## 2025-05-20 PROCEDURE — 1159F MED LIST DOCD IN RCRD: CPT | Mod: CPTII,,, | Performed by: PHYSICIAN ASSISTANT

## 2025-05-20 PROCEDURE — 83036 HEMOGLOBIN GLYCOSYLATED A1C: CPT

## 2025-05-20 PROCEDURE — 87624 HPV HI-RISK TYP POOLED RSLT: CPT | Performed by: PHYSICIAN ASSISTANT

## 2025-05-20 PROCEDURE — 87340 HEPATITIS B SURFACE AG IA: CPT

## 2025-05-20 PROCEDURE — 87086 URINE CULTURE/COLONY COUNT: CPT | Performed by: PHYSICIAN ASSISTANT

## 2025-05-20 PROCEDURE — 85025 COMPLETE CBC W/AUTO DIFF WBC: CPT

## 2025-05-20 PROCEDURE — 87591 N.GONORRHOEAE DNA AMP PROB: CPT | Performed by: PHYSICIAN ASSISTANT

## 2025-05-20 PROCEDURE — 36415 COLL VENOUS BLD VENIPUNCTURE: CPT

## 2025-05-20 PROCEDURE — 84146 ASSAY OF PROLACTIN: CPT

## 2025-05-20 PROCEDURE — 86803 HEPATITIS C AB TEST: CPT

## 2025-05-20 PROCEDURE — 3074F SYST BP LT 130 MM HG: CPT | Mod: CPTII,,, | Performed by: PHYSICIAN ASSISTANT

## 2025-05-20 NOTE — PATIENT INSTRUCTIONS
AFTER TODAY'S VISIT -- GO TO THE LAB IN THE HOSPITAL (from our office, get on the elevator. Take it to the 1st floor, take a left then another left -- you will exit th medical office building and be facing the hospital. Go around to the left and you can enter through patient registration and tell them you are there for labs).     The Maternal Fetal Medicine Department (MFM) will call you soon to schedule your 1st ultrasound. This usually takes place when you are about 8 weeks pregnant.     Clinic Location:   [address] [place] [address]  [phone]    Your Pregnancy A to Z   Allergies - Seasonal   Benadryl   Claritin   Zyrtec   If you have a skin allergic reaction, call your doctor    Baby Movement Counts  Baby movement is an indicator of your baby's health and wellbeing. A movement may be a kick, stretch, or turn. You will begin counting baby movements after you reach 28 weeks gestation. Do these counts twice a day (AM and PM). Several things can affect your baby's activity, such as see (20-40 minutes time), your blood sugar levels, smoking, noise level, drugs, gestational age, placental location, decreasing space in the uterus, and time of the day.   Call your doctor if your baby has NOT had 5 movements in 1 hour or 10 movements in 2 hours or there is a significant decrease in your baby's movements.     Backache  Almost all pregnant women have backaches in pregnancy. These are often related to stretching of ligaments that hold the uterus in place. Backaches can also be cause by stretching of the back muscles that support the weight as your baby grows. Here are some comfort measures:   Maternity Belt   Warm heating pad   Warm bath   Regular strength Tylenol   Massage     Breastfeeding  Your doctor should discuss breastfeeding before delivery. It is the best nutrition for your baby. It also protects your baby from illnesses. Studies have shown that  babies get sick less often. When they reach school  age,  babies perform better in school. Newborns tolerate breast milk better than formula. It also decreases you bleeding after delivery by shrinking your uterus. It is a natural process, but often takes some time for you and your new infant to get the hang of things. Here are some helpful hints:   Try breastfeeding as soon as baby is born. Starting immediately increases the success of breastfeeding and creates a bond between you and your baby.   Do not get discouraged! Most women don't produce a significant amount of milk until days 3- after delivery. Continue taking your prenatal vitamins while breastfeeding. Stay well hydrated by drinking 8-10 glasses of water every day.   When breastfeeding, if you have fever, redness of breasts or fullness that isn't relieved by pumping or expressing your milk, contact your doctor. Breast feeding is not recommended for women with certain medical conditions.     Breast Tenderness   This is common in pregnancy, especially in the beginning. The tenderness is related to the hormone changes that occur in the 1st trimester. Your breasts become larger. You may also notice darkening of the nipples. As your pregnancy continues, it's not uncommon to product milk, even before you deliver. To relieve tenderness and heaviness, wear a good support bra.     Colds and Congestion   This can be normal. To relieve congestion, you may use ocean nasal spray, a saline nasal spray, or Sudafed (without PE) sparingly. Do not use antihistamines because they may make your congestion worse. You can also try using a humidifier.   Medications ok in pregnancy: Plain Robitussin, plain Sudafed, Actifed, Benadryl, Claritin, Mucinex, Zyrtec.   DO NOT use any DM medications.     Constipation   This is very common throughout all stages of pregnancy. It can be caused by hormones that relax the muscles in your digestive system. Iron often taken during pregnancy may make this worse. In addition, the growing  uterus presses on the lower intestines which may add to the problem. Here are some ways to relieve constipation:   Drink plenty of water (8-10 glasses a day)   Eat foods high in fiber such as raw fruits, vegetables, wholegrain breasts, and cereals.   Eat fruit, especially at night, such as prunes, figs, dates, raisins, peaches, and cherries because of their laxative properties.   Avoid cheese, bananas, and rice as they may slow down your bowel movements.   Use Metamucil or Citrucel as needed. You can also try Senekot, GoodSense Fiber, Miralax.  Try a stool softener such as Colace.  Minimal use of Milk of Magnesia, Lactulose, or Dulcolax   If you go 5 days without any form of a bowel movement or have significant pain, contact your doctor.     Contractions   Cramping or Beny Zhao contractions are common in the 2nd and 3rd trimesters. Make sure you stay well hydrated. You may also find comfort from a warm bath.   If you experience contractions every 5 minuets apart or closer for 2 hours, come to the hospital for evaluation.      Cough   For relief from cough, you can try regular/plain Robitussin, Chloraseptic spray, or any throat lozenges.     Cramping   Women commonly have abdominal cramping throughout their pregnancies. Cramping associated with pregnancy is often described as feeling similar to menstrual cramps. Early in the pregnancy, mild abdominal cramping is due to the growing uterus. Cramping can also be due to round ligament pain. In the late 2nd trimester and 3rd trimester, you may experience cramping due to Crook Zhao. These are contractions of the uterus but are not associated with labor. They can be worse with activity or when you are dehydrated. Make sure you drink 8-10 glasses of water every day. If the cramping becomes more intense or you experience the cramping every 5 minutes apart or closer for 2 hours, come to the hospital for evaluation.     Cues  Babies don't feed at regular intervals.  Instead, babies use cutes to tell you when they are hungry and full. You can tell when your baby is starting to get hungry when you see him or her stretch or begin to wake up. Ten, your baby may begin to bring hands to mouth, smack lips or stick tongue out hopefully you have started to feed by now. If not, baby may start to cry, which makes feeding very difficult. When mothers respond to feeding cues, baby eats more frequently. More frequent feedings increase moms milk supply. More milk means that baby will be happier and healthier, and mom will be more confident.     Dental Procedures  Bleeding gums are common during pregnancy. However, if you have painful gums or teeth, speak with your dentist.   Most dental procedures can be done safely in pregnancy with a local anesthetic.   Keep your teeth healthy during pregnancy by brushing twice a day, using dental floss, and having regular dental exams and cleanings.     Diarrhea  Your gastrointestinal tract may be more sensitive during pregnancy. That sensitivity can cause diarrhea after you eat certain foods.   Relieve diarrhea with Imodium or Kaopectate. Stay well hydrated. If you have blood or mucus in your stool, call your doctor.     Diet   Maintain a healthy diet throughout pregnancy by eating grains, fruits, vegetables, dairy products, meats, and beans. Avoid fatty greasy, and fried foods. Eat small, frequent meals throughout the day and NEVER skip breakfast. There are some foods you want to avoid during pregnancy:   Cheese such as brie, Gouda, and feta. These soft cheeses are not completely pasteurized and contain bacteria that can be harmful to your baby.   Shark, swordfish, nu mackerel, and tilefish can be high in mercury. Limit canned tuna and salmon to once per week.   Packaged meat such as ham, bologna, and hot dogs. They contain bacteria that can be harmful. Eat them only if they are fully cooked. Raw meat and raw fish.   More information on foods  below    Dizziness/Faintness  This is common during pregnancy when standing for long periods of time. You may also experience this when changing positions, such as moving from sitting to standing. This usually occurs in the 2nd trimester. John sure you drink plenty of water and avoid standing for longer periods of time. If it does not improve, contact your doctor.     Exclusive Breastfeeding  Mothers milk has everything a baby needs for the first six months of life. It make take time to learn to breastfeed, but you have the support you need to be successful. All major health organizations recommend excusive breastfeeding for 6 months. This means no water, juice, tea, rice cereal, or solids for baby until after six months.     Exercise and Physical cavity   You can and should exercise during pregnancy but do not start a new rigorous activity. When you are exercising, your heart rate should not exceed 140 eats per minute. Do not exercise for more than 15 minutes in areas that are hot, humid, or not well ventilated. After the 4th month of pregnancy, avoid exercises that require you to lie on your back. Avoid exercises that will cause trauma to your abdomen, such as horseback riding, downhill skiing, wrestling, etc. swimming is permissible, but diving is not. If you experience excessive contractions, bleeding, loss of fluid, or decreased fetal movement, come to the hospital immediately.     Frequent Urination   Hormone changes at the beginning of pregnancy increase the frequency of urination. This is normal. Pressure from the uterus and the baby at the end of pregnancy decreases the capacity of the bladder - also leading to frequent urination. Because of the increased pressure, it's not uncommon to lose urine unexpectedly.     Gas and Bloating  It is common to have gas and bloating during pregnancy. Here are things to do to help:   Recognize the foods that give you gas and avoid eating them  Eat small, frequent meals  instead of big, heavy meals   Don't eat fired, fatty, and greasy foods  Avoid constipation     Hair  Often, the hormonal changes during pregnancy cause your hair to break. You may also notice increased shedding during the post-partum period. These changes are normal.   Relaxer, perms, and hair dyes can be applied after the first trimester.     Headaches   There are different causes for headaches during pregnancy:   Tension Headaches: pain usually in the back and sides of the head that becomes worse with stress. These are best treated by taking regular strength Tylenol, drinking plenty of water, and resting.   Sinus Headaches: Pain under eyes or around the face. Best relieved with regular strength Tylenol, alternating cold and warm compresses, or a humidifier.   Migraine Headaches: Often accompanied with nausea or vomiting. Light and sound make migraine pain worse. Tylenol may help with this pain. If you experience this, consult your doctor.   Tips to help with headaches:   Magnesium oxide 400 mg nightly  Check your Prenatal Vitamin and ensure you it has at least 400 mg of Vitamin B2. If it does not get OTC Vitamin B2 as well.    Riboflavin 400 mg daily  Caffeine (during the day, <200 mg daily)  Benadryl or OTC Melatonin 1-2 mg nightly (before bedtime).   2 extra strength Tylenol or 1-2 tablets (do not exceed 3000 mg tylenol daily)  Gatorade  Migraine frequency and severity should start to level off and improve during the second trimester as estrogen levels normalize.  Please contact me in 1-2 weeks to update me on any changes for better or worse.  Any time you experience a headache associated with blurry vision or a headache that's not relieved with Tylenol, contact your doctor. This may be cause by elevated blood pressure.     Heartburn   Pregnancy hormonal changes slow down your digestive system and the stomach takes longer to empty. This increases the production of gastric juices that can lead to heartburn. Hear  are some ways to ease the pain:   Eat small, more frequent meals  Avoid spicy foods  Avoid fried, fatty, spicy food   Avoid irritants such as citrus juices, tomatoes, and sodas  Avoid alcohol, mini, coffee, and strong tea   Remain upright for at least one hour after eating   Medications: Tums, Rolaids, carafate, Mylanta with Simethicone, Gas-X. Pepcid AC, Prilosec OTC 20mg, Prevacid, Protonix.   Take over the counter Pepcid twice a day      Heart Palpitations  During pregnancy, you may feel as though your heart is racing or skipping a beat. These can be normal but if associated with chest pain, shortness of breath, or fatigue, contact your doctor immediately.     Insomnia  Benadryl 25mg, Tylenol PM, Unisom     Hemorrhoids  As your pregnancy continues and you have more pelvic pressure, you may develop hemorrhoids. These can be painful. Here are tips to help with the pain:   Avoid constipation (see above).   Use hemorrhoid creams such as Annusol or Preparation H.   Use astringents such as Tucks Medicated Pads  If your pain persists or your experience excessive bleeding, contact your doctor.     Latch   When it comes to latching on for breastfeeding, only 2 things matter: comfort and effectiveness. Breastfeeding is part instinct, and part learning. Our staff will help the learning to make sure there is no pain and baby is getting milk     Leg Cramps   Muscle spasms in the calf, especially at night, are common during pregnancy. Try massaging the calves, stretching or applying a warm heating pad. If your leg cramps do not improve or only occur in one leg, go to the hospital.     Miscarriage   Unfortunately, this is the unhappy side of pregnancy and is very common. This is not your fault or your partner's fault. Most of the time, miscarriage is the results of genetic information not coming together in the right way. It will not affect your next pregnancy. However, if you have had 3 or more miscarriages, discuss this with  your doctors. Also, you should be aware of the signs of a miscarriage:   Bleeding during pregnancy is a result of increased dilation of blood vessels. However, if you have bleeding that soaks through a sanitary pad, contact your doctor.   Cramping can be a sign of growth. However, if it is associated with bleeding, contact your doctor.   Pregnancy loss is a difficult life event. If you are having difficult coping, speak to your doctor or nurse about support groups or counselors.     Mood Swings  You may be happy one minute and sad the next. Mood swings are a normal part of pregnancy and caused by hormones. However, if you are extremely sad, cry a lot, cannot sleep, are not eating or feel like hurting yourself or someone else, call your doctor immediately.     Nausea and Vomiting  During the early stages of pregnancy, these symptoms are common. These usually stop in the 2nd trimester. Here are some things you can do to reduce feeling nauseous.   Diet Modifications: Eat before or as soon as you feel hungry to avoid an empty stomach, which can aggravate nausea. Eat crackers or dry toast before sitting up in the morning. A snack before getting out of bed in the morning and snacks during the night may be helpful. Eat meals slowly and in small amounts every 1-2 hours to avoid an overly full stomach. Eliminate coffee and spicy, odorous, high-fat, acidic, or very sweet foods, and instead eat protein-dominant, salty, low-fat, bland, and/or dry meals. These include things like nuts, pretzels, crackers, cereal, toast. Drink fluids 30 minutes before or after food. Try drinking cold, clear, carbonated, or sour fluids in small amounts.    Avoid sudden movements. Get out of bed slowly.   Stay hydrated. If you cannot tolerate water, try Sprite.   Try ginger in any form: ginger ale, ginger snaps, or ginger tablets.   The only category A medication for nausea in pregnancy is doxylamine, but insurance coverage for the brand medication  is still poor. It is available over-the-counter in a form of Unisom 25 mg.  The other component of Diclegis is Vitamin B6 (also known as pyridoxine), also available over-the-counter.  You could take 1/2 tablet of the Unisom in the morning and a full tablet in the evening (to minimize sleepiness during the day).  Take 25 mg of the pyridoxine every 6-8 hours. You can also try Emetrol.   If your vomiting persists for more than 24 hours, check with your doctor for further advice.     Nosebleeds  Because of increased dilation of the blood vessels during pregnancy, nosebleeds can occur. This condition does not usually require medical treatment. However, if the bleeding persists, contact your doctor.     Pain  Labor is hard work. When you're making decisions about how to hand birth pain, talk with your nurses and doctors about techniques that will not interfere with breastfeeding. For example, select a support person to be with you during labor, and practice ways of moving and massaging that help you feel comfortable.     Preeclampsia   This is a disorder during pregnancy in which your blood pressure goes up above limits that are normal for you. This can be dangerous if not monitored. Possible complications are seizure, stroke, placental abruption, pulmonary edema, kidney failure, and baby's death. Notify your doctor if you have:   Sudden weight gain of 5 or more pounds in one week   Generalized swelling that appears quickly   Decreased urine   Headaches that don't go away with Tylenol  Chest pain  Shortness of breath   Vision changes   If you have these symptoms with a blood pressure of >140/90 or you do not have these symptoms, but your blood pressure is >160/100 go to labor and delivery if you are >20 weeks.     Prenatal Visits  Prenatal care is essential to having a happy, healthy pregnancy. During your visits, your provider will listen to the baby's heart (after 12 weeks gestation) and make sure your baby is growing  appropriately. You will have different laboratory tests performed, including your blood type, checking for anemia, and testing for infections. You will need to see a provider every 4 weeks until you are 28 weeks pregnant. Then you will see your provider every 2 weeks until you are 35 weeks pregnant. After 35 weeks, you will be seen every week until you deliver.      Labor   labor occurs when regular contractions begin to open our cervix before 37 weeks of pregnancy. A full-term pregnancy should last about 40 weeks. If  labor can't be stopped, your baby will be born early.  The good news is that doctors can do a lot to delay an early delivery. The longer your baby gets to grow inside you, the less likely he or she is to have problems after birth. Here are some risks for  birth:   Pervious  labor and delivery  Abnormally shaped uterus or uterine surgery   Two or more second trimester miscarriages or abortions   Incompetent cervix, cone biopsy, large fibroid   Current pregnancy with twins, triplets, etc.   Severe urinary tract infection or kidney infection   Vaginal bleeding, placenta previa   Too much or too little amniotic fluid     Rooming In   This is when mom and baby are together in the same room throughout their whole stay. The doctors and nurses provide all clinical care in the room, except for some procedures that need to e done in another unit. Babies feel safe and secure when they are near the people who love them. Mother and babies sleep better quality when rooming in. nurses are more available to be with you and your baby in your room because they are not busy taking care of a nursery full of babies. They will help you with feeding, diapering, bathing, etc. This way, when you go home, you will feel confident.     Round Ligament Pain   There are 2 ligaments (a band of tough, flexible, fibrous connective tissue) from the front of the uterus an end in the vagina. These are  the round ligaments.as the uterus grows and stretches, these ligaments stretch. Pain is often associated with this stretching. It can be sharp, stabbing pain usually in the lower pelvis or vagina. This pain is worse when you move from sitting to standing or walk for long periods. You can help this pain with the following:   Using a warm (not hot) heating pad  Warm bath   Regular strength Tylenol   Maternity belt   Staying well hydrated.     Salivation and spitting   Some women experience increased salivation during pregnancy. This is known as ptyalism. Decrease your saliva by using non-mediated throat lozenges, sucking candy such as peppermint or eating crackers. This may stop in 2nd trimester but may continue throughout pregnancy.     Sexual intercourse  In most cases, it is safe to continue sexual intercourse throughout pregnancy. You may find a decreased or increase desire during pregnancy, and this is normal. Avoid sex if you are having bleeding, your water bag is ruptured, or you have been diagnosed with placenta previa or an incompetent cervix.     Shortness of Breath  Toward the end of pregnancy, many women experience shortness of breath. The uterus is getting bigger, and the diaphragm is unable to lower, making it feel like you are unable to catch your breath. However, if you experience wheezing, inability to catch your breath, dizziness or your blood pressure is elevated, call your doctor.     Skin Changes  Hormone changes in pregnancy affect the melanocytes and cause darkening of several areas of the body. Some women's faces darken causing a pregnancy mask. Some have darkening around the areola around the nipple. Often a dark line appears on the abdomen from the belly button to the pubic area. Stretch marks may also form. Most of the skin changes will fade during pregnancy. You may minimize the appearance of stretch marks by using cocoa butter lotion, vitamin E oil and other over the counter products.      Skin to Skin   The first few hours after birth are sacred. As soon as your baby is born, she or he will be dried ad placed on you for the first hug! As long as everyone is healthy, you will get to be skin to skin through your first feeding and for at least an hour. Baby has been growing inside of you and really needs to stay close after birth in order to adjust to life in the outside world. Feeling your warmth, hearing your heartbeat and voice, and receiving your milk will all ensure a smooth transition. Other loved ones will enjoy holding the baby after this important adjustment period.     Support  We are here for you. With the right support, al mother can breastfeed. Our team of doctors, nurses, and lactation consultants can help you met your goals. Our job is to help you make informed decisions and to make sure you have the support you need to meet your goals.     Swelling  As the uterus gets larger, it lies on the inferior vena cava, diminishing the return of blood flow. This often leads to swelling in your ankles and feet, and sometimes in your hands. Swelling in your hands may lead to a condition known as carpal tunnel syndrome, resulting in pain in your wrists. You can improve swelling with these techniques:   Avoid salty foods  Elevate your feet high than the level of your heart  Avoid standing for long periods  Wear loose clothes  Wear wrist braces, especially at night, for carpal tunnel  If you experience swelling with blood pressure >140/90 or with other signs of preeclampsia, go to the hospital     Tiredness  Fatigue during pregnancy can be normal. It's most pronounced at the start and end of pregnancy. Make sure to get adequate sleep and rest. While most of the time this is normal, it can be a sign of anemia. You are screened for this routinely during your pregnancy.     Travel  Travel during pregnancy is safe. However, you should always check with your doctor first before traveling. During  pregnancy, you are at increased risk for blood clots, so you should walk around every 1-2 hours during travel. Always wear a seatbelt when riding in the car. Place the shoulder strap across your chest and place the waist belt underneath your belly. You should avoid flying after 35 weeks.     True Labor vs False Labor  It's important to e able to tell the difference between true and false labor. When labor begins, you have regular contractions every 5 minutes for 2 hours. The contractions get stronger (average is about 1 minute) in intensity and last longer. The cervix starts to dilate or open. A bloody show or pink discharge may occur. A sudden gush or leaking of watery fluid from the vagina may be because your water bag has broken.   When you are having false labor, the cervix does not dilate. Contractions are not in a regular pattern. They do not get strong in intensity and changing your activity, such as walking or lying down, may make the pain of contractions lessen. Staying hydrated with plenty of water and or taking a dose of Tylenol makes contractions decrease.     Urinary Tract Infection   Urinary tract infections can cause  labor. Here are some methods to help prevent getting a urinary tract infection:   Drink 8-10 glasses of water a day   Drink cranberry juice every day as it lowers the pH or the urinary tract and discourages bacterial growth   Empty your bladder immediately before and after sexual intercourse  Wipe from front to back after using the toilet  Avoid irritating bubble baths and soaps.   Wear cotton underwear.   If you experience burning when you urinate, blood in your urine, fever, chills, or pain associated with urination, tell your doctor.     Vaginal Bleeding and Discharge  Hormonal changes during pregnancy can cause vaginal discharge or varying consistencies. You may have a thick white discharge from your vagina that helps prepare your body for birth. You may have vaginal bleeding  for different reasons such as broken blood vessels in your cervix after a vaginal exam. A vaginal infection may cause bleeding. Also, having sex may cause some of the blood vessels to break and result in some spotting. If you have bleeding like a menstrual period, itching, irritation, or foul odor, call your doctor.     Varicose Veins  Varicose veins occur because of dilation of the blood vessels during pregnancy. Varicose veins may occur on the legs or even the vulva. Avoid standing for long periods of time, elevate your feet at night, and wear support hose during the day.     Weight Gain  While you should not diet during your pregnancy, there is an expected amount of weight you should gain during your pregnancy based on your BMI:   BMI  < 18.5 -  28-40 lbs.  18.5-24.9 -  25-35 lbs.  25-29.9 -  15-25 lbs.  >30 -   11-20 lbs.    Yeast Infections   In pregnancy, we would recommend any of the following (all are over the counter) - so you will not need a prescription for these medications. These products can be used at any point during pregnancy and don't pose a risk of birth defects or other pregnancy complications. For best results, choose a seven-day formula.   Clotrimazole (Mycelex, Lotrimin AF)  Miconazole (Monistat)  Terconazole    WHAT TO EXPECT AT CERTAINS WEEKS FOR VISITS/ULTRASOUNDS:     6-10 WEEKS  Schedule a transvaginal ultrasound to be done at a later date   This will be scheduled as a separate visit by our [facility] department. This is usually scheduled between 8-12 weeks to confirm your dating.   Discuss prenatal vitamins  Order labs such as CBC, Rh type, Syphilis, Hepatitis, HIV, Rubella titers   Gonorrhea and Chlamydia test   Pap smear if due   Get medical history and previous OB history   Monthly visits until 28 week visit     12 weeks   Discuss prenatal lab testing  Check weight, blood pressure, and urine  Listen for fetal heart tones  Sign up for patient portal and connected mom   MaternTI 21 (NIPT)  testing - optional     15-18 weeks  Listen for fetal heart tones and check uterine size  Order quad screen/maternal integrated screening if wanted   Check weight, blood pressure, urine  Maternal Quad Screen for down syndrome screen   Anatomy scan at 18-20 weeks     22-24 weeks  Glucose testing     28-30 weeks    Check weight, blood pressure, and urine  Listen for fetal heart tones  Tdap vaccine   Rhogam vaccine if - Rh.   Prenatal visits every 2 weeks until 36 weeks   Nonstress tests if necessary     32-36 weeks   Check weight, blood pressure, and urine  Listen for fetal heart tones  Group B strep culture of vagina     37-40 weeks   Check weight, blood pressure, and urine  Listen for fetal heart tones  Discuss labor and delivery   Cervical examination     NUTRITION IN PREGNANCY   What is folic acid and how much do I need daily?   Folic acid, also known as folate, is a B vitamin that is important for pregnant women. Before pregnancy and during pregnancy, you need 400 micrograms of folic acid daily to help prevent major birth defects of the fetal brain and spine called neural tube defects. Current dietary guidelines recommend that pregnant women get at least 600 micrograms of folic acid daily from all sources. It may be hard to get the recommended amount of folic acid from food alone. For this reason, all pregnant women and all women who may become pregnant should take a daily vitamin supplement that contains folic acid.    Why is iron important during pregnancy and how much do I need daily?  Iron is used by your body to make a substance in red blood cells that carries oxygen to your organs and tissues. During pregnancy, you need extra iron--about double the amount that a nonpregnant woman needs. This extra iron helps your body make more blood to supply oxygen to your fetus. The daily recommended dose of iron during pregnancy is 27 mg, which is found in most prenatal vitamin supplements. You also can eat iron-rich  foods, including lean red meat, poultry, fish, dried beans and peas, iron-fortified cereals, and prune juice. Iron also can be absorbed more easily if iron-rich foods are eaten with vitamin C-rich foods, such as citrus fruits and tomatoes.    Why is calcium important during pregnancy and how much do I need daily?  Calcium is used to build your fetus's bones and teeth. All women, including pregnant women, aged 19 years and older should get 1,000 mg of calcium daily; those aged 14-18 years should get 1,300 mg daily. Milk and other dairy products, such as cheese and yogurt, are the best sources of calcium. If you have trouble digesting milk products, you can get calcium from other sources, such as broccoli; dark, leafy greens; sardines; or a calcium supplement.    Why is vitamin D important during pregnancy and how much do I need daily?  Vitamin D works with calcium to help the fetus's bones and teeth develop. It also is essential for healthy skin and eyesight. All women, including those who are pregnant, need 600 international units of vitamin D a day. Good sources are milk fortified with vitamin D and fatty fish such as salmon. Exposure to sunlight also converts a chemical in the skin to vitamin D.    Can being overweight or obese affect my pregnancy?  Overweight and obese women are at an increased risk of several pregnancy problems. These problems include gestational diabetes, high blood pressure, preeclampsia,  birth, and  delivery. Babies of overweight and obese women also are at greater risk of certain problems, such as birth defects, macrosomia with possible birth injury, and childhood obesity.    Can caffeine in my diet affect my pregnancy?  Although there have been many studies on whether caffeine increases the risk of miscarriage, the results are unclear. Most experts state that consuming fewer than 200 mg of caffeine (one 12-ounce cup of coffee) a day during pregnancy is safe.  Food and  Beverages Milligrams of Caffeine (Average)   Coffee (8 oz)    Brewed, drip  137   Instant 76   Tea (8oz)     Brewed 48   Instant 26-36   Caffeinated soft drinks (12 oz)  37   Hot cocoa (12 oz) 8-12   Chocolate milk (8 oz)  5-8   Candy    Dark chocolate (1.45 oz) 30   Milk chocolate (1.55 oz)  11   Semi-sweet chocolate (1/4 cup)  26-28   Chocolate syrup (1 tbsp.) 3   Coffee ice cream or frozen yogurt (1/2 cup) 2     How much water should I drink during pregnancy?   During pregnancy, adequate fluid intake from consumption of beverages (water and other liquids) is estimated to be approximately 2.3 L/day (76 fluid ounces or approximately 10 cups). Additional water is consumed in foods other than beverages to meet the total adequate intake of 3 L/day.    What are the benefits of including fish and shellfish in my diet during pregnancy?  Omega-3 fatty acids are a type of fat found naturally in many kinds of fish. They may be important factors in your fetus's brain development both before and after birth. To get the most benefits from omega-3 fatty acids, women should eat at least two servings of fish or shellfish (about 8-12 ounces) per week before getting pregnant, while pregnant, and while breastfeeding.    What should I know about eating fish during pregnancy?  Some types of fish have higher levels of a metal called mercury than others. Mercury has been linked to birth defects. To limit your exposure to mercury, follow a few simple guidelines. Choose fish and shellfish such as shrimp, salmon, catfish, and pollock. Do not eat shark, swordfish, nu mackerel, rincon, orange roughy, or tilefish. Limit white (albacore) tuna to 6 ounces a week. You also should check advisories about fish caught in local roberts.    How can food poisoning affect my pregnancy?  Food poisoning in a pregnant woman can cause serious problems for both her and her fetus. Vomiting and diarrhea can cause your body to lose too much water and can  disrupt your body's chemical balance. To prevent food poisoning, follow these general guidelines:    Wash food. Rinse all raw produce thoroughly under running tap water before eating, cutting, or cooking.  Keep your kitchen clean. Wash your hands, knives, countertops, and cutting boards after handling and preparing uncooked foods.  Avoid all raw and undercooked seafood, eggs, and meat. Do not eat sushi made with raw fish (cooked sushi is safe). Food such as beef, pork, or poultry should be cooked to a safe internal temperature.    What is listeriosis and how can it affect my pregnancy?  Listeriosis is a type of food-borne illness caused by bacteria. Pregnant women are 13 times more likely to get listeriosis than the general population. Listeriosis can cause mild, flu-like symptoms such as fever, muscle aches, and diarrhea, but it also may not cause any symptoms. Listeriosis can lead to miscarriage, stillbirth, and premature delivery. Antibiotics can be given to treat the infection and to protect your fetus. To help prevent listeriosis, avoid eating the following foods during pregnancy:  Unpasteurized milk and foods made with unpasteurized milk  Hot dogs, luncheon meats, and cold cuts unless they are heated until steaming hot just before serving  Refrigerated irwin and meat spreads  Refrigerated smoked seafood  Raw and undercooked seafood, eggs, and meat

## 2025-05-20 NOTE — PROGRESS NOTES
CC: Positive Pregnancy Test    HISTORY OF PRESENT ILLNESS:    Jacobo Alvarado is a 30 y.o. female, ,  Presents today for a routine exam complaining of oligomenorrhea and positive home urine pregnancy test.  Patient's last menstrual period was 2025.   She is not currently on any contraception. UPT is Positive.  Pregnancy is desired. Sexual Activity: single partner, contraception: none. Last period was normal.  She is not taking a PNV.  She reports nausea/vomiting. Current symptoms also include: GERD. Denies vaginal bleeding and pelvic pain.  RECENTLY HAD 2 BREAST BIOPSIES  Denies PMH, abdominal surgeries, medications other than PNV, genetic family history (SC/CF). No personal history of DM, MOM WITH DM. No personal or family history of thyroid disease. Does not use alcohol, tobacco, or illicit drugs. Feels safe at home.     OB history:     Prior pregnancies:   6 MISCARRIAGES   2019 - FT  - TOOK JENNIFER FOR THIS PREGNANCY    - PT    Complications: history of  labor, miscarriage, and GBS +      Last Pap:   result NILM      ROS:  GENERAL: No weight changes. No swelling. No fatigue. No fever.  CARDIOVASCULAR: No chest pain. No shortness of breath. No leg cramps.   NEUROLOGICAL: No headaches. No vision changes.  BREASTS: No pain. No lumps. No discharge.  ABDOMEN: No pain. No diarrhea. No constipation.  REPRODUCTIVE: No abnormal bleeding.   VULVA: No pain. No lesions. No itching.  VAGINA: No relaxation. No itching. No odor. No discharge. No lesions.  URINARY: No incontinence. No nocturia. No frequency. No dysuria.    MEDICATIONS AND ALLERGIES:  Reviewed    COMPREHENSIVE GYN HISTORY:  PAP History: Denies abnormal Paps.  Infection History: Denies STDs. Denies PID.  Benign History: Denies uterine fibroids. Denies ovarian cysts. Denies endometriosis. Denies other conditions.  Cancer History: Denies cervical cancer. Denies uterine cancer or hyperplasia. Denies ovarian cancer. Denies  vulvar cancer or pre-cancer. Denies vaginal cancer or pre-cancer. Denies breast cancer. Denies colon cancer.  Sexual Activity History: Reports currently being sexually active  Menstrual History: None.  Contraception: None    /72   Wt 71 kg (156 lb 8.4 oz)   LMP 2025   BMI 30.57 kg/m²     PE:  AFFECT: Calm, alert and oriented X 3. Interactive during exam  GENERAL: Appears well-nourished, well-developed, in no acute distress.  HEAD: Normocephalic, atruamatic  TEETH: Good dentition.  SKIN: Normal for race, warm, & dry. No lesions or rashes.  LUNGS: Easy and unlabored  ABDOMEN: Soft and nontender without masses or organomegally.  VULVA: No lesions, masses or tenderness.  VAGINA: Moist and well rugated without lesions or discharge.  CERVIX: Moist and pink without lesions, discharge or tenderness.      UTERUS SIZE: 7 week size, nontender and without masses.  ADNEXA: No masses or tenderness.  EXTREMITIES: No cyanosis, clubbing or edema. No calf tenderness.  LYMPH NODES: No axillary or inguinal adenopathy.    PROCEDURES:  UPT Positive  Genprobe  Pap    ASSESSMENT/PLAN:  Amenorrhea  Positive urine pregnancy test (ILDA: 26, EGA: 7W0D based on LMP:25)    -  Routine prenatal care    Nausea and vomiting in pregnancy    -  Education regarding lifestyle and dietary modifications    -  Advised use of B6/Unisom. Pt will notify us if no relief/worsening symptoms, will consider alternative therapies prn    1st TRIMESTER COUNSELING: Discussed all, booklet provided:  Common complaints of pregnancy  HIV and other routine prenatal tests including  genetic screening  Risk factors identified by prenatal history  Oriented to practice - discussed anticipated course of prenatal care & indications for Ultrasound  Childbirth classes/Hospital facilities   Nutrition and weight gain counseling  -- Discussed IOM recommended weight gain of:          Underweight        Less than 18.5            28-40            Normal  Weight     18.5-24.9                    25-35            Overweight          25-29.9                       15-25            Obese                  30 and greater             11-20    -- Discussed criteria for delivery at [facility] r/t excessive pre-preg weight or excessive weight gain:          Pre-pregnancy BMI over 40 or excess pregnancy weight gain defined as:          Pre-preg BMI < 18.5; Excess weight gain = > 60 pound          Pre-preg BMI 18.5-24.9;  Excess weight gain = > 53 pounds          Pre-preg BMI 25-29.9;  Excess weight gain = > 38 pounds          Pre-preg BMI > 30;  Excess weight gain = > 30 pounds  Toxoplasmosis precautions (Cats/Raw Meat)  Sexual activity and exercise  Environmental/Work hazards  Travel  Tobacco (Ask, Advise, Assess, Assist, and Arrange), as well as alcohol and drug use  Use of any medications (Including supplements, Vitamins, Herbs, or OTC Drugs)  Domestic violence  Seat belt use      TERATOLOGY COUNSELING:   Discussed indications and options for aneuploidy screening - pamphlets given  [facility] US ORDERED  Indications for low-dose aspirin use after 12 weeks for the prevention of pre-eclampsia reviewed.  For this patient, her high risk factors include None. Her moderate risk factors include:  BMI >30, Sociodemographic characteristics: Black or , and Other history factors: previous adverse pregnancy outcome.  Patient is a candidate for low-dose aspirin and will begin taking it 12W  Anatomy  19-20 weeks   Routine serum and urine prenatal labs today  FOLLOW-UP in 4 weeks with Dr. PHYLLIS Torrez PABebeC    OB/GYN

## 2025-05-21 ENCOUNTER — RESULTS FOLLOW-UP (OUTPATIENT)
Dept: OBSTETRICS AND GYNECOLOGY | Facility: CLINIC | Age: 30
End: 2025-05-21

## 2025-05-21 LAB
EAG (OHS): 97 MG/DL (ref 68–131)
HBA1C MFR BLD: 5 % (ref 4–5.6)
HBV SURFACE AG SERPL QL IA: NORMAL
HCV AB SERPL QL IA: NORMAL
HIV 1+2 AB+HIV1 P24 AG SERPL QL IA: NORMAL
PROLACTIN SERPL IA-MCNC: 37.8 NG/ML (ref 5.2–26.5)
T PALLIDUM IGG+IGM SER QL: NORMAL

## 2025-05-22 LAB
BACTERIA UR CULT: NORMAL
RUBV IGG SER-ACNC: 54.6 IU/ML
RUBV IGG SER-IMP: REACTIVE

## 2025-05-23 LAB
C TRACH DNA SPEC QL NAA+PROBE: NOT DETECTED
CTGC SOURCE (OHS) ORD-325: NORMAL
N GONORRHOEA DNA UR QL NAA+PROBE: NOT DETECTED

## 2025-05-26 ENCOUNTER — TELEPHONE (OUTPATIENT)
Dept: OBSTETRICS AND GYNECOLOGY | Facility: CLINIC | Age: 30
End: 2025-05-26
Payer: MEDICAID

## 2025-05-26 NOTE — TELEPHONE ENCOUNTER
Colpo scheduled with Dr. Calvo    ----- Message from Emma Torrez PA-C sent at 5/26/2025 12:58 PM CDT -----  Please call pt and let her know her pap smear was abnormal.  She will need a colposcopy done with one of the MD's.    Colposcopy is a procedure used by physicians that provides a magnified and illuminated view of the vulva, vaginal walls, and uterine cervix. If any abnormal cells are seen, a small biopsy will be   taken. This procedure is relatively painless, but can cause some mild cramping, so I recommend you take Motrin 400 mg (2 regular Advil tablets) one hour prior to your appointment time.      If she has any questions, don't hesitate to let us know.  The doctor will review with you further in detail and answer any concerns at that visit.    Please schedule her for a colpo with one of the MDs.       ----- Message -----  From: Yulisa Mackenzie MA  Sent: 5/20/2025   2:50 PM CDT  To: Emma Torrez PA-C

## 2025-05-26 NOTE — TELEPHONE ENCOUNTER
----- Message from Emma Torrez PA-C sent at 5/26/2025 12:58 PM CDT -----  Please call pt and let her know her pap smear was abnormal.  She will need a colposcopy done with one of the MD's.    Colposcopy is a procedure used by physicians that provides a magnified and illuminated view of the vulva, vaginal walls, and uterine cervix. If any abnormal cells are seen, a small biopsy will be   taken. This procedure is relatively painless, but can cause some mild cramping, so I recommend you take Motrin 400 mg (2 regular Advil tablets) one hour prior to your appointment time.      If she has any questions, don't hesitate to let us know.  The doctor will review with you further in detail and answer any concerns at that visit.    Please schedule her for a colpo with one of the MDs.       ----- Message -----  From: Yulisa Mackenzie MA  Sent: 5/20/2025   2:50 PM CDT  To: Emma Torrez PA-C

## 2025-05-29 ENCOUNTER — INITIAL PRENATAL (OUTPATIENT)
Dept: OBSTETRICS AND GYNECOLOGY | Facility: CLINIC | Age: 30
End: 2025-05-29
Payer: MEDICAID

## 2025-05-29 ENCOUNTER — PROCEDURE VISIT (OUTPATIENT)
Dept: MATERNAL FETAL MEDICINE | Facility: CLINIC | Age: 30
End: 2025-05-29
Payer: MEDICAID

## 2025-05-29 VITALS
WEIGHT: 156.88 LBS | SYSTOLIC BLOOD PRESSURE: 116 MMHG | BODY MASS INDEX: 30.63 KG/M2 | DIASTOLIC BLOOD PRESSURE: 70 MMHG

## 2025-05-29 DIAGNOSIS — Z36.9 ENCOUNTER FOR FETAL ULTRASOUND: Primary | ICD-10-CM

## 2025-05-29 DIAGNOSIS — Z13.71 SCREENING FOR GENETIC DISEASE CARRIER STATUS: ICD-10-CM

## 2025-05-29 DIAGNOSIS — N91.4 SECONDARY OLIGOMENORRHEA: ICD-10-CM

## 2025-05-29 DIAGNOSIS — Z32.00 ENCOUNTER FOR CONFIRMATION OF PREGNANCY TEST RESULT WITH PHYSICAL EXAMINATION: Primary | ICD-10-CM

## 2025-05-29 DIAGNOSIS — O99.619 GASTROESOPHAGEAL REFLUX IN PREGNANCY: ICD-10-CM

## 2025-05-29 DIAGNOSIS — K21.9 GASTROESOPHAGEAL REFLUX IN PREGNANCY: ICD-10-CM

## 2025-05-29 PROCEDURE — 99212 OFFICE O/P EST SF 10 MIN: CPT | Mod: PBBFAC,TH | Performed by: STUDENT IN AN ORGANIZED HEALTH CARE EDUCATION/TRAINING PROGRAM

## 2025-05-29 PROCEDURE — 99999 PR PBB SHADOW E&M-EST. PATIENT-LVL II: CPT | Mod: PBBFAC,,, | Performed by: STUDENT IN AN ORGANIZED HEALTH CARE EDUCATION/TRAINING PROGRAM

## 2025-05-29 PROCEDURE — 76801 OB US < 14 WKS SINGLE FETUS: CPT | Mod: PBBFAC | Performed by: OBSTETRICS & GYNECOLOGY

## 2025-05-29 NOTE — PROGRESS NOTES
Jacobo Alvarado is a 30 y.o.  at 9w2d (Estimated Date of Delivery: 25) presenting for BRYCE    Today doing well, just had dating US.  Struggling with GERD, not able to take the PNV because of size. Hx of gastric sleeve surgery    Denies vaginal bleeding    Past Medical History:   Diagnosis Date    Anemia     Chlamydia     Disorder of kidney and ureter     Miscarriage        OB History    Para Term  AB Living   10 2 1 1 6 2   SAB IAB Ectopic Multiple Live Births   6   0 2      # Outcome Date GA Lbr Cody/2nd Weight Sex Type Anes PTL Lv   10 Current            9 SAB 2025 5w0d    SAB      8 SAB 2024 4w0d    SAB      7 SAB 10/2024 6w0d    SAB      6 Term 19 39w4d   F Vag-Spont EPI N [place]   5 SAB 2018 4w0d    SAB   FD   4 SAB 18 12w0d    SAB   FD   3 SAB 2017 12w0d    SAB   FD   2  12 36w0d  2.693 kg (5 lb 15 oz) F Vag-Spont EPI Y [place]   1                 Review of patient's allergies indicates:  No Known Allergies     Vitals  BP: 116/70  Weight: 71.2 kg (156 lb 13.7 oz)     Assessment & Plan    - Discussed timing and location of prenatal care visits  - reviewed prenatal labs, ordered inheritest and M21 - does want to know sex of fetus. Instructed to have drawn at 10 weeks  - Dating US reviewed today  - repeat TSH and T4 in 2nd trimester  - due for colposcopy, scheduled this Mon  - reviewed breast bx results    RTC in 4 weeks    Total time spent on visit was 25 minutes. This includes face to face time and non-face to face time preparing to see the patient (eg, review of tests), use of video or in-person , obtaining and/or reviewing separately obtained history, documenting clinical information in the electronic or other health record, independently interpreting results and communicating results to the patient/family/caregiver, or care coordination.

## 2025-05-30 RX ORDER — FAMOTIDINE 20 MG/1
20 TABLET, FILM COATED ORAL DAILY
Qty: 60 TABLET | Refills: 2 | Status: SHIPPED | OUTPATIENT
Start: 2025-05-30 | End: 2026-05-30

## 2025-06-02 ENCOUNTER — PATIENT MESSAGE (OUTPATIENT)
Dept: OBSTETRICS AND GYNECOLOGY | Facility: CLINIC | Age: 30
End: 2025-06-02
Payer: MEDICAID

## 2025-06-03 ENCOUNTER — OFFICE VISIT (OUTPATIENT)
Dept: MATERNAL FETAL MEDICINE | Facility: CLINIC | Age: 30
End: 2025-06-03
Payer: MEDICAID

## 2025-06-03 DIAGNOSIS — O26.21 RECURRENT PREGNANCY LOSS IN PREGNANT PATIENT IN FIRST TRIMESTER, ANTEPARTUM: Primary | ICD-10-CM

## 2025-06-05 ENCOUNTER — PATIENT MESSAGE (OUTPATIENT)
Dept: OBSTETRICS AND GYNECOLOGY | Facility: CLINIC | Age: 30
End: 2025-06-05
Payer: MEDICAID

## 2025-06-06 ENCOUNTER — PROCEDURE VISIT (OUTPATIENT)
Dept: OBSTETRICS AND GYNECOLOGY | Facility: CLINIC | Age: 30
End: 2025-06-06
Payer: MEDICAID

## 2025-06-06 VITALS
DIASTOLIC BLOOD PRESSURE: 70 MMHG | WEIGHT: 158.5 LBS | HEIGHT: 60 IN | BODY MASS INDEX: 31.12 KG/M2 | SYSTOLIC BLOOD PRESSURE: 114 MMHG

## 2025-06-06 DIAGNOSIS — O26.851 SPOTTING AFFECTING PREGNANCY IN FIRST TRIMESTER: ICD-10-CM

## 2025-06-06 DIAGNOSIS — R87.610 ATYPICAL SQUAMOUS CELLS OF UNDETERMINED SIGNIFICANCE ON CYTOLOGIC SMEAR OF CERVIX (ASC-US): Primary | ICD-10-CM

## 2025-06-12 ENCOUNTER — RESULTS FOLLOW-UP (OUTPATIENT)
Dept: OBSTETRICS AND GYNECOLOGY | Facility: HOSPITAL | Age: 30
End: 2025-06-12

## 2025-06-18 ENCOUNTER — TELEPHONE (OUTPATIENT)
Dept: MATERNAL FETAL MEDICINE | Facility: CLINIC | Age: 30
End: 2025-06-18
Payer: MEDICAID

## 2025-07-11 ENCOUNTER — PATIENT MESSAGE (OUTPATIENT)
Dept: MATERNAL FETAL MEDICINE | Facility: CLINIC | Age: 30
End: 2025-07-11
Payer: MEDICAID

## 2025-07-15 ENCOUNTER — PATIENT MESSAGE (OUTPATIENT)
Dept: OTHER | Facility: OTHER | Age: 30
End: 2025-07-15
Payer: MEDICAID

## 2025-07-16 ENCOUNTER — OFFICE VISIT (OUTPATIENT)
Dept: MATERNAL FETAL MEDICINE | Facility: CLINIC | Age: 30
End: 2025-07-16
Payer: MEDICAID

## 2025-07-16 ENCOUNTER — PROCEDURE VISIT (OUTPATIENT)
Dept: MATERNAL FETAL MEDICINE | Facility: CLINIC | Age: 30
End: 2025-07-16
Payer: MEDICAID

## 2025-07-16 ENCOUNTER — PATIENT MESSAGE (OUTPATIENT)
Dept: MATERNAL FETAL MEDICINE | Facility: CLINIC | Age: 30
End: 2025-07-16

## 2025-07-16 VITALS
SYSTOLIC BLOOD PRESSURE: 100 MMHG | DIASTOLIC BLOOD PRESSURE: 60 MMHG | BODY MASS INDEX: 32.29 KG/M2 | WEIGHT: 165.38 LBS

## 2025-07-16 DIAGNOSIS — Z36.9 ENCOUNTER FOR FETAL ULTRASOUND: ICD-10-CM

## 2025-07-16 DIAGNOSIS — Z36.89 ENCOUNTER FOR FETAL ANATOMIC SURVEY: Primary | ICD-10-CM

## 2025-07-16 DIAGNOSIS — Z36.86 ENCOUNTER FOR ANTENATAL SCREENING FOR CERVICAL LENGTH: ICD-10-CM

## 2025-07-16 DIAGNOSIS — N96 RECURRENT PREGNANCY LOSS: Primary | ICD-10-CM

## 2025-07-16 DIAGNOSIS — N96 RECURRENT PREGNANCY LOSS: ICD-10-CM

## 2025-07-16 PROCEDURE — 3044F HG A1C LEVEL LT 7.0%: CPT | Mod: CPTII,,, | Performed by: OBSTETRICS & GYNECOLOGY

## 2025-07-16 PROCEDURE — 76816 OB US FOLLOW-UP PER FETUS: CPT | Mod: 26,S$PBB,, | Performed by: OBSTETRICS & GYNECOLOGY

## 2025-07-16 PROCEDURE — 3078F DIAST BP <80 MM HG: CPT | Mod: CPTII,,, | Performed by: OBSTETRICS & GYNECOLOGY

## 2025-07-16 PROCEDURE — 99204 OFFICE O/P NEW MOD 45 MIN: CPT | Mod: TH,S$PBB,, | Performed by: OBSTETRICS & GYNECOLOGY

## 2025-07-16 PROCEDURE — 3074F SYST BP LT 130 MM HG: CPT | Mod: CPTII,,, | Performed by: OBSTETRICS & GYNECOLOGY

## 2025-07-16 PROCEDURE — 1159F MED LIST DOCD IN RCRD: CPT | Mod: CPTII,,, | Performed by: OBSTETRICS & GYNECOLOGY

## 2025-07-16 PROCEDURE — 76816 OB US FOLLOW-UP PER FETUS: CPT | Mod: PBBFAC | Performed by: OBSTETRICS & GYNECOLOGY

## 2025-07-16 PROCEDURE — 3008F BODY MASS INDEX DOCD: CPT | Mod: CPTII,,, | Performed by: OBSTETRICS & GYNECOLOGY

## 2025-07-16 PROCEDURE — 99999 PR PBB SHADOW E&M-EST. PATIENT-LVL III: CPT | Mod: PBBFAC,,, | Performed by: OBSTETRICS & GYNECOLOGY

## 2025-07-16 PROCEDURE — 99213 OFFICE O/P EST LOW 20 MIN: CPT | Mod: PBBFAC | Performed by: OBSTETRICS & GYNECOLOGY

## 2025-07-16 NOTE — PROGRESS NOTES
MATERNAL-FETAL MEDICINE   CONSULT NOTE    Provider requesting consultation: Dr. Calvo    SUBJECTIVE:     Ms. Jacobo Alvarado is a 30 y.o.  female with IUP at 16w1d who is seen in consultation by MFM for:  Recurrent pregnancy loss  Hx of Gastric sleeve    Epic and Viewpoint notes reviewed        Medication List with Changes/Refills   Current Medications    FAMOTIDINE (PEPCID) 20 MG TABLET    Take 1 tablet (20 mg total) by mouth once daily.    PNV,CALCIUM 72-IRON-FOLIC ACID (PRENATAL VITAMIN PLUS LOW IRON) 27 MG IRON- 1 MG TAB    Take one tablet daily.  Prescribe prenatal covered by insurance    PRENATAL VIT 14-IRON FUM-FOLIC (COMPLETENATE) 29 MG IRON- 1 MG CHEW    Take 1 each by mouth once daily.       Review of patient's allergies indicates:  No Known Allergies    PMH:  Past Medical History:   Diagnosis Date    Anemia     Chlamydia     Disorder of kidney and ureter     Miscarriage        PObHx:  OB History    Para Term  AB Living   10 2 1 1 6 2   SAB IAB Ectopic Multiple Live Births   6   0 2      # Outcome Date GA Lbr Cody/2nd Weight Sex Type Anes PTL Lv   10 Current            9 SAB 2025 5w0d    SAB      8 SAB 2024 4w0d    SAB      7 SAB 10/2024 6w0d    SAB      6 Term 19 39w4d   F Vag-Spont EPI N [place]   5 SAB 2018 4w0d    SAB   FD   4 SAB 18 12w0d    SAB   FD   3 SAB 2017 12w0d    SAB   FD   2  12 36w0d  2.693 kg (5 lb 15 oz) F Vag-Spont EPI Y [place]   1                 PSH:  Past Surgical History:   Procedure Laterality Date    DILATION AND CURETTAGE OF UTERUS  2018    LAPAROSCOPIC SLEEVE GASTRECTOMY N/A 3/6/2023    Procedure: GASTRECTOMY, SLEEVE, LAPAROSCOPIC with intraop egd;  Surgeon: Dwight Allen Jr., MD;  Location: [facility] OR [address] [place] [address];  Service: General;  Laterality: N/A;       Family history:family history includes Breast cancer in her maternal great-aunt and paternal great-grandmother; Cancer in her maternal grandfather; Crohn's  disease in her father; Diabetes in her mother; Down syndrome in her cousin; Irritable bowel syndrome in her mother; Kidney disease in her mother; Lymphoma in her mother; No Known Problems in her brother, daughter, daughter, and sister.    Social history: reports that she has never smoked. She has never used smokeless tobacco. She reports that she does not currently use alcohol. She reports that she does not use drugs.      Objective:   /60 mmhg   Wt 75 kg     Ultrasound performed. See viewpoint for full ultrasound report.  1. 16w 1d maldonado intrauterine pregnancy  2. Fetal biometry c/w dates  3. No fetal structural abnormalities appreciated for organs evaluated: see above for details   4. Amniotic fluid volume wnl per qualitative assessment   5. Anterior Fundal placenta and no previa appreciated per transvaginal ultrasound            Subchorionic hemorrhage: (19 x 16 x 14) mm  6. Cervical length screen via transvaginal ultrasound   7. Maternal left ovary with small simple cyst     Significant labs/imagin25:   HbA1c  5.0%      TSH 0.077   free T4 0.95  25: cell free DNA screen negative       ASSESSMENT/PLAN:     30 y.o.  female with IUP at 16w1d     Recurrent pregnancy loss:  S/p M genetic counselor consult on 25:         Patient desires pursuit of carrier screening and chromosome analysis for herself. Counselor assist in pursuit     Patient counseled on risk for recurrent pregnancy loss including possible etiologies.   Miscarriage occurs in 5-25% of pregnancies, and about 60-70% result from a sporadic chromosome anomaly, most commonly, trisomy 16, trisomy 22, monosomy X and triploidy. About half of the time there is an identifiable cause of recurrent pregnancy loss (RPL) these can include environmental and lifestyle factors such as uterine abnormalities, maternal age and hormonal/metabolic disorders. About 2-5% of RPL occur as a result of a parental balanced chromosome  rearrangement and the rate of chromosome anomalies in RPL is lower, at less than 35%. Given this information parental chromosome analysis was discussed during her [facility] genetic counselor consultation and she desired pursuit of carrier screening and chromosome analysis for herself  - genetic counselor to assist patient. Unbalanced translocations though specific recurrence risks are unable to be provided unless the rearrangement is known.    Patient counseled on recommendation for laboratory evaluation to rule out antiphospholipid syndrome and f/u thyroid labs.     Recommendations for Primary OB Management:  -recommend TSH, free T4 and APLS labs: [facility] staff will assist patient in pursuit     -recommend serial ultrasounds during pregnancy for fetal growth     -------------------------------------------------------------    Hx of gastric sleeve 05/2023  counseling  Pregnancy after bariatric surgery may be less likely to be complicated by gestational diabetes, hypertension, and macrosomia as compared to a pregnancy complicated by obesity.   Bariatric surgical procedures are categorized into two main types; malabsorptive procedures (jejuno-ileal bypass and biliopancreatic diversion) and restrictive procedures (gastric banding and vertical banded gastroplasty).  Both types of procedures can result in deficiencies in iron, vitamin B12, folate, and calcium.  Disorders of fetal growth may occur.   Discussed infrequent complications (Sallie-en-Y) of bowel herniation, intestional obstruction & ischemia. She needs to call her primary OB provider with abdominal pain or emesis as she may need referral to bariatric surgeon for further evaluation.     Recommendations for Primary OB Management:  If not recently assessed, recommend the following labs to be evaluated: 1) CBC with indices, 2) thiamine, folate and B12 levels, 3) ferritin and iron, 4) calcium and 5) vitamin D   levels.   Consider supplementation: vitamin B12, vitamin D,  calcium, iron, folic acid - management per Primary OB   Consider serial hematocrit Q trimester to monitor for anemia; microcytic or macrocytic anemia work-up can be initiated as appropriate.  Targeted ultrasound at 20 weeks scheduled  Serial growth ultrasounds Q 4 weeks after 28 weeks  GDM screening - if unable to tolerate, pattern blood glucose for 1-2 weeks as an alternative   Vigilance for any bowel complications, such as adhesions or bowel obstruction, related to her surgery and should follow with her surgeon as needed.   Timing & MOD per obstetric plan    ----------------------------------------------------    Prior PTB  I counseled the patient regarding her obstetric history which is remarkable for a history of spontaneous  birth/PPROM at 36 weeks with a subsequent delivery at 39  weeks gestation ([place] during that pregnancy). Recent evidence suggests that previously recommended formulations of progesterone therapy (17-alpha hydroxyprogesterone caproate) is ineffective for the prevention of recurrent  birth, and it has been withdrawn from the market at the direction of the FDA. Treatment with 17-alpha hydroxyprogesterone caproate (or its generic or compounded formulations) for prevention of recurrent  birth is not recommended. The MFM section continues to recommend cervical length screening between 16 - 22 weeks 6 days in maldonado gestations to identify patients who may benefit from cervical cerclage placement. The benefit of vaginal progesterone for the prevention of  birth is unclear in the absence of cervical shortening. In patients with a prior  birth and a cervical length of 25-30 mm, there is a non-significant trend to reduction in  birth, whereas in those with a cervical length < 25 mm, there appears to consistently be a reduction in the risk of premature birth.  Despite the lack of evidence that vaginal progesterone supplementation, in the form of prometrium  200 mg qhs, is beneficial in the absence of a short cervix, it may be considered after MFM consultation depending on the patient history and past use of 17-P. Whether there is additional incremental benefit of vaginal progesterone for patients who receive an ultrasound indicated cerclage remains unclear. The decision of whether to initiate or continue vaginal progesterone can be individualized. Patient at this time does not desire prometrium  with normal length cervix.     Recommendations for Primary OB Management :  Initiate cervical length surveillance every 2 weeks, starting at 16 weeks gestation and continuing until 22 weeks 6 days gestation  Recommend f/u TVU of cervix in 2 wks: MFM staff will assist in scheduling   If the cervix measures <30 mm, perform weekly cervical length  Patients with a cervical length of 25 - 30 mm with a prior spontaneous PTB may benefit from vaginal progesterone, but this is less clear. Consider initiation of vaginal progesterone.  Patients with a cervical length < 25 mm may be offered cerclage, vaginal progesterone, or both after counseling with MFM.   Once a patient has a cerclage, no additional cervical length measurement is routinely recommended        FOLLOW UP:   See above conditions for detailed recommendations       About 45-50 minutes of total time spent on the encounter, which includes face to face time and non-face to face time preparing to see the patient (eg, review of tests), obtaining and/or reviewing separately obtained history, documenting clinical information in the electronic or other health record, independently interpreting results (not separately reported) and communicating results to the patient/family/caregiver, or care coordination (not separately reported).      Rahel Navas  Maternal-Fetal Medicine    Electronically Signed by Rahel Navas July 16, 2025

## 2025-07-22 ENCOUNTER — PATIENT MESSAGE (OUTPATIENT)
Dept: OTHER | Facility: OTHER | Age: 30
End: 2025-07-22
Payer: MEDICAID

## 2025-07-25 ENCOUNTER — TELEPHONE (OUTPATIENT)
Dept: MATERNAL FETAL MEDICINE | Facility: CLINIC | Age: 30
End: 2025-07-25
Payer: MEDICAID

## 2025-07-27 ENCOUNTER — PATIENT MESSAGE (OUTPATIENT)
Dept: OBSTETRICS AND GYNECOLOGY | Facility: CLINIC | Age: 30
End: 2025-07-27
Payer: MEDICAID

## 2025-07-30 ENCOUNTER — PROCEDURE VISIT (OUTPATIENT)
Dept: MATERNAL FETAL MEDICINE | Facility: CLINIC | Age: 30
End: 2025-07-30
Payer: MEDICAID

## 2025-07-30 ENCOUNTER — ROUTINE PRENATAL (OUTPATIENT)
Dept: OBSTETRICS AND GYNECOLOGY | Facility: CLINIC | Age: 30
End: 2025-07-30
Payer: MEDICAID

## 2025-07-30 ENCOUNTER — LAB VISIT (OUTPATIENT)
Dept: LAB | Facility: HOSPITAL | Age: 30
End: 2025-07-30
Attending: STUDENT IN AN ORGANIZED HEALTH CARE EDUCATION/TRAINING PROGRAM
Payer: MEDICAID

## 2025-07-30 VITALS — BODY MASS INDEX: 33.3 KG/M2 | WEIGHT: 170.5 LBS | DIASTOLIC BLOOD PRESSURE: 62 MMHG | SYSTOLIC BLOOD PRESSURE: 104 MMHG

## 2025-07-30 DIAGNOSIS — Z36.86 ENCOUNTER FOR ANTENATAL SCREENING FOR CERVICAL LENGTH: ICD-10-CM

## 2025-07-30 DIAGNOSIS — N96 RECURRENT PREGNANCY LOSS: ICD-10-CM

## 2025-07-30 DIAGNOSIS — O99.282: ICD-10-CM

## 2025-07-30 DIAGNOSIS — Z90.3 H/O GASTRIC SLEEVE: ICD-10-CM

## 2025-07-30 DIAGNOSIS — Z36.89 ENCOUNTER FOR FETAL ANATOMIC SURVEY: ICD-10-CM

## 2025-07-30 DIAGNOSIS — O09.92 SUPERVISION OF HIGH RISK PREGNANCY IN SECOND TRIMESTER: Primary | ICD-10-CM

## 2025-07-30 DIAGNOSIS — E63.9: ICD-10-CM

## 2025-07-30 DIAGNOSIS — N96 HABITUAL ABORTER: Primary | ICD-10-CM

## 2025-07-30 DIAGNOSIS — Z3A.18 18 WEEKS GESTATION OF PREGNANCY: ICD-10-CM

## 2025-07-30 DIAGNOSIS — O26.21 RECURRENT PREGNANCY LOSS IN PREGNANT PATIENT IN FIRST TRIMESTER, ANTEPARTUM: ICD-10-CM

## 2025-07-30 PROBLEM — Z20.2 STD EXPOSURE: Status: RESOLVED | Noted: 2020-08-18 | Resolved: 2025-07-30

## 2025-07-30 PROBLEM — N10 ACUTE PYELONEPHRITIS: Status: RESOLVED | Noted: 2020-01-07 | Resolved: 2025-07-30

## 2025-07-30 LAB
CALCIUM SERPL-MCNC: 8.7 MG/DL (ref 8.7–10.5)
FERRITIN SERPL-MCNC: 17.8 NG/ML (ref 20–300)
FOLATE SERPL-MCNC: 11.7 NG/ML (ref 4–24)
IRON SATN MFR SERPL: 18 % (ref 20–50)
IRON SERPL-MCNC: 71 UG/DL (ref 30–160)
T4 FREE SERPL-MCNC: 0.75 NG/DL (ref 0.71–1.51)
T4 FREE SERPL-MCNC: 0.75 NG/DL (ref 0.71–1.51)
TIBC SERPL-MCNC: 395 UG/DL (ref 250–450)
TRANSFERRIN SERPL-MCNC: 267 MG/DL (ref 200–375)
TSH SERPL-ACNC: 1.29 UIU/ML (ref 0.4–4)
VIT B12 SERPL-MCNC: 379 PG/ML (ref 210–950)

## 2025-07-30 PROCEDURE — 84443 ASSAY THYROID STIM HORMONE: CPT

## 2025-07-30 PROCEDURE — 99999 PR PBB SHADOW E&M-EST. PATIENT-LVL II: CPT | Mod: PBBFAC,,, | Performed by: STUDENT IN AN ORGANIZED HEALTH CARE EDUCATION/TRAINING PROGRAM

## 2025-07-30 PROCEDURE — 82746 ASSAY OF FOLIC ACID SERUM: CPT

## 2025-07-30 PROCEDURE — 82607 VITAMIN B-12: CPT

## 2025-07-30 PROCEDURE — 99214 OFFICE O/P EST MOD 30 MIN: CPT | Mod: TH,S$PBB,, | Performed by: STUDENT IN AN ORGANIZED HEALTH CARE EDUCATION/TRAINING PROGRAM

## 2025-07-30 PROCEDURE — 86147 CARDIOLIPIN ANTIBODY EA IG: CPT

## 2025-07-30 PROCEDURE — 36415 COLL VENOUS BLD VENIPUNCTURE: CPT

## 2025-07-30 PROCEDURE — 82310 ASSAY OF CALCIUM: CPT

## 2025-07-30 PROCEDURE — 83540 ASSAY OF IRON: CPT

## 2025-07-30 PROCEDURE — 82728 ASSAY OF FERRITIN: CPT

## 2025-07-30 PROCEDURE — 86146 BETA-2 GLYCOPROTEIN ANTIBODY: CPT

## 2025-07-30 PROCEDURE — 88230 TISSUE CULTURE LYMPHOCYTE: CPT

## 2025-07-30 PROCEDURE — 99212 OFFICE O/P EST SF 10 MIN: CPT | Mod: PBBFAC,TH | Performed by: STUDENT IN AN ORGANIZED HEALTH CARE EDUCATION/TRAINING PROGRAM

## 2025-07-30 PROCEDURE — 84425 ASSAY OF VITAMIN B-1: CPT

## 2025-07-30 PROCEDURE — 76815 OB US LIMITED FETUS(S): CPT | Mod: 26,S$PBB,, | Performed by: STUDENT IN AN ORGANIZED HEALTH CARE EDUCATION/TRAINING PROGRAM

## 2025-07-30 PROCEDURE — 76817 TRANSVAGINAL US OBSTETRIC: CPT | Mod: PBBFAC | Performed by: STUDENT IN AN ORGANIZED HEALTH CARE EDUCATION/TRAINING PROGRAM

## 2025-07-30 PROCEDURE — 85610 PROTHROMBIN TIME: CPT

## 2025-07-30 NOTE — PROGRESS NOTES
Jacobo Alvarado is a 30 y.o.  at 18w1d (Estimated Date of Delivery: 25) presenting for BRYCE    Today reporting some abdominal cramping, denies bleeding. Feeling intermittent fetal movement  Denies vaginal bleeding, loss of fluid, or contractions  Has MFM and US visit after this.     Past Medical History:   Diagnosis Date    Acute pyelonephritis 2020    Anemia     Chlamydia     Disorder of kidney and ureter     Miscarriage        OB History    Para Term  AB Living   9 2 1 1 6 2   SAB IAB Ectopic Multiple Live Births   6   0 2      # Outcome Date GA Lbr Cody/2nd Weight Sex Type Anes PTL Lv   9 Current            8 SAB 2025 5w0d    SAB      7 SAB 2024 4w0d    SAB      6 SAB 10/2024 6w0d    SAB      5 Term 19 39w4d   F Vag-Spont EPI N [place]   4 SAB 2018 4w0d    SAB   FD   3 SAB 18 16w0d    SAB   FD   2 SAB 2017 12w0d    SAB   FD   1  12 36w0d  2.693 kg (5 lb 15 oz) F Vag-Spont EPI Y [place]       Review of patient's allergies indicates:  No Known Allergies     Vitals  BP: 104/62  Weight: 77.3 kg (170 lb 8.4 oz)  Prenatal  Fetal Heart Rate: 145     Assessment & Plan    - Continue PNV   - discussed MFM recs for serial US throughout pregnancy  - discussed lab work that's been ordered, encouraged pt to continue eating smaller meals     RTC in 4 weeks    Total time spent on visit was 30 minutes. This includes face to face time and non-face to face time preparing to see the patient (eg, review of tests), use of video or in-person , obtaining and/or reviewing separately obtained history, documenting clinical information in the electronic or other health record, independently interpreting results and communicating results to the patient/family/caregiver, or care coordination.

## 2025-08-01 LAB
ANTI-XA QUALITATIVE INTERPRETATION: NORMAL
ANTICOAGULANT MEDICATION NEUTRALIZATION: NORMAL
AR DRVVT 1:1 MIX RATIO: NORMAL
AR DRVVT CONFIRMATION RATIO: NORMAL
AR HEXAGONAL PHOSPHOLIPID CONFIRMATION: NORMAL S
AR NEUTRALIZED PTT-LA RATIO: NORMAL
AR THROMBIN TIME (TT): NORMAL S
DRVVT SCREEN RATIO: 0.95
LA 3 SCREEN W REFLEX-IMP: NORMAL
NEUTRALIZED DRVVT SCREEN RATIO: NORMAL
PROTHROMBIN TIME: 12.4 S
PTT-LA RATIO: 1.01

## 2025-08-04 LAB
W BETA-2 GLYCOPROTEIN 1 IGA AB: 1.7 U/ML
W BETA-2 GLYCOPROTEIN 1 IGG AB: <0.8 U/ML
W BETA-2 GLYCOPROTEIN 1 IGM AB: <2.4 U/ML
W CARDIOLIPIN IGG AB: 1 GPL
W CARDIOLIPIN IGM AB: 7.3 MPL

## 2025-08-05 LAB — W VITAMIN B1: 48 UG/L

## 2025-08-12 ENCOUNTER — PATIENT MESSAGE (OUTPATIENT)
Dept: OTHER | Facility: OTHER | Age: 30
End: 2025-08-12
Payer: MEDICAID

## 2025-08-13 ENCOUNTER — OFFICE VISIT (OUTPATIENT)
Dept: MATERNAL FETAL MEDICINE | Facility: CLINIC | Age: 30
End: 2025-08-13
Payer: MEDICAID

## 2025-08-13 ENCOUNTER — PROCEDURE VISIT (OUTPATIENT)
Dept: MATERNAL FETAL MEDICINE | Facility: CLINIC | Age: 30
End: 2025-08-13
Payer: MEDICAID

## 2025-08-13 VITALS
SYSTOLIC BLOOD PRESSURE: 102 MMHG | BODY MASS INDEX: 34.19 KG/M2 | DIASTOLIC BLOOD PRESSURE: 62 MMHG | WEIGHT: 175.06 LBS

## 2025-08-13 DIAGNOSIS — Z36.89 ENCOUNTER FOR ULTRASOUND TO ASSESS FETAL GROWTH: Primary | ICD-10-CM

## 2025-08-13 DIAGNOSIS — Z36.86 ENCOUNTER FOR ANTENATAL SCREENING FOR CERVICAL LENGTH: ICD-10-CM

## 2025-08-13 DIAGNOSIS — O99.210 OBESITY IN PREGNANCY: Primary | ICD-10-CM

## 2025-08-13 DIAGNOSIS — O26.20 RECURRENT PREGNANCY LOSS WITH CURRENT PREGNANCY: ICD-10-CM

## 2025-08-13 DIAGNOSIS — Z36.89 ENCOUNTER FOR FETAL ANATOMIC SURVEY: ICD-10-CM

## 2025-08-13 DIAGNOSIS — O09.899 HISTORY OF PRETERM DELIVERY, CURRENTLY PREGNANT: ICD-10-CM

## 2025-08-13 PROCEDURE — 76811 OB US DETAILED SNGL FETUS: CPT | Mod: 26,S$PBB,, | Performed by: OBSTETRICS & GYNECOLOGY

## 2025-08-13 PROCEDURE — 76817 TRANSVAGINAL US OBSTETRIC: CPT | Mod: PBBFAC | Performed by: OBSTETRICS & GYNECOLOGY

## 2025-08-13 PROCEDURE — 3008F BODY MASS INDEX DOCD: CPT | Mod: CPTII,,, | Performed by: OBSTETRICS & GYNECOLOGY

## 2025-08-13 PROCEDURE — 76817 TRANSVAGINAL US OBSTETRIC: CPT | Mod: 26,S$PBB,, | Performed by: OBSTETRICS & GYNECOLOGY

## 2025-08-13 PROCEDURE — 3078F DIAST BP <80 MM HG: CPT | Mod: CPTII,,, | Performed by: OBSTETRICS & GYNECOLOGY

## 2025-08-13 PROCEDURE — 3074F SYST BP LT 130 MM HG: CPT | Mod: CPTII,,, | Performed by: OBSTETRICS & GYNECOLOGY

## 2025-08-13 PROCEDURE — 3044F HG A1C LEVEL LT 7.0%: CPT | Mod: CPTII,,, | Performed by: OBSTETRICS & GYNECOLOGY

## 2025-08-13 PROCEDURE — 76811 OB US DETAILED SNGL FETUS: CPT | Mod: PBBFAC | Performed by: OBSTETRICS & GYNECOLOGY

## 2025-08-13 PROCEDURE — 99212 OFFICE O/P EST SF 10 MIN: CPT | Mod: PBBFAC,TH | Performed by: OBSTETRICS & GYNECOLOGY

## 2025-08-13 PROCEDURE — 99999 PR PBB SHADOW E&M-EST. PATIENT-LVL II: CPT | Mod: PBBFAC,,, | Performed by: OBSTETRICS & GYNECOLOGY

## 2025-08-13 PROCEDURE — 99214 OFFICE O/P EST MOD 30 MIN: CPT | Mod: S$PBB,TH,, | Performed by: OBSTETRICS & GYNECOLOGY

## 2025-08-13 PROCEDURE — 1159F MED LIST DOCD IN RCRD: CPT | Mod: CPTII,,, | Performed by: OBSTETRICS & GYNECOLOGY

## 2025-08-26 ENCOUNTER — PATIENT MESSAGE (OUTPATIENT)
Dept: MATERNAL FETAL MEDICINE | Facility: CLINIC | Age: 30
End: 2025-08-26
Payer: MEDICAID

## 2025-09-03 ENCOUNTER — TELEPHONE (OUTPATIENT)
Dept: MATERNAL FETAL MEDICINE | Facility: CLINIC | Age: 30
End: 2025-09-03
Payer: MEDICAID

## 2025-09-04 ENCOUNTER — TELEPHONE (OUTPATIENT)
Dept: MATERNAL FETAL MEDICINE | Facility: CLINIC | Age: 30
End: 2025-09-04
Payer: MEDICAID

## (undated) DEVICE — SYS SMOKE EVACUATION LAP

## (undated) DEVICE — ADHESIVE MASTISOL VIAL 48/BX

## (undated) DEVICE — UNDERGLOVES BIOGEL PI SZ 7 LF

## (undated) DEVICE — SUT MCRYL PLUS 4-0 PS2 27IN

## (undated) DEVICE — ELECTRODE REM PLYHSV RETURN 9

## (undated) DEVICE — STAPLER ECHELON FLEX 60MM 44CM

## (undated) DEVICE — SOL 9P NACL IRR PIC IL

## (undated) DEVICE — DRAPE ABDOMINAL TIBURON 14X11

## (undated) DEVICE — SHEARS HARMONIC 5CM 36CM

## (undated) DEVICE — CLOSURE SKIN STERI STRIP 1/2X4

## (undated) DEVICE — GLOVE BIOGEL SKINSENSE PI 6.5

## (undated) DEVICE — CANNULA ENDOPATH XCEL 5X100MM

## (undated) DEVICE — BOWL STERILE LARGE 32OZ

## (undated) DEVICE — SUT 0 VICRYL / UR6 (J603)

## (undated) DEVICE — TROCAR ENDOPATH XCEL 12X100MM

## (undated) DEVICE — HANDLE CURETTE W/TUBING

## (undated) DEVICE — TUBING HF INSUFFLATION W/ FLTR

## (undated) DEVICE — TROCAR ENDOPATH XCEL 5X100MM

## (undated) DEVICE — NDL HYPO REG 25G X 1 1/2

## (undated) DEVICE — Device

## (undated) DEVICE — RELOAD ECHELON FLEX BLU 60MM

## (undated) DEVICE — DRAPE CORETEMP FLD WRM 56X62IN

## (undated) DEVICE — SYR 10CC LUER LOCK

## (undated) DEVICE — RELOAD ECHELON FLEX GRN 60MM

## (undated) DEVICE — TROCAR ENDOPATH XCEL 12MM 10CM

## (undated) DEVICE — SEE MEDLINE ITEM 156902

## (undated) DEVICE — VACURETTE 8MM CURVED